# Patient Record
Sex: FEMALE | Race: WHITE | NOT HISPANIC OR LATINO | Employment: OTHER | URBAN - METROPOLITAN AREA
[De-identification: names, ages, dates, MRNs, and addresses within clinical notes are randomized per-mention and may not be internally consistent; named-entity substitution may affect disease eponyms.]

---

## 2016-12-30 RX ORDER — CYANOCOBALAMIN 1000 UG/ML
1000 INJECTION INTRAMUSCULAR; SUBCUTANEOUS ONCE
Status: COMPLETED | OUTPATIENT
Start: 2017-01-03 | End: 2017-01-03

## 2017-01-01 ENCOUNTER — GENERIC CONVERSION - ENCOUNTER (OUTPATIENT)
Dept: OBGYN CLINIC | Facility: CLINIC | Age: 82
End: 2017-01-01

## 2017-01-01 ENCOUNTER — APPOINTMENT (OUTPATIENT)
Dept: RADIOLOGY | Facility: CLINIC | Age: 82
End: 2017-01-01
Payer: COMMERCIAL

## 2017-01-01 ENCOUNTER — GENERIC CONVERSION - ENCOUNTER (OUTPATIENT)
Dept: OTHER | Facility: OTHER | Age: 82
End: 2017-01-01

## 2017-01-01 DIAGNOSIS — I12.9 HYPERTENSIVE CHRONIC KIDNEY DISEASE WITH STAGE 1 THROUGH STAGE 4 CHRONIC KIDNEY DISEASE, OR UNSPECIFIED CHRONIC KIDNEY DISEASE: ICD-10-CM

## 2017-01-01 DIAGNOSIS — S72.401D: ICD-10-CM

## 2017-01-01 DIAGNOSIS — Z91.81 HISTORY OF FALLING: ICD-10-CM

## 2017-01-01 PROCEDURE — 73552 X-RAY EXAM OF FEMUR 2/>: CPT

## 2017-01-03 ENCOUNTER — HOSPITAL ENCOUNTER (OUTPATIENT)
Dept: INFUSION CENTER | Facility: CLINIC | Age: 82
Discharge: HOME | End: 2017-01-03
Payer: MEDICARE

## 2017-01-03 PROCEDURE — 96372 THER/PROPH/DIAG INJ SC/IM: CPT

## 2017-01-03 RX ORDER — POTASSIUM CHLORIDE 20 MEQ/1
10 TABLET, EXTENDED RELEASE ORAL EVERY OTHER DAY
COMMUNITY
End: 2018-01-01 | Stop reason: HOSPADM

## 2017-01-03 RX ORDER — FUROSEMIDE 20 MG/1
40 TABLET ORAL EVERY OTHER DAY
Status: ON HOLD | COMMUNITY
End: 2018-01-01

## 2017-01-03 RX ORDER — WARFARIN SODIUM 1 MG/1
2.5 TABLET ORAL DAILY
COMMUNITY

## 2017-01-03 RX ADMIN — CYANOCOBALAMIN 1000 MCG: 1000 INJECTION, SOLUTION INTRAMUSCULAR at 11:57

## 2017-01-03 NOTE — PLAN OF CARE

## 2017-01-03 NOTE — PLAN OF CARE
Problem: PAIN - ADULT  Goal: Verbalizes/displays adequate comfort level or baseline comfort level  Interventions:  - Encourage patient to monitor pain and request assistance  - Assess pain using appropriate pain scale  - Administer analgesics based on type and severity of pain and evaluate response  - Implement non-pharmacological measures as appropriate and evaluate response  - Consider cultural and social influences on pain and pain management  - Notify physician/advanced practitioner if interventions unsuccessful or patient reports new pain  Outcome: Progressing

## 2017-01-03 NOTE — PROGRESS NOTES
Patient tolerated b12 injection to left deltoid as ordered and will RTO in 1 month for same  Daughter stated patient is on new BP medication but unsure of name  She will bring a list in at next appointment

## 2017-01-03 NOTE — PLAN OF CARE
Problem: Potential for Falls  Goal: Patient will remain free of falls  INTERVENTIONS:  - Assess patient frequently for physical needs  - Identify cognitive and physical deficits and behaviors that affect risk of falls    - [place] fall precautions as indicated by assessment   - Educate patient/family on patient safety including physical limitations  - Instruct patient to call for assistance with activity based on assessment  - Modify environment to reduce risk of injury  - Consider OT/PT consult to assist with strengthening/mobility   Outcome: Progressing

## 2017-01-12 ENCOUNTER — APPOINTMENT (OUTPATIENT)
Dept: LAB | Facility: CLINIC | Age: 82
End: 2017-01-12
Payer: MEDICARE

## 2017-01-12 ENCOUNTER — TRANSCRIBE ORDERS (OUTPATIENT)
Dept: LAB | Facility: CLINIC | Age: 82
End: 2017-01-12

## 2017-01-12 DIAGNOSIS — I48.91 ATRIAL FIBRILLATION, UNSPECIFIED TYPE (HCC): ICD-10-CM

## 2017-01-12 DIAGNOSIS — R06.02 SHORTNESS OF BREATH: ICD-10-CM

## 2017-01-12 DIAGNOSIS — R60.0 LOCALIZED EDEMA: ICD-10-CM

## 2017-01-12 LAB
INR PPP: 2.37 (ref 0.86–1.16)
PROTHROMBIN TIME: 25 SECONDS (ref 12–14.3)

## 2017-01-12 PROCEDURE — 85610 PROTHROMBIN TIME: CPT

## 2017-01-12 PROCEDURE — 36415 COLL VENOUS BLD VENIPUNCTURE: CPT

## 2017-01-25 ENCOUNTER — APPOINTMENT (OUTPATIENT)
Dept: LAB | Facility: CLINIC | Age: 82
End: 2017-01-25
Payer: MEDICARE

## 2017-01-25 DIAGNOSIS — I48.91 ATRIAL FIBRILLATION, UNSPECIFIED TYPE (HCC): ICD-10-CM

## 2017-01-25 DIAGNOSIS — R06.02 SHORTNESS OF BREATH: ICD-10-CM

## 2017-01-25 DIAGNOSIS — R60.0 LOCALIZED EDEMA: ICD-10-CM

## 2017-01-25 LAB
INR PPP: 1.94 (ref 0.86–1.16)
PROTHROMBIN TIME: 21.5 SECONDS (ref 12–14.3)

## 2017-01-25 PROCEDURE — 85610 PROTHROMBIN TIME: CPT

## 2017-01-25 PROCEDURE — 36415 COLL VENOUS BLD VENIPUNCTURE: CPT

## 2017-01-27 RX ORDER — CYANOCOBALAMIN 1000 UG/ML
1000 INJECTION INTRAMUSCULAR; SUBCUTANEOUS ONCE
Status: COMPLETED | OUTPATIENT
Start: 2017-01-30 | End: 2017-01-30

## 2017-01-30 ENCOUNTER — HOSPITAL ENCOUNTER (OUTPATIENT)
Dept: INFUSION CENTER | Facility: CLINIC | Age: 82
Discharge: HOME | End: 2017-01-30
Payer: MEDICARE

## 2017-01-30 PROCEDURE — 96372 THER/PROPH/DIAG INJ SC/IM: CPT

## 2017-01-30 RX ADMIN — CYANOCOBALAMIN 1000 MCG: 1000 INJECTION, SOLUTION INTRAMUSCULAR at 12:08

## 2017-01-30 NOTE — PROGRESS NOTES
Pt offers no complaints today  B12 given in right arm, band aid applied   Copy of schedule given, declines AVS

## 2017-02-08 ENCOUNTER — APPOINTMENT (OUTPATIENT)
Dept: LAB | Facility: CLINIC | Age: 82
End: 2017-02-08
Payer: MEDICARE

## 2017-02-08 DIAGNOSIS — R60.0 LOCALIZED EDEMA: ICD-10-CM

## 2017-02-08 DIAGNOSIS — R06.02 SHORTNESS OF BREATH: ICD-10-CM

## 2017-02-08 DIAGNOSIS — I48.91 ATRIAL FIBRILLATION, UNSPECIFIED TYPE (HCC): ICD-10-CM

## 2017-02-08 LAB
INR PPP: 3.08 (ref 0.86–1.16)
PROTHROMBIN TIME: 30.5 SECONDS (ref 12–14.3)

## 2017-02-08 PROCEDURE — 36415 COLL VENOUS BLD VENIPUNCTURE: CPT

## 2017-02-08 PROCEDURE — 85610 PROTHROMBIN TIME: CPT

## 2017-02-14 ENCOUNTER — GENERIC CONVERSION - ENCOUNTER (OUTPATIENT)
Dept: OTHER | Facility: OTHER | Age: 82
End: 2017-02-14

## 2017-02-23 ENCOUNTER — APPOINTMENT (OUTPATIENT)
Dept: LAB | Facility: CLINIC | Age: 82
End: 2017-02-23
Payer: MEDICARE

## 2017-02-23 ENCOUNTER — TRANSCRIBE ORDERS (OUTPATIENT)
Dept: LAB | Facility: CLINIC | Age: 82
End: 2017-02-23

## 2017-02-23 DIAGNOSIS — R06.02 SHORTNESS OF BREATH: ICD-10-CM

## 2017-02-23 DIAGNOSIS — R60.0 LOCALIZED EDEMA: ICD-10-CM

## 2017-02-23 DIAGNOSIS — I48.91 ATRIAL FIBRILLATION, UNSPECIFIED TYPE (HCC): ICD-10-CM

## 2017-02-23 LAB
INR PPP: 3 (ref 0.86–1.16)
PROTHROMBIN TIME: 29.9 SECONDS (ref 12–14.3)

## 2017-02-23 PROCEDURE — 36415 COLL VENOUS BLD VENIPUNCTURE: CPT

## 2017-02-23 PROCEDURE — 85610 PROTHROMBIN TIME: CPT

## 2017-02-24 RX ORDER — CYANOCOBALAMIN 1000 UG/ML
1000 INJECTION INTRAMUSCULAR; SUBCUTANEOUS ONCE
Status: COMPLETED | OUTPATIENT
Start: 2017-02-27 | End: 2017-02-27

## 2017-02-27 ENCOUNTER — HOSPITAL ENCOUNTER (OUTPATIENT)
Dept: INFUSION CENTER | Facility: CLINIC | Age: 82
Discharge: HOME | End: 2017-02-27
Payer: MEDICARE

## 2017-02-27 ENCOUNTER — APPOINTMENT (OUTPATIENT)
Dept: LAB | Facility: CLINIC | Age: 82
End: 2017-02-27
Payer: MEDICARE

## 2017-02-27 DIAGNOSIS — D50.9 IRON DEFICIENCY ANEMIA: ICD-10-CM

## 2017-02-27 LAB
ALBUMIN SERPL BCP-MCNC: 3.2 G/DL (ref 3.5–5)
ALP SERPL-CCNC: 137 U/L (ref 46–116)
ALT SERPL W P-5'-P-CCNC: 28 U/L (ref 12–78)
ANION GAP SERPL CALCULATED.3IONS-SCNC: 8 MMOL/L (ref 4–13)
AST SERPL W P-5'-P-CCNC: 34 U/L (ref 5–45)
BASOPHILS # BLD AUTO: 0.01 THOUSANDS/ΜL (ref 0–0.1)
BASOPHILS NFR BLD AUTO: 0 % (ref 0–1)
BILIRUB SERPL-MCNC: 0.9 MG/DL (ref 0.2–1)
BUN SERPL-MCNC: 35 MG/DL (ref 5–25)
CALCIUM SERPL-MCNC: 9 MG/DL (ref 8.3–10.1)
CHLORIDE SERPL-SCNC: 105 MMOL/L (ref 100–108)
CO2 SERPL-SCNC: 30 MMOL/L (ref 21–32)
CREAT SERPL-MCNC: 1.48 MG/DL (ref 0.6–1.3)
EOSINOPHIL # BLD AUTO: 0.01 THOUSAND/ΜL (ref 0–0.61)
EOSINOPHIL NFR BLD AUTO: 0 % (ref 0–6)
ERYTHROCYTE [DISTWIDTH] IN BLOOD BY AUTOMATED COUNT: 14.3 % (ref 11.6–15.1)
FERRITIN SERPL-MCNC: 16 NG/ML (ref 8–388)
GFR SERPL CREATININE-BSD FRML MDRD: 32.9 ML/MIN/1.73SQ M
GLUCOSE SERPL-MCNC: 79 MG/DL (ref 65–140)
HCT VFR BLD AUTO: 40.6 % (ref 34.8–46.1)
HGB BLD-MCNC: 12.7 G/DL (ref 11.5–15.4)
IRON SATN MFR SERPL: 15 %
IRON SERPL-MCNC: 62 UG/DL (ref 50–170)
LYMPHOCYTES # BLD AUTO: 1.25 THOUSANDS/ΜL (ref 0.6–4.47)
LYMPHOCYTES NFR BLD AUTO: 24 % (ref 14–44)
MCH RBC QN AUTO: 29.5 PG (ref 26.8–34.3)
MCHC RBC AUTO-ENTMCNC: 31.3 G/DL (ref 31.4–37.4)
MCV RBC AUTO: 94 FL (ref 82–98)
MONOCYTES # BLD AUTO: 0.32 THOUSAND/ΜL (ref 0.17–1.22)
MONOCYTES NFR BLD AUTO: 6 % (ref 4–12)
NEUTROPHILS # BLD AUTO: 3.57 THOUSANDS/ΜL (ref 1.85–7.62)
NEUTS SEG NFR BLD AUTO: 70 % (ref 43–75)
PLATELET # BLD AUTO: 179 THOUSANDS/UL (ref 149–390)
PMV BLD AUTO: 10.2 FL (ref 8.9–12.7)
POTASSIUM SERPL-SCNC: 3.5 MMOL/L (ref 3.5–5.3)
PROT SERPL-MCNC: 7.6 G/DL (ref 6.4–8.2)
RBC # BLD AUTO: 4.3 MILLION/UL (ref 3.81–5.12)
SODIUM SERPL-SCNC: 143 MMOL/L (ref 136–145)
TIBC SERPL-MCNC: 423 UG/DL (ref 250–450)
WBC # BLD AUTO: 5.16 THOUSAND/UL (ref 4.31–10.16)

## 2017-02-27 PROCEDURE — 83550 IRON BINDING TEST: CPT

## 2017-02-27 PROCEDURE — 96372 THER/PROPH/DIAG INJ SC/IM: CPT

## 2017-02-27 PROCEDURE — 85025 COMPLETE CBC W/AUTO DIFF WBC: CPT

## 2017-02-27 PROCEDURE — 80053 COMPREHEN METABOLIC PANEL: CPT

## 2017-02-27 PROCEDURE — 36415 COLL VENOUS BLD VENIPUNCTURE: CPT

## 2017-02-27 PROCEDURE — 82728 ASSAY OF FERRITIN: CPT

## 2017-02-27 PROCEDURE — 83540 ASSAY OF IRON: CPT

## 2017-02-27 RX ADMIN — CYANOCOBALAMIN 1000 MCG: 1000 INJECTION, SOLUTION INTRAMUSCULAR at 12:04

## 2017-03-09 ENCOUNTER — APPOINTMENT (OUTPATIENT)
Dept: LAB | Facility: CLINIC | Age: 82
End: 2017-03-09
Payer: MEDICARE

## 2017-03-09 DIAGNOSIS — R06.02 SHORTNESS OF BREATH: ICD-10-CM

## 2017-03-09 DIAGNOSIS — R60.0 LOCALIZED EDEMA: ICD-10-CM

## 2017-03-09 DIAGNOSIS — I48.91 ATRIAL FIBRILLATION, UNSPECIFIED TYPE (HCC): ICD-10-CM

## 2017-03-09 LAB
INR PPP: 2.84 (ref 0.86–1.16)
PROTHROMBIN TIME: 28.7 SECONDS (ref 12–14.3)

## 2017-03-09 PROCEDURE — 36415 COLL VENOUS BLD VENIPUNCTURE: CPT

## 2017-03-09 PROCEDURE — 85610 PROTHROMBIN TIME: CPT

## 2017-03-23 ENCOUNTER — GENERIC CONVERSION - ENCOUNTER (OUTPATIENT)
Dept: OTHER | Facility: OTHER | Age: 82
End: 2017-03-23

## 2017-03-24 RX ORDER — CYANOCOBALAMIN 1000 UG/ML
1000 INJECTION INTRAMUSCULAR; SUBCUTANEOUS ONCE
Status: COMPLETED | OUTPATIENT
Start: 2017-03-27 | End: 2017-03-27

## 2017-03-27 ENCOUNTER — HOSPITAL ENCOUNTER (OUTPATIENT)
Dept: INFUSION CENTER | Facility: CLINIC | Age: 82
Discharge: HOME | End: 2017-03-27
Payer: MEDICARE

## 2017-03-27 ENCOUNTER — ALLSCRIPTS OFFICE VISIT (OUTPATIENT)
Dept: OTHER | Facility: OTHER | Age: 82
End: 2017-03-27

## 2017-03-27 PROCEDURE — 96372 THER/PROPH/DIAG INJ SC/IM: CPT

## 2017-03-27 RX ADMIN — CYANOCOBALAMIN 1000 MCG: 1000 INJECTION, SOLUTION INTRAMUSCULAR at 11:53

## 2017-03-27 NOTE — PROGRESS NOTES
Pt resting with no complaints  Vitamin B12 given in VAN without issue  Declined AVS  Pt aware of next appt

## 2017-03-29 ENCOUNTER — APPOINTMENT (OUTPATIENT)
Dept: LAB | Facility: CLINIC | Age: 82
End: 2017-03-29
Payer: MEDICARE

## 2017-03-29 ENCOUNTER — TRANSCRIBE ORDERS (OUTPATIENT)
Dept: LAB | Facility: CLINIC | Age: 82
End: 2017-03-29

## 2017-03-29 DIAGNOSIS — R06.02 SHORTNESS OF BREATH: ICD-10-CM

## 2017-03-29 DIAGNOSIS — R60.0 LOCALIZED EDEMA: ICD-10-CM

## 2017-03-29 DIAGNOSIS — I48.91 ATRIAL FIBRILLATION, UNSPECIFIED TYPE (HCC): ICD-10-CM

## 2017-03-29 LAB
INR PPP: 2.47 (ref 0.86–1.16)
PROTHROMBIN TIME: 25.8 SECONDS (ref 12–14.3)

## 2017-03-29 PROCEDURE — 36415 COLL VENOUS BLD VENIPUNCTURE: CPT

## 2017-03-29 PROCEDURE — 85610 PROTHROMBIN TIME: CPT

## 2017-04-05 ENCOUNTER — ALLSCRIPTS OFFICE VISIT (OUTPATIENT)
Dept: OTHER | Facility: OTHER | Age: 82
End: 2017-04-05

## 2017-04-20 ENCOUNTER — APPOINTMENT (OUTPATIENT)
Dept: LAB | Facility: CLINIC | Age: 82
End: 2017-04-20
Payer: MEDICARE

## 2017-04-20 ENCOUNTER — GENERIC CONVERSION - ENCOUNTER (OUTPATIENT)
Dept: OTHER | Facility: OTHER | Age: 82
End: 2017-04-20

## 2017-04-20 DIAGNOSIS — R06.02 SHORTNESS OF BREATH: ICD-10-CM

## 2017-04-20 DIAGNOSIS — R60.0 LOCALIZED EDEMA: ICD-10-CM

## 2017-04-20 DIAGNOSIS — E03.9 HYPOTHYROIDISM: ICD-10-CM

## 2017-04-20 DIAGNOSIS — I48.91 ATRIAL FIBRILLATION, UNSPECIFIED TYPE (HCC): ICD-10-CM

## 2017-04-20 LAB
INR PPP: 1.96 (ref 0.86–1.16)
PROTHROMBIN TIME: 21.7 SECONDS (ref 12–14.3)
T4 FREE SERPL-MCNC: 1.57 NG/DL (ref 0.76–1.46)
TSH SERPL DL<=0.05 MIU/L-ACNC: 0.35 UIU/ML (ref 0.36–3.74)

## 2017-04-20 PROCEDURE — 36415 COLL VENOUS BLD VENIPUNCTURE: CPT

## 2017-04-20 PROCEDURE — 84439 ASSAY OF FREE THYROXINE: CPT

## 2017-04-20 PROCEDURE — 85610 PROTHROMBIN TIME: CPT

## 2017-04-20 PROCEDURE — 84443 ASSAY THYROID STIM HORMONE: CPT

## 2017-04-21 ENCOUNTER — GENERIC CONVERSION - ENCOUNTER (OUTPATIENT)
Dept: OTHER | Facility: OTHER | Age: 82
End: 2017-04-21

## 2017-04-28 RX ORDER — SODIUM CHLORIDE 9 MG/ML
20 INJECTION, SOLUTION INTRAVENOUS CONTINUOUS
Status: DISCONTINUED | OUTPATIENT
Start: 2017-05-01 | End: 2017-05-04 | Stop reason: HOSPADM

## 2017-04-28 RX ORDER — CYANOCOBALAMIN 1000 UG/ML
1000 INJECTION INTRAMUSCULAR; SUBCUTANEOUS ONCE
Status: COMPLETED | OUTPATIENT
Start: 2017-05-01 | End: 2017-05-01

## 2017-05-01 ENCOUNTER — HOSPITAL ENCOUNTER (OUTPATIENT)
Dept: INFUSION CENTER | Facility: CLINIC | Age: 82
Discharge: HOME | End: 2017-05-01
Payer: MEDICARE

## 2017-05-01 VITALS
HEART RATE: 65 BPM | RESPIRATION RATE: 20 BRPM | DIASTOLIC BLOOD PRESSURE: 62 MMHG | TEMPERATURE: 97.6 F | SYSTOLIC BLOOD PRESSURE: 133 MMHG

## 2017-05-01 PROCEDURE — 96372 THER/PROPH/DIAG INJ SC/IM: CPT

## 2017-05-01 PROCEDURE — 96365 THER/PROPH/DIAG IV INF INIT: CPT

## 2017-05-01 RX ADMIN — CYANOCOBALAMIN 1000 MCG: 1000 INJECTION, SOLUTION INTRAMUSCULAR at 15:12

## 2017-05-01 RX ADMIN — IRON SUCROSE 300 MG: 20 INJECTION, SOLUTION INTRAVENOUS at 14:08

## 2017-05-01 RX ADMIN — SODIUM CHLORIDE 20 ML/HR: 0.9 INJECTION, SOLUTION INTRAVENOUS at 13:59

## 2017-05-17 ENCOUNTER — TRANSCRIBE ORDERS (OUTPATIENT)
Dept: LAB | Facility: CLINIC | Age: 82
End: 2017-05-17

## 2017-05-17 ENCOUNTER — GENERIC CONVERSION - ENCOUNTER (OUTPATIENT)
Dept: OTHER | Facility: OTHER | Age: 82
End: 2017-05-17

## 2017-05-17 ENCOUNTER — APPOINTMENT (OUTPATIENT)
Dept: LAB | Facility: CLINIC | Age: 82
End: 2017-05-17
Payer: MEDICARE

## 2017-05-17 DIAGNOSIS — R06.02 SHORTNESS OF BREATH: ICD-10-CM

## 2017-05-17 DIAGNOSIS — E03.9 HYPOTHYROIDISM: ICD-10-CM

## 2017-05-17 DIAGNOSIS — I48.91 ATRIAL FIBRILLATION, UNSPECIFIED TYPE (HCC): ICD-10-CM

## 2017-05-17 DIAGNOSIS — R60.0 LOCALIZED EDEMA: ICD-10-CM

## 2017-05-17 LAB
INR PPP: 1.26 (ref 0.86–1.16)
PROTHROMBIN TIME: 16.2 SECONDS (ref 12.1–14.4)
TSH SERPL DL<=0.05 MIU/L-ACNC: 1.48 UIU/ML (ref 0.36–3.74)

## 2017-05-17 PROCEDURE — 85610 PROTHROMBIN TIME: CPT

## 2017-05-17 PROCEDURE — 36415 COLL VENOUS BLD VENIPUNCTURE: CPT

## 2017-05-17 PROCEDURE — 84443 ASSAY THYROID STIM HORMONE: CPT

## 2017-05-25 ENCOUNTER — APPOINTMENT (OUTPATIENT)
Dept: LAB | Facility: CLINIC | Age: 82
End: 2017-05-25
Payer: MEDICARE

## 2017-05-25 DIAGNOSIS — R60.0 LOCALIZED EDEMA: ICD-10-CM

## 2017-05-25 DIAGNOSIS — R06.02 SHORTNESS OF BREATH: ICD-10-CM

## 2017-05-25 DIAGNOSIS — I48.91 ATRIAL FIBRILLATION, UNSPECIFIED TYPE (HCC): ICD-10-CM

## 2017-05-25 LAB
INR PPP: 1.71 (ref 0.86–1.16)
PROTHROMBIN TIME: 20.7 SECONDS (ref 12.1–14.4)

## 2017-05-25 PROCEDURE — 36415 COLL VENOUS BLD VENIPUNCTURE: CPT

## 2017-05-25 PROCEDURE — 85610 PROTHROMBIN TIME: CPT

## 2017-05-26 RX ORDER — CYANOCOBALAMIN 1000 UG/ML
1000 INJECTION INTRAMUSCULAR; SUBCUTANEOUS ONCE
Status: COMPLETED | OUTPATIENT
Start: 2017-05-30 | End: 2017-05-30

## 2017-05-30 ENCOUNTER — HOSPITAL ENCOUNTER (OUTPATIENT)
Dept: INFUSION CENTER | Facility: CLINIC | Age: 82
Discharge: HOME | End: 2017-05-30
Payer: MEDICARE

## 2017-05-30 PROCEDURE — 96372 THER/PROPH/DIAG INJ SC/IM: CPT

## 2017-05-30 RX ADMIN — CYANOCOBALAMIN 1000 MCG: 1000 INJECTION, SOLUTION INTRAMUSCULAR at 14:36

## 2017-05-30 NOTE — PROGRESS NOTES
Pt  offers no complaints  B12 admin  IM in VAN without incident  AVS declined, next appt  confirmed

## 2017-05-30 NOTE — PLAN OF CARE
Problem: Potential for Falls  Goal: Patient will remain free of falls  INTERVENTIONS:  - Assess patient frequently for physical needs  - Identify cognitive and physical deficits and behaviors that affect risk of falls    - [place] fall precautions as indicated by assessment   - Educate patient/family on patient safety including physical limitations  - Instruct patient to call for assistance with activity based on assessment  - Modify environment to reduce risk of injury  - Consider OT/PT consult to assist with strengthening/mobility   Outcome: Progressing

## 2017-06-01 ENCOUNTER — APPOINTMENT (OUTPATIENT)
Dept: LAB | Facility: CLINIC | Age: 82
End: 2017-06-01
Payer: MEDICARE

## 2017-06-01 DIAGNOSIS — R06.02 SHORTNESS OF BREATH: ICD-10-CM

## 2017-06-01 DIAGNOSIS — R60.0 LOCALIZED EDEMA: ICD-10-CM

## 2017-06-01 DIAGNOSIS — I48.91 ATRIAL FIBRILLATION, UNSPECIFIED TYPE (HCC): ICD-10-CM

## 2017-06-01 LAB
INR PPP: 2.01 (ref 0.86–1.16)
PROTHROMBIN TIME: 23.5 SECONDS (ref 12.1–14.4)

## 2017-06-01 PROCEDURE — 36415 COLL VENOUS BLD VENIPUNCTURE: CPT

## 2017-06-01 PROCEDURE — 85610 PROTHROMBIN TIME: CPT

## 2017-06-05 DIAGNOSIS — E03.9 HYPOTHYROIDISM: ICD-10-CM

## 2017-06-15 ENCOUNTER — APPOINTMENT (OUTPATIENT)
Dept: LAB | Facility: CLINIC | Age: 82
End: 2017-06-15
Payer: MEDICARE

## 2017-06-15 DIAGNOSIS — I48.91 ATRIAL FIBRILLATION, UNSPECIFIED TYPE (HCC): ICD-10-CM

## 2017-06-15 DIAGNOSIS — R60.0 LOCALIZED EDEMA: ICD-10-CM

## 2017-06-15 DIAGNOSIS — R06.02 SHORTNESS OF BREATH: ICD-10-CM

## 2017-06-15 LAB
INR PPP: 2.26 (ref 0.86–1.16)
PROTHROMBIN TIME: 25.8 SECONDS (ref 12.1–14.4)

## 2017-06-15 PROCEDURE — 36415 COLL VENOUS BLD VENIPUNCTURE: CPT

## 2017-06-15 PROCEDURE — 85610 PROTHROMBIN TIME: CPT

## 2017-06-23 RX ORDER — CYANOCOBALAMIN 1000 UG/ML
1000 INJECTION INTRAMUSCULAR; SUBCUTANEOUS ONCE
Status: COMPLETED | OUTPATIENT
Start: 2017-06-26 | End: 2017-06-26

## 2017-06-26 ENCOUNTER — HOSPITAL ENCOUNTER (OUTPATIENT)
Dept: INFUSION CENTER | Facility: CLINIC | Age: 82
Discharge: HOME | End: 2017-06-26
Payer: MEDICARE

## 2017-06-26 PROCEDURE — 96372 THER/PROPH/DIAG INJ SC/IM: CPT

## 2017-06-26 RX ADMIN — CYANOCOBALAMIN 1000 MCG: 1000 INJECTION, SOLUTION INTRAMUSCULAR at 11:36

## 2017-06-26 NOTE — PLAN OF CARE
Problem: Potential for Falls  Goal: Patient will remain free of falls  INTERVENTIONS:  - Assess patient frequently for physical needs  -  Identify cognitive and physical deficits and behaviors that affect risk of falls    -  [place] fall precautions as indicated by assessment   - Educate patient/family on patient safety including physical limitations  - Instruct patient to call for assistance with activity based on assessment  - Modify environment to reduce risk of injury  - Consider OT/PT consult to assist with strengthening/mobility   Outcome: Progressing

## 2017-06-26 NOTE — PROGRESS NOTES
Pt to clinic for monthly B12 injection  She is feeling well and offers no c/o  She denies any changes in health history or medications since last visit  IM injection given as per orders without incident  Pt confirms next month's appt

## 2017-07-05 ENCOUNTER — APPOINTMENT (OUTPATIENT)
Dept: LAB | Facility: CLINIC | Age: 82
End: 2017-07-05
Payer: MEDICARE

## 2017-07-05 ENCOUNTER — TRANSCRIBE ORDERS (OUTPATIENT)
Dept: LAB | Facility: CLINIC | Age: 82
End: 2017-07-05

## 2017-07-05 ENCOUNTER — ALLSCRIPTS OFFICE VISIT (OUTPATIENT)
Dept: OTHER | Facility: OTHER | Age: 82
End: 2017-07-05

## 2017-07-05 DIAGNOSIS — I48.91 ATRIAL FIBRILLATION, UNSPECIFIED TYPE (HCC): ICD-10-CM

## 2017-07-05 DIAGNOSIS — R06.02 SHORTNESS OF BREATH: ICD-10-CM

## 2017-07-05 DIAGNOSIS — R60.0 LOCALIZED EDEMA: ICD-10-CM

## 2017-07-05 LAB
INR PPP: 2.62 (ref 0.86–1.16)
PROTHROMBIN TIME: 29 SECONDS (ref 12.1–14.4)

## 2017-07-05 PROCEDURE — 36415 COLL VENOUS BLD VENIPUNCTURE: CPT

## 2017-07-05 PROCEDURE — 85610 PROTHROMBIN TIME: CPT

## 2017-07-20 RX ORDER — CYANOCOBALAMIN 1000 UG/ML
1000 INJECTION INTRAMUSCULAR; SUBCUTANEOUS ONCE
Status: COMPLETED | OUTPATIENT
Start: 2017-07-24 | End: 2017-07-24

## 2017-07-24 ENCOUNTER — APPOINTMENT (OUTPATIENT)
Dept: LAB | Facility: CLINIC | Age: 82
End: 2017-07-24
Payer: MEDICARE

## 2017-07-24 ENCOUNTER — HOSPITAL ENCOUNTER (OUTPATIENT)
Dept: INFUSION CENTER | Facility: CLINIC | Age: 82
Discharge: HOME | End: 2017-07-24
Payer: MEDICARE

## 2017-07-24 DIAGNOSIS — R06.02 SHORTNESS OF BREATH: ICD-10-CM

## 2017-07-24 DIAGNOSIS — I48.91 ATRIAL FIBRILLATION, UNSPECIFIED TYPE (HCC): ICD-10-CM

## 2017-07-24 DIAGNOSIS — R60.0 LOCALIZED EDEMA: ICD-10-CM

## 2017-07-24 LAB
INR PPP: 2.45 (ref 0.86–1.16)
PROTHROMBIN TIME: 27.5 SECONDS (ref 12.1–14.4)

## 2017-07-24 PROCEDURE — 96372 THER/PROPH/DIAG INJ SC/IM: CPT

## 2017-07-24 PROCEDURE — 85610 PROTHROMBIN TIME: CPT

## 2017-07-24 PROCEDURE — 36415 COLL VENOUS BLD VENIPUNCTURE: CPT

## 2017-07-24 RX ADMIN — CYANOCOBALAMIN 1000 MCG: 1000 INJECTION, SOLUTION INTRAMUSCULAR at 13:38

## 2017-08-17 RX ORDER — CYANOCOBALAMIN 1000 UG/ML
1000 INJECTION INTRAMUSCULAR; SUBCUTANEOUS ONCE
Status: COMPLETED | OUTPATIENT
Start: 2017-08-21 | End: 2017-08-21

## 2017-08-21 ENCOUNTER — HOSPITAL ENCOUNTER (OUTPATIENT)
Dept: INFUSION CENTER | Facility: CLINIC | Age: 82
Discharge: HOME | End: 2017-08-21
Payer: MEDICARE

## 2017-08-21 ENCOUNTER — TRANSCRIBE ORDERS (OUTPATIENT)
Dept: LAB | Facility: CLINIC | Age: 82
End: 2017-08-21

## 2017-08-21 ENCOUNTER — APPOINTMENT (OUTPATIENT)
Dept: LAB | Facility: CLINIC | Age: 82
End: 2017-08-21
Payer: MEDICARE

## 2017-08-21 DIAGNOSIS — I48.91 ATRIAL FIBRILLATION, UNSPECIFIED TYPE (HCC): ICD-10-CM

## 2017-08-21 DIAGNOSIS — R06.02 SHORTNESS OF BREATH: ICD-10-CM

## 2017-08-21 DIAGNOSIS — R60.0 LOCALIZED EDEMA: ICD-10-CM

## 2017-08-21 LAB
INR PPP: 2.31 (ref 0.86–1.16)
PROTHROMBIN TIME: 26.2 SECONDS (ref 12.1–14.4)

## 2017-08-21 PROCEDURE — 85610 PROTHROMBIN TIME: CPT

## 2017-08-21 PROCEDURE — 96372 THER/PROPH/DIAG INJ SC/IM: CPT

## 2017-08-21 PROCEDURE — 36415 COLL VENOUS BLD VENIPUNCTURE: CPT

## 2017-08-21 RX ADMIN — CYANOCOBALAMIN 1000 MCG: 1000 INJECTION, SOLUTION INTRAMUSCULAR at 13:34

## 2017-09-15 RX ORDER — CYANOCOBALAMIN 1000 UG/ML
1000 INJECTION INTRAMUSCULAR; SUBCUTANEOUS ONCE
Status: DISCONTINUED | OUTPATIENT
Start: 2017-09-18 | End: 2017-09-21 | Stop reason: HOSPADM

## 2017-09-18 ENCOUNTER — HOSPITAL ENCOUNTER (OUTPATIENT)
Dept: INFUSION CENTER | Facility: CLINIC | Age: 82
Discharge: HOME | End: 2017-09-18
Payer: MEDICARE

## 2017-09-20 ENCOUNTER — APPOINTMENT (OUTPATIENT)
Dept: LAB | Facility: CLINIC | Age: 82
End: 2017-09-20
Payer: MEDICARE

## 2017-09-20 ENCOUNTER — HOSPITAL ENCOUNTER (OUTPATIENT)
Dept: INFUSION CENTER | Facility: CLINIC | Age: 82
Discharge: HOME | End: 2017-09-20
Payer: MEDICARE

## 2017-09-20 ENCOUNTER — TRANSCRIBE ORDERS (OUTPATIENT)
Dept: LAB | Facility: CLINIC | Age: 82
End: 2017-09-20

## 2017-09-20 DIAGNOSIS — R60.0 LOCALIZED EDEMA: ICD-10-CM

## 2017-09-20 DIAGNOSIS — I48.91 ATRIAL FIBRILLATION, UNSPECIFIED TYPE (HCC): ICD-10-CM

## 2017-09-20 DIAGNOSIS — R10.9 ABDOMINAL PAIN: ICD-10-CM

## 2017-09-20 DIAGNOSIS — N18.9 CHRONIC KIDNEY DISEASE: ICD-10-CM

## 2017-09-20 DIAGNOSIS — R06.02 SHORTNESS OF BREATH: ICD-10-CM

## 2017-09-20 LAB
BASOPHILS # BLD AUTO: 0.05 THOUSANDS/ΜL (ref 0–0.1)
BASOPHILS NFR BLD AUTO: 1 % (ref 0–1)
EOSINOPHIL # BLD AUTO: 0.09 THOUSAND/ΜL (ref 0–0.61)
EOSINOPHIL NFR BLD AUTO: 2 % (ref 0–6)
ERYTHROCYTE [DISTWIDTH] IN BLOOD BY AUTOMATED COUNT: 14.1 % (ref 11.6–15.1)
FERRITIN SERPL-MCNC: 20 NG/ML (ref 8–388)
HCT VFR BLD AUTO: 42 % (ref 34.8–46.1)
HGB BLD-MCNC: 13.2 G/DL (ref 11.5–15.4)
INR PPP: 3.07 (ref 0.86–1.16)
IRON SATN MFR SERPL: 15 %
IRON SERPL-MCNC: 71 UG/DL (ref 50–170)
LYMPHOCYTES # BLD AUTO: 1.1 THOUSANDS/ΜL (ref 0.6–4.47)
LYMPHOCYTES NFR BLD AUTO: 24 % (ref 14–44)
MCH RBC QN AUTO: 30.8 PG (ref 26.8–34.3)
MCHC RBC AUTO-ENTMCNC: 31.4 G/DL (ref 31.4–37.4)
MCV RBC AUTO: 98 FL (ref 82–98)
MONOCYTES # BLD AUTO: 0.3 THOUSAND/ΜL (ref 0.17–1.22)
MONOCYTES NFR BLD AUTO: 6 % (ref 4–12)
NEUTROPHILS # BLD AUTO: 3.13 THOUSANDS/ΜL (ref 1.85–7.62)
NEUTS SEG NFR BLD AUTO: 67 % (ref 43–75)
PLATELET # BLD AUTO: 199 THOUSANDS/UL (ref 149–390)
PMV BLD AUTO: 10.4 FL (ref 8.9–12.7)
PROTHROMBIN TIME: 32.9 SECONDS (ref 12.1–14.4)
RBC # BLD AUTO: 4.29 MILLION/UL (ref 3.81–5.12)
TIBC SERPL-MCNC: 480 UG/DL (ref 250–450)
WBC # BLD AUTO: 4.67 THOUSAND/UL (ref 4.31–10.16)

## 2017-09-20 PROCEDURE — 85025 COMPLETE CBC W/AUTO DIFF WBC: CPT

## 2017-09-20 PROCEDURE — 85610 PROTHROMBIN TIME: CPT

## 2017-09-20 PROCEDURE — 36415 COLL VENOUS BLD VENIPUNCTURE: CPT

## 2017-09-20 PROCEDURE — 96372 THER/PROPH/DIAG INJ SC/IM: CPT

## 2017-09-20 PROCEDURE — 82728 ASSAY OF FERRITIN: CPT

## 2017-09-20 PROCEDURE — 83540 ASSAY OF IRON: CPT

## 2017-09-20 PROCEDURE — 83550 IRON BINDING TEST: CPT

## 2017-09-20 RX ORDER — CYANOCOBALAMIN 1000 UG/ML
1000 INJECTION INTRAMUSCULAR; SUBCUTANEOUS ONCE
Status: COMPLETED | OUTPATIENT
Start: 2017-09-20 | End: 2017-09-20

## 2017-09-20 RX ADMIN — CYANOCOBALAMIN 1000 MCG: 1000 INJECTION, SOLUTION INTRAMUSCULAR at 11:47

## 2017-09-20 NOTE — PLAN OF CARE
Problem: Potential for Falls  Goal: Patient will remain free of falls  INTERVENTIONS:  - Assess patient frequently for physical needs  -  Identify cognitive and physical deficits and behaviors that affect risk of falls    -  [place] fall precautions as indicated by assessment   - Educate patient/family on patient safety including physical limitations  - Instruct patient to call for assistance with activity based on assessment  - Modify environment to reduce risk of injury  - Consider OT/PT consult to assist with strengthening/mobility   Outcome: Progressing

## 2017-09-21 ENCOUNTER — GENERIC CONVERSION - ENCOUNTER (OUTPATIENT)
Dept: OTHER | Facility: OTHER | Age: 82
End: 2017-09-21

## 2017-10-11 ENCOUNTER — ALLSCRIPTS OFFICE VISIT (OUTPATIENT)
Dept: OTHER | Facility: OTHER | Age: 82
End: 2017-10-11

## 2017-10-11 ENCOUNTER — TRANSCRIBE ORDERS (OUTPATIENT)
Dept: LAB | Facility: CLINIC | Age: 82
End: 2017-10-11

## 2017-10-11 ENCOUNTER — GENERIC CONVERSION - ENCOUNTER (OUTPATIENT)
Dept: OTHER | Facility: OTHER | Age: 82
End: 2017-10-11

## 2017-10-11 ENCOUNTER — APPOINTMENT (OUTPATIENT)
Dept: LAB | Facility: CLINIC | Age: 82
End: 2017-10-11
Payer: MEDICARE

## 2017-10-11 DIAGNOSIS — I48.91 ATRIAL FIBRILLATION, UNSPECIFIED TYPE (HCC): ICD-10-CM

## 2017-10-11 DIAGNOSIS — R60.0 LOCALIZED EDEMA: ICD-10-CM

## 2017-10-11 DIAGNOSIS — R06.02 SHORTNESS OF BREATH: Primary | ICD-10-CM

## 2017-10-11 DIAGNOSIS — E03.9 HYPOTHYROIDISM: ICD-10-CM

## 2017-10-11 LAB
ALBUMIN SERPL BCP-MCNC: 3.2 G/DL (ref 3.5–5)
ALP SERPL-CCNC: 147 U/L (ref 46–116)
ALT SERPL W P-5'-P-CCNC: 24 U/L (ref 12–78)
ANION GAP SERPL CALCULATED.3IONS-SCNC: 6 MMOL/L (ref 4–13)
AST SERPL W P-5'-P-CCNC: 34 U/L (ref 5–45)
BILIRUB SERPL-MCNC: 1.3 MG/DL (ref 0.2–1)
BUN SERPL-MCNC: 31 MG/DL (ref 5–25)
CALCIUM SERPL-MCNC: 9.4 MG/DL (ref 8.3–10.1)
CHLORIDE SERPL-SCNC: 104 MMOL/L (ref 100–108)
CHOLEST SERPL-MCNC: 125 MG/DL (ref 50–200)
CO2 SERPL-SCNC: 30 MMOL/L (ref 21–32)
CREAT SERPL-MCNC: 1.47 MG/DL (ref 0.6–1.3)
GFR SERPL CREATININE-BSD FRML MDRD: 30 ML/MIN/1.73SQ M
GLUCOSE P FAST SERPL-MCNC: 100 MG/DL (ref 65–99)
HDLC SERPL-MCNC: 46 MG/DL (ref 40–60)
INR PPP: 2.62 (ref 0.86–1.16)
LDLC SERPL CALC-MCNC: 57 MG/DL (ref 0–100)
POTASSIUM SERPL-SCNC: 4.1 MMOL/L (ref 3.5–5.3)
PROT SERPL-MCNC: 7.7 G/DL (ref 6.4–8.2)
PROTHROMBIN TIME: 29 SECONDS (ref 12.1–14.4)
SODIUM SERPL-SCNC: 140 MMOL/L (ref 136–145)
TRIGL SERPL-MCNC: 111 MG/DL
TSH SERPL DL<=0.05 MIU/L-ACNC: 1.02 UIU/ML (ref 0.36–3.74)

## 2017-10-11 PROCEDURE — 36415 COLL VENOUS BLD VENIPUNCTURE: CPT

## 2017-10-11 PROCEDURE — 80061 LIPID PANEL: CPT

## 2017-10-11 PROCEDURE — 84443 ASSAY THYROID STIM HORMONE: CPT

## 2017-10-11 PROCEDURE — 85610 PROTHROMBIN TIME: CPT

## 2017-10-11 PROCEDURE — 80053 COMPREHEN METABOLIC PANEL: CPT

## 2017-10-13 RX ORDER — CYANOCOBALAMIN 1000 UG/ML
1000 INJECTION INTRAMUSCULAR; SUBCUTANEOUS ONCE
Status: COMPLETED | OUTPATIENT
Start: 2017-10-16 | End: 2017-10-16

## 2017-10-13 NOTE — PROGRESS NOTES
Assessment  1  Anemia in CKD (chronic kidney disease) (285 21) (N18 9,D63 1)   2  Vitamin B12 deficiency (266 2) (E53 8)    Plan  Vitamin B12 deficiency    · Drink plenty of fluids ; Status:Complete;   Done: [id number]   Ordered; For:Vitamin B12 deficiency; Ordered By:Miley Hammonds;   · Follow-up visit in 6 months Evaluation and Treatment  Follow-up  Status: Hold For -  Scheduling  Requested for: [id number]   Ordered; For: Vitamin B12 deficiency; Ordered By: Rosa Becker Performed:  Due: 07TWS6583   · (1) CBC/PLT/DIFF; Status:Active; Requested for:04Apr2017;    Perform:formerly [facility]; QCD:[id number]; Ordered; For:Vitamin B12 deficiency; Ordered By:Miley Hammonds;   · (1) COMPREHENSIVE METABOLIC PANEL; Status:Active; Requested for:04Apr2017;    Perform:formerly [facility]; WMX:[id number]; Ordered; For:Vitamin B12 deficiency; Ordered By:Miley Hammonds;   · (1) FERRITIN; Status:Active; Requested for:04Apr2017;    Perform:formerly [facility]; ATY:[id number]; Ordered; For:Vitamin B12 deficiency; Ordered By:Felix Hammonds;   · (1) IRON SATURATION %, TIBC; Status:Active; Requested for:04Apr2017;    Perform:formerly [facility]; GWZ:[id number]; Ordered; For:Vitamin B12 deficiency; Ordered By:Miley Hammonds;    Discussion/Summary  Discussion Summary:   In summary, this is an 28-year-old female history of anemia as outlined  CBC shows normal hemoglobin  Iron saturation is 15%  Ferritin is normal iron infusion was April 2017  Observation is recommended at this time  she is feeling fairly well  She does have some dyspnea with exertion but is able to walk with her walker and go shopping without difficulty  she notes that she is quite sensitive to oral sodium intake and monitors this trying to keep it at a minimum  all is well see her back in 6 months for review with repeat lab work just prior to that visit     Counseling Documentation With Imm: The patient was counseled regarding diagnostic results,-instructions for management,-patient and family education,-impressions  total time of encounter was 15 minutes  Chief Complaint  Chief Complaint Free Text Note Form: CC follow-up regarding anemia  History of Present Illness  HPI: Patient believes she was diagnosed with pernicious anemia approximately 1990  She had been on chronic B12 injections  2012 decision was made to hold her B12 shots  After 4 months  She felt weak and tired  B12 shots were restarted  In February 2013  Her B12 level is less than 200  colonoscopy- incomplete  2015- EGD gastric polyp removed  +Hiatal hernia  2015- got Feraheme x 2 doses  Previous Therapy:   Feraheme Early July 2013 , weekly B12 through July into early aug 2013  Current Therapy: B12 1000 mcg q month  Interval History: Faye Bernard and fx left hip, December 2015  Review of Systems  Complete-Female:   Constitutional: recent 10 lbs past few months  lb weight loss, but-No fever, no chills, feels well, no tiredness, no recent weight gain or weight loss  Eyes: No complaints of eye pain, no red eyes, no eyesight problems, no discharge, no dry eyes, no itching of eyes  ENT: no complaints of earache, no loss of hearing, no nose bleeds, no nasal discharge, no sore throat, no hoarseness  Cardiovascular: No complaints of slow heart rate, no fast heart rate, no chest pain, no palpitations, no leg claudication, no lower extremity edema  Respiratory: shortness of breath during exertion  Gastrointestinal: No complaints of abdominal pain, no constipation, no nausea or vomiting, no diarrhea, no bloody stools  Genitourinary: No complaints of dysuria, no incontinence, no pelvic pain, no dysmenorrhea, no vaginal discharge or bleeding  Musculoskeletal: arthralgias-and-weakness in the knees  occasional, but better than it had been  Integumentary: No complaints of skin rash or lesions, no itching, no skin wounds, no breast pain or lump  Neurological: tingling-and-some BL toe tingling in bed  lasts 10 minutes or so  Resolves woithout intervention  Psychiatric: Not suicidal, no sleep disturbance, no anxiety or depression, no change in personality, no emotional problems  Endocrine: No complaints of proptosis, no hot flashes, no muscle weakness, no deepening of the voice, no feelings of weakness  Hematologic/Lymphatic: No complaints of swollen glands, no swollen glands in the neck, does not bleed easily, does not bruise easily  Active Problems  1  Abdominal discomfort (789 00) (R10 9)   2  Abdominal pain (789 00) (R10 9)   3  Abdominal swelling, mass, or lump (789 30) (R19 00)   4  Abnormal finding on imaging (793 99) (R93 8)   5  Aftercare following surgery of the musculoskeletal system (V58 78) (Z47 89)   6  Anemia in CKD (chronic kidney disease) (285 21) (N18 9,D63 1)   7  Atrial fibrillation (427 31) (I48 91)   8  Back pain (724 5) (M54 9)   9  Benign hypertension with chronic kidney disease, stage III (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,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) (I12 9,N18 3)   10  Benign paroxysmal vertigo, unspecified laterality (386 11) (H81 10)   11  Carotid artery stenosis (433 10) (I65 29)   12  Constipation (564 00) (K59 00)   13  Current use of long term anticoagulation (V58 61) (Z79 01)   14  Dark stools (792 1) (R19 5)   15  Difficulty breathing (786 09) (R06 89)   16  Encounter to discuss test results (V65 49) (Z71 89)   17  Gastritis (535 50) (K29 70)   18  Heme + stool (792 1) (R19 5)   19  Hiatal hernia (553 3) (K44 9)   20  History of fall (V15 88) (Z91 81)   21  History of hip fracture (V15 51) (Z87 81)   22  Hypercholesterolemia (272 0) (E78 00)   23  Hyperhomocysteinemia (270 4) (E72 11)   24  Hypothyroidism (244 9) (E03 9)   25  Iron deficiency anemia (280 9) (D50 9)   26  Left knee pain (719 46) (M25 562)   27  Medicare annual wellness visit, initial (V70 0) (Z00 00)   28  Need for prophylactic vaccination and inoculation against influenza (V04 81) (Z23)   29  Osteopenia (733 90) (M85 80)   30  Paroxysmal tachycardia (427 2) (I47 9)   31  Pedal edema (782 3) (R60 0)   32  Screening for genitourinary condition (V81 6) (Z13 89)   33  Screening for neurological condition (V80 09) (Z13 89)   34  Sick sinus syndrome (427 81) (I49 5)   35  Thyroid Nodule   36  Vitamin B12 deficiency (266 2) (E53 8)   37  Vitamin D deficiency (268 9) (E55 9)    Past Medical History  1  History of Angioedema, initial encounter (995 1) (T78 3XXA)   2  History of Breast cancer screening (V76 10) (Z12 39)   3  History of Colon cancer screening (V76 51) (Z12 11)   4  History of Difficulty chewing (783 3) (R63 3)   5  History of anemia (V12 3) (Z86 2)   6  History of iron deficiency anemia (V12 3) (Z86 2)   7  History of paroxysmal atrial tachycardia (V12 59) (Z86 79)   8  History of urinary tract infection (V13 02) (Z87 440)   9  History of Other screening mammogram (V76 12) (Z12 31)   10  History of Pain, dental (525 9) (K08 89)   11  History of Swelling of calf (729 81) (M79 89)   12  History of Urinary frequency (788 41) (R35 0)   13  History of Weight loss (783 21) (R63 4)    Surgical History  1  History of Anterior Colporrhaphy, Repair Of Cystocele   2  History of Appendectomy   3  History of Cataract Surgery   4  History of Hysterectomy   5  History of Knee Replacement   6  History of Oophorectomy   7  History of Open Treatment Intertrochanteric Fracture, Intramed Implant   8  History of Rectocele Repair    Family History  Mother    1  Family history of Kidney Cancer (V16 51)  Father    2  Family history of Alcohol Abuse   3  Family history of Cirrhosis   4  Family history of Drug Use  Brother    5  Family history of Alcohol Abuse   6  Family history of Diabetes Mellitus (V18 0)   7  Family history of Drug Use   8  Family history of Stroke Syndrome (V17 1)  Family History    9   Denied: Family history of Coronary Artery Disease    Social History   · Denied: History of Alcohol Use (History)   · Daily Coffee Consumption (2  Cups/Day)   · Daily Tea Consumption (1  Cups/Day) · Never A Smoker   · Work History    Current Meds   1  Acetaminophen 325 MG Oral Tablet Recorded   2  Dicyclomine HCl - 10 MG Oral Capsule; TAKE ONE TABLET 4 TIMES PER WEEK OR AS   DIRECTED; Therapy: 02Efu3126 to (Evaluate:13Oct2017)  Requested for: 30Sql8605; Last   Rx:[id number] Ordered   3  Docusate Sodium 100 MG Oral Tablet; TAKE 1 TABLET DAILY AS DIRECTED; Therapy: 51NSM0191 to (Evaluate:42Dcr9907); Last Rx:[id number] Ordered   4  Furosemide 40 MG Oral Tablet; TAKE 1 TABLET DAILY AS DIRECTED; Therapy: (Recorded:24Oct2016) to Recorded   5  Levothyroxine Sodium 88 MCG Oral Tablet; TAKE 1 TABLET DAILY; Therapy: 19HWZ5547 to (Evaluate:30Jun2018)  Requested for: 01DDZ3918; Last   Rx:[id number] Ordered   6  Meclizine HCl - 12 5 MG Oral Tablet; TAKE 1 TABLET 3 TIMES DAILY AS NEEDED; Therapy: 15XRF5754 to (Evaluate:69Kwf5218)  Requested for: 58Nsc0457; Last   Rx:[id number] Ordered   7  Metoprolol Tartrate 25 MG Oral Tablet; TAKE 1 TABLET TWICE DAILY; Therapy: (Recorded:24Oct2016) to Recorded   8  Omeprazole 40 MG Oral Capsule Delayed Release; 1 po daily; Therapy: (Recorded:79Zpb8268) to Recorded   9  Potassium Chloride ER 10 MEQ Oral Capsule Extended Release; Therapy: (Recorded:27Mar2017) to Recorded   10  Pravastatin Sodium 20 MG Oral Tablet; TAKE 1 TABLET DAILY AT BEDTIME  Requested    for: 82UER2010; Last Rx:44Jap5587; Status: ACTIVE - Renewal Voided Ordered   11  Pravastatin Sodium 20 MG Oral Tablet; take 1 tablet daily at bedtime; Therapy: 72AGF4686 to (FNJPEGNA:42TBK3026)  Requested for: 60MUY7511; Last    Rx:25Yvf8737 Ordered   12  Vitamin D3 2000 UNIT Oral Capsule; TAKE 1 CAPSULE ONCE DAILY; Therapy: 11LCI6708 to (Evaluate:18Oct2016)  Requested for: 21Apr2016; Last    Rx:21Apr2016 Ordered   13  Warfarin Sodium 2 5 MG Oral Tablet; TAKE 1 TABLET DAILY AS DIRECTED; Therapy: (Recorded:24Oct2016) to Recorded    Allergies  1  ACE Inhibitors   2  Angiotensin Receptor Blockers   3   Aspirin Buffered TABS   4  Codeine Sulfate TABS   5  Penicillins    Vitals  Vital Signs    Recorded: 22VOL8232 02:35PM   Temperature 98 2 F   Heart Rate 93   Respiration 17   Systolic 513   Diastolic 64   Height 4 ft 9 in   Weight 131 lb    BMI Calculated 28 35   BSA Calculated 1 5   O2 Saturation 96   Pain Scale 0     Physical Exam    Constitutional   General appearance: No acute distress, well appearing and well nourished  Eyes   Conjunctiva and lids: No swelling, erythema or discharge  Ears, Nose, Mouth, and Throat   External inspection of ears and nose: Normal     Oropharynx: Normal with no erythema, edema, exudate or lesions  Pulmonary   Auscultation of lungs: Clear to auscultation  Cardiovascular   Auscultation of heart: Normal rate and rhythm, normal S1 and S2, without murmurs  Examination of extremities for edema and/or varicosities: Abnormal   bilateral ankle --U+ pitting edema  Abdomen   Abdomen: Non-tender, no masses  Liver and spleen: No hepatomegaly or splenomegaly  Lymphatic   Palpation of lymph nodes in neck: No lymphadenopathy  Musculoskeletal   Gait and station: Normal     Skin   Skin and subcutaneous tissue: Normal without rashes or lesions  Neurologic   Cranial nerves: Cranial nerves 2-12 intact  Psychiatric   Orientation to person, place, and time: Normal          Future Appointments    Date/Time Provider Specialty Site   11/13/2017 02:00 PM Manju Hankins DO Internal Medicine [address]     Signatures   Electronically signed by : SMITHA Muir  Oct 11 2017  2:55PM EST                       (Author)

## 2017-10-16 ENCOUNTER — HOSPITAL ENCOUNTER (OUTPATIENT)
Dept: INFUSION CENTER | Facility: CLINIC | Age: 82
Discharge: HOME | End: 2017-10-16
Payer: MEDICARE

## 2017-10-16 PROCEDURE — 96372 THER/PROPH/DIAG INJ SC/IM: CPT

## 2017-10-16 RX ADMIN — CYANOCOBALAMIN 1000 MCG: 1000 INJECTION, SOLUTION INTRAMUSCULAR at 13:14

## 2017-11-01 DIAGNOSIS — E03.9 HYPOTHYROIDISM: ICD-10-CM

## 2017-11-09 RX ORDER — CYANOCOBALAMIN 1000 UG/ML
1000 INJECTION INTRAMUSCULAR; SUBCUTANEOUS ONCE
Status: COMPLETED | OUTPATIENT
Start: 2017-11-13 | End: 2017-11-13

## 2017-11-13 ENCOUNTER — APPOINTMENT (OUTPATIENT)
Dept: LAB | Facility: CLINIC | Age: 82
End: 2017-11-13
Payer: MEDICARE

## 2017-11-13 ENCOUNTER — HOSPITAL ENCOUNTER (OUTPATIENT)
Dept: INFUSION CENTER | Facility: CLINIC | Age: 82
Discharge: HOME | End: 2017-11-13
Payer: MEDICARE

## 2017-11-13 ENCOUNTER — TRANSCRIBE ORDERS (OUTPATIENT)
Dept: LAB | Facility: CLINIC | Age: 82
End: 2017-11-13

## 2017-11-13 ENCOUNTER — ALLSCRIPTS OFFICE VISIT (OUTPATIENT)
Dept: OTHER | Facility: OTHER | Age: 82
End: 2017-11-13

## 2017-11-13 DIAGNOSIS — Z79.01 LONG-TERM (CURRENT) USE OF ANTICOAGULANTS: ICD-10-CM

## 2017-11-13 DIAGNOSIS — I48.91 ATRIAL FIBRILLATION, UNSPECIFIED TYPE (HCC): Primary | ICD-10-CM

## 2017-11-13 DIAGNOSIS — I48.91 ATRIAL FIBRILLATION, UNSPECIFIED TYPE (HCC): ICD-10-CM

## 2017-11-13 LAB
INR PPP: 3.36 (ref 0.86–1.16)
PROTHROMBIN TIME: 35.3 SECONDS (ref 12.1–14.4)

## 2017-11-13 PROCEDURE — 96372 THER/PROPH/DIAG INJ SC/IM: CPT

## 2017-11-13 PROCEDURE — 36415 COLL VENOUS BLD VENIPUNCTURE: CPT

## 2017-11-13 PROCEDURE — 85610 PROTHROMBIN TIME: CPT

## 2017-11-13 RX ADMIN — CYANOCOBALAMIN 1000 MCG: 1000 INJECTION, SOLUTION INTRAMUSCULAR at 13:20

## 2017-11-13 NOTE — PROGRESS NOTES
Pt administered B12 as ordered  Pt tolerated treatment well  Pt declines AVS and aware of next appointment

## 2017-11-23 ENCOUNTER — APPOINTMENT (EMERGENCY)
Dept: RADIOLOGY | Facility: HOSPITAL | Age: 82
DRG: 534 | End: 2017-11-23
Payer: MEDICARE

## 2017-11-23 ENCOUNTER — HOSPITAL ENCOUNTER (INPATIENT)
Facility: HOSPITAL | Age: 82
LOS: 5 days | Discharge: NURSING, REHAB OR LONG-TERM CARE | DRG: 534 | End: 2017-11-28
Attending: EMERGENCY MEDICINE | Admitting: FAMILY MEDICINE
Payer: MEDICARE

## 2017-11-23 ENCOUNTER — APPOINTMENT (EMERGENCY)
Dept: CT IMAGING | Facility: HOSPITAL | Age: 82
DRG: 534 | End: 2017-11-23
Payer: MEDICARE

## 2017-11-23 DIAGNOSIS — M25.559 HIP PAIN: ICD-10-CM

## 2017-11-23 DIAGNOSIS — S72.344A: Primary | ICD-10-CM

## 2017-11-23 PROBLEM — N17.9 AKI (ACUTE KIDNEY INJURY) (HCC): Status: ACTIVE | Noted: 2017-11-23

## 2017-11-23 PROBLEM — N18.4 CKD (CHRONIC KIDNEY DISEASE) STAGE 4, GFR 15-29 ML/MIN (HCC): Status: ACTIVE | Noted: 2017-11-23

## 2017-11-23 PROBLEM — E03.9 HYPOTHYROIDISM: Status: ACTIVE | Noted: 2017-11-23

## 2017-11-23 PROBLEM — I10 ESSENTIAL HYPERTENSION: Status: ACTIVE | Noted: 2017-11-23

## 2017-11-23 LAB
ANION GAP SERPL CALCULATED.3IONS-SCNC: 7 MMOL/L (ref 4–13)
APTT PPP: 37 SECONDS (ref 23–35)
BASOPHILS # BLD AUTO: 0.02 THOUSANDS/ΜL (ref 0–0.1)
BASOPHILS NFR BLD AUTO: 1 % (ref 0–1)
BUN SERPL-MCNC: 32 MG/DL (ref 5–25)
CALCIUM SERPL-MCNC: 9.5 MG/DL (ref 8.3–10.1)
CHLORIDE SERPL-SCNC: 105 MMOL/L (ref 100–108)
CO2 SERPL-SCNC: 28 MMOL/L (ref 21–32)
CREAT SERPL-MCNC: 1.4 MG/DL (ref 0.6–1.3)
EOSINOPHIL # BLD AUTO: 0.08 THOUSAND/ΜL (ref 0–0.61)
EOSINOPHIL NFR BLD AUTO: 2 % (ref 0–6)
ERYTHROCYTE [DISTWIDTH] IN BLOOD BY AUTOMATED COUNT: 14.5 % (ref 11.6–15.1)
GFR SERPL CREATININE-BSD FRML MDRD: 32 ML/MIN/1.73SQ M
GLUCOSE SERPL-MCNC: 116 MG/DL (ref 65–140)
HCT VFR BLD AUTO: 39.1 % (ref 34.8–46.1)
HGB BLD-MCNC: 12.2 G/DL (ref 11.5–15.4)
INR PPP: 2.18 (ref 0.86–1.16)
LYMPHOCYTES # BLD AUTO: 0.7 THOUSANDS/ΜL (ref 0.6–4.47)
LYMPHOCYTES NFR BLD AUTO: 16 % (ref 14–44)
MCH RBC QN AUTO: 30.2 PG (ref 26.8–34.3)
MCHC RBC AUTO-ENTMCNC: 31.2 G/DL (ref 31.4–37.4)
MCV RBC AUTO: 97 FL (ref 82–98)
MONOCYTES # BLD AUTO: 0.3 THOUSAND/ΜL (ref 0.17–1.22)
MONOCYTES NFR BLD AUTO: 7 % (ref 4–12)
NEUTROPHILS # BLD AUTO: 3.16 THOUSANDS/ΜL (ref 1.85–7.62)
NEUTS SEG NFR BLD AUTO: 74 % (ref 43–75)
PLATELET # BLD AUTO: 186 THOUSANDS/UL (ref 149–390)
PMV BLD AUTO: 9.9 FL (ref 8.9–12.7)
POTASSIUM SERPL-SCNC: 4.4 MMOL/L (ref 3.5–5.3)
PROTHROMBIN TIME: 25.1 SECONDS (ref 12.1–14.4)
RBC # BLD AUTO: 4.04 MILLION/UL (ref 3.81–5.12)
SODIUM SERPL-SCNC: 140 MMOL/L (ref 136–145)
WBC # BLD AUTO: 4.26 THOUSAND/UL (ref 4.31–10.16)

## 2017-11-23 PROCEDURE — 80048 BASIC METABOLIC PNL TOTAL CA: CPT | Performed by: EMERGENCY MEDICINE

## 2017-11-23 PROCEDURE — 96374 THER/PROPH/DIAG INJ IV PUSH: CPT

## 2017-11-23 PROCEDURE — 70450 CT HEAD/BRAIN W/O DYE: CPT

## 2017-11-23 PROCEDURE — 73552 X-RAY EXAM OF FEMUR 2/>: CPT

## 2017-11-23 PROCEDURE — 36415 COLL VENOUS BLD VENIPUNCTURE: CPT | Performed by: EMERGENCY MEDICINE

## 2017-11-23 PROCEDURE — 85025 COMPLETE CBC W/AUTO DIFF WBC: CPT | Performed by: EMERGENCY MEDICINE

## 2017-11-23 PROCEDURE — 71010 HB CHEST X-RAY 1 VIEW FRONTAL (PORTABLE): CPT

## 2017-11-23 PROCEDURE — 73502 X-RAY EXAM HIP UNI 2-3 VIEWS: CPT

## 2017-11-23 PROCEDURE — 72125 CT NECK SPINE W/O DYE: CPT

## 2017-11-23 PROCEDURE — 85730 THROMBOPLASTIN TIME PARTIAL: CPT | Performed by: EMERGENCY MEDICINE

## 2017-11-23 PROCEDURE — 99285 EMERGENCY DEPT VISIT HI MDM: CPT

## 2017-11-23 PROCEDURE — 85610 PROTHROMBIN TIME: CPT | Performed by: EMERGENCY MEDICINE

## 2017-11-23 RX ORDER — LEVOTHYROXINE SODIUM 88 UG/1
88 TABLET ORAL
Status: DISCONTINUED | OUTPATIENT
Start: 2017-11-24 | End: 2017-11-28 | Stop reason: HOSPADM

## 2017-11-23 RX ORDER — ACETAMINOPHEN 325 MG/1
650 TABLET ORAL EVERY 6 HOURS PRN
Status: DISCONTINUED | OUTPATIENT
Start: 2017-11-23 | End: 2017-11-24

## 2017-11-23 RX ORDER — PRAVASTATIN SODIUM 20 MG
20 TABLET ORAL EVERY EVENING
Status: DISCONTINUED | OUTPATIENT
Start: 2017-11-23 | End: 2017-11-28 | Stop reason: HOSPADM

## 2017-11-23 RX ORDER — FUROSEMIDE 40 MG/1
40 TABLET ORAL EVERY OTHER DAY
Status: DISCONTINUED | OUTPATIENT
Start: 2017-11-24 | End: 2017-11-28 | Stop reason: HOSPADM

## 2017-11-23 RX ORDER — FENTANYL CITRATE 50 UG/ML
50 INJECTION, SOLUTION INTRAMUSCULAR; INTRAVENOUS ONCE
Status: COMPLETED | OUTPATIENT
Start: 2017-11-23 | End: 2017-11-23

## 2017-11-23 RX ORDER — MELATONIN
2000 DAILY
Status: DISCONTINUED | OUTPATIENT
Start: 2017-11-24 | End: 2017-11-28 | Stop reason: HOSPADM

## 2017-11-23 RX ORDER — WARFARIN SODIUM 1 MG/1
1 TABLET ORAL
Status: DISCONTINUED | OUTPATIENT
Start: 2017-11-23 | End: 2017-11-28 | Stop reason: HOSPADM

## 2017-11-23 RX ORDER — FUROSEMIDE 40 MG/1
40 TABLET ORAL EVERY OTHER DAY
Status: DISCONTINUED | OUTPATIENT
Start: 2017-11-23 | End: 2017-11-23

## 2017-11-23 RX ORDER — PANTOPRAZOLE SODIUM 20 MG/1
20 TABLET, DELAYED RELEASE ORAL
Status: DISCONTINUED | OUTPATIENT
Start: 2017-11-24 | End: 2017-11-28 | Stop reason: HOSPADM

## 2017-11-23 RX ORDER — OMEPRAZOLE 40 MG/1
40 CAPSULE, DELAYED RELEASE ORAL DAILY
COMMUNITY
End: 2018-01-01 | Stop reason: SDUPTHER

## 2017-11-23 RX ADMIN — METOPROLOL TARTRATE 25 MG: 25 TABLET ORAL at 21:37

## 2017-11-23 RX ADMIN — FENTANYL CITRATE 50 MCG: 50 INJECTION INTRAMUSCULAR; INTRAVENOUS at 10:33

## 2017-11-23 RX ADMIN — WARFARIN SODIUM 1 MG: 1 TABLET ORAL at 18:08

## 2017-11-23 RX ADMIN — ACETAMINOPHEN 650 MG: 325 TABLET ORAL at 16:41

## 2017-11-23 RX ADMIN — PRAVASTATIN SODIUM 20 MG: 20 TABLET ORAL at 18:08

## 2017-11-23 NOTE — H&P
History and Physical - [facility]    Patient Information: Dank Canchola 80 y o  female MRN: [id number]  Unit/Bed#: -01 Encounter: [id number]  Admitting Physician: Syed Kidd MD  PCP: Jose Guadalupe Brown MD  Date of Admission:  11/23/17    Assessment/Plan:    Hospital Problem List:     Principal Problem:    Hip pain  Active Problems:    Nondisplaced spiral fracture of shaft of right femur, initial encounter for closed fracture (HCC)    CKD (chronic kidney disease) stage 4, GFR 15-29 ml/min ([address] )    Hypothyroidism    Essential hypertension      Plan for the Primary Problem(s):  Hip Pain:  Most likely secondary to Right hip internal fixation hardware (intramedullary nixon and compression screw) demonstrates unremarkable configuration without evidence of hardware complication  on X ray  -Pain medication  -DVT Prophylaxis  -Orthopedic     CKDIV: Closely monitor    HTN: keep on home med  Hypothyroidism: cont home medication   Plan for Additional Problems:   ·     VTE Prophylaxis: Warfarin (Coumadin)  / sequential compression device   Code Status: full code  POLST: There is no POLST form on file for this patient (pre-hospital)    Anticipated Length of Stay:  Patient will be admitted on an Inpatient basis with an anticipated length of stay of  > 2 midnights  Justification for Hospital Stay:  HIP PAIN  Total Time for Visit, including Counseling / Coordination of Care: 45 minutes  Greater than 50% of this total time spent on direct patient counseling and coordination of care  Chief Complaint:  Hip pain    History of Present Illness:    Dank Canchola is a 80 y o  female with significant past medical history of Hypothyroidism, HTN,  Who came today after patient fall in her house, hit the floor  She now complained of right leg pain secondary to the fall which she previously had knee intervention  Pain is 10/10 of scale, constant,hip and right knee    Patient denied any chest pain, short of breath, palpitation, abdominal pain, urinary complaints, leg swelling,    Review of Systems:    Review of Systems   Constitutional: Negative for activity change, appetite change, chills, diaphoresis, fatigue, fever and unexpected weight change  HENT: Negative for congestion, dental problem and drooling  Eyes: Negative for pain, discharge, redness, itching and visual disturbance  Respiratory: Negative for apnea, cough, choking, chest tightness, shortness of breath, wheezing and stridor  Cardiovascular: Negative for chest pain, palpitations and leg swelling  Gastrointestinal: Negative for abdominal distention, abdominal pain, anal bleeding, blood in stool, constipation, diarrhea, rectal pain and vomiting  Endocrine: Negative for cold intolerance and heat intolerance  Genitourinary: Negative for difficulty urinating, dyspareunia, dysuria, enuresis, flank pain and frequency  Musculoskeletal: Positive for back pain and gait problem  Negative for joint swelling, myalgias, neck pain and neck stiffness  Skin: Negative for color change  Neurological: Negative for dizziness, facial asymmetry, light-headedness and headaches  Psychiatric/Behavioral: Negative for agitation  Past Medical and Surgical History:     Past Medical History:   Diagnosis Date    Anemia     pernicious    Arthritis     Cardiac disease     CHF [facility]estive heart failure) [facility] [address]     Hiatal hernia     HTN (hypertension)     Hypercholesteremia     Hypothyroidism        Past Surgical History:   Procedure Laterality Date    APPENDECTOMY      CATARACT EXTRACTION Bilateral     HIP FRACTURE SURGERY Right 12/2015    HYSTERECTOMY      OOPHORECTOMY      REPAIR RECTOCELE      REPLACEMENT TOTAL KNEE Right        Meds/Allergies:    Prior to Admission medications    Medication Sig Start Date End Date Taking? Authorizing Provider   Cholecalciferol (VITAMIN D) 2000 UNITS CAPS Take 2,000 Units by mouth daily     Yes Historical Provider, MD   furosemide (LASIX) 20 mg tablet Take 40 mg by mouth every other day     Yes Historical Provider, MD   levothyroxine 75 mcg tablet Take 88 mcg by mouth daily     Yes Historical Provider, MD   metoprolol tartrate (LOPRESSOR) 25 mg tablet Take 25 mg by mouth every 12 (twelve) hours   Yes Historical Provider, MD   omeprazole (PriLOSEC) 40 MG capsule Take 40 mg by mouth daily   Yes Historical Provider, MD   potassium chloride (K-DUR,KLOR-CON) 20 mEq tablet Take 10 mEq by mouth every other day     Yes Historical Provider, MD   pravastatin (PRAVACHOL) 20 mg tablet Take 20 mg by mouth daily  Yes Historical Provider, MD   warfarin (COUMADIN) 1 mg tablet Take by mouth daily Dosing is based on INR     Yes Historical Provider, MD   dicyclomine (BENTYL) 10 mg capsule Take 10 mg by mouth 3 (three) times a week    11/23/17  Historical Provider, MD   irbesartan (AVAPRO) 300 mg tablet Take 300 mg by mouth daily at bedtime  11/23/17  Historical Provider, MD   meclizine (ANTIVERT) 32 MG tablet Take 12 5 mg by mouth 3 (three) times a day as needed for dizziness Indications: Sensation of Spinning or Whirling  11/23/17  Historical Provider, MD   ondansetron (ZOFRAN) 4 mg tablet Take 4 mg by mouth every 8 (eight) hours as needed for nausea or vomiting  11/23/17  Historical Provider, MD     I have reviewed home medications with patient personally  Allergies: Allergies   Allergen Reactions    Ace Inhibitors      Other reaction(s): Angioedema  Pt and family state not an allergy    Erythromycin Ethylsuccinate GI Intolerance    Losartan      Other reaction(s): Angioedema  Pt and family state this is not an allergy      Aspirin Dermatitis    Codeine     Penicillins Hives       Social History:     Marital Status:     Occupation:   Patient Pre-hospital Living Situation:   Patient Pre-hospital Level of Mobility:   Patient Pre-hospital Diet Restrictions:   Substance Use History:   History   Alcohol Use No History   Smoking Status    Never Smoker   Smokeless Tobacco    Never Used     History   Drug Use No       Family History:    non-contributory    Physical Exam:     Vitals:   Blood Pressure: 140/65 (11/23/17 1402)  Pulse: 78 (11/23/17 1402)  Temperature: 97 6 °F (36 4 °C) (11/23/17 1402)  Temp Source: Oral (11/23/17 1402)  Respirations: 18 (11/23/17 1402)  Height: 5' (152 4 cm) (11/23/17 1402)  Weight - Scale: 61 7 kg (136 lb 0 4 oz) (11/23/17 1402)  SpO2: 96 % (11/23/17 1402)    Physical Exam   Constitutional: She is oriented to person, place, and time  No distress  Eyes: Left eye exhibits no discharge  No scleral icterus  Neck: Normal range of motion  Neck supple  No JVD present  No tracheal deviation present  No thyromegaly present  Cardiovascular: Normal rate, regular rhythm, normal heart sounds and intact distal pulses  Exam reveals no gallop and no friction rub  No murmur heard  Pulmonary/Chest: No respiratory distress  She has no wheezes  She has no rales  She exhibits no tenderness  Abdominal: Soft  Bowel sounds are normal  She exhibits no distension and no mass  There is no tenderness  There is no rebound and no guarding  Musculoskeletal: She exhibits tenderness and deformity  Decrease ROM due hip pain    Lymphadenopathy:     She has no cervical adenopathy  Neurological: She is alert and oriented to person, place, and time  Skin: Skin is warm  She is not diaphoretic  Psychiatric: She has a normal mood and affect  Additional Data:     Lab Results: I have personally reviewed pertinent reports          Results from last 7 days  Lab Units 11/23/17  1002   WBC Thousand/uL 4 26*   HEMOGLOBIN g/dL 12 2   HEMATOCRIT % 39 1   PLATELETS Thousands/uL 186   NEUTROS PCT % 74   LYMPHS PCT % 16   MONOS PCT % 7   EOS PCT % 2       Results from last 7 days  Lab Units 11/23/17  1002   SODIUM mmol/L 140   POTASSIUM mmol/L 4 4   CHLORIDE mmol/L 105   CO2 mmol/L 28   BUN mg/dL 32* CREATININE mg/dL 1 40*   CALCIUM mg/dL 9 5   GLUCOSE RANDOM mg/dL 116       Results from last 7 days  Lab Units 11/23/17  1002   INR  2 18*       Imaging: I have personally reviewed pertinent reports  Xr Chest Portable    Result Date: 11/23/2017  Narrative: CHEST  PORTABLE INDICATION: Trauma COMPARISON:  February 1, 2016 VIEWS:   AP semierect; IMAGES:  1 FINDINGS: Low lung volumes  The cardiomediastinal silhouette is unremarkable  The lungs are clear  No pleural effusion  Bony thorax unremarkable  Evidence of chronic rotator cuff tear and/or degeneration (narrowing of the right acromiohumeral joint space), right shoulder  Impression: No acute pulmonary disease    Workstation performed: [id number]     Xr Hip/pelv 2-3 Vws Right    Result Date: 11/23/2017  Narrative: RIGHT HIP INDICATION:  Trauma  Right thigh pain  COMPARISON: Right femur June 1, 2016 VIEWS: AP pelvis and 2 coned down views IMAGES:  4 FINDINGS: Right hip internal fixation hardware (intramedullary nixon and compression screw) demonstrates unremarkable configuration without evidence of hardware complication  The distal nixon is not included in this portion of the study though it is present in the right femur obtained same date  There is no acute fracture or dislocation  No lytic or blastic lesions are seen  Soft tissues are unremarkable  Impression: Advanced healing intertrochanteric fracture status post internal fixation  (Fracture distal femur noted on right femur radiographs, same date; please see report of right femur for further discussion of this finding)  Workstation performed: [id number]     Xr Femur 2 Views Right    Result Date: 11/23/2017  Narrative: RIGHT FEMUR INDICATION:  Right thigh pain  Trauma   COMPARISON: June 1, 2016 VIEWS:  AP and lateral; IMAGES:  4 FINDINGS: Spiral nondisplaced fracture of the distal femoral metadiaphysis appears new in the interval, traversed by a previously noted indwelling intramedullary nixon with proximal femoral neck compression screw  Old healed intertrochanteric fracture noted  Total right knee replacement prosthesis components present  No degenerative changes  No lytic or blastic lesions are seen  Soft tissues are unremarkable  Impression: Nondisplaced spiral fracture of the distal femoral metadiaphysis, new since June 1, 2016  Right hip internal fixation hardware includes a long intramedullary nixon which traverses the length of the spiral fracture  Right knee replacement intact  Workstation performed: [id number]     Ct Head Without Contrast    Result Date: 11/23/2017  Narrative: CT BRAIN - WITHOUT CONTRAST INDICATION:  Trauma  History taken directly from the electronic ordering system  COMPARISON:  12/4/2015 TECHNIQUE:  CT examination of the brain was performed  In addition to axial images, coronal reformatted images were created and submitted for interpretation  Radiation dose length product (DLP) for this visit:  0282 mGy-cm   This examination, like all CT scans performed in the [facility], was performed utilizing techniques to minimize radiation dose exposure, including the use of iterative reconstruction and automated exposure control  IMAGE QUALITY:  Diagnostic  FINDINGS:  PARENCHYMA:  Decreased attenuation is noted in the supratentorial white matter demonstrating an appearance most consistent with severe microangiopathic change  Stable right temporal encephalomalacia  No intracranial mass, mass effect or midline shift  No CT signs of acute infarction  There is no parenchymal hemorrhage  Vascular calcifications  VENTRICLES AND EXTRA-AXIAL SPACES:  Enlargement of ventricles and extra-axial CSF spaces consistent with cerebral and cerebellar atrophy  VISUALIZED ORBITS AND PARANASAL SINUSES:  Unremarkable  CALVARIUM AND EXTRACRANIAL SOFT TISSUES:   Normal      Impression: Stable exam  No acute intracranial abnormality   Workstation performed: [id number]     Ct Cervical Spine Without Contrast    Result Date: 11/23/2017  Narrative: CT CERVICAL SPINE - WITHOUT CONTRAST INDICATION: Trauma  History taken directly from the electronic ordering system  COMPARISON: 12/4/2015 TECHNIQUE:  CT examination of the cervical spine was performed without intravenous contrast   Contiguous axial images were obtained  Sagittal and coronal reconstructions were performed  Radiation dose length product (DLP) for this visit:  360 mGy-cm   This examination, like all CT scans performed in the [facility], was performed utilizing techniques to minimize radiation dose exposure, including the use of iterative reconstruction and automated exposure control  IMAGE QUALITY:  Diagnostic  FINDINGS: ALIGNMENT:  Normal alignment of the cervical spine  No subluxation  VERTEBRAL BODIES:  No fracture  DEGENERATIVE CHANGES:  Moderate multilevel cervical degenerative changes are noted  No critical central canal stenosis  PREVERTEBRAL AND PARASPINAL SOFT TISSUES: Normal         THORACIC INLET:  6 x 5 mm right upper medial subpleural nodule series 5 image 108  Impression: 1  No cervical spine fracture or traumatic malalignment  2   6 x 5 mm right upper medial subpleural lung nodule  Based on current Fleischner Society 2017 Guidelines on incidental pulmonary nodule, followup non-contrast CT is recommended, initially at 6-12 months from this examination and, if stable at  that time, an additional followup is recommended for 18-24 months from this exam   Workstation performed: [id number]       EKG, Pathology, and Other Studies Reviewed on Admission:   EKG:   Allscripts / Epic Records Reviewed: Yes     ** Please Note: This note has been constructed using a voice recognition system   **

## 2017-11-23 NOTE — ED PROVIDER NOTES
History  Chief Complaint   Patient presents with    Fall     Pt brought to ER via EMS from home with c/o right hip pain s/p loosing balance when ambulating from bed to closet  +right leg shortened  +blood thinners  Pt denies head injury, or c-spine tenderness with palp     This is a 70-year-old female presents emergency department after a fall today  Patient lost balance and fell landing on her right side  The patient has had prior medullary nixon placed for a hip fracture  Complaining of pain in the distal femur  Patient does not believe she struck her head  The patient is on Coumadin  Unable to stand after the fall  No back pain  No neck pain  No nausea vomiting  No abdominal pain  Symptoms moderate severity  Worse with movement  No aggravating or alleviating factors other than those mentioned  No radiation of symptoms  Sudden onset  Differential diagnosis includes hip fracture, femur fracture, head injury, intracranial hemorrhage  Prior to Admission Medications   Prescriptions Last Dose Informant Patient Reported? Taking? Cholecalciferol (VITAMIN D) 2000 UNITS CAPS 11/23/2017 at Unknown time Self Yes Yes   Sig: Take 2,000 Units by mouth daily  furosemide (LASIX) 20 mg tablet 11/22/2017 at Unknown time Self Yes Yes   Sig: Take 40 mg by mouth every other day     levothyroxine 75 mcg tablet 11/23/2017 at Unknown time Self Yes Yes   Sig: Take 88 mcg by mouth daily     metoprolol tartrate (LOPRESSOR) 25 mg tablet 11/23/2017 at Unknown time Self Yes Yes   Sig: Take 25 mg by mouth every 12 (twelve) hours   omeprazole (PriLOSEC) 40 MG capsule 11/23/2017 at Unknown time Self Yes Yes   Sig: Take 40 mg by mouth daily   potassium chloride (K-DUR,KLOR-CON) 20 mEq tablet 11/22/2017 at Unknown time Self Yes Yes   Sig: Take 10 mEq by mouth every other day     pravastatin (PRAVACHOL) 20 mg tablet 11/22/2017 at Unknown time Self Yes Yes   Sig: Take 20 mg by mouth daily     warfarin (COUMADIN) 1 mg tablet 11/22/2017 at Unknown time Self Yes Yes   Sig: Take by mouth daily Dosing is based on INR        Facility-Administered Medications: None       Past Medical History:   Diagnosis Date    Anemia     pernicious    Arthritis     Cardiac disease     CHF (congestive heart failure) (HCC)     Hiatal hernia     HTN (hypertension)     Hypercholesteremia     Hypothyroidism        Past Surgical History:   Procedure Laterality Date    APPENDECTOMY      CATARACT EXTRACTION Bilateral     HIP FRACTURE SURGERY Right 12/2015    HYSTERECTOMY      OOPHORECTOMY      REPAIR RECTOCELE      REPLACEMENT TOTAL KNEE Right        History reviewed  No pertinent family history  I have reviewed and agree with the history as documented  Social History   Substance Use Topics    Smoking status: Never Smoker    Smokeless tobacco: Never Used    Alcohol use No        Review of Systems   Constitutional: Negative for activity change, appetite change, chills and fever  HENT: Negative for congestion, ear pain, rhinorrhea and sore throat  Eyes: Negative for pain, discharge and redness  Respiratory: Negative for cough, shortness of breath and wheezing  Cardiovascular: Negative for chest pain and palpitations  Gastrointestinal: Negative for abdominal pain, diarrhea, nausea and vomiting  Endocrine: Negative for polyuria  Genitourinary: Negative for difficulty urinating, dysuria, frequency and urgency  Musculoskeletal: Positive for arthralgias  Negative for back pain, joint swelling, myalgias and neck pain  Skin: Negative for color change, pallor, rash and wound  Allergic/Immunologic: Negative for immunocompromised state  Neurological: Negative for dizziness, syncope, weakness, light-headedness and numbness  Hematological: Does not bruise/bleed easily  Psychiatric/Behavioral: Negative for confusion  All other systems reviewed and are negative        Physical Exam  ED Triage Vitals [11/23/17 4846] Temperature Pulse Respirations Blood Pressure SpO2   98 2 °F (36 8 °C) 94 18 139/81 94 %      Temp Source Heart Rate Source Patient Position - Orthostatic VS BP Location FiO2 (%)   Oral Monitor Lying Right arm --      Pain Score       Worst Possible Pain           Orthostatic Vital Signs  Vitals:    11/23/17 0930 11/23/17 1035 11/23/17 1341 11/23/17 1402   BP: 139/81 136/77 110/76 140/65   Pulse: 94 78 90 78   Patient Position - Orthostatic VS: Lying  Lying Lying       Physical Exam   Constitutional: She is oriented to person, place, and time  She appears well-developed  No distress  HENT:   Head: Normocephalic and atraumatic  Nose: Nose normal    Eyes: Conjunctivae are normal  No scleral icterus  Neck: Normal range of motion  Neck supple  Cardiovascular: Normal rate, regular rhythm, normal heart sounds and intact distal pulses  Pulmonary/Chest: Effort normal and breath sounds normal  No stridor  No respiratory distress  She has no wheezes  Abdominal: Soft  She exhibits no distension  There is no tenderness  There is no rebound and no guarding  Musculoskeletal: She exhibits tenderness (Tender over the distal femur  )  She exhibits no edema or deformity  No midline C-spine tenderness   Neurological: She is alert and oriented to person, place, and time  No cranial nerve deficit  She exhibits normal muscle tone  Coordination normal    Skin: Skin is warm and dry  No rash noted  Psychiatric: She has a normal mood and affect  Thought content normal    Nursing note and vitals reviewed        ED Medications  Medications   fentanyl citrate (PF) 100 MCG/2ML 50 mcg (50 mcg Intravenous Given 11/23/17 1033)       Diagnostic Studies  Results Reviewed     Procedure Component Value Units Date/Time    Protime-INR [[id number]]  (Abnormal) Collected:  11/23/17 1002    Lab Status:  Final result Specimen:  Blood from Arm, Right Updated:  11/23/17 1022     Protime 25 1 (H) seconds      INR 2 18 (H)    APTT [[id number]] (Abnormal) Collected:  11/23/17 1002    Lab Status:  Final result Specimen:  Blood from Arm, Right Updated:  11/23/17 1022     PTT 37 (H) seconds     Narrative: Therapeutic Heparin Range = 60-90 seconds    Basic metabolic panel [[id number]]  (Abnormal) Collected:  11/23/17 1002    Lab Status:  Final result Specimen:  Blood from Arm, Right Updated:  11/23/17 1018     Sodium 140 mmol/L      Potassium 4 4 mmol/L      Chloride 105 mmol/L      CO2 28 mmol/L      Anion Gap 7 mmol/L      BUN 32 (H) mg/dL      Creatinine 1 40 (H) mg/dL      Glucose 116 mg/dL      Calcium 9 5 mg/dL      eGFR 32 ml/min/1 73sq m     Narrative:         National Kidney Disease Education Program recommendations are as follows:  GFR calculation is accurate only with a steady state creatinine  Chronic Kidney disease less than 60 ml/min/1 73 sq  meters  Kidney failure less than 15 ml/min/1 73 sq  meters  CBC and differential [[id number]]  (Abnormal) Collected:  11/23/17 1002    Lab Status:  Final result Specimen:  Blood from Arm, Right Updated:  11/23/17 1010     WBC 4 26 (L) Thousand/uL      RBC 4 04 Million/uL      Hemoglobin 12 2 g/dL      Hematocrit 39 1 %      MCV 97 fL      MCH 30 2 pg      MCHC 31 2 (L) g/dL      RDW 14 5 %      MPV 9 9 fL      Platelets 363 Thousands/uL      Neutrophils Relative 74 %      Lymphocytes Relative 16 %      Monocytes Relative 7 %      Eosinophils Relative 2 %      Basophils Relative 1 %      Neutrophils Absolute 3 16 Thousands/µL      Lymphocytes Absolute 0 70 Thousands/µL      Monocytes Absolute 0 30 Thousand/µL      Eosinophils Absolute 0 08 Thousand/µL      Basophils Absolute 0 02 Thousands/µL                  CT head without contrast   Final Result by Freddy Tan MD (11/23 1052)   Stable exam    No acute intracranial abnormality  Workstation performed: [id number]         CT cervical spine without contrast   Final Result by Freddy Tan MD (11/23 1051)   1    No cervical spine fracture or traumatic malalignment  2   6 x 5 mm right upper medial subpleural lung nodule  Based on current Fleischner Society 2017 Guidelines on incidental pulmonary nodule, followup non-contrast CT is recommended, initially at 6-12 months from this examination and, if stable at    that time, an additional followup is recommended for 18-24 months from this exam                    Workstation performed: [id number]         XR hip/pelv 2-3 vws right   Final Result by TIM Agustin MD (11/23 1043)      Advanced healing intertrochanteric fracture status post internal fixation  (Fracture distal femur noted on right femur radiographs, same date; please see report of right femur for further discussion of this finding)  Workstation performed: [id number]         XR femur 2 views RIGHT   ED Interpretation by Alexsandra Means MD (11/23 1039)   + distal femur fracture      Final Result by TIM Agustin MD (11/23 1029)      Nondisplaced spiral fracture of the distal femoral metadiaphysis, new since June 1, 2016  Right hip internal fixation hardware includes a long intramedullary nixon which traverses the length of the spiral fracture  Right knee replacement intact  Workstation performed: [id number]         XR chest portable   Final Result by TIM Agustin MD (11/23 1027)      No acute pulmonary disease            Workstation performed: [id number]                    Procedures  Procedures       Phone Contacts  ED Phone Contact    ED Course  ED Course as of Nov 23 1559   Thu Nov 23, 2017   [id number] Ortho paged    [phone] Discussed with Dr Parveen Richardson (ortho)  Requests knee immobilizer  Non weight bearing  Will consult on patient          right leg knee immobilizer Splint was placed by ED tech  Examined by me post splint placement  Splint is in good position and neurovascular exam intact after splint placement                            MDM  Number of Diagnoses or Management Options  Nondisplaced spiral fracture of shaft of right femur, initial encounter for closed fracture [facility]):   Diagnosis management comments: Patient with distal femur fracture  Spiral fracture  Discussed with Orthopedics  The patient will need to be not ambulatory and will need to be evaluated by Ortho  Patient will require admission  Amount and/or Complexity of Data Reviewed  Clinical lab tests: ordered and reviewed  Tests in the radiology section of CPT®: ordered and reviewed  Discuss the patient with other providers: yes  Independent visualization of images, tracings, or specimens: yes      CritCare Time    Disposition  Final diagnoses:   Nondisplaced spiral fracture of shaft of right femur, initial encounter for closed fracture ([address] )     Time reflects when diagnosis was documented in both MDM as applicable and the Disposition within this note     Time User Action Codes Description Comment    11/23/2017 11:48 AM Henrietta Rain Add [O35 [id number]] Nondisplaced spiral fracture of shaft of right femur, initial encounter for closed fracture [facility])       ED Disposition     ED Disposition Condition Comment    Admit  Case was discussed with Dr Vangie Ugalde and the patient's admission status was agreed to be Admission Status: inpatient status to the service of Dr Vangie Ugalde   Follow-up Information    None       Current Discharge Medication List      CONTINUE these medications which have NOT CHANGED    Details   Cholecalciferol (VITAMIN D) 2000 UNITS CAPS Take 2,000 Units by mouth daily        furosemide (LASIX) 20 mg tablet Take 40 mg by mouth every other day        levothyroxine 75 mcg tablet Take 88 mcg by mouth daily        metoprolol tartrate (LOPRESSOR) 25 mg tablet Take 25 mg by mouth every 12 (twelve) hours      omeprazole (PriLOSEC) 40 MG capsule Take 40 mg by mouth daily      potassium chloride (K-DUR,KLOR-CON) 20 mEq tablet Take 10 mEq by mouth every other day        pravastatin (PRAVACHOL) 20 mg tablet Take 20 mg by mouth daily  warfarin (COUMADIN) 1 mg tablet Take by mouth daily Dosing is based on INR             No discharge procedures on file      ED Provider  Electronically Signed by           Codi Mejía MD  11/23/17 8953

## 2017-11-23 NOTE — PLAN OF CARE
PAIN - ADULT     Verbalizes/displays adequate comfort level or baseline comfort level Progressing        Potential for Falls     Patient will remain free of falls Progressing        Prexisting or High Potential for Compromised Skin Integrity     Skin integrity is maintained or improved Progressing        SAFETY ADULT     Maintain or return to baseline ADL function Progressing     Maintain or return mobility status to optimal level Progressing

## 2017-11-24 LAB
ANION GAP SERPL CALCULATED.3IONS-SCNC: 9 MMOL/L (ref 4–13)
BUN SERPL-MCNC: 28 MG/DL (ref 5–25)
CALCIUM SERPL-MCNC: 8.8 MG/DL (ref 8.3–10.1)
CHLORIDE SERPL-SCNC: 108 MMOL/L (ref 100–108)
CO2 SERPL-SCNC: 27 MMOL/L (ref 21–32)
CREAT SERPL-MCNC: 1.36 MG/DL (ref 0.6–1.3)
ERYTHROCYTE [DISTWIDTH] IN BLOOD BY AUTOMATED COUNT: 14.6 % (ref 11.6–15.1)
GFR SERPL CREATININE-BSD FRML MDRD: 33 ML/MIN/1.73SQ M
GLUCOSE SERPL-MCNC: 106 MG/DL (ref 65–140)
HCT VFR BLD AUTO: 35.2 % (ref 34.8–46.1)
HGB BLD-MCNC: 10.8 G/DL (ref 11.5–15.4)
MCH RBC QN AUTO: 29.6 PG (ref 26.8–34.3)
MCHC RBC AUTO-ENTMCNC: 30.7 G/DL (ref 31.4–37.4)
MCV RBC AUTO: 96 FL (ref 82–98)
PLATELET # BLD AUTO: 168 THOUSANDS/UL (ref 149–390)
PMV BLD AUTO: 10.2 FL (ref 8.9–12.7)
POTASSIUM SERPL-SCNC: 3.7 MMOL/L (ref 3.5–5.3)
RBC # BLD AUTO: 3.65 MILLION/UL (ref 3.81–5.12)
SODIUM SERPL-SCNC: 144 MMOL/L (ref 136–145)
WBC # BLD AUTO: 4.45 THOUSAND/UL (ref 4.31–10.16)

## 2017-11-24 PROCEDURE — G8978 MOBILITY CURRENT STATUS: HCPCS

## 2017-11-24 PROCEDURE — G8987 SELF CARE CURRENT STATUS: HCPCS

## 2017-11-24 PROCEDURE — G8979 MOBILITY GOAL STATUS: HCPCS

## 2017-11-24 PROCEDURE — 97163 PT EVAL HIGH COMPLEX 45 MIN: CPT

## 2017-11-24 PROCEDURE — 80048 BASIC METABOLIC PNL TOTAL CA: CPT | Performed by: FAMILY MEDICINE

## 2017-11-24 PROCEDURE — 97167 OT EVAL HIGH COMPLEX 60 MIN: CPT

## 2017-11-24 PROCEDURE — 97110 THERAPEUTIC EXERCISES: CPT

## 2017-11-24 PROCEDURE — G8988 SELF CARE GOAL STATUS: HCPCS

## 2017-11-24 PROCEDURE — 85027 COMPLETE CBC AUTOMATED: CPT | Performed by: FAMILY MEDICINE

## 2017-11-24 RX ORDER — OXYCODONE HYDROCHLORIDE 5 MG/1
2.5 TABLET ORAL EVERY 12 HOURS SCHEDULED
Status: DISCONTINUED | OUTPATIENT
Start: 2017-11-24 | End: 2017-11-28 | Stop reason: HOSPADM

## 2017-11-24 RX ORDER — ACETAMINOPHEN 325 MG/1
975 TABLET ORAL EVERY 8 HOURS SCHEDULED
Status: DISCONTINUED | OUTPATIENT
Start: 2017-11-24 | End: 2017-11-28 | Stop reason: HOSPADM

## 2017-11-24 RX ORDER — OXYCODONE HYDROCHLORIDE 5 MG/1
5 TABLET ORAL EVERY 6 HOURS PRN
Status: DISCONTINUED | OUTPATIENT
Start: 2017-11-24 | End: 2017-11-28 | Stop reason: HOSPADM

## 2017-11-24 RX ADMIN — OXYCODONE HYDROCHLORIDE 2.5 MG: 5 TABLET ORAL at 15:32

## 2017-11-24 RX ADMIN — FUROSEMIDE 40 MG: 40 TABLET ORAL at 10:35

## 2017-11-24 RX ADMIN — PANTOPRAZOLE SODIUM 20 MG: 20 TABLET, DELAYED RELEASE ORAL at 05:39

## 2017-11-24 RX ADMIN — METOPROLOL TARTRATE 25 MG: 25 TABLET ORAL at 20:45

## 2017-11-24 RX ADMIN — CHOLECALCIFEROL TAB 25 MCG (1000 UNIT) 2000 UNITS: 25 TAB at 10:32

## 2017-11-24 RX ADMIN — PRAVASTATIN SODIUM 20 MG: 20 TABLET ORAL at 18:34

## 2017-11-24 RX ADMIN — LEVOTHYROXINE SODIUM 88 MCG: 88 TABLET ORAL at 05:39

## 2017-11-24 RX ADMIN — WARFARIN SODIUM 1 MG: 1 TABLET ORAL at 18:34

## 2017-11-24 RX ADMIN — ACETAMINOPHEN 650 MG: 325 TABLET ORAL at 12:40

## 2017-11-24 RX ADMIN — METOPROLOL TARTRATE 25 MG: 25 TABLET ORAL at 10:32

## 2017-11-24 RX ADMIN — ACETAMINOPHEN 650 MG: 325 TABLET ORAL at 03:03

## 2017-11-24 RX ADMIN — ACETAMINOPHEN 975 MG: 325 TABLET ORAL at 21:52

## 2017-11-24 NOTE — SOCIAL WORK
LOS- 1 DAY  PATIENT IS NOT A BUNDLE  PATIENT IS NOT A READMIT  CM intern RB met with Pt at bedside  Pt lives in an apartment with her daughter located in [place]  Pt reports living on the first floor and has 3 steps to get into the apartment  Pt uses a walker to get around, and is capable of completing all her ADLS  Pt's daughter states that Pt is on a no salt diet  Pt reports having [employer] 2 years ago to help her after having surgery on the her hip  Pt reports she was at [facility] for 3 weeks after her surgery  Pt uses the [address] [employer] and [employer] located in [place] and has prescription coverage  Pt has her daughter listed as her emergency contact and consents that we can call her whenever needed  Pt does not have a POA and does not want information  Pt is retired and receives [employer]  Pt depends on her daughter to drive her around  CM dept will continue to follow Pt until d/c

## 2017-11-24 NOTE — PLAN OF CARE
Problem: DISCHARGE PLANNING - CARE MANAGEMENT  Goal: Discharge to post-acute care or home with appropriate resources  INTERVENTIONS:  - Conduct assessment to determine patient/family and health care team treatment goals, and need for post-acute services based on payer coverage, community resources, and patient preferences, and barriers to discharge  - Address psychosocial, clinical, and financial barriers to discharge as identified in assessment in conjunction with the patient/family and health care team  - Arrange appropriate level of post-acute services according to patients   needs and preference and payer coverage in collaboration with the physician and health care team  - Communicate with and update the patient/family, physician, and health care team regarding progress on the discharge plan  - Arrange appropriate transportation to post-acute venues  Outcome: Progressing  LOS- 1 [address]  PATIENT IS NOT A BUNDLE  PATIENT IS NOT A READMIT  CM intern RB met with Pt at bedside  Pt lives in an apartment with her daughter located in [place]  Pt reports living on the first floor and has 3 steps to get into the apartment  Pt uses a walker to get around, and is capable of completing all her ADLS  Pt's daughter states that Pt is on a no salt diet  Pt reports having [employer] 2 years ago to help her after having surgery on the her hip  Pt reports she was at [place] [address] for 3 weeks after her surgery  Pt uses the [address] and [employer] located in [place] and has prescription coverage  Pt has her daughter listed as her emergency contact and consents that we can call her whenever needed  Pt does not have a POA and does not want information  Pt is retired and receives [employer]  Pt depends on her daughter to drive her around   dept will continue to follow Pt until d/c

## 2017-11-24 NOTE — PROGRESS NOTES
[facility] Internal Medicine Progress Note  Patient: Sourav Bird 80 y o  female   MRN: [id number]  PCP: Radha Swann MD  Unit/Bed#: -71 Encounter: [id number]  Date Of Visit: 17    Assessment:    Principal Problem:    Hip pain  Active Problems:    Nondisplaced spiral fracture of shaft of right femur, initial encounter for closed fracture (HCC)    CKD (chronic kidney disease) stage 4, GFR 15-29 ml/min ([facility])    Hypothyroidism    Essential hypertension      Plan:    · Right hip pain with history of right distal femur fracture continue analgesics, orthopedic input noted, weight-bearing as tolerated physical therapy  · Atrial fibrillation on Coumadin monitor INR  · Chronic kidney disease stage 4 monitor  · Out of bed as able ambulation as able      VTE Pharmacologic Prophylaxis:   Pharmacologic: Warfarin (Coumadin)  Mechanical VTE Prophylaxis in Place: Yes    Patient Centered Rounds: I have performed bedside rounds with nursing staff today  Discussions with Specialists or Other Care Team Provider:     Education and Discussions with Family / Patient:  Discussed with the patient and her children at bedside in detail, questions answered    Time Spent for Care: 30 minutes  More than 50% of total time spent on counseling and coordination of care as described above      Current Length of Stay: 1 day(s)    Current Patient Status: Inpatient   Certification Statement: The patient will continue to require additional inpatient hospital stay due to As mentioned    Discharge Plan / Estimated Discharge Date:  Pending placement discussed with case management    Code Status: Level 1 - Full Code      Subjective:     Complains of right lower extremity pain  Chart history Labs medications reviewed    Objective:     Vitals:   Temp (24hrs), Av 2 °F (36 8 °C), Min:97 6 °F (36 4 °C), Max:98 7 °F (37 1 °C)    HR:  [78-86] 86  Resp:  [18] 18  BP: (119-140)/(63-78) 126/78  SpO2:  [96 %] 96 %  Body mass index is 26 57 kg/m²  Input and Output Summary (last 24 hours): Intake/Output Summary (Last 24 hours) at 11/24/17 1359  Last data filed at 11/24/17 1302   Gross per 24 hour   Intake              720 ml   Output              600 ml   Net              120 ml       Physical Exam:     Physical Exam     Comfortably sitting up in chair  Neck supple  Lungs clear to auscultation  Heart sounds no murmurs appreciable  Abdomen soft nontender  Pulses present  No pedal edema  Awake alert nonfocal neuro exam  No rash      Additional Data:     Labs:      Results from last 7 days  Lab Units 11/24/17  0449 11/23/17  1002   WBC Thousand/uL 4 45 4 26*   HEMOGLOBIN g/dL 10 8* 12 2   HEMATOCRIT % 35 2 39 1   PLATELETS Thousands/uL 168 186   NEUTROS PCT %  --  74   LYMPHS PCT %  --  16   MONOS PCT %  --  7   EOS PCT %  --  2       Results from last 7 days  Lab Units 11/24/17  0449   SODIUM mmol/L 144   POTASSIUM mmol/L 3 7   CHLORIDE mmol/L 108   CO2 mmol/L 27   BUN mg/dL 28*   CREATININE mg/dL 1 36*   CALCIUM mg/dL 8 8   GLUCOSE RANDOM mg/dL 106       Results from last 7 days  Lab Units 11/23/17  1002   INR  2 18*       * I Have Reviewed All Lab Data Listed Above  * Additional Pertinent Lab Tests Reviewed: [facility] Admission Reviewed    Imaging:    Imaging Reports Reviewed Today Include:   Imaging Personally Reviewed by Myself Includes:     Recent Cultures (last 7 days):           Last 24 Hours Medication List:     acetaminophen 975 mg Oral Q8H [facility]   cholecalciferol 2,000 Units Oral Daily   furosemide 40 mg Oral Every Other Day   levothyroxine 88 mcg Oral Early Morning   metoprolol tartrate 25 mg Oral Q12H [facility]   oxyCODONE 2 5 mg Oral Q12H KENISHA   pantoprazole 20 mg Oral Early Morning   pravastatin 20 mg Oral QPM   warfarin 1 mg Oral Daily (warfarin)        Today, Patient Was Seen By: Mike Blue MD    ** Please Note: This note has been constructed using a voice recognition system   **

## 2017-11-24 NOTE — PLAN OF CARE
Problem: PHYSICAL THERAPY ADULT  Goal: Performs mobility at highest level of function for planned discharge setting  See evaluation for individualized goals  Treatment/Interventions: Functional transfer training, LE strengthening/ROM, Elevations, Therapeutic exercise, Endurance training, Bed mobility, Equipment eval/education, Patient/family training, Cognitive reorientation, Gait training, Spoke to nursing (WC mobility)  Equipment Recommended: Maryuri Hernandez, Wheelchair       See flowsheet documentation for full assessment, interventions and recommendations  Prognosis: Good  Problem List: Decreased strength, Decreased range of motion, Decreased endurance, Impaired balance, Decreased mobility, Decreased safety awareness, Impaired hearing, Orthopedic restrictions, Pain     Barriers to Discharge: Inaccessible home environment ([facility] apt)     Recommendation: Post acute IP rehab, OT consult          See flowsheet documentation for full assessment

## 2017-11-24 NOTE — PHYSICAL THERAPY NOTE
PHYSICAL THERAPY EVALUATION  NAME:  Meghan Mar  DATE: [birth year]    AGE:   80 y o  Mrn:   [id number]  ADMIT DX:  Hip pain [M25 559]  Nondisplaced spiral fracture of shaft of right femur, initial encounter for closed fracture ([facility] [address] ) [place]    Past Medical History:   Diagnosis Date    Anemia     pernicious    Arthritis     Cardiac disease     [facility] )     Hiatal hernia     HTN (hypertension)     Hypercholesteremia     Hypothyroidism      Length Of Stay: 1  PHYSICAL THERAPY EVALUATION :    11/24/17 4878   Note Type   Note type Eval/Treat   Pain Assessment   Pain Assessment FLACC   Pain Score 6   Pain Type Acute pain   Pain Location Leg   Pain Orientation Right   Effect of Pain on Daily Activities limits speed and indep of mobility   Patient's Stated Pain Goal No pain   Hospital Pain Intervention(s) Repositioned;Medication (See MAR); Elevated; Emotional support  (premedicated prior to session)   Pain Rating: FLACC (Rest) - Face 0   Pain Rating: FLACC (Rest) - Legs 0   Pain Rating: FLACC (Rest) - Activity 0   Pain Rating: FLACC (Rest) - Cry 0   Pain Rating: FLACC (Rest) - Consolability 0   Score: FLACC (Rest) 0   Pain Rating: FLACC (Activity) - Face 2   Pain Rating: FLACC (Activity) - Legs 1   Pain Rating: FLACC (Activity) - Activity 1   Pain Rating: FLACC (Activity) - Cry 1   Pain Rating: FLACC (Activity) - Consolability 1   Score: FLACC (Activity) 6   Home Living   Type of Home Apartment   Home Layout One level  (4 TEMI (down) with one railing)   Home Equipment Walker   Prior Function   Level of [place] Independent with ADLs and functional mobility   Lives With Daughter   Receives Help From Family   ADL Assistance Independent   IADLs Needs assistance  (pt reports daughter shops and cleans  )   Falls in the last 6 months 1 to 4   Vocational Retired   Restrictions/Precautions   [place] Bearing Precautions Per Order Yes   RLE Weight Bearing Per Order NWB   Braces or Orthoses LE Immobilizer  (R LE knee immobilizer)   General   Additional Pertinent History Ortho Assessment:89 y  o female with Right Distal Femur Fracture around Long TFN after mechanical fall  Plan: Recommend non-surgical management  ,NWB RLE in knee immobilizer, Pain Control, PT/OT   Family/Caregiver Present No   Cognition   Overall Cognitive Status WFL   Attention Attends with cues to redirect  (hard of hearing)   Orientation Level Oriented to person;Oriented to place;Oriented to situation   Memory Decreased recall of precautions   Following Commands Follows one step commands with increased time or repetition  (hard of hearing)   RUE Assessment   RUE Assessment X  (limited AROM shoulder past 90*)   RUE Strength   RUE Overall Strength Deficits   LUE Assessment   LUE Assessment X  (limited AROM shoulder end range)   LUE Strength   LUE Overall Strength Deficits   RLE Overall PROM   R Ankle Dorsiflexion limited to neutral    R Ankle Plantar Flexion WFL   R Knee Flexion NT   R Knee Extension WFL   R Hip ABduction limited   Strength RLE   R Hip Flexion 2-/5   R Hip ABduction 2-/5   R Hip ADduction 2-/5   R Ankle Dorsiflexion 3/5   Strength LLE   L Hip Flexion 4+/5   L Knee Extension 4+/5   L Ankle Dorsiflexion 4+/5   Coordination   Movements are Fluid and Coordinated 0   Sensation X  ([place])   Light Touch   RLE Light Touch Grossly intact   LLE Light Touch Grossly intact   Bed Mobility   Supine to Sit 2  Maximal assistance   Additional items Assist x 1; Increased time required;Verbal cues; Bedrails;HOB elevated   Transfers   Sit to Stand 2  Maximal assistance   Additional items Assist x 2; Increased time required;Verbal cues   Stand to Sit 3  Moderate assistance   Additional items Assist x 2; Increased time required;Verbal cues   Stand pivot 2  Maximal assistance   Additional items Assist x 2; Increased time required;Verbal cues  (cues for R LE NWB, hand placement, and tech w/ RW) Ambulation/Elevation   Gait pattern Not appropriate  (Pt performed pivot transfer, but not ambulation )   Balance   Static Sitting Fair +   Static Standing Fair -  (standing tolerance 90 seconds)   Activity Tolerance   Activity Tolerance Patient limited by pain   Medical Staff Made Aware spoke to [place] OT   Nurse Made Aware spoke to RN and PCA   Assessment   Prognosis Good   Problem List Decreased strength;Decreased range of motion;Decreased endurance; Impaired balance;Decreased mobility; Decreased safety awareness; Impaired hearing;Orthopedic restrictions;Pain   Barriers to Discharge Inaccessible home environment  (TEMI apt)   Goals   Patient Goals to go home   STG Expiration Date 12/04/17   Short Term Goal #1 Pt will perform bed mobility S, transfers sit<>stand and stand pivot S; amb w/ short walker for >20' S w/maintenance of NWB on RLE , perform steps as appropriate, propel WC for > 50' S w/ S and S for brakes/legrests  Treatment Day 0   Plan   Treatment/Interventions Functional transfer training;LE strengthening/ROM; Elevations; Therapeutic exercise; Endurance training;Bed mobility; Equipment eval/education;Patient/family training;Cognitive reorientation;Gait training;Spoke to nursing  (WC mobility)   PT Frequency 5x/wk; Weekend  (1x weekend)   Recommendation   Recommendation Post acute IP rehab;OT consult   Equipment Recommended Walker; Wheelchair   Barthel Index   Feeding 10   Bathing 0   Grooming Score 5   Dressing Score 5   Bladder Score 10   Bowels Score 10   Toilet Use Score 5   Transfers (Bed/Chair) Score 5   Mobility (Level Surface) Score 0   Stairs Score 0   Barthel Index Score 50   (Please find full objective findings from PT assessment regarding body systems outlined above)  Assessment: Pt is a 80 y o  female seen for PT evaluation s/p admit to [facility] on 11/23/2017 w/ Hip pain  Ortho Plan: Recommend non-surgical management  ,NWB RLE in knee immobilizer  Order placed for PT    Prior to admission, pt was independent w/ all functional mobility w/ rolling walker, lives in one floor environment, has 4 TEMI and lives with daughter  Upon evaluation: Pt requires A of 1 for bed mobility, A of 2 for sit<>stand and stand pivot transfers using walker, but otherwise non ambulatory  Impairments and limitations also listed above, especially due to  weakness, decreased ROM, impaired balance, decreased endurance, gait deviations, pain, fall risk and orthopedic restrictions  The following objective measures performed on IE also reveal limitations: Barthel Index 50/100  Pt's clinical presentation is currently unstable/unpredictable seen in pt's presentation of limited mobility compared to her baseline, fall risk due to hx of fall and new WB limitations, plus abnormal labs   Pt to benefit from continued skilled PT tx while in hospital and upon DC to address deficits as defined above and maximize level of functional mobility  From PT/mobility standpoint, recommendation at time of d/c would be IP rehab and OT consult pending progress in order to maximize pt's functional independence and consistency w/ mobility in order to facilitate return to PLOF  Recommend trial with walker next session, trial with WC next session and ther ex BLes  Comorbidities affecting pt's physical performance at time of assessment include: HTN, limited hearing, previous surgery, CHF and TKR, hip fracture with fixation (both surgeries on RLE)  Personal factors affecting pt at time of IE include: steps to enter environment, environment, advanced age, past experience and recent fall(s)  Brock Butler PT, DPT    Skilled PT recommended while in hospital and upon DC to progress pt toward treatment goals  Pt requires PT /OT co-eval due to signficant assistance with mobility and cognitive-behavioral impairments      Brock Butler PT

## 2017-11-24 NOTE — PLAN OF CARE
Problem: OCCUPATIONAL THERAPY ADULT  Goal: Performs self-care activities at highest level of function for planned discharge setting  See evaluation for individualized goals  Treatment Interventions: ADL retraining, Functional transfer training, UE strengthening/ROM, Equipment evaluation/education, Patient/family training, Compensatory technique education, Activityengagement  Equipment Recommended: Bedside commode       See flowsheet documentation for full assessment, interventions and recommendations  Limitation: Decreased ADL status, Decreased UE strength, Decreased endurance, Decreased self-care trans, Decreased high-level ADLs     Assessment: Pt is a 79 yo female [facility]  Pt presents w/ hip pain after a fall at home, and significant PMH impacting her occupational performance including HTN, CHF, hx R TKA, hx R hips fracture surgery, cardiac disease  Per ortho consult NWB R LE in knee immobilizer for nondisplaced spiral fracture shaft of right femur  Pt completed bed mobility w/ max A supine to sit at EOB w/ HOB elevated  Pt demonstrated limited ROM B shoulders AG R>L, and reports right hand dominance  Pt completed UBD w/ min A and requires max A to complete LBD  Pt completed functional transfers bed to chair to pt's left side using RW w/ max A x2  Pt benefits from verbal cues and physical demonstration to improve carryover of R LE NWB  Pt alert and cooperative, and benefits from good eye contact when engaged in conversation due to hard of hearing  Pt able to provide social history and demonstrated appropriate memory of recent events  Pt presents w/ decreased standing tolerance, decreasd endurance, decreased UE ROM and strength, decreased R LE ROM strength, decreased balance impacting her I w/ dressing, bathing, grooming, clothing mgmt, food prep / clean up, functional mobility, and community mobility   Pt would benefit from OT while in acute care to address deficits, and post acute rehab when medically stable for discharge from acute care to return to baseline level of I  Will continue to follow pt while in acute care        OT Discharge Recommendation: Other (Comment) (post acute rehab)  OT - OK to Discharge: No

## 2017-11-24 NOTE — PHYSICAL THERAPY NOTE
Physical Therapy Cancellation Note    Chart reviewed  Ortho consult pending  Will follow up and treat as appropriate       Riccardo PORTILLO

## 2017-11-24 NOTE — PLAN OF CARE
Problem: PHYSICAL THERAPY ADULT  Goal: Performs mobility at highest level of function for planned discharge setting  See evaluation for individualized goals  Treatment/Interventions: Functional transfer training, LE strengthening/ROM, Elevations, Therapeutic exercise, Endurance training, Bed mobility, Equipment eval/education, Patient/family training, Cognitive reorientation, Gait training, Spoke to nursing (WC mobility)  Equipment Recommended: Caitlin Spanner, Wheelchair       See flowsheet documentation for full assessment, interventions and recommendations  Outcome: Progressing  Prognosis: Good  Problem List: Decreased strength, Decreased range of motion, Decreased endurance, Impaired balance, Decreased mobility, Decreased safety awareness, Impaired hearing, Orthopedic restrictions, Pain  Assessment: Pt was able to perform >50% participation with seated ther ex, but with slow performance and longer therapeutic rests than normal  Recommend continued ther ex for RLE, and Walker vs WC for mobility  Barriers to Discharge: [address] home environment (TEMI apt)     Recommendation: Post acute IP rehab, OT consult          See flowsheet documentation for full assessment

## 2017-11-24 NOTE — SOCIAL WORK
CM met with Pt at bedside  CM discussed STR recommendation  Pt reports that she was previously at [facility] and that it where she would like a referral made to, CM explained difference between acute rehab and short term rehab  Pt reports she has only been to acute and would like to go back and for CM to follow up with daughter if [facility] cannot accept  CM placed referral to [facility] CM dept will follow Pt until discharge

## 2017-11-24 NOTE — CASE MANAGEMENT
Initial Clinical Review    Admission: Date/Time/Statement: 11/23/17 @ 1147     Orders Placed This Encounter   Procedures    Inpatient Admission (expected length of stay for this patient is greater than two midnights)     Standing Status:   Standing     Number of Occurrences:   1     Order Specific Question:   Admitting Physician     Answer:   Toby Whitney [[id number]]     Order Specific Question:   Level of Care     Answer:   Med Surg [16]     Order Specific Question:   Estimated length of stay     Answer:   More than 2 Midnights     Order Specific Question:   Certification     Answer:   I certify that inpatient services are medically necessary for this patient for a duration of greater than two midnights  See H&P and MD Progress Notes for additional information about the patient's course of treatment  ED: Date/Time/Mode of Arrival:   ED Arrival Information     Expected Arrival Acuity Means of Arrival Escorted By Service Admission Type    - 11/23/2017 09:28 Urgent Ambulance [facility] General Medicine Urgent    Arrival Complaint    Fall          Chief Complaint:   Chief Complaint   Patient presents with    Fall     Pt brought to ER via EMS from home with c/o right hip pain s/p loosing balance when ambulating from bed to closet  +right leg shortened  +blood thinners  Pt denies head injury, or c-spine tenderness with palp       History of Illness:  80 y o  female with significant past medical history of Hypothyroidism, HTN,  Who came today after patient fall in her house, hit the floor  She now complained of right leg pain secondary to the fall which she previously had knee intervention  Pain is 10/10 of scale, constant,hip and right knee    Patient     ED Vital Signs:   ED Triage Vitals [11/23/17 0930]   Temperature Pulse Respirations Blood Pressure SpO2   98 2 °F (36 8 °C) 94 18 139/81 94 %      Temp Source Heart Rate Source Patient Position - Orthostatic VS BP Location FiO2 (%)   Oral Monitor Lying Right arm --      Pain Score       Worst Possible Pain        Wt Readings from Last 1 Encounters:   11/23/17 61 7 kg (136 lb 0 4 oz)       Vital Signs (abnormal): none    Abnormal Labs/Diagnostic Test Results: INR 2 18  Bun 32  Creatinine 1 40  Wbc 4 26    Ct cervical spine -  No cervical spine fracture or traumatic malalignment  2   6 x 5 mm right upper medial subpleural lung nodule  Xray right femur - Nondisplaced spiral fracture of the distal femoral metadiaphysis, new since June 1, 2016  Right hip internal fixation hardware includes a long intramedullary nixon which traverses the length of the spiral fracture  Right knee replacement intact  Xray right hip - Advanced healing intertrochanteric fracture status post internal fixation  Labs 11/24 am - bun 28  Creatinine 1 36    hgb 10 8, hct 35 2    ED Treatment:   Medication Administration from 11/23/2017 0928 to 11/23/2017 1357       Date/Time Order Dose Route Action Action by Comments     11/23/2017 1033 fentanyl citrate (PF) 100 MCG/2ML 50 mcg 50 mcg Intravenous Given Theodore Urban RN           Past Medical/Surgical History:    Active Ambulatory Problems     Diagnosis Date Noted    No Active Ambulatory Problems     Resolved Ambulatory Problems     Diagnosis Date Noted    No Resolved Ambulatory Problems     Past Medical History:   Diagnosis Date    Anemia     Arthritis     Cardiac disease     CHF [facility]congestive [facility] failure) ([facility] [address]     Hiatal hernia     HTN (hypertension)     Hypercholesteremia     Hypothyroidism        Admitting Diagnosis: Hip pain [M25 559]  Nondisplaced spiral fracture of shaft of right femur, initial encounter for closed fracture ([facility] [address] ) [place]    Age/Sex: 80 y o  female    Assessment/Plan: Hip Pain:  Most likely secondary to Right hip internal fixation hardware (intramedullary nixon and compression screw) demonstrates unremarkable configuration without evidence of hardware complication   on X ray  -Pain medication  -DVT Prophylaxis  -Orthopedic      CKDIV: Closely monitor     HTN: keep on home med  Hypothyroidism: cont home medication     Admission Orders:  11/23/2017  1147 INPATIENT   Scheduled Meds:   cholecalciferol 2,000 Units Oral Daily   furosemide 40 mg Oral Every Other Day   levothyroxine 88 mcg Oral Early Morning   metoprolol tartrate 25 mg Oral Q12H KENISHA   pantoprazole 20 mg Oral Early Morning   pravastatin 20 mg Oral QPM   warfarin 1 mg Oral Daily (warfarin)     Continuous Infusions:    PRN Meds:   acetaminophen- used x 2    OTHER ORDERS: scds  Knee immobilizer  Consult orthopedics  PT/OT    Per orthopedics:  80 y  o female with Right Distal Femur Fracture around Long TFN after mechanical fall      Plan:   · Recommend non-surgical management    · NWB RLE in knee immobilizer  · Pain Control  · PT/OT  · DVT proph, continue coumadin  · Discussed treatment plan with patients daughter Agapito Phelps on the phone  F/u as outpt in 2 weeks

## 2017-11-24 NOTE — SOCIAL WORK
CM spoke to Radha Garcia from [place] [address] who asked about Pt's home environment and if it is w/c accessible  Per Radha Garcia they would assist with w/c level for a short time  CM spoke to Pt's daughter via phone to discuss with her accessibility of Pt's home  Per Pt's daughter, the home is not w/c accessible  CM explained [facility] [place] and providing w/c level training  CM discussed STR with Pt's daughter and Pt's daughter will look over the list and look into choices  CM left list of SNF's at Pt's bedside  Radha Garcia from [address] [place] [address] also called daughter and daughter is in agreement with pt going to short term rehab rather than acute  CM dept will follow Pt until discharge

## 2017-11-24 NOTE — PHYSICAL THERAPY NOTE
PHYSICAL THERAPY NOTE    Patient Name: Osiris Abdullahi  PUTCD'Q Date: 11/24/2017 11/24/17 1007   Pain Assessment   Pain Assessment FLACC   Pain Score 6   Pain Type Acute pain   Pain Location Leg   Pain Orientation Right   Effect of Pain on Daily Activities limits speed and indep of mobility   Patient's Stated Pain Goal No pain   Hospital Pain Intervention(s) Repositioned;Medication (See MAR); Elevated; Emotional support   Pain Rating: FLACC (Rest) - Face 0   Pain Rating: FLACC (Rest) - Legs 0   Pain Rating: FLACC (Rest) - Activity 0   Pain Rating: FLACC (Rest) - Cry 0   Pain Rating: FLACC (Rest) - Consolability 0   Score: FLACC (Rest) 0   Pain Rating: FLACC (Activity) - Face 2   Pain Rating: FLACC (Activity) - Legs 1   Pain Rating: FLACC (Activity) - Activity 1   Pain Rating: FLACC (Activity) - Cry 1   Pain Rating: FLACC (Activity) - Consolability 1   Score: FLACC (Activity) 6   Restrictions/Precautions   Weight Bearing Precautions Per Order Yes   RLE Weight Bearing Per Order NWB   Braces or Orthoses LE Immobilizer  (R LE knee immobilizer)   General   Chart Reviewed Yes   Response to Previous Treatment Patient reporting fatigue but able to participate  Family/Caregiver Present No   Cognition   Overall Cognitive Status WFL   Attention Attends with cues to redirect  (hard of hearing)   Orientation Level Oriented to person;Oriented to place;Oriented to situation   Memory Decreased recall of precautions   Following Commands Follows one step commands with increased time or repetition  (hard of hearing)   Subjective   Subjective Pt agreeable to participate with ther ex once positioned into recliner   Activity Tolerance   Activity Tolerance Patient limited by pain   Medical Staff Made Aware spoke to Giuliano Handley 61 spoke to RN and PCA   Exercises   Quad Sets Sitting;10 reps;AROM; Right  (inside knee immobilizer)   Hip Abduction Sitting;10 reps;AAROM; Right   Hip Adduction Sitting;10 reps;AAROM; Right  (to neutral )   Ankle Pumps Sitting;10 reps;AAROM;AROM; Right  (slow)   Assessment   Prognosis Good   Problem List Decreased strength;Decreased range of motion;Decreased endurance; Impaired balance;Decreased mobility; Decreased safety awareness; Impaired hearing;Orthopedic restrictions;Pain   Assessment Pt was able to perform >50% participation with seated ther ex, but with slow performance and longer therapeutic rests than normal  Recommend continued ther ex for RLE, and Walker vs WC for mobility   Barriers to Discharge Inaccessible home environment  (TEMI apt)   Goals   Patient Goals to go home   STG Expiration Date 12/04/17   Treatment Day 1   Plan   Treatment/Interventions Functional transfer training;LE strengthening/ROM; Elevations; Therapeutic exercise; Endurance training;Bed mobility; Equipment eval/education;Patient/family training;Cognitive reorientation;Gait training;Spoke to nursing  (WC mobility)   Progress Slow progress, decreased activity tolerance   PT Frequency 5x/wk; Weekend  (1x weekend)   Recommendation   Recommendation Post acute IP rehab;OT consult   Equipment Recommended Walker; Wheelchair   Skilled PT recommended while in hospital and upon DC to progress pt toward treatment goals     Arben Trujillo, PT

## 2017-11-24 NOTE — OCCUPATIONAL THERAPY NOTE
[address]  Evaluation     Patient Name: Angelo CARPENTER Date: 11/24/2017  Problem List  Patient Active Problem List   Diagnosis    Hip pain    Nondisplaced spiral fracture of shaft of right femur, initial encounter for closed fracture ([facility] )    CKD (chronic kidney disease) stage 4, GFR 15-29 ml/min ([facility])    Hypothyroidism    Essential hypertension     Past Medical History  Past Medical History:   Diagnosis Date    Anemia     pernicious    Arthritis     Cardiac disease     CHF (congestive heart failure) ([facility] )     Hiatal hernia     HTN (hypertension)     Hypercholesteremia     Hypothyroidism      Past Surgical History  Past Surgical History:   Procedure Laterality Date    APPENDECTOMY      CATARACT EXTRACTION Bilateral     HIP FRACTURE SURGERY Right 12/2015    HYSTERECTOMY      OOPHORECTOMY      REPAIR RECTOCELE      REPLACEMENT TOTAL KNEE Right          11/24/17 0955   Note Type   Note type Eval/Treat   Restrictions/Precautions   Weight Bearing Precautions Per Order Yes   RLE Weight Bearing Per Order NWB   Braces or Orthoses LE Immobilizer  (R LE knee immobilizer)   Pain Assessment   Pain Assessment FLACC  (pt reports no pain upon arrival resting in bed)   Pain Score 6   Pain Type Acute pain   Pain Location Leg   Pain Orientation Right   Effect of Pain on Daily Activities limits mobility and ADL performance   Patient's Stated Pain Goal No pain   Hospital Pain Intervention(s) Repositioned; Ambulation/increased activity; Emotional support   Response to Interventions tolerated   Pain Rating: FLACC (Rest) - Face 0   Pain Rating: FLACC (Rest) - Legs 0   Pain Rating: FLACC (Rest) - Activity 0   Pain Rating: FLACC (Rest) - Cry 0   Pain Rating: FLACC (Rest) - Consolability 0   Score: FLACC (Rest) 0   Pain Rating: FLACC (Activity) - Face 2   Pain Rating: FLACC (Activity) - Legs 1   Pain Rating: FLACC (Activity) - Activity 1   Pain Rating: FLACC (Activity) - Cry 1   Pain Rating: FLACC (Activity) - Consolability 1   Score: FLACC (Activity) 6   Home Living   Type of Home Apartment   Home Layout One level; Other (Comment); Performs ADLs on one level  (4 TEMI)   Bathroom Shower/Tub Tub/shower unit   Bathroom Toilet Standard   Bathroom Equipment Other (Comment)  (no DME)   [address]   Additional Comments Pt reports living w/ daughter in apt w/ 4 TEMI w/ railing   Prior Function   Level of [place] Independent with ADLs and functional mobility   Lives With Daughter   Receives Help From Family   ADL Assistance Independent   IADLs Needs assistance  (pt reports daughter shops and cleans  )   Falls in the last 6 months 1 to 4   Vocational Retired   Comments Pt reports I w/ ADL and functional mobility using RW  Pt reports that she drives, and folds the laundry, but her daugher washes laundry, cleans, and grocery shops  Pt reports that her daughter works part time at [id number] Pt reports I w/ ADL PTA  Pt reports some assistance w/ IADL   Reciprocal Relationships Pt reports living w/ daughter in one level apt w/ 4 TEMI  Pt reports supportive and involved family  Pt added that her daughter works part time   Service to [employer] 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[employer], and worked in packaging   Intrinsic Gratification Pt reports enjoying spending time w/ family and doing word puzzles  ADL   Eating Assistance 6  Modified independent   Eating Deficit Setup   Grooming Assistance 6  Modified Independent  (seated in chair w/ tray table)   Grooming Deficit Setup   UB Bathing Assistance 4  Minimal Assistance   UB Dressing Assistance 5  Supervision/Setup   UB Dressing Deficit Setup; Increased time to complete   LB Dressing Assistance 2  Maximal Assistance   LB Dressing Deficit Setup; Increased time to complete; Don/doff R sock; Thread RLE into pants; Thread RLE into underwear;Pull up over hips   Bed Mobility   Supine to Sit 2  Maximal assistance Additional items HOB elevated; Increased time required;Verbal cues;LE management;Assist x 1   Sit to Supine Unable to assess   Additional Comments Pt seated OOB in chair post eval w/ call bell in reach w/ PT, Romel   Transfers   Sit to Stand 2  Maximal assistance   Additional items Assist x 2; Increased time required;Verbal cues   Stand to Sit 3  Moderate assistance   Additional items Assist x 2;Armrests; Increased time required;Verbal cues   Stand pivot 2  Maximal assistance   Additional items Assist x 2; Increased time required;Verbal cues  (cues for R LE NWB, hand placement, and tech  w/ RW)   Balance   Static Sitting Fair   Static Standing Fair -   Activity Tolerance   Activity Tolerance Patient limited by pain   Medical Staff Made Aware spoke to PT, Cr 39 per RN, Moraima Maciel ok to see   RUE Assessment   RUE Assessment X  (limited AROM shoulder past 90*)   RUE Strength   RUE Overall Strength Deficits; Other (Comment)  (shoulder 2+/5, elbow 3+/5, grasp 3+/5)   LUE Assessment   LUE Assessment X  (limited AROM shoulder end range)   LUE Strength   LUE Overall Strength Deficits  (shoulder 3/5, elbow 3/5, grasp 3+/5)   Hand Function   Fine Motor Coordination Functional  (right hand dominance, arthritic changes)   Sensation   Light Touch No apparent deficits   Vision-Basic Assessment   Current Vision Wears glasses all the time   Cognition   Overall Cognitive Status [facility]   Arousal/Participation Alert; Cooperative   Attention Attends with cues to redirect  (hard of hearing)   Orientation Level Oriented to person;Oriented to place;Oriented to situation   Memory Decreased recall of precautions   Following Commands Follows one step commands with increased time or repetition  (hard of hearing)   Comments Pt agreed to participate in OT eval  Pt able to provide social history, and follow one step directions w/ cues / increased time  Pt presetsn hard of hearing, and benefits from good eye contact   Pt benefits from verbal cues and physical demonstration for improved carryover of functional transfers tech using RW  Pt alert and oriented to person, place, situation  Pt oriented to time (Thanksgiving yesterday)   Assessment   Limitation Decreased ADL status; Decreased UE strength;Decreased endurance;Decreased self-care trans;Decreased high-level ADLs   Assessment Pt is a 81 yo female admitted [facility] on 11/23/2017  Pt presents w/ hip pain after a fall at home, and significant PMH impacting her occupational performance including HTN, CHF, hx R TKA, hx R hips fracture surgery, cardiac disease  Per ortho consult NWB R LE in knee immobilizer for nondisplaced spiral fracture shaft of right femur  Pt completed bed mobility w/ max A supine to sit at EOB w/ HOB elevated  Pt demonstrated limited ROM B shoulders AG R>L, and reports right hand dominance  Pt completed UBD w/ min A and requires max A to complete LBD  Pt completed functional transfers bed to chair to pt's left side using RW w/ max A x2  Pt benefits from verbal cues and physical demonstration to improve carryover of R LE NWB  Pt alert and cooperative, and benefits from good eye contact when engaged in conversation due to hard of hearing  Pt able to provide social history and demonstrated appropriate memory of recent events  Pt presents w/ decreased standing tolerance, decreasd endurance, decreased UE ROM and strength, decreased R LE ROM strength, decreased balance impacting her I w/ dressing, bathing, grooming, clothing mgmt, food prep / clean up, functional mobility, and community mobility  Pt would benefit from OT while in acute care to address deficits, and post acute rehab when medically stable for discharge from acute care to return to baseline level of I  Will continue to follow pt while in acute care  Goals   Patient Goals to go home   Plan   Treatment Interventions ADL retraining;Functional transfer training;UE strengthening/ROM; Equipment evaluation/education;Patient/family training; Compensatory technique education; Activityengagement   Goal Expiration Date 12/01/17   OT Frequency 3-5x/wk   Recommendation   OT Discharge Recommendation Other (Comment)  (post acute rehab)   Equipment Recommended Bedside commode   OT - OK to Discharge No   Barthel Index   Feeding 10   Bathing 0   Grooming Score 5   Dressing Score 5   Bladder Score 10   Bowels Score 10   Toilet Use Score 5   Transfers (Bed/Chair) Score 5   Mobility (Level Surface) Score 0   Stairs Score 0   Barthel Index Score 50   Pt goals to be met in 7 days:  1  Pt will complete bed mobility supine <> sit w/ min A x1  2  Pt will complete functional transfers to bed, chair, and commode w/ min A x1 using AD  3  Pt will complete UBD w/ mod I after set- up  4  Pt will complete LBD w/ min A x1 after set- up while seated at EOB w/ fair + balance  5  Pt will demonstrate increased activity tolerance while seated at EOB to complete grooming w/ S after set- up, and will tolerate sitting for at least 10 minutes  6  Pt will demonstrate good attention and verbalize understanding of safety precautions and R LE NWB for ADL performance  7  Pt will consistently demonstrate understanding of safety precautions during ADL performance  8   Pt will consistently demonstrate understanding of R LE NWB during LB ADL performance and functional transfers  Dana Elizondo, OT

## 2017-11-25 RX ADMIN — ACETAMINOPHEN 975 MG: 325 TABLET ORAL at 22:17

## 2017-11-25 RX ADMIN — METOPROLOL TARTRATE 25 MG: 25 TABLET ORAL at 22:17

## 2017-11-25 RX ADMIN — METOPROLOL TARTRATE 25 MG: 25 TABLET ORAL at 09:40

## 2017-11-25 RX ADMIN — ACETAMINOPHEN 975 MG: 325 TABLET ORAL at 05:37

## 2017-11-25 RX ADMIN — LEVOTHYROXINE SODIUM 88 MCG: 88 TABLET ORAL at 05:36

## 2017-11-25 RX ADMIN — ACETAMINOPHEN 975 MG: 325 TABLET ORAL at 12:42

## 2017-11-25 RX ADMIN — OXYCODONE HYDROCHLORIDE 2.5 MG: 5 TABLET ORAL at 14:48

## 2017-11-25 RX ADMIN — OXYCODONE HYDROCHLORIDE 2.5 MG: 5 TABLET ORAL at 02:40

## 2017-11-25 RX ADMIN — PRAVASTATIN SODIUM 20 MG: 20 TABLET ORAL at 17:03

## 2017-11-25 RX ADMIN — WARFARIN SODIUM 1 MG: 1 TABLET ORAL at 17:03

## 2017-11-25 RX ADMIN — PANTOPRAZOLE SODIUM 20 MG: 20 TABLET, DELAYED RELEASE ORAL at 05:37

## 2017-11-25 RX ADMIN — CHOLECALCIFEROL TAB 25 MCG (1000 UNIT) 2000 UNITS: 25 TAB at 09:39

## 2017-11-25 NOTE — PROGRESS NOTES
[facility] Internal Medicine Progress Note  Patient: Anthony Foley 80 y o  female   MRN: [id number]  PCP: Vaughn Alejo MD  Unit/Bed#: -51 Encounter: [id number]  Date Of Visit: 17    Assessment:    Principal Problem:    Hip pain  Active Problems:    Nondisplaced spiral fracture of shaft of right femur, initial encounter for closed fracture (HCC)    CKD (chronic kidney disease) stage 4, GFR 15-29 ml/min (HCC)    Hypothyroidism    Essential hypertension      Plan:    · Right hip pain he slowly improving, continue analgesics weight-bearing as tolerated physical therapy  · Atrial fibrillation continue Coumadin monitor INR  · Chronic kidney stage 4  · Out of bed as able ambulation as able physical therapy      VTE Pharmacologic Prophylaxis:   Pharmacologic: Warfarin (Coumadin)  Mechanical VTE Prophylaxis in Place: Yes    Patient Centered Rounds: I have performed bedside rounds with nursing staff today  Discussions with Specialists or Other Care Team Provider:     Education and Discussions with Family / Patient: pt    Time Spent for Care: 30 minutes  More than 50% of total time spent on counseling and coordination of care as described above  Current Length of Stay: 2 day(s)    Current Patient Status: Inpatient   Certification Statement: The patient will continue to require additional inpatient hospital stay due to As mentioned    Discharge Plan / Estimated Discharge Date:  Pending placement discussed with case management    Code Status: Level 1 - Full Code      Subjective:     Comfortably lying in bed  Reports improving right hip pain    Objective:     Vitals:   Temp (24hrs), Av 7 °F (36 5 °C), Min:97 5 °F (36 4 °C), Max:97 8 °F (36 6 °C)    HR:  [72-87] 73  Resp:  [16-18] 18  BP: (131-154)/(64-76) 131/64  SpO2:  [93 %-96 %] 93 %  Body mass index is 26 57 kg/m²  Input and Output Summary (last 24 hours):        Intake/Output Summary (Last 24 hours) at 17 2661  Last data filed at 11/25/17 1300   Gross per 24 hour   Intake              900 ml   Output             1125 ml   Net             -225 ml       Physical Exam:     Physical Exam     Comfortably lying in bed  Neck supple  Lungs clear to auscultation  Heart sounds no murmurs appreciable  Abdomen soft  Pulses present  No pedal edema  Awake alert obeys simple commands  No rash      Additional Data:     Labs:      Results from last 7 days  Lab Units 11/24/17  0449 11/23/17  1002   WBC Thousand/uL 4 45 4 26*   HEMOGLOBIN g/dL 10 8* 12 2   HEMATOCRIT % 35 2 39 1   PLATELETS Thousands/uL 168 186   NEUTROS PCT %  --  74   LYMPHS PCT %  --  16   MONOS PCT %  --  7   EOS PCT %  --  2       Results from last 7 days  Lab Units 11/24/17  0449   SODIUM mmol/L 144   POTASSIUM mmol/L 3 7   CHLORIDE mmol/L 108   CO2 mmol/L 27   BUN mg/dL 28*   CREATININE mg/dL 1 36*   CALCIUM mg/dL 8 8   GLUCOSE RANDOM mg/dL 106       Results from last 7 days  Lab Units 11/23/17  1002   INR  2 18*       * I Have Reviewed All Lab Data Listed Above  * Additional Pertinent Lab Tests Reviewed: All Labs Within Last 24 Hours Reviewed    Imaging:    Imaging Reports Reviewed Today Include:   Imaging Personally Reviewed by Myself Includes:     Recent Cultures (last 7 days):           Last 24 Hours Medication List:     acetaminophen 975 mg Oral Q8H [address]   cholecalciferol 2,000 Units Oral Daily   furosemide 40 mg Oral Every Other Day   levothyroxine 88 mcg Oral Early Morning   metoprolol tartrate 25 mg Oral Q12H [address]   oxyCODONE 2 5 mg Oral Q12H KENISHA   pantoprazole 20 mg Oral Early Morning   pravastatin 20 mg Oral QPM   warfarin 1 mg Oral Daily (warfarin)        Today, Patient Was Seen By: Sourav Weaver MD    ** Please Note: This note has been constructed using a voice recognition system   **

## 2017-11-25 NOTE — PLAN OF CARE
DISCHARGE PLANNING - CARE MANAGEMENT     Discharge to post-acute care or home with appropriate resources Progressing        PAIN - ADULT     Verbalizes/displays adequate comfort level or baseline comfort level Progressing        Potential for Falls     Patient will remain free of falls Progressing        Prexisting or High Potential for Compromised Skin Integrity     Skin integrity is maintained or improved Progressing        SAFETY ADULT     Maintain or return to baseline ADL function Progressing     Maintain or return mobility status to optimal level Progressing

## 2017-11-25 NOTE — PHYSICIAN ADVISOR
Current patient class: Inpatient  The patient is currently on Hospital Day: 2      The patient was admitted to the hospital at [phone] on 11/23/17 for the following diagnosis:  Hip pain [M25 [id number]]  Nondisplaced spiral fracture of shaft of right femur, initial encounter for closed fracture ([facility] ) [S72 805A]       There is documentation in the medical record of an expected length of stay of at least 2 midnights  The patient is therefore expected to satisfy the 2 midnight benchmark and given the 2 midnight presumption is appropriate for INPATIENT ADMISSION  Given this expectation of a satisfying stay, CMS instructs us that the patient is most often appropriate for inpatient admission under part A provided medical necessity is documented in the chart  After review of the relevant documentation, labs, vital signs and test results, the patient is appropriate for INPATIENT ADMISSION  Admission to the hospital as an inpatient is a complex decision making process which requires the practitioner to consider the patients presenting complaint, history and physical examination and all relevant testing  With this in mind, in this case, the patient was deemed appropriate for INPATIENT ADMISSION  After review of the documentation and testing available at the time of the admission I concur with this clinical determination of medical necessity  Rationale is as follows: The patient is a 80 yrs old Female who presented to the ED at 11/23/2017  9:31 AM with a chief complaint of Fall (Pt brought to ER via EMS from home with c/o right hip pain s/p loosing balance when ambulating from bed to closet  +right leg shortened  +blood thinners  Pt denies head injury, or c-spine tenderness with palp)     Patient was found to have a hip fracture, and is currently admitted to the hospital within expectation at least 2 midnight length of stay    At the present time the patient has been hospitalized for 1 midnight, and will cross a 2nd midnight tonight  The patient is appropriate for inpatient admission given the need for at least a 2 midnight length of stay, the presence of acute hip fracture, and need for further management      The patients vitals on arrival were ED Triage Vitals [11/23/17 0930]   Temperature Pulse Respirations Blood Pressure SpO2   98 2 °F (36 8 °C) 94 18 139/81 94 %      Temp Source Heart Rate Source Patient Position - Orthostatic VS BP Location FiO2 (%)   Oral Monitor Lying Right arm --      Pain Score       Worst Possible Pain           Past Medical History:   Diagnosis Date    Anemia     pernicious    Arthritis     Cardiac disease     CHF (congestive heart failure) (HCC)     Hiatal hernia     HTN (hypertension)     Hypercholesteremia     Hypothyroidism      Past Surgical History:   Procedure Laterality Date    APPENDECTOMY      CATARACT EXTRACTION Bilateral     HIP FRACTURE SURGERY Right 12/2015    HYSTERECTOMY      OOPHORECTOMY      REPAIR RECTOCELE      REPLACEMENT TOTAL KNEE Right            Consults have been placed to:   IP CONSULT TO ORTHOPEDIC SURGERY    Vitals:    11/24/17 1032 11/24/17 1525 11/24/17 2045 11/24/17 2213   BP: 126/78 143/67 143/65 154/76   Pulse: 86 79 87 72   Resp:  16  16   Temp:  97 8 °F (36 6 °C)  97 8 °F (36 6 °C)   TempSrc:  Oral  Oral   SpO2:  96%  94%   Weight:       Height:           Most recent labs:    Recent Labs      11/23/17   1002  11/24/17   0449   WBC  4 26*  4 45   HGB  12 2  10 8*   HCT  39 1  35 2   PLT  186  168   K  4 4  3 7   NA  140  144   CALCIUM  9 5  8 8   BUN  32*  28*   CREATININE  1 40*  1 36*   INR  2 18*   --        Scheduled Meds:  acetaminophen 975 mg Oral Q8H KENISHA   cholecalciferol 2,000 Units Oral Daily   furosemide 40 mg Oral Every Other Day   levothyroxine 88 mcg Oral Early Morning   metoprolol tartrate 25 mg Oral Q12H KENISHA   oxyCODONE 2 5 mg Oral Q12H KENISHA   pantoprazole 20 mg Oral Early Morning   pravastatin 20 mg Oral QPM   warfarin 1 mg Oral Daily (warfarin)     Continuous Infusions:   PRN Meds: oxyCODONE    Surgical procedures (if appropriate):

## 2017-11-26 PROBLEM — N18.30 CKD (CHRONIC KIDNEY DISEASE), STAGE III (HCC): Status: ACTIVE | Noted: 2017-11-23

## 2017-11-26 PROBLEM — I48.91 ATRIAL FIBRILLATION (HCC): Status: ACTIVE | Noted: 2017-11-26

## 2017-11-26 LAB
ANION GAP SERPL CALCULATED.3IONS-SCNC: 7 MMOL/L (ref 4–13)
BUN SERPL-MCNC: 26 MG/DL (ref 5–25)
CALCIUM SERPL-MCNC: 8.8 MG/DL (ref 8.3–10.1)
CHLORIDE SERPL-SCNC: 101 MMOL/L (ref 100–108)
CO2 SERPL-SCNC: 28 MMOL/L (ref 21–32)
CREAT SERPL-MCNC: 1.48 MG/DL (ref 0.6–1.3)
ERYTHROCYTE [DISTWIDTH] IN BLOOD BY AUTOMATED COUNT: 14.2 % (ref 11.6–15.1)
GFR SERPL CREATININE-BSD FRML MDRD: 30 ML/MIN/1.73SQ M
GLUCOSE SERPL-MCNC: 132 MG/DL (ref 65–140)
HCT VFR BLD AUTO: 36.6 % (ref 34.8–46.1)
HGB BLD-MCNC: 11.2 G/DL (ref 11.5–15.4)
INR PPP: 2.63 (ref 0.86–1.16)
MCH RBC QN AUTO: 29.2 PG (ref 26.8–34.3)
MCHC RBC AUTO-ENTMCNC: 30.6 G/DL (ref 31.4–37.4)
MCV RBC AUTO: 96 FL (ref 82–98)
PLATELET # BLD AUTO: 206 THOUSANDS/UL (ref 149–390)
PMV BLD AUTO: 9.9 FL (ref 8.9–12.7)
POTASSIUM SERPL-SCNC: 4.1 MMOL/L (ref 3.5–5.3)
PROTHROMBIN TIME: 29.1 SECONDS (ref 12.1–14.4)
RBC # BLD AUTO: 3.83 MILLION/UL (ref 3.81–5.12)
SODIUM SERPL-SCNC: 136 MMOL/L (ref 136–145)
WBC # BLD AUTO: 4.49 THOUSAND/UL (ref 4.31–10.16)

## 2017-11-26 PROCEDURE — 85610 PROTHROMBIN TIME: CPT | Performed by: PHYSICIAN ASSISTANT

## 2017-11-26 PROCEDURE — 85027 COMPLETE CBC AUTOMATED: CPT | Performed by: PHYSICIAN ASSISTANT

## 2017-11-26 PROCEDURE — 80048 BASIC METABOLIC PNL TOTAL CA: CPT | Performed by: PHYSICIAN ASSISTANT

## 2017-11-26 RX ORDER — ONDANSETRON 2 MG/ML
4 INJECTION INTRAMUSCULAR; INTRAVENOUS EVERY 4 HOURS PRN
Status: DISCONTINUED | OUTPATIENT
Start: 2017-11-26 | End: 2017-11-28 | Stop reason: HOSPADM

## 2017-11-26 RX ADMIN — FUROSEMIDE 40 MG: 40 TABLET ORAL at 08:29

## 2017-11-26 RX ADMIN — METOPROLOL TARTRATE 25 MG: 25 TABLET ORAL at 21:24

## 2017-11-26 RX ADMIN — PRAVASTATIN SODIUM 20 MG: 20 TABLET ORAL at 17:33

## 2017-11-26 RX ADMIN — ACETAMINOPHEN 975 MG: 325 TABLET ORAL at 06:10

## 2017-11-26 RX ADMIN — ACETAMINOPHEN 975 MG: 325 TABLET ORAL at 21:24

## 2017-11-26 RX ADMIN — ACETAMINOPHEN 975 MG: 325 TABLET ORAL at 13:21

## 2017-11-26 RX ADMIN — CHOLECALCIFEROL TAB 25 MCG (1000 UNIT) 2000 UNITS: 25 TAB at 08:29

## 2017-11-26 RX ADMIN — METOPROLOL TARTRATE 25 MG: 25 TABLET ORAL at 08:29

## 2017-11-26 RX ADMIN — OXYCODONE HYDROCHLORIDE 2.5 MG: 5 TABLET ORAL at 04:17

## 2017-11-26 RX ADMIN — LEVOTHYROXINE SODIUM 88 MCG: 88 TABLET ORAL at 06:11

## 2017-11-26 RX ADMIN — PANTOPRAZOLE SODIUM 20 MG: 20 TABLET, DELAYED RELEASE ORAL at 06:11

## 2017-11-26 RX ADMIN — WARFARIN SODIUM 1 MG: 1 TABLET ORAL at 17:33

## 2017-11-26 RX ADMIN — OXYCODONE HYDROCHLORIDE 2.5 MG: 5 TABLET ORAL at 14:56

## 2017-11-26 NOTE — SOCIAL WORK
CM spoke to Pt's sister Alvarez Gomez about STR  Per Alvarez Gomez they have questions for the orthopedic doctor they would like answered prior to agreeing that Pt is ready for d/c  Alvarez Gomez states she is agreeable to a referral being made to [facility] but is not fully committed to Pt going there as they would like to tour it first  CM submitted referral  Cm dept will follow Pt until discharge

## 2017-11-26 NOTE — PLAN OF CARE
Problem: DISCHARGE PLANNING - CARE MANAGEMENT  Goal: Discharge to post-acute care or home with appropriate resources  INTERVENTIONS:  - Conduct assessment to determine patient/family and health care team treatment goals, and need for post-acute services based on payer coverage, community resources, and patient preferences, and barriers to discharge  - Address psychosocial, clinical, and financial barriers to discharge as identified in assessment in conjunction with the patient/family and health care team  - Arrange appropriate level of post-acute services according to patient's   needs and preference and payer coverage in collaboration with the physician and health care team  - Communicate with and update the patient/family, physician, and health care team regarding progress on the discharge plan  - Arrange appropriate transportation to post-acute venues   Outcome: Progressing  CM spoke to Pt's sister Jaylyn Evans about STR  Per Jaylyn Evans they have questions for the orthopedic doctor they would like answered prior to agreeing that Pt is ready for d/c  Jaylyn Evans states she is agreeable to a referral being made to [facility] but is not fully committed to Pt going there as they would like to tour it first  CM submitted referral  Cm dept will follow Pt until discharge

## 2017-11-26 NOTE — SOCIAL WORK
TRINA met with Pt, Pt's son in law, and Pt's son in [address] [place] brother at bedside  Pt stated she wanted CM to contact her daughter for STR choices  CM left a message for Pt's daughter Kelli Amor and a message for Pt's daughter Tasha Harry, awaiting call back

## 2017-11-26 NOTE — PLAN OF CARE
Problem: DISCHARGE PLANNING - CARE MANAGEMENT  Goal: Discharge to post-acute care or home with appropriate resources  INTERVENTIONS:  - Conduct assessment to determine patient/family and health care team treatment goals, and need for post-acute services based on payer coverage, community resources, and patient preferences, and barriers to discharge  - Address psychosocial, clinical, and financial barriers to discharge as identified in assessment in conjunction with the patient/family and health care team  - Arrange appropriate level of post-acute services according to patient's   needs and preference and payer coverage in collaboration with the physician and health care team  - Communicate with and update the patient/family, physician, and health care team regarding progress on the discharge plan  - Arrange appropriate transportation to post-acute venues   Outcome: Progressing  CM met with Pt, Pt's son in law, and Pt's son in [address] [facility] brother at bedside  Pt stated she wanted CM to contact her daughter for STR choices  CM left a message for Pt's daughter Ryanne Marking and a message for Pt's daughter Debo Clas, awaiting call back

## 2017-11-26 NOTE — PROGRESS NOTES
[address] Internal Medicine Progress Note  Patient: Sharifa Nolan 80 y o  female   MRN: [id number]  PCP: Paula Garcia MD  Unit/Bed#: -06 Encounter: [id number]  Date Of Visit: 11/26/17    Assessment:    Principal Problem:    Nondisplaced spiral fracture of shaft of right femur, initial encounter for closed fracture [facility])  Active Problems:    Hip pain    CKD (chronic kidney disease), stage III    Hypothyroidism    Essential hypertension    Atrial fibrillation ([address] )      Plan:    · Right distal femur spiral fracture around long TFN:  Was seen in consultation by Orthopedics, nonweightbearing for 6 weeks, not operative intervention  Knee immobilizer  Pain control  Awaiting rehab choices  · Ambulatory dysfunction secondary to distal femur fracture: Nonweightbearing right lower extremity  Physical therapy occupational therapy following  Await rehab choices  · Atrial fibrillation:  Maintained on chronic Coumadin  Repeat INR  Last INR 11/23/2017 was 2 18  Continue Coumadin at 1 mg, await INR and dose adjust based on this  Continue metoprolol  · Chronic kidney disease stage III:  Creatinine currently 1 36 which appears to be baseline  Continue to monitor  · Hypothyroidism:  Continue Synthroid  · Hyperlipidemia:  Continue statin  · Anemia secondary to chronic kidney disease and B12 deficiency:  Hemoglobin on admission was noted to be 12, decreased to 10 8  Baseline appears to be 11-12  Repeat labs  VTE Pharmacologic Prophylaxis:   Pharmacologic: Warfarin (Coumadin)  Mechanical VTE Prophylaxis in Place: Yes    Patient Centered Rounds: I have performed bedside rounds with nursing staff today  Winter Perez    Discussions with Specialists or Other Care Team Provider: case management    Education and Discussions with Family / Patient:     Time Spent for Care: 30 minutes  More than 50% of total time spent on counseling and coordination of care as described above      Current Length of Stay: 3 day(s)    Current Patient Status: Inpatient   Certification Statement: The patient will continue to require additional inpatient hospital stay due to await rehab placement    Discharge Plan / Estimated Discharge Date: await rehab placement    Code Status: Level 1 - Full Code      Subjective:   Ms Primo Ferreira is a 80year old female who is feeling well since oxycodone was started  No nausea, vomiting, diarrhea or abdominal pain  Urinates a lot when she takes lasix (this is the same dose as at home)  Objective:     Vitals:   Temp (24hrs), Av 2 °F (36 8 °C), Min:97 8 °F (36 6 °C), Max:98 5 °F (36 9 °C)    HR:  [70-81] 81  Resp:  [18] 18  BP: (117-135)/(61-74) 135/61  SpO2:  [95 %-96 %] 96 %  Body mass index is 26 57 kg/m²  Input and Output Summary (last 24 hours): Intake/Output Summary (Last 24 hours) at 17 1212  Last data filed at 17 1210   Gross per 24 hour   Intake              800 ml   Output             1600 ml   Net             -800 ml       Physical Exam:     Physical Exam   Constitutional: No distress  HENT:   Head: Normocephalic and atraumatic  Eyes: Conjunctivae are normal    Neck: No JVD present  Cardiovascular: Normal rate, normal heart sounds and intact distal pulses  An irregularly irregular rhythm present  No murmur heard  Pulmonary/Chest: Effort normal and breath sounds normal  No respiratory distress  She has no wheezes  She has no rales  Abdominal: Soft  Bowel sounds are normal  She exhibits no distension  There is no tenderness  There is no rebound and no guarding  Musculoskeletal: She exhibits no edema  Neurological: She is alert  Skin: Skin is warm and dry  No rash noted  She is not diaphoretic  No erythema  Nursing note and vitals reviewed        Additional Data:     Labs:      Results from last 7 days  Lab Units 17  0449 17  1002   WBC Thousand/uL 4 45 4 26*   HEMOGLOBIN g/dL 10 8* 12 2   HEMATOCRIT % 35 2 39 1   PLATELETS Thousands/uL 168 186   NEUTROS PCT %  --  74   LYMPHS PCT %  --  16   MONOS PCT %  --  7   EOS PCT %  --  2       Results from last 7 days  Lab Units 11/24/17  0449   SODIUM mmol/L 144   POTASSIUM mmol/L 3 7   CHLORIDE mmol/L 108   CO2 mmol/L 27   BUN mg/dL 28*   CREATININE mg/dL 1 36*   CALCIUM mg/dL 8 8   GLUCOSE RANDOM mg/dL 106       Results from last 7 days  Lab Units 11/23/17  1002   INR  2 18*       * I Have Reviewed All Lab Data Listed Above  * Additional Pertinent Lab Tests Reviewed: [address] Admission Reviewed    Imaging:    Imaging Reports Reviewed Today Include: Ct cervical spine, ct head  Imaging Personally Reviewed by Myself Includes:  none    Recent Cultures (last 7 days):           Last 24 Hours Medication List:     acetaminophen 975 mg Oral Q8H [address]   cholecalciferol 2,000 Units Oral Daily   furosemide 40 mg Oral Every Other Day   levothyroxine 88 mcg Oral Early Morning   metoprolol tartrate 25 mg Oral Q12H KENISHA   oxyCODONE 2 5 mg Oral Q12H KENISHA   pantoprazole 20 mg Oral Early Morning   pravastatin 20 mg Oral QPM   warfarin 1 mg Oral Daily (warfarin)        Today, Patient Was Seen By: Evita Sanchez PA-C    ** Please Note: This note has been constructed using a voice recognition system   **

## 2017-11-27 PROBLEM — D63.8 ANEMIA OF CHRONIC DISEASE: Chronic | Status: ACTIVE | Noted: 2017-11-27

## 2017-11-27 RX ADMIN — PRAVASTATIN SODIUM 20 MG: 20 TABLET ORAL at 17:56

## 2017-11-27 RX ADMIN — OXYCODONE HYDROCHLORIDE 2.5 MG: 5 TABLET ORAL at 14:58

## 2017-11-27 RX ADMIN — WARFARIN SODIUM 1 MG: 1 TABLET ORAL at 17:56

## 2017-11-27 RX ADMIN — OXYCODONE HYDROCHLORIDE 5 MG: 5 TABLET ORAL at 19:05

## 2017-11-27 RX ADMIN — ACETAMINOPHEN 975 MG: 325 TABLET ORAL at 14:58

## 2017-11-27 RX ADMIN — METOPROLOL TARTRATE 25 MG: 25 TABLET ORAL at 08:45

## 2017-11-27 RX ADMIN — OXYCODONE HYDROCHLORIDE 2.5 MG: 5 TABLET ORAL at 03:55

## 2017-11-27 RX ADMIN — ACETAMINOPHEN 975 MG: 325 TABLET ORAL at 21:52

## 2017-11-27 RX ADMIN — PANTOPRAZOLE SODIUM 20 MG: 20 TABLET, DELAYED RELEASE ORAL at 06:27

## 2017-11-27 RX ADMIN — ACETAMINOPHEN 975 MG: 325 TABLET ORAL at 06:27

## 2017-11-27 RX ADMIN — METOPROLOL TARTRATE 25 MG: 25 TABLET ORAL at 21:52

## 2017-11-27 RX ADMIN — LEVOTHYROXINE SODIUM 88 MCG: 88 TABLET ORAL at 06:27

## 2017-11-27 RX ADMIN — CHOLECALCIFEROL TAB 25 MCG (1000 UNIT) 2000 UNITS: 25 TAB at 08:45

## 2017-11-27 NOTE — PLAN OF CARE
Problem: DISCHARGE PLANNING - CARE MANAGEMENT  Goal: Discharge to post-acute care or home with appropriate resources  INTERVENTIONS:  - Conduct assessment to determine patient/family and health care team treatment goals, and need for post-acute services based on payer coverage, community resources, and patient preferences, and barriers to discharge  - Address psychosocial, clinical, and financial barriers to discharge as identified in assessment in conjunction with the patient/family and health care team  - Arrange appropriate level of post-acute services according to patient's   needs and preference and payer coverage in collaboration with the physician and health care team  - Communicate with and update the patient/family, physician, and health care team regarding progress on the discharge plan  - Arrange appropriate transportation to post-acute venues   Outcome: Progressing  Pt's family requested referral to [facility]  S/w pt's daughter Radha Soto at pt's bedside to make her aware that [facility] does not have a bed available this week  [place] understands and requests referral to [facility]  I offered to call pt's daughter Ata Gandara but Radha Soto states she will call to update her  Referral placed, CM dept will follow

## 2017-11-27 NOTE — ASSESSMENT & PLAN NOTE
· Non-surgical treatment per orthopedics  · Non-weight bearing in knee immobilizer  · Adjust pain Rx  No benefit in utilizing short-acting Oxycodone every 12 hours  Would discontinue OxyIR 2 5 mg every 12 hours  · Continue scheduled Tylenol  · Add scheduled Tramadol 50 mg every 8 hours (between scheduled Tylenol)  · Continue PRN oxycodone 5 mg every 6 hours PRN

## 2017-11-27 NOTE — PROGRESS NOTES
Progress Note - Neymar Schaefer 80 y o  female MRN: [id number]  Unit/Bed#: -01 Encounter: [id number]    * Nondisplaced spiral fracture of shaft of right femur, initial encounter for closed fracture [facility])   Assessment & Plan    · Non-surgical treatment per orthopedics  · Non-weight bearing in knee immobilizer  · Adjust pain Rx  No benefit in utilizing short-acting Oxycodone every 12 hours  Would discontinue OxyIR 2 5 mg every 12 hours  · Continue scheduled Tylenol  · Add scheduled Tramadol 50 mg every 8 hours (between scheduled Tylenol)  · Continue PRN oxycodone 5 mg every 6 hours PRN  Anemia of chronic disease   Assessment & Plan    · Hemoglobin stable and at baseline  Atrial fibrillation ([address] )   Assessment & Plan    · Rate controlled on metoprolol  · Therapeutic on Coumadin (INR today: 2 63)        Essential hypertension   Assessment & Plan    · Stable on Lasix, metoprolol        Hypothyroidism   Assessment & Plan    · Continue Synthroid  CKD (chronic kidney disease), stage III   Assessment & Plan    · Baseline creatinine: 1 4          VTE Pharmacologic Prophylaxis:   Pharmacologic: Warfarin (Coumadin)  Mechanical: Mechanical VTE prophylaxis in place  Patient Centered Rounds: I have performed bedside rounds with nursing staff today  Discussions with Specialists or Other Care Team Provider: None  Education and Discussions with Family / Patient: All patient questions answered to the best of my ability  Time Spent for Care: 20 minutes  More than 50% of total time spent on counseling and coordination of care as described above  Current Length of Stay: 4 day(s)  Current Patient Status: Inpatient   Certification Statement: The patient will continue to require additional inpatient hospital stay due to awaiting rehab placement  Discharge Plan: Patient is medically stable for discharge once rehab placement is obtained    Code Status: Level 1 - Full Code    Subjective:   Patient is doing well overall; however, she has a lot of pain in the leg with only a little movement  Objective:   Vitals:   Temp (24hrs), Av 7 °F (36 5 °C), Min:97 5 °F (36 4 °C), Max:98 °F (36 7 °C)    HR:  [67-81] 67  Resp:  [18] 18  BP: (136-156)/(63-91) 136/66  SpO2:  [94 %-97 %] 97 %  Body mass index is 26 57 kg/m²  Input and Output Summary (last 24 hours): Intake/Output Summary (Last 24 hours) at 17 0908  Last data filed at 17 0405   Gross per 24 hour   Intake              420 ml   Output             1650 ml   Net            -1230 ml       Physical Exam:     Physical Exam   HENT:   Head: Normocephalic and atraumatic  Mouth/Throat: Oropharynx is clear and moist and mucous membranes are normal    Eyes: No scleral icterus  Cardiovascular: Normal rate  An irregular rhythm present  No murmur heard  Pulmonary/Chest: Breath sounds normal  She has no wheezes  She has no rales  She exhibits no tenderness  Abdominal: Soft  Bowel sounds are normal  She exhibits no distension  There is no tenderness  Musculoskeletal: Normal range of motion  She exhibits no edema  Right knee immobilizer  Skin: Skin is warm and dry  No rash noted  Psychiatric: She has a normal mood and affect  Vitals reviewed  Additional Data:   Labs:    Results from last 7 days  Lab Units 17  1237  17  1002   WBC Thousand/uL 4 49  < > 4 26*   HEMOGLOBIN g/dL 11 2*  < > 12 2   HEMATOCRIT % 36 6  < > 39 1   PLATELETS Thousands/uL 206  < > 186   NEUTROS PCT %  --   --  74   LYMPHS PCT %  --   --  16   MONOS PCT %  --   --  7   EOS PCT %  --   --  2   < > = values in this interval not displayed      Results from last 7 days  Lab Units 17  1237   SODIUM mmol/L 136   POTASSIUM mmol/L 4 1   CHLORIDE mmol/L 101   CO2 mmol/L 28   BUN mg/dL 26*   CREATININE mg/dL 1 48*   CALCIUM mg/dL 8 8   GLUCOSE RANDOM mg/dL 132       Results from last 7 days  Lab Units 17  1237   INR  2 63*       * I Have Reviewed All Lab Data Listed Above  * Additional Pertinent Lab Tests Reviewed: All Labs Within Last 24 Hours Reviewed    Imaging:    Imaging Reports Reviewed Today Include: None new    Cultures:   Blood Culture: No results found for: BLOODCX  Urine Culture: No results found for: URINECX  Sputum Culture: No components found for: SPUTUMCX  Wound Culture: No results found for: WOUNDCULT    Last 24 Hours Medication List:     acetaminophen 975 mg Oral Q8H [address]   cholecalciferol 2,000 Units Oral Daily   furosemide 40 mg Oral Every Other Day   levothyroxine 88 mcg Oral Early Morning   metoprolol tartrate 25 mg Oral Q12H [address]   oxyCODONE 2 5 mg Oral Q12H [address]   pantoprazole 20 mg Oral Early Morning   pravastatin 20 mg Oral QPM   warfarin 1 mg Oral Daily (warfarin)        Today, Patient Was Seen By: Kait Hadley PA-C    ** Please Note: Dragon 360 Dictation voice to text software may have been used in the creation of this document   **

## 2017-11-27 NOTE — SOCIAL WORK
Pt's family requested referral to [facility]  S/w pt's daughter Kelly Barragan at pt's bedside to make her aware that [facility] does not have a bed available this week  [place] understands and requests referral to [facility]  I offered to call pt's daughter Mike Geller but Kelly Barragan states she will call to update her  Referral placed, CM dept will follow

## 2017-11-27 NOTE — CASE MANAGEMENT
Continued Stay Review    Date: 11/27/17    Vital Signs: /66   Pulse 67   Temp 98 °F (36 7 °C) (Oral)   Resp 18   Ht 5' (1 524 m)   Wt 61 7 kg (136 lb 0 4 oz)   LMP  (LMP Unknown)   SpO2 97%   BMI 26 57 kg/m²     Medications:   Scheduled Meds:   acetaminophen 975 mg Oral Q8H [address]   cholecalciferol 2,000 Units Oral Daily   furosemide 40 mg Oral Every Other Day   levothyroxine 88 mcg Oral Early Morning   metoprolol tartrate 25 mg Oral Q12H KENISHA   oxyCODONE 2 5 mg Oral Q12H KENISHA   pantoprazole 20 mg Oral Early Morning   pravastatin 20 mg Oral QPM   warfarin 1 mg Oral Daily (warfarin)     Continuous Infusions:    PRN Meds: ondansetron    oxyCODONE    Abnormal Labs/Diagnostic Results:   Age/Sex: 80 y o  female     Assessment/Plan:   * Nondisplaced spiral fracture of shaft of right femur, initial encounter for closed fracture (HCC)   Assessment & Plan     · Non-surgical treatment per orthopedics  · Non-weight bearing in knee immobilizer  · Adjust pain Rx  No benefit in utilizing short-acting Oxycodone every 12 hours  Would discontinue OxyIR 2 5 mg every 12 hours  · Continue scheduled Tylenol  · Add scheduled Tramadol 50 mg every 8 hours (between scheduled Tylenol)  · Continue PRN oxycodone 5 mg every 6 hours PRN        Anemia of chronic disease   Assessment & Plan     · Hemoglobin stable and at baseline        Atrial fibrillation (HCC)   Assessment & Plan     · Rate controlled on metoprolol    · Therapeutic on Coumadin (INR today: 2 63)       Essential hypertension   Assessment & Plan     · Stable on Lasix, metoprolol       Hypothyroidism   Assessment & Plan     · Continue Synthroid        CKD (chronic kidney disease), stage III   Assessment & Plan     · Baseline creatinine: 1 4         Discharge Plan: TBD

## 2017-11-28 VITALS
WEIGHT: 136.02 LBS | RESPIRATION RATE: 22 BRPM | BODY MASS INDEX: 26.71 KG/M2 | HEIGHT: 60 IN | DIASTOLIC BLOOD PRESSURE: 83 MMHG | OXYGEN SATURATION: 95 % | HEART RATE: 70 BPM | SYSTOLIC BLOOD PRESSURE: 137 MMHG | TEMPERATURE: 97.4 F

## 2017-11-28 RX ORDER — ACETAMINOPHEN 325 MG/1
975 TABLET ORAL EVERY 8 HOURS
Qty: 30 TABLET | Refills: 0
Start: 2017-11-28

## 2017-11-28 RX ORDER — OXYCODONE HYDROCHLORIDE 5 MG/1
5 TABLET ORAL EVERY 4 HOURS PRN
Qty: 42 TABLET | Refills: 0 | Status: SHIPPED | OUTPATIENT
Start: 2017-11-28 | End: 2017-01-01

## 2017-11-28 RX ORDER — OXYCODONE HYDROCHLORIDE 5 MG/1
2.5 TABLET ORAL EVERY 4 HOURS PRN
Qty: 42 TABLET | Refills: 0 | Status: SHIPPED | OUTPATIENT
Start: 2017-11-28 | End: 2017-01-01

## 2017-11-28 RX ADMIN — METOPROLOL TARTRATE 25 MG: 25 TABLET ORAL at 09:03

## 2017-11-28 RX ADMIN — ACETAMINOPHEN 975 MG: 325 TABLET ORAL at 05:25

## 2017-11-28 RX ADMIN — PANTOPRAZOLE SODIUM 20 MG: 20 TABLET, DELAYED RELEASE ORAL at 05:25

## 2017-11-28 RX ADMIN — OXYCODONE HYDROCHLORIDE 2.5 MG: 5 TABLET ORAL at 02:19

## 2017-11-28 RX ADMIN — CHOLECALCIFEROL TAB 25 MCG (1000 UNIT) 2000 UNITS: 25 TAB at 09:01

## 2017-11-28 RX ADMIN — FUROSEMIDE 40 MG: 40 TABLET ORAL at 09:02

## 2017-11-28 RX ADMIN — LEVOTHYROXINE SODIUM 88 MCG: 88 TABLET ORAL at 05:25

## 2017-11-28 NOTE — DISCHARGE INSTRUCTIONS
Orthopedics:     Right distal femur fracture around long TFN  Nonweightbearing right lower extremity in knee immobilizer at all times x 6 weeks  Follow-up in two weeks as outpatient      Lung nodule found on CT:  followup non-contrast CT is recommended, initially at 6-12 months from this examination and, if stable at   that time, an additional followup is recommended for 18-24 months from this exam

## 2017-11-28 NOTE — SOCIAL WORK
S/w pt's daughter Veola Fraction this am to discuss dcp to [facility] today  Veola Fraction would like for pt to be transferred at 130pm to [facility]  Pt was set up for a 130pm BLS transport via [facility] to [facility] today  Nurse Tammi Gee, Dr Nasreen Luciano and Bonnie at [facility] aware  CM dept will follow

## 2017-11-28 NOTE — PLAN OF CARE
Problem: DISCHARGE PLANNING - CARE MANAGEMENT  Goal: Discharge to post-acute care or home with appropriate resources  INTERVENTIONS:  - Conduct assessment to determine patient/family and health care team treatment goals, and need for post-acute services based on payer coverage, community resources, and patient preferences, and barriers to discharge  - Address psychosocial, clinical, and financial barriers to discharge as identified in assessment in conjunction with the patient/family and health care team  - Arrange appropriate level of post-acute services according to patient's   needs and preference and payer coverage in collaboration with the physician and health care team  - Communicate with and update the patient/family, physician, and health care team regarding progress on the discharge plan  - Arrange appropriate transportation to post-acute venues   Outcome: Completed Date Met: 11/28/17  S/w pt's daughter Juana Guevara this am to discuss dcp to [facility] today  Juana Guevara would like for pt to be transferred at 130pm to [facility]  Pt was set up for a 130pm BLS transport via [facility] to [facility] today  Nurse Winter Perez, Dr Aravind Zabala and Bonnie at [facility] aware  CM dept will follow

## 2017-11-28 NOTE — PROGRESS NOTES
Pt resting in bed with knee immobilzer on  Pt able to move toes and positive pedal pulses  No complaints of pain at this time  Pt going to order breakfast with her student nurse  Will continue to monitor

## 2017-11-28 NOTE — DISCHARGE SUMMARY
Discharge Summary - [address] Internal Medicine    Patient Information: Yvan Bettencourt 80 y o  female MRN: [id number]  Unit/Bed#: -01 Encounter: [id number]    Discharging Physician / Practitioner: Criss Lima PA-C  PCP: Bry Qiu MD  Admission Date: 11/23/2017  Discharge Date: 11/28/17    Reason for Admission: Nondisplaced spiral fracture of the shaft of the right femur    Discharge Diagnoses:     Principal Problem:    Nondisplaced spiral fracture of shaft of right femur, initial encounter for closed fracture ([address] )  Active Problems:    CKD (chronic kidney disease), stage III    Hypothyroidism    Essential hypertension    Atrial fibrillation ([facility] )    Anemia of chronic disease  Resolved Problems:    * No resolved hospital problems  *      Consultations During Hospital Stay:  · Orthopedics     Procedures Performed:     · None     Significant Findings / Test Results:     · XR femur 2 view right-  Nondisplaced spiral fracture of the distal femoral metadiaphysis, new since June 1, 2016  Right hip internal fixation hardware includes a long intramedullary nixon which traverses the length of the spiral fracture  Right knee replacement intact      · XR hip/pelv- same as above  · XR chest portable- no acute pulmonary disease  · CT head wo contrast- no acute intracranial abnormality   · CT cervical spine-No cervical spine fracture or traumatic malalignment    Incidental Findings:   6 x 5 mm right upper medial subpleural lung nodule            Based on current Fleischner Society 2017 Guidelines on incidental pulmonary nodule, followup non-contrast CT is recommended, initially at 6-12 months from this examination and, if stable at   that time, an additional followup is recommended for 18-24 months from this exam   Test Results Pending at Discharge (will require follow up):   · none     Outpatient Tests Requested:  · none    Complications:  none    Hospital Course:     Yvan Bettenocurt is a 80 y o  female patient who originally presented to the hospital on 11/23/2017 due to hip pain  Patient was admitted after x-ray of her femur showed a spiral nondisplaced fracture of the distal femoral metadiaphysis at which point the orthopedics was consulted  Patient also received CT head without contrast to rule out any intracranial processes as well as a CT cervical spine and a chest x-ray all of which were normal   Ortho opted for nonsurgical treatment with nonweightbearing and a knee immobilizer  Patient placed on DVT prophylaxis and INR kept within the therapeutic range for AFib on Coumadin simultaneously  Pain controlled on scheduled Tylenol and scheduled tramadol with p r n  Oxycodone  Patient is ready for placement at [address] [place] [address] on 11/28  Condition at Discharge: stable     Discharge Day Visit / Exam:     Subjective:  Patient states that she still has some pain in her hip but that is well controlled on her current pain regimen she does admit to requesting both p r n  oxycodone yesterday  Vitals: Blood Pressure: 137/83 (11/28/17 0900)  Pulse: 70 (11/28/17 0900)  Temperature: (!) 97 4 °F (36 3 °C) (11/28/17 0900)  Temp Source: Oral (11/28/17 0900)  Respirations: 22 (11/28/17 0900)  Height: 5' (152 4 cm) (11/23/17 1402)  Weight - Scale: 61 7 kg (136 lb 0 4 oz) (11/23/17 1402)  SpO2: 95 % (11/28/17 0900)  Exam:   Physical Exam   Constitutional: She is oriented to person, place, and time  She appears well-developed and well-nourished  No distress  HENT:   Head: Normocephalic and atraumatic  Cardiovascular: Normal rate, normal heart sounds and intact distal pulses  Irregular rhythm    Pulmonary/Chest: Breath sounds normal  No respiratory distress  She has no wheezes  She has no rales  She exhibits no tenderness  Musculoskeletal: She exhibits no edema  Neurological: She is alert and oriented to person, place, and time  Skin: Skin is warm and dry  Psychiatric: She has a normal mood and affect     Vitals reviewed  Discussion with Family: discussed plan of care with patient's daughter    Discharge instructions/Information to patient and family:   See after visit summary for information provided to patient and family  Provisions for Follow-Up Care:  See after visit summary for information related to follow-up care and any pertinent home health orders  Disposition:     Cornel Crandall at [facility] to [facility]:   · Not applicable    Planned Readmission: no     Discharge Statement:  I spent 45 minutes discharging the patient  This time was spent on the day of discharge  I had direct contact with the patient on the day of discharge  Greater than 50% of the total time was spent examining patient, answering all patient questions, arranging and discussing plan of care with patient as well as directly providing post-discharge instructions  Additional time then spent on discharge activities  Discharge Medications:  See after visit summary for reconciled discharge medications provided to patient and family        ** Please Note: This note has been constructed using a voice recognition system **

## 2017-11-30 ENCOUNTER — GENERIC CONVERSION - ENCOUNTER (OUTPATIENT)
Dept: OTHER | Facility: OTHER | Age: 82
End: 2017-11-30

## 2018-01-01 ENCOUNTER — OFFICE VISIT (OUTPATIENT)
Dept: PHYSICAL THERAPY | Facility: CLINIC | Age: 83
End: 2018-01-01
Payer: MEDICARE

## 2018-01-01 ENCOUNTER — TRANSCRIBE ORDERS (OUTPATIENT)
Dept: LAB | Facility: CLINIC | Age: 83
End: 2018-01-01

## 2018-01-01 ENCOUNTER — GENERIC CONVERSION - ENCOUNTER (OUTPATIENT)
Dept: OTHER | Facility: OTHER | Age: 83
End: 2018-01-01

## 2018-01-01 ENCOUNTER — OFFICE VISIT (OUTPATIENT)
Dept: OBGYN CLINIC | Facility: CLINIC | Age: 83
End: 2018-01-01
Payer: MEDICARE

## 2018-01-01 ENCOUNTER — APPOINTMENT (OUTPATIENT)
Dept: LAB | Facility: CLINIC | Age: 83
End: 2018-01-01
Payer: MEDICARE

## 2018-01-01 ENCOUNTER — LAB REQUISITION (OUTPATIENT)
Dept: LAB | Facility: HOSPITAL | Age: 83
End: 2018-01-01
Payer: MEDICARE

## 2018-01-01 ENCOUNTER — HOSPITAL ENCOUNTER (OUTPATIENT)
Dept: ULTRASOUND IMAGING | Facility: HOSPITAL | Age: 83
Discharge: HOME | End: 2018-10-10
Payer: MEDICARE

## 2018-01-01 ENCOUNTER — OFFICE VISIT (OUTPATIENT)
Dept: INTERNAL MEDICINE CLINIC | Facility: CLINIC | Age: 83
End: 2018-01-01
Payer: MEDICARE

## 2018-01-01 ENCOUNTER — OFFICE VISIT (OUTPATIENT)
Dept: OBGYN CLINIC | Facility: CLINIC | Age: 83
End: 2018-01-01

## 2018-01-01 ENCOUNTER — APPOINTMENT (EMERGENCY)
Dept: RADIOLOGY | Facility: HOSPITAL | Age: 83
End: 2018-01-01
Payer: MEDICARE

## 2018-01-01 ENCOUNTER — TELEPHONE (OUTPATIENT)
Dept: INTERNAL MEDICINE CLINIC | Facility: CLINIC | Age: 83
End: 2018-01-01

## 2018-01-01 ENCOUNTER — APPOINTMENT (EMERGENCY)
Dept: CT IMAGING | Facility: HOSPITAL | Age: 83
DRG: 392 | End: 2018-01-01
Payer: MEDICARE

## 2018-01-01 ENCOUNTER — EVALUATION (OUTPATIENT)
Dept: PHYSICAL THERAPY | Facility: CLINIC | Age: 83
End: 2018-01-01
Payer: MEDICARE

## 2018-01-01 ENCOUNTER — APPOINTMENT (OUTPATIENT)
Dept: PHYSICAL THERAPY | Facility: CLINIC | Age: 83
End: 2018-01-01
Payer: MEDICARE

## 2018-01-01 ENCOUNTER — TELEPHONE (OUTPATIENT)
Dept: HEMATOLOGY ONCOLOGY | Facility: CLINIC | Age: 83
End: 2018-01-01

## 2018-01-01 ENCOUNTER — APPOINTMENT (INPATIENT)
Dept: RADIOLOGY | Facility: HOSPITAL | Age: 83
DRG: 392 | End: 2018-01-01
Payer: MEDICARE

## 2018-01-01 ENCOUNTER — APPOINTMENT (EMERGENCY)
Dept: RADIOLOGY | Facility: HOSPITAL | Age: 83
DRG: 640 | End: 2018-01-01
Payer: MEDICARE

## 2018-01-01 ENCOUNTER — APPOINTMENT (INPATIENT)
Dept: NUCLEAR MEDICINE | Facility: HOSPITAL | Age: 83
DRG: 392 | End: 2018-01-01
Payer: MEDICARE

## 2018-01-01 ENCOUNTER — TRANSITIONAL CARE MANAGEMENT (OUTPATIENT)
Dept: INTERNAL MEDICINE CLINIC | Facility: CLINIC | Age: 83
End: 2018-01-01

## 2018-01-01 ENCOUNTER — HOSPITAL ENCOUNTER (OUTPATIENT)
Dept: INFUSION CENTER | Facility: CLINIC | Age: 83
Discharge: HOME | End: 2018-09-21
Payer: MEDICARE

## 2018-01-01 ENCOUNTER — HOSPITAL ENCOUNTER (OUTPATIENT)
Dept: INFUSION CENTER | Facility: CLINIC | Age: 83
End: 2018-01-01

## 2018-01-01 ENCOUNTER — HOSPITAL ENCOUNTER (OUTPATIENT)
Dept: INFUSION CENTER | Facility: CLINIC | Age: 83
Discharge: HOME | End: 2018-11-20

## 2018-01-01 ENCOUNTER — APPOINTMENT (OUTPATIENT)
Dept: RADIOLOGY | Facility: CLINIC | Age: 83
End: 2018-01-01
Payer: MEDICARE

## 2018-01-01 ENCOUNTER — HOSPITAL ENCOUNTER (OUTPATIENT)
Dept: INFUSION CENTER | Facility: CLINIC | Age: 83
Discharge: HOME | End: 2018-07-27
Payer: MEDICARE

## 2018-01-01 ENCOUNTER — HOSPITAL ENCOUNTER (EMERGENCY)
Facility: HOSPITAL | Age: 83
Discharge: HOME | End: 2018-10-05
Attending: EMERGENCY MEDICINE | Admitting: EMERGENCY MEDICINE
Payer: MEDICARE

## 2018-01-01 ENCOUNTER — APPOINTMENT (EMERGENCY)
Dept: CT IMAGING | Facility: HOSPITAL | Age: 83
End: 2018-01-01
Payer: MEDICARE

## 2018-01-01 ENCOUNTER — OFFICE VISIT (OUTPATIENT)
Dept: HEMATOLOGY ONCOLOGY | Facility: CLINIC | Age: 83
End: 2018-01-01
Payer: MEDICARE

## 2018-01-01 ENCOUNTER — HOSPITAL ENCOUNTER (EMERGENCY)
Facility: HOSPITAL | Age: 83
Discharge: HOME | End: 2018-09-08
Attending: EMERGENCY MEDICINE | Admitting: EMERGENCY MEDICINE
Payer: MEDICARE

## 2018-01-01 ENCOUNTER — APPOINTMENT (INPATIENT)
Dept: RADIOLOGY | Facility: HOSPITAL | Age: 83
DRG: 640 | End: 2018-01-01
Payer: MEDICARE

## 2018-01-01 ENCOUNTER — APPOINTMENT (INPATIENT)
Dept: ULTRASOUND IMAGING | Facility: HOSPITAL | Age: 83
DRG: 392 | End: 2018-01-01
Payer: MEDICARE

## 2018-01-01 ENCOUNTER — HOSPITAL ENCOUNTER (OUTPATIENT)
Dept: INFUSION CENTER | Facility: CLINIC | Age: 83
Discharge: HOME | End: 2018-08-24
Payer: MEDICARE

## 2018-01-01 ENCOUNTER — HOSPITAL ENCOUNTER (INPATIENT)
Facility: HOSPITAL | Age: 83
LOS: 11 days | Discharge: HOME HEALTH | DRG: 392 | End: 2018-11-01
Attending: EMERGENCY MEDICINE | Admitting: INTERNAL MEDICINE
Payer: MEDICARE

## 2018-01-01 ENCOUNTER — HOSPITAL ENCOUNTER (OUTPATIENT)
Dept: INFUSION CENTER | Facility: CLINIC | Age: 83
Discharge: HOME | End: 2018-06-29
Payer: MEDICARE

## 2018-01-01 ENCOUNTER — HOSPITAL ENCOUNTER (OUTPATIENT)
Dept: INFUSION CENTER | Facility: CLINIC | Age: 83
Discharge: HOME | End: 2018-01-10
Payer: MEDICARE

## 2018-01-01 ENCOUNTER — HOSPITAL ENCOUNTER (OUTPATIENT)
Dept: INFUSION CENTER | Facility: CLINIC | Age: 83
Discharge: HOME | End: 2018-06-01
Payer: MEDICARE

## 2018-01-01 ENCOUNTER — HOSPITAL ENCOUNTER (OUTPATIENT)
Dept: INFUSION CENTER | Facility: CLINIC | Age: 83
Discharge: HOME | End: 2018-02-05
Payer: MEDICARE

## 2018-01-01 ENCOUNTER — APPOINTMENT (OUTPATIENT)
Dept: RADIOLOGY | Facility: CLINIC | Age: 83
End: 2018-01-01
Payer: COMMERCIAL

## 2018-01-01 ENCOUNTER — HOSPITAL ENCOUNTER (OUTPATIENT)
Dept: INFUSION CENTER | Facility: CLINIC | Age: 83
Discharge: HOME | End: 2018-05-01
Payer: MEDICARE

## 2018-01-01 ENCOUNTER — TELEPHONE (OUTPATIENT)
Dept: OBGYN CLINIC | Facility: CLINIC | Age: 83
End: 2018-01-01

## 2018-01-01 ENCOUNTER — HOSPITAL ENCOUNTER (OUTPATIENT)
Dept: INFUSION CENTER | Facility: CLINIC | Age: 83
Discharge: HOME | End: 2018-04-24
Payer: MEDICARE

## 2018-01-01 ENCOUNTER — HOSPITAL ENCOUNTER (INPATIENT)
Facility: HOSPITAL | Age: 83
LOS: 8 days | DRG: 640 | End: 2018-12-05
Attending: EMERGENCY MEDICINE | Admitting: INTERNAL MEDICINE
Payer: MEDICARE

## 2018-01-01 VITALS
HEIGHT: 60 IN | DIASTOLIC BLOOD PRESSURE: 80 MMHG | HEART RATE: 94 BPM | RESPIRATION RATE: 18 BRPM | BODY MASS INDEX: 25.64 KG/M2 | SYSTOLIC BLOOD PRESSURE: 116 MMHG | OXYGEN SATURATION: 98 % | TEMPERATURE: 98 F | WEIGHT: 130.6 LBS

## 2018-01-01 VITALS
RESPIRATION RATE: 18 BRPM | OXYGEN SATURATION: 97 % | SYSTOLIC BLOOD PRESSURE: 130 MMHG | TEMPERATURE: 96.8 F | DIASTOLIC BLOOD PRESSURE: 80 MMHG | HEART RATE: 102 BPM

## 2018-01-01 VITALS
HEART RATE: 96 BPM | HEIGHT: 60 IN | WEIGHT: 133.2 LBS | OXYGEN SATURATION: 98 % | RESPIRATION RATE: 16 BRPM | SYSTOLIC BLOOD PRESSURE: 138 MMHG | DIASTOLIC BLOOD PRESSURE: 82 MMHG | BODY MASS INDEX: 26.15 KG/M2

## 2018-01-01 VITALS
RESPIRATION RATE: 18 BRPM | SYSTOLIC BLOOD PRESSURE: 120 MMHG | OXYGEN SATURATION: 95 % | DIASTOLIC BLOOD PRESSURE: 80 MMHG | TEMPERATURE: 97.6 F | HEART RATE: 66 BPM

## 2018-01-01 VITALS
SYSTOLIC BLOOD PRESSURE: 118 MMHG | TEMPERATURE: 97.7 F | OXYGEN SATURATION: 92 % | HEART RATE: 52 BPM | DIASTOLIC BLOOD PRESSURE: 84 MMHG | RESPIRATION RATE: 18 BRPM

## 2018-01-01 VITALS
OXYGEN SATURATION: 95 % | SYSTOLIC BLOOD PRESSURE: 120 MMHG | BODY MASS INDEX: 28.1 KG/M2 | TEMPERATURE: 97.6 F | WEIGHT: 130.25 LBS | HEIGHT: 57 IN | DIASTOLIC BLOOD PRESSURE: 80 MMHG | RESPIRATION RATE: 16 BRPM | HEART RATE: 86 BPM

## 2018-01-01 VITALS
OXYGEN SATURATION: 96 % | BODY MASS INDEX: 28.26 KG/M2 | DIASTOLIC BLOOD PRESSURE: 64 MMHG | RESPIRATION RATE: 17 BRPM | TEMPERATURE: 98.2 F | HEART RATE: 93 BPM | HEIGHT: 57 IN | SYSTOLIC BLOOD PRESSURE: 132 MMHG | WEIGHT: 131 LBS

## 2018-01-01 VITALS
OXYGEN SATURATION: 95 % | HEIGHT: 57 IN | BODY MASS INDEX: 27.61 KG/M2 | TEMPERATURE: 99 F | WEIGHT: 128 LBS | SYSTOLIC BLOOD PRESSURE: 132 MMHG | RESPIRATION RATE: 18 BRPM | DIASTOLIC BLOOD PRESSURE: 68 MMHG | HEART RATE: 69 BPM

## 2018-01-01 VITALS — HEART RATE: 92 BPM | DIASTOLIC BLOOD PRESSURE: 81 MMHG | SYSTOLIC BLOOD PRESSURE: 131 MMHG

## 2018-01-01 VITALS
WEIGHT: 117.06 LBS | RESPIRATION RATE: 18 BRPM | TEMPERATURE: 97.3 F | BODY MASS INDEX: 22.98 KG/M2 | HEART RATE: 61 BPM | OXYGEN SATURATION: 99 % | SYSTOLIC BLOOD PRESSURE: 148 MMHG | DIASTOLIC BLOOD PRESSURE: 65 MMHG | HEIGHT: 60 IN

## 2018-01-01 VITALS
TEMPERATURE: 97.9 F | DIASTOLIC BLOOD PRESSURE: 69 MMHG | BODY MASS INDEX: 23.42 KG/M2 | SYSTOLIC BLOOD PRESSURE: 148 MMHG | WEIGHT: 119.93 LBS | HEART RATE: 60 BPM | OXYGEN SATURATION: 94 % | RESPIRATION RATE: 16 BRPM

## 2018-01-01 VITALS
HEART RATE: 74 BPM | DIASTOLIC BLOOD PRESSURE: 75 MMHG | WEIGHT: 125 LBS | TEMPERATURE: 97.6 F | SYSTOLIC BLOOD PRESSURE: 135 MMHG | OXYGEN SATURATION: 93 % | BODY MASS INDEX: 24.41 KG/M2 | RESPIRATION RATE: 18 BRPM

## 2018-01-01 VITALS
DIASTOLIC BLOOD PRESSURE: 84 MMHG | BODY MASS INDEX: 28.1 KG/M2 | HEIGHT: 57 IN | HEART RATE: 94 BPM | WEIGHT: 130.25 LBS | SYSTOLIC BLOOD PRESSURE: 135 MMHG

## 2018-01-01 VITALS — DIASTOLIC BLOOD PRESSURE: 81 MMHG | SYSTOLIC BLOOD PRESSURE: 126 MMHG | HEART RATE: 105 BPM | HEIGHT: 60 IN

## 2018-01-01 VITALS
RESPIRATION RATE: 18 BRPM | TEMPERATURE: 97.8 F | HEART RATE: 74 BPM | HEIGHT: 57 IN | BODY MASS INDEX: 28.34 KG/M2 | OXYGEN SATURATION: 96 % | DIASTOLIC BLOOD PRESSURE: 74 MMHG | SYSTOLIC BLOOD PRESSURE: 130 MMHG | WEIGHT: 131.38 LBS

## 2018-01-01 VITALS
RESPIRATION RATE: 16 BRPM | SYSTOLIC BLOOD PRESSURE: 128 MMHG | DIASTOLIC BLOOD PRESSURE: 72 MMHG | BODY MASS INDEX: 27.62 KG/M2 | HEART RATE: 80 BPM | HEIGHT: 59 IN | WEIGHT: 137 LBS | TEMPERATURE: 98.2 F

## 2018-01-01 VITALS
DIASTOLIC BLOOD PRESSURE: 75 MMHG | TEMPERATURE: 98 F | SYSTOLIC BLOOD PRESSURE: 119 MMHG | RESPIRATION RATE: 20 BRPM | HEART RATE: 66 BPM | OXYGEN SATURATION: 97 %

## 2018-01-01 VITALS — DIASTOLIC BLOOD PRESSURE: 65 MMHG | SYSTOLIC BLOOD PRESSURE: 137 MMHG

## 2018-01-01 VITALS
WEIGHT: 127.25 LBS | DIASTOLIC BLOOD PRESSURE: 72 MMHG | RESPIRATION RATE: 18 BRPM | OXYGEN SATURATION: 95 % | SYSTOLIC BLOOD PRESSURE: 130 MMHG | HEIGHT: 58 IN | TEMPERATURE: 98 F | BODY MASS INDEX: 26.71 KG/M2 | HEART RATE: 98 BPM

## 2018-01-01 VITALS — SYSTOLIC BLOOD PRESSURE: 138 MMHG | DIASTOLIC BLOOD PRESSURE: 83 MMHG | HEART RATE: 96 BPM

## 2018-01-01 DIAGNOSIS — D51.9 ANEMIA DUE TO VITAMIN B12 DEFICIENCY, UNSPECIFIED B12 DEFICIENCY TYPE: ICD-10-CM

## 2018-01-01 DIAGNOSIS — I50.32 CHRONIC DIASTOLIC HEART FAILURE (HCC): ICD-10-CM

## 2018-01-01 DIAGNOSIS — S72.344D CLOSED NONDISPLACED SPIRAL FRACTURE OF SHAFT OF RIGHT FEMUR WITH ROUTINE HEALING, SUBSEQUENT ENCOUNTER: Primary | ICD-10-CM

## 2018-01-01 DIAGNOSIS — E78.2 MIXED HYPERLIPIDEMIA: ICD-10-CM

## 2018-01-01 DIAGNOSIS — R06.02 SHORTNESS OF BREATH: Primary | ICD-10-CM

## 2018-01-01 DIAGNOSIS — I48.91 ATRIAL FIBRILLATION, UNSPECIFIED TYPE (HCC): ICD-10-CM

## 2018-01-01 DIAGNOSIS — Z79.01 LONG-TERM (CURRENT) USE OF ANTICOAGULANTS: ICD-10-CM

## 2018-01-01 DIAGNOSIS — E03.9 HYPOTHYROIDISM, UNSPECIFIED TYPE: Primary | ICD-10-CM

## 2018-01-01 DIAGNOSIS — D51.9 VITAMIN B12 DEFICIENCY ANEMIA: ICD-10-CM
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
## 2018-01-01 DIAGNOSIS — Z79.01 LONG TERM (CURRENT) USE OF ANTICOAGULANTS: ICD-10-CM

## 2018-01-01 DIAGNOSIS — I48.20 CHRONIC ATRIAL FIBRILLATION (HCC): Primary | ICD-10-CM

## 2018-01-01 DIAGNOSIS — M79.604 RIGHT LEG PAIN: ICD-10-CM

## 2018-01-01 DIAGNOSIS — N17.9 AKI (ACUTE KIDNEY INJURY) (HCC): ICD-10-CM

## 2018-01-01 DIAGNOSIS — R77.8 ELEVATED TROPONIN: ICD-10-CM

## 2018-01-01 DIAGNOSIS — S22.31XA CLOSED FRACTURE OF ONE RIB OF RIGHT SIDE, INITIAL ENCOUNTER: ICD-10-CM

## 2018-01-01 DIAGNOSIS — R74.8 ELEVATED ALKALINE PHOSPHATASE LEVEL: ICD-10-CM

## 2018-01-01 DIAGNOSIS — Z79.01 LONG TERM CURRENT USE OF ANTICOAGULANT THERAPY: ICD-10-CM

## 2018-01-01 DIAGNOSIS — E44.0 MODERATE PROTEIN-CALORIE MALNUTRITION (HCC): ICD-10-CM

## 2018-01-01 DIAGNOSIS — S22.070A CLOSED WEDGE COMPRESSION FRACTURE OF NINTH THORACIC VERTEBRA, INITIAL ENCOUNTER: ICD-10-CM

## 2018-01-01 DIAGNOSIS — K44.9 HIATAL HERNIA: ICD-10-CM

## 2018-01-01 DIAGNOSIS — I50.32 CHRONIC DIASTOLIC CHF (CONGESTIVE HEART FAILURE) (HCC): ICD-10-CM

## 2018-01-01 DIAGNOSIS — R10.30 LOWER ABDOMINAL PAIN: Primary | ICD-10-CM

## 2018-01-01 DIAGNOSIS — R62.7 ADULT FAILURE TO THRIVE: ICD-10-CM

## 2018-01-01 DIAGNOSIS — R10.84 GENERALIZED ABDOMINAL PAIN: Primary | ICD-10-CM

## 2018-01-01 DIAGNOSIS — R17 TOTAL BILIRUBIN, ELEVATED: ICD-10-CM

## 2018-01-01 DIAGNOSIS — R10.9 RIGHT SIDED ABDOMINAL PAIN: ICD-10-CM

## 2018-01-01 DIAGNOSIS — R17 ELEVATED BILIRUBIN: ICD-10-CM

## 2018-01-01 DIAGNOSIS — N30.00 ACUTE CYSTITIS WITHOUT HEMATURIA: ICD-10-CM

## 2018-01-01 DIAGNOSIS — K29.70 GASTRITIS, PRESENCE OF BLEEDING UNSPECIFIED, UNSPECIFIED CHRONICITY, UNSPECIFIED GASTRITIS TYPE: Primary | ICD-10-CM

## 2018-01-01 DIAGNOSIS — I50.32 CHRONIC DIASTOLIC HEART FAILURE (HCC): Primary | ICD-10-CM

## 2018-01-01 DIAGNOSIS — E03.9 HYPOTHYROIDISM: ICD-10-CM

## 2018-01-01 DIAGNOSIS — K29.70 GASTRITIS, PRESENCE OF BLEEDING UNSPECIFIED, UNSPECIFIED CHRONICITY, UNSPECIFIED GASTRITIS TYPE: ICD-10-CM

## 2018-01-01 DIAGNOSIS — R79.1 ELEVATED INR: ICD-10-CM

## 2018-01-01 DIAGNOSIS — S72.401D: ICD-10-CM

## 2018-01-01 DIAGNOSIS — R10.9 ABDOMINAL CRAMPING: ICD-10-CM

## 2018-01-01 DIAGNOSIS — I48.20 CHRONIC ATRIAL FIBRILLATION (HCC): ICD-10-CM

## 2018-01-01 DIAGNOSIS — E53.8 DEFICIENCY OF OTHER SPECIFIED B GROUP VITAMINS (CODE): ICD-10-CM

## 2018-01-01 DIAGNOSIS — D51.9 ANEMIA DUE TO VITAMIN B12 DEFICIENCY, UNSPECIFIED B12 DEFICIENCY TYPE: Primary | ICD-10-CM

## 2018-01-01 DIAGNOSIS — I10 ESSENTIAL HYPERTENSION, MALIGNANT: ICD-10-CM

## 2018-01-01 DIAGNOSIS — B37.0 ORAL THRUSH: Primary | ICD-10-CM

## 2018-01-01 DIAGNOSIS — E03.9 HYPOTHYROIDISM, UNSPECIFIED TYPE: ICD-10-CM

## 2018-01-01 DIAGNOSIS — D63.8 ANEMIA OF CHRONIC DISEASE: Primary | Chronic | ICD-10-CM

## 2018-01-01 DIAGNOSIS — S72.344A: Primary | ICD-10-CM

## 2018-01-01 DIAGNOSIS — R04.0 EPISTAXIS: Primary | ICD-10-CM

## 2018-01-01 DIAGNOSIS — R10.10 PAIN OF UPPER ABDOMEN: ICD-10-CM

## 2018-01-01 DIAGNOSIS — Z23 FLU VACCINE NEED: ICD-10-CM

## 2018-01-01 DIAGNOSIS — E87.2 LACTIC ACIDOSIS: ICD-10-CM

## 2018-01-01 DIAGNOSIS — E78.00 HYPERCHOLESTEREMIA: ICD-10-CM

## 2018-01-01 DIAGNOSIS — S09.90XA INJURY OF HEAD, INITIAL ENCOUNTER: ICD-10-CM

## 2018-01-01 DIAGNOSIS — Z79.01 CHRONIC ANTICOAGULATION: ICD-10-CM

## 2018-01-01 DIAGNOSIS — L60.2 LONG TOENAIL: ICD-10-CM

## 2018-01-01 DIAGNOSIS — R10.9 RIGHT SIDED ABDOMINAL PAIN: Primary | ICD-10-CM

## 2018-01-01 DIAGNOSIS — I48.91 ATRIAL FIBRILLATION (HCC): ICD-10-CM

## 2018-01-01 DIAGNOSIS — R10.9 ABDOMINAL PAIN: ICD-10-CM

## 2018-01-01 DIAGNOSIS — R07.89 CHEST WALL PAIN: Primary | ICD-10-CM

## 2018-01-01 DIAGNOSIS — S22.31XA CLOSED FRACTURE OF ONE RIB OF RIGHT SIDE, INITIAL ENCOUNTER: Primary | ICD-10-CM

## 2018-01-01 DIAGNOSIS — S22.070S CLOSED WEDGE COMPRESSION FRACTURE OF NINTH THORACIC VERTEBRA, SEQUELA: ICD-10-CM

## 2018-01-01 LAB
ALBUMIN SERPL BCP-MCNC: 2.2 G/DL (ref 3.5–5)
ALBUMIN SERPL BCP-MCNC: 2.3 G/DL (ref 3.5–5)
ALBUMIN SERPL BCP-MCNC: 2.3 G/DL (ref 3.5–5)
ALBUMIN SERPL BCP-MCNC: 2.4 G/DL (ref 3.5–5)
ALBUMIN SERPL BCP-MCNC: 2.5 G/DL (ref 3.5–5)
ALBUMIN SERPL BCP-MCNC: 2.7 G/DL (ref 3.5–5)
ALBUMIN SERPL BCP-MCNC: 2.9 G/DL (ref 3.5–5)
ALBUMIN SERPL BCP-MCNC: 2.9 G/DL (ref 3.5–5)
ALBUMIN SERPL BCP-MCNC: 3 G/DL (ref 3.5–5)
ALBUMIN SERPL BCP-MCNC: 3 G/DL (ref 3.5–5)
ALBUMIN SERPL BCP-MCNC: 3.1 G/DL (ref 3.5–5)
ALBUMIN SERPL BCP-MCNC: 3.2 G/DL (ref 3.5–5)
ALBUMIN SERPL BCP-MCNC: 3.3 G/DL (ref 3.5–5)
ALBUMIN SERPL BCP-MCNC: 3.5 G/DL (ref 3.5–5)
ALBUMIN SERPL BCP-MCNC: 3.7 G/DL (ref 3.5–5)
ALP BONE CFR SERPL: 38 % (ref 14–68)
ALP INTEST CFR SERPL: 0 % (ref 0–18)
ALP LIVER CFR SERPL: 62 % (ref 18–85)
ALP SERPL-CCNC: 156 U/L (ref 46–116)
ALP SERPL-CCNC: 158 U/L (ref 46–116)
ALP SERPL-CCNC: 172 U/L (ref 46–116)
ALP SERPL-CCNC: 178 U/L (ref 46–116)
ALP SERPL-CCNC: 182 U/L (ref 46–116)
ALP SERPL-CCNC: 188 U/L (ref 46–116)
ALP SERPL-CCNC: 197 U/L (ref 46–116)
ALP SERPL-CCNC: 200 IU/L (ref 39–117)
ALP SERPL-CCNC: 200 U/L (ref 46–116)
ALP SERPL-CCNC: 205 U/L (ref 46–116)
ALP SERPL-CCNC: 215 U/L (ref 46–116)
ALP SERPL-CCNC: 223 U/L (ref 46–116)
ALP SERPL-CCNC: 227 U/L (ref 46–116)
ALP SERPL-CCNC: 242 U/L (ref 46–116)
ALP SERPL-CCNC: 249 U/L (ref 46–116)
ALP SERPL-CCNC: 249 U/L (ref 46–116)
ALP SERPL-CCNC: 251 U/L (ref 46–116)
ALP SERPL-CCNC: 272 U/L (ref 46–116)
ALP SERPL-CCNC: 274 U/L (ref 46–116)
ALT SERPL W P-5'-P-CCNC: 15 U/L (ref 12–78)
ALT SERPL W P-5'-P-CCNC: 20 U/L (ref 12–78)
ALT SERPL W P-5'-P-CCNC: 20 U/L (ref 12–78)
ALT SERPL W P-5'-P-CCNC: 21 U/L (ref 12–78)
ALT SERPL W P-5'-P-CCNC: 21 U/L (ref 12–78)
ALT SERPL W P-5'-P-CCNC: 22 U/L (ref 12–78)
ALT SERPL W P-5'-P-CCNC: 23 U/L (ref 12–78)
ALT SERPL W P-5'-P-CCNC: 23 U/L (ref 12–78)
ALT SERPL W P-5'-P-CCNC: 29 U/L (ref 12–78)
ALT SERPL W P-5'-P-CCNC: 31 U/L (ref 12–78)
ALT SERPL W P-5'-P-CCNC: 41 U/L (ref 12–78)
ALT SERPL W P-5'-P-CCNC: 72 U/L (ref 12–78)
ALT SERPL W P-5'-P-CCNC: 73 U/L (ref 12–78)
ANION GAP SERPL CALCULATED.3IONS-SCNC: 10 MMOL/L (ref 4–13)
ANION GAP SERPL CALCULATED.3IONS-SCNC: 11 MMOL/L (ref 4–13)
ANION GAP SERPL CALCULATED.3IONS-SCNC: 12 MMOL/L (ref 4–13)
ANION GAP SERPL CALCULATED.3IONS-SCNC: 13 MMOL/L (ref 4–13)
ANION GAP SERPL CALCULATED.3IONS-SCNC: 15 MMOL/L (ref 4–13)
ANION GAP SERPL CALCULATED.3IONS-SCNC: 15 MMOL/L (ref 4–13)
ANION GAP SERPL CALCULATED.3IONS-SCNC: 6 MMOL/L (ref 4–13)
ANION GAP SERPL CALCULATED.3IONS-SCNC: 7 MMOL/L (ref 4–13)
ANION GAP SERPL CALCULATED.3IONS-SCNC: 8 MMOL/L (ref 4–13)
ANION GAP SERPL CALCULATED.3IONS-SCNC: 8 MMOL/L (ref 4–13)
ANION GAP SERPL CALCULATED.3IONS-SCNC: 9 MMOL/L (ref 4–13)
ANISOCYTOSIS BLD QL SMEAR: PRESENT
APTT PPP: 45 SECONDS (ref 24–36)
AST SERPL W P-5'-P-CCNC: 103 U/L (ref 5–45)
AST SERPL W P-5'-P-CCNC: 20 U/L (ref 5–45)
AST SERPL W P-5'-P-CCNC: 22 U/L (ref 5–45)
AST SERPL W P-5'-P-CCNC: 25 U/L (ref 5–45)
AST SERPL W P-5'-P-CCNC: 27 U/L (ref 5–45)
AST SERPL W P-5'-P-CCNC: 29 U/L (ref 5–45)
AST SERPL W P-5'-P-CCNC: 32 U/L (ref 5–45)
AST SERPL W P-5'-P-CCNC: 34 U/L (ref 5–45)
AST SERPL W P-5'-P-CCNC: 37 U/L (ref 5–45)
AST SERPL W P-5'-P-CCNC: 40 U/L (ref 5–45)
AST SERPL W P-5'-P-CCNC: 40 U/L (ref 5–45)
AST SERPL W P-5'-P-CCNC: 41 U/L (ref 5–45)
AST SERPL W P-5'-P-CCNC: 42 U/L (ref 5–45)
AST SERPL W P-5'-P-CCNC: 46 U/L (ref 5–45)
AST SERPL W P-5'-P-CCNC: 48 U/L (ref 5–45)
AST SERPL W P-5'-P-CCNC: 51 U/L (ref 5–45)
AST SERPL W P-5'-P-CCNC: 59 U/L (ref 5–45)
AST SERPL W P-5'-P-CCNC: 66 U/L (ref 5–45)
ATRIAL RATE: 241 BPM
ATRIAL RATE: 340 BPM
BACTERIA BLD CULT: NORMAL
BACTERIA BLD CULT: NORMAL
BACTERIA UR CULT: ABNORMAL
BACTERIA UR QL AUTO: ABNORMAL /HPF
BACTERIA UR QL AUTO: ABNORMAL /HPF
BASOPHILS # BLD AUTO: 0.01 THOUSANDS/ΜL (ref 0–0.1)
BASOPHILS # BLD AUTO: 0.01 THOUSANDS/ΜL (ref 0–0.1)
BASOPHILS # BLD AUTO: 0.02 THOUSANDS/ΜL (ref 0–0.1)
BASOPHILS # BLD AUTO: 0.02 THOUSANDS/ΜL (ref 0–0.1)
BASOPHILS # BLD AUTO: 0.03 THOUSANDS/ΜL (ref 0–0.1)
BASOPHILS # BLD AUTO: 0.04 THOUSANDS/ΜL (ref 0–0.1)
BASOPHILS # BLD MANUAL: 0 THOUSAND/UL (ref 0–0.1)
BASOPHILS NFR BLD AUTO: 0 % (ref 0–1)
BASOPHILS NFR BLD AUTO: 1 % (ref 0–1)
BASOPHILS NFR MAR MANUAL: 0 % (ref 0–1)
BILIRUB DIRECT SERPL-MCNC: 1.08 MG/DL (ref 0–0.2)
BILIRUB DIRECT SERPL-MCNC: 1.28 MG/DL (ref 0–0.2)
BILIRUB DIRECT SERPL-MCNC: 1.31 MG/DL (ref 0–0.2)
BILIRUB SERPL-MCNC: 0.8 MG/DL (ref 0.2–1)
BILIRUB SERPL-MCNC: 1.1 MG/DL (ref 0.2–1)
BILIRUB SERPL-MCNC: 1.2 MG/DL (ref 0.2–1)
BILIRUB SERPL-MCNC: 1.3 MG/DL (ref 0.2–1)
BILIRUB SERPL-MCNC: 1.3 MG/DL (ref 0.2–1)
BILIRUB SERPL-MCNC: 1.36 MG/DL (ref 0.2–1)
BILIRUB SERPL-MCNC: 1.38 MG/DL (ref 0.2–1)
BILIRUB SERPL-MCNC: 1.5 MG/DL (ref 0.2–1)
BILIRUB SERPL-MCNC: 1.5 MG/DL (ref 0.2–1)
BILIRUB SERPL-MCNC: 1.6 MG/DL (ref 0.2–1)
BILIRUB SERPL-MCNC: 1.7 MG/DL (ref 0.2–1)
BILIRUB SERPL-MCNC: 1.9 MG/DL (ref 0.2–1)
BILIRUB SERPL-MCNC: 2 MG/DL (ref 0.2–1)
BILIRUB SERPL-MCNC: 2 MG/DL (ref 0.2–1)
BILIRUB SERPL-MCNC: 2.1 MG/DL (ref 0.2–1)
BILIRUB SERPL-MCNC: 2.21 MG/DL (ref 0.2–1)
BILIRUB SERPL-MCNC: 2.4 MG/DL (ref 0.2–1)
BILIRUB SERPL-MCNC: 2.72 MG/DL (ref 0.2–1)
BILIRUB UR QL STRIP: ABNORMAL
BILIRUB UR QL STRIP: NEGATIVE
BUN SERPL-MCNC: 20 MG/DL (ref 5–25)
BUN SERPL-MCNC: 24 MG/DL (ref 5–25)
BUN SERPL-MCNC: 25 MG/DL (ref 5–25)
BUN SERPL-MCNC: 25 MG/DL (ref 5–25)
BUN SERPL-MCNC: 27 MG/DL (ref 5–25)
BUN SERPL-MCNC: 27 MG/DL (ref 5–25)
BUN SERPL-MCNC: 28 MG/DL (ref 5–25)
BUN SERPL-MCNC: 28 MG/DL (ref 5–25)
BUN SERPL-MCNC: 30 MG/DL (ref 5–25)
BUN SERPL-MCNC: 32 MG/DL (ref 5–25)
BUN SERPL-MCNC: 32 MG/DL (ref 5–25)
BUN SERPL-MCNC: 33 MG/DL (ref 5–25)
BUN SERPL-MCNC: 34 MG/DL (ref 5–25)
BUN SERPL-MCNC: 35 MG/DL (ref 5–25)
BUN SERPL-MCNC: 37 MG/DL (ref 5–25)
BUN SERPL-MCNC: 40 MG/DL (ref 5–25)
BUN SERPL-MCNC: 42 MG/DL (ref 5–25)
BUN SERPL-MCNC: 44 MG/DL (ref 5–25)
BUN SERPL-MCNC: 46 MG/DL (ref 5–25)
BUN SERPL-MCNC: 46 MG/DL (ref 5–25)
BURR CELLS BLD QL SMEAR: PRESENT
CALCIUM SERPL-MCNC: 8 MG/DL (ref 8.3–10.1)
CALCIUM SERPL-MCNC: 8.7 MG/DL (ref 8.3–10.1)
CALCIUM SERPL-MCNC: 8.7 MG/DL (ref 8.3–10.1)
CALCIUM SERPL-MCNC: 8.8 MG/DL (ref 8.3–10.1)
CALCIUM SERPL-MCNC: 8.9 MG/DL (ref 8.3–10.1)
CALCIUM SERPL-MCNC: 9 MG/DL (ref 8.3–10.1)
CALCIUM SERPL-MCNC: 9 MG/DL (ref 8.3–10.1)
CALCIUM SERPL-MCNC: 9.1 MG/DL (ref 8.3–10.1)
CALCIUM SERPL-MCNC: 9.1 MG/DL (ref 8.3–10.1)
CALCIUM SERPL-MCNC: 9.3 MG/DL (ref 8.3–10.1)
CALCIUM SERPL-MCNC: 9.5 MG/DL (ref 8.3–10.1)
CALCIUM SERPL-MCNC: 9.5 MG/DL (ref 8.3–10.1)
CALCIUM SERPL-MCNC: 9.6 MG/DL (ref 8.3–10.1)
CALCIUM SERPL-MCNC: 9.6 MG/DL (ref 8.3–10.1)
CALCIUM SERPL-MCNC: 9.7 MG/DL (ref 8.3–10.1)
CALCIUM SERPL-MCNC: 9.7 MG/DL (ref 8.3–10.1)
CALCIUM SERPL-MCNC: 9.8 MG/DL (ref 8.3–10.1)
CHLORIDE SERPL-SCNC: 100 MMOL/L (ref 100–108)
CHLORIDE SERPL-SCNC: 101 MMOL/L (ref 100–108)
CHLORIDE SERPL-SCNC: 102 MMOL/L (ref 100–108)
CHLORIDE SERPL-SCNC: 103 MMOL/L (ref 100–108)
CHLORIDE SERPL-SCNC: 104 MMOL/L (ref 100–108)
CHLORIDE SERPL-SCNC: 104 MMOL/L (ref 100–108)
CHLORIDE SERPL-SCNC: 105 MMOL/L (ref 100–108)
CHLORIDE SERPL-SCNC: 106 MMOL/L (ref 100–108)
CHLORIDE SERPL-SCNC: 108 MMOL/L (ref 100–108)
CHLORIDE SERPL-SCNC: 111 MMOL/L (ref 100–108)
CHLORIDE SERPL-SCNC: 98 MMOL/L (ref 100–108)
CHLORIDE SERPL-SCNC: 99 MMOL/L (ref 100–108)
CHOLEST SERPL-MCNC: 138 MG/DL (ref 50–200)
CLARITY UR: ABNORMAL
CLARITY UR: CLEAR
CO2 SERPL-SCNC: 20 MMOL/L (ref 21–32)
CO2 SERPL-SCNC: 21 MMOL/L (ref 21–32)
CO2 SERPL-SCNC: 21 MMOL/L (ref 21–32)
CO2 SERPL-SCNC: 23 MMOL/L (ref 21–32)
CO2 SERPL-SCNC: 24 MMOL/L (ref 21–32)
CO2 SERPL-SCNC: 25 MMOL/L (ref 21–32)
CO2 SERPL-SCNC: 26 MMOL/L (ref 21–32)
CO2 SERPL-SCNC: 27 MMOL/L (ref 21–32)
CO2 SERPL-SCNC: 28 MMOL/L (ref 21–32)
CO2 SERPL-SCNC: 34 MMOL/L (ref 21–32)
COLOR UR: YELLOW
COLOR UR: YELLOW
COLOR, POC: NORMAL
CREAT SERPL-MCNC: 1.15 MG/DL (ref 0.6–1.3)
CREAT SERPL-MCNC: 1.21 MG/DL (ref 0.6–1.3)
CREAT SERPL-MCNC: 1.22 MG/DL (ref 0.6–1.3)
CREAT SERPL-MCNC: 1.25 MG/DL (ref 0.6–1.3)
CREAT SERPL-MCNC: 1.28 MG/DL (ref 0.6–1.3)
CREAT SERPL-MCNC: 1.29 MG/DL (ref 0.6–1.3)
CREAT SERPL-MCNC: 1.29 MG/DL (ref 0.6–1.3)
CREAT SERPL-MCNC: 1.3 MG/DL (ref 0.6–1.3)
CREAT SERPL-MCNC: 1.32 MG/DL (ref 0.6–1.3)
CREAT SERPL-MCNC: 1.38 MG/DL (ref 0.6–1.3)
CREAT SERPL-MCNC: 1.38 MG/DL (ref 0.6–1.3)
CREAT SERPL-MCNC: 1.44 MG/DL (ref 0.6–1.3)
CREAT SERPL-MCNC: 1.44 MG/DL (ref 0.6–1.3)
CREAT SERPL-MCNC: 1.46 MG/DL (ref 0.6–1.3)
CREAT SERPL-MCNC: 1.55 MG/DL (ref 0.6–1.3)
CREAT SERPL-MCNC: 1.55 MG/DL (ref 0.6–1.3)
CREAT SERPL-MCNC: 1.56 MG/DL (ref 0.6–1.3)
CREAT SERPL-MCNC: 1.63 MG/DL (ref 0.6–1.3)
CREAT SERPL-MCNC: 1.68 MG/DL (ref 0.6–1.3)
CREAT SERPL-MCNC: 1.75 MG/DL (ref 0.6–1.3)
CREAT SERPL-MCNC: 2.09 MG/DL (ref 0.6–1.3)
CREAT SERPL-MCNC: 2.37 MG/DL (ref 0.6–1.3)
EOSINOPHIL # BLD AUTO: 0 THOUSAND/ΜL (ref 0–0.61)
EOSINOPHIL # BLD AUTO: 0 THOUSAND/ΜL (ref 0–0.61)
EOSINOPHIL # BLD AUTO: 0.02 THOUSAND/ΜL (ref 0–0.61)
EOSINOPHIL # BLD AUTO: 0.03 THOUSAND/ΜL (ref 0–0.61)
EOSINOPHIL # BLD AUTO: 0.05 THOUSAND/ΜL (ref 0–0.61)
EOSINOPHIL # BLD AUTO: 0.06 THOUSAND/ΜL (ref 0–0.61)
EOSINOPHIL # BLD AUTO: 0.09 THOUSAND/ΜL (ref 0–0.61)
EOSINOPHIL # BLD AUTO: 0.17 THOUSAND/ΜL (ref 0–0.61)
EOSINOPHIL # BLD AUTO: 0.19 THOUSAND/ΜL (ref 0–0.61)
EOSINOPHIL # BLD MANUAL: 0 THOUSAND/UL (ref 0–0.4)
EOSINOPHIL NFR BLD AUTO: 0 % (ref 0–6)
EOSINOPHIL NFR BLD AUTO: 1 % (ref 0–6)
EOSINOPHIL NFR BLD AUTO: 4 % (ref 0–6)
EOSINOPHIL NFR BLD AUTO: 4 % (ref 0–6)
EOSINOPHIL NFR BLD MANUAL: 0 % (ref 0–6)
ERYTHROCYTE [DISTWIDTH] IN BLOOD BY AUTOMATED COUNT: 13.5 % (ref 11.6–15.1)
ERYTHROCYTE [DISTWIDTH] IN BLOOD BY AUTOMATED COUNT: 13.6 % (ref 11.6–15.1)
ERYTHROCYTE [DISTWIDTH] IN BLOOD BY AUTOMATED COUNT: 14.4 % (ref 11.6–15.1)
ERYTHROCYTE [DISTWIDTH] IN BLOOD BY AUTOMATED COUNT: 14.6 % (ref 11.6–15.1)
ERYTHROCYTE [DISTWIDTH] IN BLOOD BY AUTOMATED COUNT: 14.6 % (ref 11.6–15.1)
ERYTHROCYTE [DISTWIDTH] IN BLOOD BY AUTOMATED COUNT: 14.8 % (ref 11.6–15.1)
ERYTHROCYTE [DISTWIDTH] IN BLOOD BY AUTOMATED COUNT: 15 % (ref 11.6–15.1)
ERYTHROCYTE [DISTWIDTH] IN BLOOD BY AUTOMATED COUNT: 15.2 % (ref 11.6–15.1)
ERYTHROCYTE [DISTWIDTH] IN BLOOD BY AUTOMATED COUNT: 15.4 % (ref 11.6–15.1)
ERYTHROCYTE [DISTWIDTH] IN BLOOD BY AUTOMATED COUNT: 15.5 % (ref 11.6–15.1)
ERYTHROCYTE [DISTWIDTH] IN BLOOD BY AUTOMATED COUNT: 16.4 % (ref 11.6–15.1)
ERYTHROCYTE [DISTWIDTH] IN BLOOD BY AUTOMATED COUNT: 18.3 % (ref 11.6–15.1)
ERYTHROCYTE [DISTWIDTH] IN BLOOD BY AUTOMATED COUNT: 18.3 % (ref 11.6–15.1)
ERYTHROCYTE [DISTWIDTH] IN BLOOD BY AUTOMATED COUNT: 18.8 % (ref 11.6–15.1)
ERYTHROCYTE [DISTWIDTH] IN BLOOD BY AUTOMATED COUNT: 19.2 % (ref 11.6–15.1)
ERYTHROCYTE [DISTWIDTH] IN BLOOD BY AUTOMATED COUNT: 19.2 % (ref 11.6–15.1)
ERYTHROCYTE [DISTWIDTH] IN BLOOD BY AUTOMATED COUNT: 19.6 % (ref 11.6–15.1)
FERRITIN SERPL-MCNC: 26 NG/ML (ref 8–388)
FERRITIN SERPL-MCNC: 520 NG/ML (ref 8–388)
GFR SERPL CREATININE-BSD FRML MDRD: 17 ML/MIN/1.73SQ M
GFR SERPL CREATININE-BSD FRML MDRD: 20 ML/MIN/1.73SQ M
GFR SERPL CREATININE-BSD FRML MDRD: 24 ML/MIN/1.73SQ M
GFR SERPL CREATININE-BSD FRML MDRD: 26 ML/MIN/1.73SQ M
GFR SERPL CREATININE-BSD FRML MDRD: 27 ML/MIN/1.73SQ M
GFR SERPL CREATININE-BSD FRML MDRD: 28 ML/MIN/1.73SQ M
GFR SERPL CREATININE-BSD FRML MDRD: 30 ML/MIN/1.73SQ M
GFR SERPL CREATININE-BSD FRML MDRD: 31 ML/MIN/1.73SQ M
GFR SERPL CREATININE-BSD FRML MDRD: 31 ML/MIN/1.73SQ M
GFR SERPL CREATININE-BSD FRML MDRD: 33 ML/MIN/1.73SQ M
GFR SERPL CREATININE-BSD FRML MDRD: 33 ML/MIN/1.73SQ M
GFR SERPL CREATININE-BSD FRML MDRD: 34 ML/MIN/1.73SQ M
GFR SERPL CREATININE-BSD FRML MDRD: 35 ML/MIN/1.73SQ M
GFR SERPL CREATININE-BSD FRML MDRD: 35 ML/MIN/1.73SQ M
GFR SERPL CREATININE-BSD FRML MDRD: 36 ML/MIN/1.73SQ M
GFR SERPL CREATININE-BSD FRML MDRD: 36 ML/MIN/1.73SQ M
GFR SERPL CREATININE-BSD FRML MDRD: 37 ML/MIN/1.73SQ M
GFR SERPL CREATININE-BSD FRML MDRD: 38 ML/MIN/1.73SQ M
GFR SERPL CREATININE-BSD FRML MDRD: 38 ML/MIN/1.73SQ M
GFR SERPL CREATININE-BSD FRML MDRD: 41 ML/MIN/1.73SQ M
GGT SERPL-CCNC: 232 U/L (ref 5–85)
GLUCOSE P FAST SERPL-MCNC: 104 MG/DL (ref 65–99)
GLUCOSE SERPL-MCNC: 100 MG/DL (ref 65–140)
GLUCOSE SERPL-MCNC: 102 MG/DL (ref 65–140)
GLUCOSE SERPL-MCNC: 103 MG/DL (ref 65–140)
GLUCOSE SERPL-MCNC: 105 MG/DL (ref 65–140)
GLUCOSE SERPL-MCNC: 106 MG/DL (ref 65–140)
GLUCOSE SERPL-MCNC: 113 MG/DL (ref 65–140)
GLUCOSE SERPL-MCNC: 116 MG/DL (ref 65–140)
GLUCOSE SERPL-MCNC: 120 MG/DL (ref 65–140)
GLUCOSE SERPL-MCNC: 134 MG/DL (ref 65–140)
GLUCOSE SERPL-MCNC: 138 MG/DL (ref 65–140)
GLUCOSE SERPL-MCNC: 140 MG/DL (ref 65–140)
GLUCOSE SERPL-MCNC: 171 MG/DL (ref 65–140)
GLUCOSE SERPL-MCNC: 67 MG/DL (ref 65–140)
GLUCOSE SERPL-MCNC: 73 MG/DL (ref 65–140)
GLUCOSE SERPL-MCNC: 75 MG/DL (ref 65–140)
GLUCOSE SERPL-MCNC: 79 MG/DL (ref 65–140)
GLUCOSE SERPL-MCNC: 80 MG/DL (ref 65–140)
GLUCOSE SERPL-MCNC: 82 MG/DL (ref 65–140)
GLUCOSE SERPL-MCNC: 83 MG/DL (ref 65–140)
GLUCOSE SERPL-MCNC: 91 MG/DL (ref 65–140)
GLUCOSE SERPL-MCNC: 98 MG/DL (ref 65–140)
GLUCOSE UR STRIP-MCNC: NEGATIVE MG/DL
GLUCOSE UR STRIP-MCNC: NEGATIVE MG/DL
HCT VFR BLD AUTO: 38.7 % (ref 34.8–46.1)
HCT VFR BLD AUTO: 39.3 % (ref 34.8–46.1)
HCT VFR BLD AUTO: 42.3 % (ref 34.8–46.1)
HCT VFR BLD AUTO: 42.6 % (ref 34.8–46.1)
HCT VFR BLD AUTO: 44.3 % (ref 34.8–46.1)
HCT VFR BLD AUTO: 45 % (ref 34.8–46.1)
HCT VFR BLD AUTO: 46.9 % (ref 34.8–46.1)
HCT VFR BLD AUTO: 47.1 % (ref 34.8–46.1)
HCT VFR BLD AUTO: 47.8 % (ref 34.8–46.1)
HCT VFR BLD AUTO: 47.8 % (ref 34.8–46.1)
HCT VFR BLD AUTO: 47.9 % (ref 34.8–46.1)
HCT VFR BLD AUTO: 48.5 % (ref 34.8–46.1)
HCT VFR BLD AUTO: 48.5 % (ref 34.8–46.1)
HCT VFR BLD AUTO: 48.8 % (ref 34.8–46.1)
HCT VFR BLD AUTO: 48.9 % (ref 34.8–46.1)
HCT VFR BLD AUTO: 50.9 % (ref 34.8–46.1)
HCT VFR BLD AUTO: 51.1 % (ref 34.8–46.1)
HDLC SERPL-MCNC: 50 MG/DL (ref 40–60)
HGB BLD-MCNC: 11.8 G/DL (ref 11.5–15.4)
HGB BLD-MCNC: 12.3 G/DL (ref 11.5–15.4)
HGB BLD-MCNC: 13.8 G/DL (ref 11.5–15.4)
HGB BLD-MCNC: 13.9 G/DL (ref 11.5–15.4)
HGB BLD-MCNC: 14.6 G/DL (ref 11.5–15.4)
HGB BLD-MCNC: 14.9 G/DL (ref 11.5–15.4)
HGB BLD-MCNC: 15.1 G/DL (ref 11.5–15.4)
HGB BLD-MCNC: 15.5 G/DL (ref 11.5–15.4)
HGB BLD-MCNC: 15.6 G/DL (ref 11.5–15.4)
HGB BLD-MCNC: 15.6 G/DL (ref 11.5–15.4)
HGB BLD-MCNC: 15.7 G/DL (ref 11.5–15.4)
HGB BLD-MCNC: 15.7 G/DL (ref 11.5–15.4)
HGB BLD-MCNC: 16 G/DL (ref 11.5–15.4)
HGB BLD-MCNC: 16.1 G/DL (ref 11.5–15.4)
HGB BLD-MCNC: 16.1 G/DL (ref 11.5–15.4)
HGB BLD-MCNC: 16.4 G/DL (ref 11.5–15.4)
HGB BLD-MCNC: 16.7 G/DL (ref 11.5–15.4)
HGB UR QL STRIP.AUTO: ABNORMAL
HGB UR QL STRIP.AUTO: ABNORMAL
HYALINE CASTS #/AREA URNS LPF: ABNORMAL /LPF
IMM GRANULOCYTES # BLD AUTO: 0.01 THOUSAND/UL (ref 0–0.2)
IMM GRANULOCYTES # BLD AUTO: 0.01 THOUSAND/UL (ref 0–0.2)
IMM GRANULOCYTES # BLD AUTO: 0.02 THOUSAND/UL (ref 0–0.2)
IMM GRANULOCYTES # BLD AUTO: 0.02 THOUSAND/UL (ref 0–0.2)
IMM GRANULOCYTES # BLD AUTO: 0.04 THOUSAND/UL (ref 0–0.2)
IMM GRANULOCYTES # BLD AUTO: 0.06 THOUSAND/UL (ref 0–0.2)
IMM GRANULOCYTES # BLD AUTO: 0.08 THOUSAND/UL (ref 0–0.2)
IMM GRANULOCYTES # BLD AUTO: 0.08 THOUSAND/UL (ref 0–0.2)
IMM GRANULOCYTES NFR BLD AUTO: 0 % (ref 0–2)
IMM GRANULOCYTES NFR BLD AUTO: 1 % (ref 0–2)
INR PPP: 1.64 (ref 0.86–1.16)
INR PPP: 1.8 (ref 0.86–1.17)
INR PPP: 1.9 (ref 0.86–1.17)
INR PPP: 1.99 (ref 0.86–1.17)
INR PPP: 2.06 (ref 0.86–1.16)
INR PPP: 2.22 (ref 0.86–1.16)
INR PPP: 2.3 (ref 0.86–1.17)
INR PPP: 2.31 (ref 0.86–1.17)
INR PPP: 2.54 (ref 0.86–1.17)
INR PPP: 2.56 (ref 0.86–1.17)
INR PPP: 2.61 (ref 0.86–1.17)
INR PPP: 2.68 (ref 0.86–1.17)
INR PPP: 2.79 (ref 0.86–1.17)
INR PPP: 2.86 (ref 0.86–1.16)
INR PPP: 3.29 (ref 0.86–1.17)
INR PPP: 3.3 (ref 0.86–1.17)
INR PPP: 3.53 (ref 0.86–1.17)
INR PPP: 3.87 (ref 0.86–1.17)
INR PPP: 4.08 (ref 0.86–1.17)
INR PPP: 4.23 (ref 0.86–1.17)
INR PPP: 5.73 (ref 0.86–1.17)
INR PPP: 7.07 (ref 0.86–1.17)
INR PPP: 8.24 (ref 0.86–1.17)
INR PPP: 9.81 (ref 0.86–1.16)
IRON SATN MFR SERPL: 25 %
IRON SATN MFR SERPL: 9 %
IRON SERPL-MCNC: 39 UG/DL (ref 50–170)
IRON SERPL-MCNC: 52 UG/DL (ref 50–170)
KETONES UR STRIP-MCNC: NEGATIVE MG/DL
KETONES UR STRIP-MCNC: NEGATIVE MG/DL
LACTATE SERPL-SCNC: 2.4 MMOL/L (ref 0.5–2)
LACTATE SERPL-SCNC: 2.5 MMOL/L (ref 0.5–2)
LACTATE SERPL-SCNC: 3.1 MMOL/L (ref 0.5–2)
LDLC SERPL CALC-MCNC: 61 MG/DL (ref 0–100)
LEUKOCYTE ESTERASE UR QL STRIP: ABNORMAL
LEUKOCYTE ESTERASE UR QL STRIP: NEGATIVE
LG PLATELETS BLD QL SMEAR: PRESENT
LIPASE SERPL-CCNC: 293 U/L (ref 73–393)
LIPASE SERPL-CCNC: 434 U/L (ref 73–393)
LYMPHOCYTES # BLD AUTO: 0.18 THOUSAND/UL (ref 0.6–4.47)
LYMPHOCYTES # BLD AUTO: 0.42 THOUSANDS/ΜL (ref 0.6–4.47)
LYMPHOCYTES # BLD AUTO: 0.49 THOUSANDS/ΜL (ref 0.6–4.47)
LYMPHOCYTES # BLD AUTO: 0.5 THOUSANDS/ΜL (ref 0.6–4.47)
LYMPHOCYTES # BLD AUTO: 0.57 THOUSANDS/ΜL (ref 0.6–4.47)
LYMPHOCYTES # BLD AUTO: 0.58 THOUSANDS/ΜL (ref 0.6–4.47)
LYMPHOCYTES # BLD AUTO: 0.61 THOUSANDS/ΜL (ref 0.6–4.47)
LYMPHOCYTES # BLD AUTO: 0.66 THOUSANDS/ΜL (ref 0.6–4.47)
LYMPHOCYTES # BLD AUTO: 0.71 THOUSANDS/ΜL (ref 0.6–4.47)
LYMPHOCYTES # BLD AUTO: 0.74 THOUSANDS/ΜL (ref 0.6–4.47)
LYMPHOCYTES # BLD AUTO: 0.96 THOUSANDS/ΜL (ref 0.6–4.47)
LYMPHOCYTES # BLD AUTO: 1.01 THOUSANDS/ΜL (ref 0.6–4.47)
LYMPHOCYTES # BLD AUTO: 4 % (ref 14–44)
LYMPHOCYTES NFR BLD AUTO: 10 % (ref 14–44)
LYMPHOCYTES NFR BLD AUTO: 10 % (ref 14–44)
LYMPHOCYTES NFR BLD AUTO: 11 % (ref 14–44)
LYMPHOCYTES NFR BLD AUTO: 11 % (ref 14–44)
LYMPHOCYTES NFR BLD AUTO: 13 % (ref 14–44)
LYMPHOCYTES NFR BLD AUTO: 13 % (ref 14–44)
LYMPHOCYTES NFR BLD AUTO: 14 % (ref 14–44)
LYMPHOCYTES NFR BLD AUTO: 16 % (ref 14–44)
LYMPHOCYTES NFR BLD AUTO: 22 % (ref 14–44)
LYMPHOCYTES NFR BLD AUTO: 7 % (ref 14–44)
LYMPHOCYTES NFR BLD AUTO: 9 % (ref 14–44)
MACROCYTES BLD QL AUTO: PRESENT
MAGNESIUM SERPL-MCNC: 1.4 MG/DL (ref 1.6–2.6)
MAGNESIUM SERPL-MCNC: 1.5 MG/DL (ref 1.6–2.6)
MCH RBC QN AUTO: 31.1 PG (ref 26.8–34.3)
MCH RBC QN AUTO: 32.5 PG (ref 26.8–34.3)
MCH RBC QN AUTO: 32.8 PG (ref 26.8–34.3)
MCH RBC QN AUTO: 33 PG (ref 26.8–34.3)
MCH RBC QN AUTO: 33.2 PG (ref 26.8–34.3)
MCH RBC QN AUTO: 33.3 PG (ref 26.8–34.3)
MCH RBC QN AUTO: 33.5 PG (ref 26.8–34.3)
MCH RBC QN AUTO: 33.6 PG (ref 26.8–34.3)
MCH RBC QN AUTO: 33.6 PG (ref 26.8–34.3)
MCH RBC QN AUTO: 33.7 PG (ref 26.8–34.3)
MCH RBC QN AUTO: 33.8 PG (ref 26.8–34.3)
MCH RBC QN AUTO: 33.8 PG (ref 26.8–34.3)
MCH RBC QN AUTO: 33.9 PG (ref 26.8–34.3)
MCHC RBC AUTO-ENTMCNC: 30.5 G/DL (ref 31.4–37.4)
MCHC RBC AUTO-ENTMCNC: 31.3 G/DL (ref 31.4–37.4)
MCHC RBC AUTO-ENTMCNC: 31.8 G/DL (ref 31.4–37.4)
MCHC RBC AUTO-ENTMCNC: 32.2 G/DL (ref 31.4–37.4)
MCHC RBC AUTO-ENTMCNC: 32.2 G/DL (ref 31.4–37.4)
MCHC RBC AUTO-ENTMCNC: 32.4 G/DL (ref 31.4–37.4)
MCHC RBC AUTO-ENTMCNC: 32.4 G/DL (ref 31.4–37.4)
MCHC RBC AUTO-ENTMCNC: 32.6 G/DL (ref 31.4–37.4)
MCHC RBC AUTO-ENTMCNC: 32.6 G/DL (ref 31.4–37.4)
MCHC RBC AUTO-ENTMCNC: 32.7 G/DL (ref 31.4–37.4)
MCHC RBC AUTO-ENTMCNC: 32.8 G/DL (ref 31.4–37.4)
MCHC RBC AUTO-ENTMCNC: 32.8 G/DL (ref 31.4–37.4)
MCHC RBC AUTO-ENTMCNC: 32.9 G/DL (ref 31.4–37.4)
MCHC RBC AUTO-ENTMCNC: 33 G/DL (ref 31.4–37.4)
MCHC RBC AUTO-ENTMCNC: 33.1 G/DL (ref 31.4–37.4)
MCHC RBC AUTO-ENTMCNC: 33.6 G/DL (ref 31.4–37.4)
MCHC RBC AUTO-ENTMCNC: 33.7 G/DL (ref 31.4–37.4)
MCV RBC AUTO: 100 FL (ref 82–98)
MCV RBC AUTO: 101 FL (ref 82–98)
MCV RBC AUTO: 102 FL (ref 82–98)
MCV RBC AUTO: 103 FL (ref 82–98)
MCV RBC AUTO: 104 FL (ref 82–98)
MCV RBC AUTO: 105 FL (ref 82–98)
MCV RBC AUTO: 106 FL (ref 82–98)
MCV RBC AUTO: 108 FL (ref 82–98)
MCV RBC AUTO: 99 FL (ref 82–98)
MONOCYTES # BLD AUTO: 0.09 THOUSAND/UL (ref 0–1.22)
MONOCYTES # BLD AUTO: 0.2 THOUSAND/ΜL (ref 0.17–1.22)
MONOCYTES # BLD AUTO: 0.24 THOUSAND/ΜL (ref 0.17–1.22)
MONOCYTES # BLD AUTO: 0.26 THOUSAND/ΜL (ref 0.17–1.22)
MONOCYTES # BLD AUTO: 0.27 THOUSAND/ΜL (ref 0.17–1.22)
MONOCYTES # BLD AUTO: 0.27 THOUSAND/ΜL (ref 0.17–1.22)
MONOCYTES # BLD AUTO: 0.28 THOUSAND/ΜL (ref 0.17–1.22)
MONOCYTES # BLD AUTO: 0.28 THOUSAND/ΜL (ref 0.17–1.22)
MONOCYTES # BLD AUTO: 0.31 THOUSAND/ΜL (ref 0.17–1.22)
MONOCYTES # BLD AUTO: 0.36 THOUSAND/ΜL (ref 0.17–1.22)
MONOCYTES # BLD AUTO: 0.37 THOUSAND/ΜL (ref 0.17–1.22)
MONOCYTES # BLD AUTO: 0.5 THOUSAND/ΜL (ref 0.17–1.22)
MONOCYTES NFR BLD AUTO: 4 % (ref 4–12)
MONOCYTES NFR BLD AUTO: 5 % (ref 4–12)
MONOCYTES NFR BLD AUTO: 6 % (ref 4–12)
MONOCYTES NFR BLD AUTO: 7 % (ref 4–12)
MONOCYTES NFR BLD: 2 % (ref 4–12)
NEUTROPHILS # BLD AUTO: 2.91 THOUSANDS/ΜL (ref 1.85–7.62)
NEUTROPHILS # BLD AUTO: 3.14 THOUSANDS/ΜL (ref 1.85–7.62)
NEUTROPHILS # BLD AUTO: 3.87 THOUSANDS/ΜL (ref 1.85–7.62)
NEUTROPHILS # BLD AUTO: 3.88 THOUSANDS/ΜL (ref 1.85–7.62)
NEUTROPHILS # BLD AUTO: 4.27 THOUSANDS/ΜL (ref 1.85–7.62)
NEUTROPHILS # BLD AUTO: 4.45 THOUSANDS/ΜL (ref 1.85–7.62)
NEUTROPHILS # BLD AUTO: 4.59 THOUSANDS/ΜL (ref 1.85–7.62)
NEUTROPHILS # BLD AUTO: 4.66 THOUSANDS/ΜL (ref 1.85–7.62)
NEUTROPHILS # BLD AUTO: 4.71 THOUSANDS/ΜL (ref 1.85–7.62)
NEUTROPHILS # BLD AUTO: 5.42 THOUSANDS/ΜL (ref 1.85–7.62)
NEUTROPHILS # BLD AUTO: 6.33 THOUSANDS/ΜL (ref 1.85–7.62)
NEUTROPHILS # BLD MANUAL: 4.28 THOUSAND/UL (ref 1.85–7.62)
NEUTS SEG NFR BLD AUTO: 68 % (ref 43–75)
NEUTS SEG NFR BLD AUTO: 74 % (ref 43–75)
NEUTS SEG NFR BLD AUTO: 78 % (ref 43–75)
NEUTS SEG NFR BLD AUTO: 79 % (ref 43–75)
NEUTS SEG NFR BLD AUTO: 79 % (ref 43–75)
NEUTS SEG NFR BLD AUTO: 80 % (ref 43–75)
NEUTS SEG NFR BLD AUTO: 80 % (ref 43–75)
NEUTS SEG NFR BLD AUTO: 82 % (ref 43–75)
NEUTS SEG NFR BLD AUTO: 82 % (ref 43–75)
NEUTS SEG NFR BLD AUTO: 85 % (ref 43–75)
NEUTS SEG NFR BLD AUTO: 88 % (ref 43–75)
NEUTS SEG NFR BLD AUTO: 94 % (ref 43–75)
NITRITE UR QL STRIP: NEGATIVE
NITRITE UR QL STRIP: POSITIVE
NON-SQ EPI CELLS URNS QL MICRO: ABNORMAL /HPF
NON-SQ EPI CELLS URNS QL MICRO: ABNORMAL /HPF
NRBC BLD AUTO-RTO: 0 /100 WBCS
NRBC BLD AUTO-RTO: 1 /100 WBCS
NRBC BLD AUTO-RTO: 2 /100 WBCS
NRBC BLD AUTO-RTO: 3 /100 WBC (ref 0–2)
NT-PROBNP SERPL-MCNC: ABNORMAL PG/ML
OTHER STN SPEC: ABNORMAL
PH UR STRIP.AUTO: 5.5 [PH] (ref 4.5–8)
PH UR STRIP.AUTO: 5.5 [PH] (ref 4.5–8)
PLATELET # BLD AUTO: 112 THOUSANDS/UL (ref 149–390)
PLATELET # BLD AUTO: 124 THOUSANDS/UL (ref 149–390)
PLATELET # BLD AUTO: 124 THOUSANDS/UL (ref 149–390)
PLATELET # BLD AUTO: 128 THOUSANDS/UL (ref 149–390)
PLATELET # BLD AUTO: 130 THOUSANDS/UL (ref 149–390)
PLATELET # BLD AUTO: 138 THOUSANDS/UL (ref 149–390)
PLATELET # BLD AUTO: 147 THOUSANDS/UL (ref 149–390)
PLATELET # BLD AUTO: 163 THOUSANDS/UL (ref 149–390)
PLATELET # BLD AUTO: 167 THOUSANDS/UL (ref 149–390)
PLATELET # BLD AUTO: 174 THOUSANDS/UL (ref 149–390)
PLATELET # BLD AUTO: 175 THOUSANDS/UL (ref 149–390)
PLATELET # BLD AUTO: 176 THOUSANDS/UL (ref 149–390)
PLATELET # BLD AUTO: 187 THOUSANDS/UL (ref 149–390)
PLATELET # BLD AUTO: 197 THOUSANDS/UL (ref 149–390)
PLATELET # BLD AUTO: 212 THOUSANDS/UL (ref 149–390)
PLATELET # BLD AUTO: 232 THOUSANDS/UL (ref 149–390)
PLATELET # BLD AUTO: 235 THOUSANDS/UL (ref 149–390)
PLATELET BLD QL SMEAR: ABNORMAL
PMV BLD AUTO: 10 FL (ref 8.9–12.7)
PMV BLD AUTO: 10.1 FL (ref 8.9–12.7)
PMV BLD AUTO: 10.2 FL (ref 8.9–12.7)
PMV BLD AUTO: 10.3 FL (ref 8.9–12.7)
PMV BLD AUTO: 10.4 FL (ref 8.9–12.7)
PMV BLD AUTO: 10.4 FL (ref 8.9–12.7)
PMV BLD AUTO: 10.5 FL (ref 8.9–12.7)
PMV BLD AUTO: 10.6 FL (ref 8.9–12.7)
PMV BLD AUTO: 10.7 FL (ref 8.9–12.7)
PMV BLD AUTO: 10.8 FL (ref 8.9–12.7)
PMV BLD AUTO: 10.9 FL (ref 8.9–12.7)
PMV BLD AUTO: 10.9 FL (ref 8.9–12.7)
PMV BLD AUTO: 11 FL (ref 8.9–12.7)
PMV BLD AUTO: 11.1 FL (ref 8.9–12.7)
PMV BLD AUTO: 11.2 FL (ref 8.9–12.7)
POIKILOCYTOSIS BLD QL SMEAR: PRESENT
POTASSIUM SERPL-SCNC: 3.3 MMOL/L (ref 3.5–5.3)
POTASSIUM SERPL-SCNC: 3.5 MMOL/L (ref 3.5–5.3)
POTASSIUM SERPL-SCNC: 3.7 MMOL/L (ref 3.5–5.3)
POTASSIUM SERPL-SCNC: 4 MMOL/L (ref 3.5–5.3)
POTASSIUM SERPL-SCNC: 4 MMOL/L (ref 3.5–5.3)
POTASSIUM SERPL-SCNC: 4.1 MMOL/L (ref 3.5–5.3)
POTASSIUM SERPL-SCNC: 4.2 MMOL/L (ref 3.5–5.3)
POTASSIUM SERPL-SCNC: 4.2 MMOL/L (ref 3.5–5.3)
POTASSIUM SERPL-SCNC: 4.3 MMOL/L (ref 3.5–5.3)
POTASSIUM SERPL-SCNC: 4.4 MMOL/L (ref 3.5–5.3)
POTASSIUM SERPL-SCNC: 4.4 MMOL/L (ref 3.5–5.3)
POTASSIUM SERPL-SCNC: 4.5 MMOL/L (ref 3.5–5.3)
POTASSIUM SERPL-SCNC: 4.8 MMOL/L (ref 3.5–5.3)
POTASSIUM SERPL-SCNC: 5 MMOL/L (ref 3.5–5.3)
POTASSIUM SERPL-SCNC: 5 MMOL/L (ref 3.5–5.3)
POTASSIUM SERPL-SCNC: 5.3 MMOL/L (ref 3.5–5.3)
PROCALCITONIN SERPL-MCNC: 0.09 NG/ML
PROT SERPL-MCNC: 5.1 G/DL (ref 6.4–8.2)
PROT SERPL-MCNC: 5.4 G/DL (ref 6.4–8.2)
PROT SERPL-MCNC: 5.7 G/DL (ref 6.4–8.2)
PROT SERPL-MCNC: 5.7 G/DL (ref 6.4–8.2)
PROT SERPL-MCNC: 6 G/DL (ref 6.4–8.2)
PROT SERPL-MCNC: 6.1 G/DL (ref 6.4–8.2)
PROT SERPL-MCNC: 6.3 G/DL (ref 6.4–8.2)
PROT SERPL-MCNC: 6.7 G/DL (ref 6.4–8.2)
PROT SERPL-MCNC: 6.8 G/DL (ref 6.4–8.2)
PROT SERPL-MCNC: 6.9 G/DL (ref 6.4–8.2)
PROT SERPL-MCNC: 6.9 G/DL (ref 6.4–8.2)
PROT SERPL-MCNC: 7 G/DL (ref 6.4–8.2)
PROT SERPL-MCNC: 7 G/DL (ref 6.4–8.2)
PROT SERPL-MCNC: 7.1 G/DL (ref 6.4–8.2)
PROT SERPL-MCNC: 7.2 G/DL (ref 6.4–8.2)
PROT SERPL-MCNC: 7.6 G/DL (ref 6.4–8.2)
PROT SERPL-MCNC: 7.7 G/DL (ref 6.4–8.2)
PROT SERPL-MCNC: 8.5 G/DL (ref 6.4–8.2)
PROT UR STRIP-MCNC: NEGATIVE MG/DL
PROT UR STRIP-MCNC: NEGATIVE MG/DL
PROTHROMBIN TIME: 105.4 SECONDS (ref 9.4–11.7)
PROTHROMBIN TIME: 20 SECONDS (ref 12.1–14.4)
PROTHROMBIN TIME: 21 SECONDS (ref 11.8–14.2)
PROTHROMBIN TIME: 21.2 SECONDS (ref 11.8–14.2)
PROTHROMBIN TIME: 22 SECONDS (ref 11.8–14.2)
PROTHROMBIN TIME: 24 SECONDS (ref 12.1–14.4)
PROTHROMBIN TIME: 24.6 SECONDS (ref 11.8–14.2)
PROTHROMBIN TIME: 24.7 SECONDS (ref 11.8–14.2)
PROTHROMBIN TIME: 25.4 SECONDS (ref 12.1–14.4)
PROTHROMBIN TIME: 26.6 SECONDS (ref 11.8–14.2)
PROTHROMBIN TIME: 26.7 SECONDS (ref 11.8–14.2)
PROTHROMBIN TIME: 27.1 SECONDS (ref 11.8–14.2)
PROTHROMBIN TIME: 27.7 SECONDS (ref 11.8–14.2)
PROTHROMBIN TIME: 28.6 SECONDS (ref 11.8–14.2)
PROTHROMBIN TIME: 31.1 SECONDS (ref 12.1–14.4)
PROTHROMBIN TIME: 32.5 SECONDS (ref 11.8–14.2)
PROTHROMBIN TIME: 32.6 SECONDS (ref 11.8–14.2)
PROTHROMBIN TIME: 34.3 SECONDS (ref 11.8–14.2)
PROTHROMBIN TIME: 38 SECONDS (ref 11.8–14.2)
PROTHROMBIN TIME: 38.4 SECONDS (ref 11.8–14.2)
PROTHROMBIN TIME: 40.7 SECONDS (ref 11.8–14.2)
PROTHROMBIN TIME: 50 SECONDS (ref 11.8–14.2)
PROTHROMBIN TIME: 60.6 SECONDS (ref 11.8–14.2)
PROTHROMBIN TIME: 68.2 SECONDS (ref 11.8–14.2)
QRS AXIS: -28 DEGREES
QRS AXIS: -32 DEGREES
QRSD INTERVAL: 76 MS
QRSD INTERVAL: 84 MS
QT INTERVAL: 330 MS
QT INTERVAL: 376 MS
QTC INTERVAL: 348 MS
QTC INTERVAL: 397 MS
RBC # BLD AUTO: 3.63 MILLION/UL (ref 3.81–5.12)
RBC # BLD AUTO: 3.8 MILLION/UL (ref 3.81–5.12)
RBC # BLD AUTO: 4.14 MILLION/UL (ref 3.81–5.12)
RBC # BLD AUTO: 4.21 MILLION/UL (ref 3.81–5.12)
RBC # BLD AUTO: 4.4 MILLION/UL (ref 3.81–5.12)
RBC # BLD AUTO: 4.44 MILLION/UL (ref 3.81–5.12)
RBC # BLD AUTO: 4.48 MILLION/UL (ref 3.81–5.12)
RBC # BLD AUTO: 4.59 MILLION/UL (ref 3.81–5.12)
RBC # BLD AUTO: 4.62 MILLION/UL (ref 3.81–5.12)
RBC # BLD AUTO: 4.7 MILLION/UL (ref 3.81–5.12)
RBC # BLD AUTO: 4.76 MILLION/UL (ref 3.81–5.12)
RBC # BLD AUTO: 4.76 MILLION/UL (ref 3.81–5.12)
RBC # BLD AUTO: 4.77 MILLION/UL (ref 3.81–5.12)
RBC # BLD AUTO: 4.84 MILLION/UL (ref 3.81–5.12)
RBC # BLD AUTO: 4.85 MILLION/UL (ref 3.81–5.12)
RBC # BLD AUTO: 5.05 MILLION/UL (ref 3.81–5.12)
RBC # BLD AUTO: 5.06 MILLION/UL (ref 3.81–5.12)
RBC #/AREA URNS AUTO: ABNORMAL /HPF
RBC #/AREA URNS AUTO: ABNORMAL /HPF
RBC MORPH BLD: PRESENT
SODIUM SERPL-SCNC: 133 MMOL/L (ref 136–145)
SODIUM SERPL-SCNC: 133 MMOL/L (ref 136–145)
SODIUM SERPL-SCNC: 135 MMOL/L (ref 136–145)
SODIUM SERPL-SCNC: 136 MMOL/L (ref 136–145)
SODIUM SERPL-SCNC: 137 MMOL/L (ref 136–145)
SODIUM SERPL-SCNC: 137 MMOL/L (ref 136–145)
SODIUM SERPL-SCNC: 138 MMOL/L (ref 136–145)
SODIUM SERPL-SCNC: 138 MMOL/L (ref 136–145)
SODIUM SERPL-SCNC: 139 MMOL/L (ref 136–145)
SODIUM SERPL-SCNC: 140 MMOL/L (ref 136–145)
SODIUM SERPL-SCNC: 142 MMOL/L (ref 136–145)
SODIUM SERPL-SCNC: 142 MMOL/L (ref 136–145)
SP GR UR STRIP.AUTO: 1.01 (ref 1–1.03)
SP GR UR STRIP.AUTO: 1.02 (ref 1–1.03)
T WAVE AXIS: 201 DEGREES
T WAVE AXIS: 263 DEGREES
TIBC SERPL-MCNC: 209 UG/DL (ref 250–450)
TIBC SERPL-MCNC: 416 UG/DL (ref 250–450)
TRIGL SERPL-MCNC: 133 MG/DL
TROPONIN I SERPL-MCNC: 0.04 NG/ML
TROPONIN I SERPL-MCNC: 0.05 NG/ML
TROPONIN I SERPL-MCNC: 0.09 NG/ML
TROPONIN I SERPL-MCNC: 0.09 NG/ML
TSH SERPL DL<=0.05 MIU/L-ACNC: 2.56 UIU/ML (ref 0.36–3.74)
UROBILINOGEN UR QL STRIP.AUTO: 0.2 E.U./DL
UROBILINOGEN UR QL STRIP.AUTO: 1 E.U./DL
VENTRICULAR RATE: 67 BPM
VENTRICULAR RATE: 67 BPM
WBC # BLD AUTO: 4 THOUSAND/UL (ref 4.31–10.16)
WBC # BLD AUTO: 4.13 THOUSAND/UL (ref 4.31–10.16)
WBC # BLD AUTO: 4.22 THOUSAND/UL (ref 4.31–10.16)
WBC # BLD AUTO: 4.3 THOUSAND/UL (ref 4.31–10.16)
WBC # BLD AUTO: 4.55 THOUSAND/UL (ref 4.31–10.16)
WBC # BLD AUTO: 4.56 THOUSAND/UL (ref 4.31–10.16)
WBC # BLD AUTO: 4.71 THOUSAND/UL (ref 4.31–10.16)
WBC # BLD AUTO: 4.82 THOUSAND/UL (ref 4.31–10.16)
WBC # BLD AUTO: 4.84 THOUSAND/UL (ref 4.31–10.16)
WBC # BLD AUTO: 5.35 THOUSAND/UL (ref 4.31–10.16)
WBC # BLD AUTO: 5.47 THOUSAND/UL (ref 4.31–10.16)
WBC # BLD AUTO: 5.59 THOUSAND/UL (ref 4.31–10.16)
WBC # BLD AUTO: 5.7 THOUSAND/UL (ref 4.31–10.16)
WBC # BLD AUTO: 5.86 THOUSAND/UL (ref 4.31–10.16)
WBC # BLD AUTO: 6.46 THOUSAND/UL (ref 4.31–10.16)
WBC # BLD AUTO: 7.05 THOUSAND/UL (ref 4.31–10.16)
WBC # BLD AUTO: 7.14 THOUSAND/UL (ref 4.31–10.16)
WBC #/AREA URNS AUTO: ABNORMAL /HPF
WBC #/AREA URNS AUTO: ABNORMAL /HPF

## 2018-01-01 PROCEDURE — 97530 THERAPEUTIC ACTIVITIES: CPT

## 2018-01-01 PROCEDURE — 97110 THERAPEUTIC EXERCISES: CPT

## 2018-01-01 PROCEDURE — 97112 NEUROMUSCULAR REEDUCATION: CPT | Performed by: PHYSICAL THERAPIST

## 2018-01-01 PROCEDURE — 85027 COMPLETE CBC AUTOMATED: CPT | Performed by: NURSE PRACTITIONER

## 2018-01-01 PROCEDURE — 97535 SELF CARE MNGMENT TRAINING: CPT | Performed by: PHYSICAL THERAPIST

## 2018-01-01 PROCEDURE — 36415 COLL VENOUS BLD VENIPUNCTURE: CPT

## 2018-01-01 PROCEDURE — 85610 PROTHROMBIN TIME: CPT | Performed by: EMERGENCY MEDICINE

## 2018-01-01 PROCEDURE — 85730 THROMBOPLASTIN TIME PARTIAL: CPT | Performed by: EMERGENCY MEDICINE

## 2018-01-01 PROCEDURE — 85007 BL SMEAR W/DIFF WBC COUNT: CPT | Performed by: INTERNAL MEDICINE

## 2018-01-01 PROCEDURE — 99232 SBSQ HOSP IP/OBS MODERATE 35: CPT | Performed by: INTERNAL MEDICINE

## 2018-01-01 PROCEDURE — 81001 URINALYSIS AUTO W/SCOPE: CPT | Performed by: EMERGENCY MEDICINE

## 2018-01-01 PROCEDURE — 85610 PROTHROMBIN TIME: CPT

## 2018-01-01 PROCEDURE — 84080 ASSAY ALKALINE PHOSPHATASES: CPT | Performed by: PHYSICIAN ASSISTANT

## 2018-01-01 PROCEDURE — 36415 COLL VENOUS BLD VENIPUNCTURE: CPT | Performed by: EMERGENCY MEDICINE

## 2018-01-01 PROCEDURE — 85610 PROTHROMBIN TIME: CPT | Performed by: INTERNAL MEDICINE

## 2018-01-01 PROCEDURE — 2W35X3Z IMMOBILIZATION OF BACK USING BRACE: ICD-10-PCS | Performed by: ORTHOPAEDIC SURGERY

## 2018-01-01 PROCEDURE — 93975 VASCULAR STUDY: CPT

## 2018-01-01 PROCEDURE — 97110 THERAPEUTIC EXERCISES: CPT | Performed by: PHYSICAL THERAPIST

## 2018-01-01 PROCEDURE — G8978 MOBILITY CURRENT STATUS: HCPCS | Performed by: PHYSICAL THERAPIST

## 2018-01-01 PROCEDURE — 80053 COMPREHEN METABOLIC PANEL: CPT | Performed by: PHYSICIAN ASSISTANT

## 2018-01-01 PROCEDURE — 85025 COMPLETE CBC W/AUTO DIFF WBC: CPT | Performed by: EMERGENCY MEDICINE

## 2018-01-01 PROCEDURE — 80053 COMPREHEN METABOLIC PANEL: CPT | Performed by: EMERGENCY MEDICINE

## 2018-01-01 PROCEDURE — 96372 THER/PROPH/DIAG INJ SC/IM: CPT

## 2018-01-01 PROCEDURE — 99233 SBSQ HOSP IP/OBS HIGH 50: CPT | Performed by: FAMILY MEDICINE

## 2018-01-01 PROCEDURE — 85027 COMPLETE CBC AUTOMATED: CPT | Performed by: INTERNAL MEDICINE

## 2018-01-01 PROCEDURE — 80076 HEPATIC FUNCTION PANEL: CPT | Performed by: PHYSICIAN ASSISTANT

## 2018-01-01 PROCEDURE — 99232 SBSQ HOSP IP/OBS MODERATE 35: CPT | Performed by: FAMILY MEDICINE

## 2018-01-01 PROCEDURE — 85025 COMPLETE CBC W/AUTO DIFF WBC: CPT | Performed by: INTERNAL MEDICINE

## 2018-01-01 PROCEDURE — 97140 MANUAL THERAPY 1/> REGIONS: CPT | Performed by: PHYSICAL THERAPIST

## 2018-01-01 PROCEDURE — 97760 ORTHOTIC MGMT&TRAING 1ST ENC: CPT

## 2018-01-01 PROCEDURE — 99024 POSTOP FOLLOW-UP VISIT: CPT | Performed by: ORTHOPAEDIC SURGERY

## 2018-01-01 PROCEDURE — 99232 SBSQ HOSP IP/OBS MODERATE 35: CPT | Performed by: PHYSICIAN ASSISTANT

## 2018-01-01 PROCEDURE — 99239 HOSP IP/OBS DSCHRG MGMT >30: CPT | Performed by: INTERNAL MEDICINE

## 2018-01-01 PROCEDURE — 99213 OFFICE O/P EST LOW 20 MIN: CPT | Performed by: ORTHOPAEDIC SURGERY

## 2018-01-01 PROCEDURE — 84484 ASSAY OF TROPONIN QUANT: CPT | Performed by: EMERGENCY MEDICINE

## 2018-01-01 PROCEDURE — 78226 HEPATOBILIARY SYSTEM IMAGING: CPT

## 2018-01-01 PROCEDURE — 80048 BASIC METABOLIC PNL TOTAL CA: CPT | Performed by: PHYSICIAN ASSISTANT

## 2018-01-01 PROCEDURE — 97162 PT EVAL MOD COMPLEX 30 MIN: CPT | Performed by: PHYSICAL THERAPIST

## 2018-01-01 PROCEDURE — 85025 COMPLETE CBC W/AUTO DIFF WBC: CPT | Performed by: PHYSICIAN ASSISTANT

## 2018-01-01 PROCEDURE — G8979 MOBILITY GOAL STATUS: HCPCS | Performed by: PHYSICAL THERAPIST

## 2018-01-01 PROCEDURE — 99222 1ST HOSP IP/OBS MODERATE 55: CPT | Performed by: FAMILY MEDICINE

## 2018-01-01 PROCEDURE — 99232 SBSQ HOSP IP/OBS MODERATE 35: CPT | Performed by: NURSE PRACTITIONER

## 2018-01-01 PROCEDURE — 99214 OFFICE O/P EST MOD 30 MIN: CPT | Performed by: INTERNAL MEDICINE

## 2018-01-01 PROCEDURE — 81001 URINALYSIS AUTO W/SCOPE: CPT

## 2018-01-01 PROCEDURE — 97163 PT EVAL HIGH COMPLEX 45 MIN: CPT | Performed by: PHYSICAL THERAPIST

## 2018-01-01 PROCEDURE — 97140 MANUAL THERAPY 1/> REGIONS: CPT

## 2018-01-01 PROCEDURE — 97116 GAIT TRAINING THERAPY: CPT

## 2018-01-01 PROCEDURE — 80053 COMPREHEN METABOLIC PANEL: CPT | Performed by: NURSE PRACTITIONER

## 2018-01-01 PROCEDURE — 85610 PROTHROMBIN TIME: CPT | Performed by: PHYSICIAN ASSISTANT

## 2018-01-01 PROCEDURE — 83880 ASSAY OF NATRIURETIC PEPTIDE: CPT | Performed by: EMERGENCY MEDICINE

## 2018-01-01 PROCEDURE — 70450 CT HEAD/BRAIN W/O DYE: CPT

## 2018-01-01 PROCEDURE — 97167 OT EVAL HIGH COMPLEX 60 MIN: CPT

## 2018-01-01 PROCEDURE — 85027 COMPLETE CBC AUTOMATED: CPT | Performed by: PHYSICIAN ASSISTANT

## 2018-01-01 PROCEDURE — 85027 COMPLETE CBC AUTOMATED: CPT

## 2018-01-01 PROCEDURE — 83605 ASSAY OF LACTIC ACID: CPT | Performed by: EMERGENCY MEDICINE

## 2018-01-01 PROCEDURE — 80048 BASIC METABOLIC PNL TOTAL CA: CPT | Performed by: INTERNAL MEDICINE

## 2018-01-01 PROCEDURE — 73502 X-RAY EXAM HIP UNI 2-3 VIEWS: CPT

## 2018-01-01 PROCEDURE — 97112 NEUROMUSCULAR REEDUCATION: CPT

## 2018-01-01 PROCEDURE — 80061 LIPID PANEL: CPT

## 2018-01-01 PROCEDURE — 85610 PROTHROMBIN TIME: CPT | Performed by: NURSE PRACTITIONER

## 2018-01-01 PROCEDURE — G8988 SELF CARE GOAL STATUS: HCPCS

## 2018-01-01 PROCEDURE — 80053 COMPREHEN METABOLIC PANEL: CPT | Performed by: INTERNAL MEDICINE

## 2018-01-01 PROCEDURE — 83735 ASSAY OF MAGNESIUM: CPT | Performed by: INTERNAL MEDICINE

## 2018-01-01 PROCEDURE — 99223 1ST HOSP IP/OBS HIGH 75: CPT | Performed by: INTERNAL MEDICINE

## 2018-01-01 PROCEDURE — 96365 THER/PROPH/DIAG IV INF INIT: CPT

## 2018-01-01 PROCEDURE — 82248 BILIRUBIN DIRECT: CPT | Performed by: PHYSICIAN ASSISTANT

## 2018-01-01 PROCEDURE — 82728 ASSAY OF FERRITIN: CPT

## 2018-01-01 PROCEDURE — 97530 THERAPEUTIC ACTIVITIES: CPT | Performed by: PHYSICAL THERAPIST

## 2018-01-01 PROCEDURE — 99284 EMERGENCY DEPT VISIT MOD MDM: CPT

## 2018-01-01 PROCEDURE — 74176 CT ABD & PELVIS W/O CONTRAST: CPT

## 2018-01-01 PROCEDURE — G8978 MOBILITY CURRENT STATUS: HCPCS

## 2018-01-01 PROCEDURE — G8987 SELF CARE CURRENT STATUS: HCPCS

## 2018-01-01 PROCEDURE — 80053 COMPREHEN METABOLIC PANEL: CPT

## 2018-01-01 PROCEDURE — 82977 ASSAY OF GGT: CPT | Performed by: PHYSICIAN ASSISTANT

## 2018-01-01 PROCEDURE — 99222 1ST HOSP IP/OBS MODERATE 55: CPT | Performed by: INTERNAL MEDICINE

## 2018-01-01 PROCEDURE — 73552 X-RAY EXAM OF FEMUR 2/>: CPT

## 2018-01-01 PROCEDURE — 83550 IRON BINDING TEST: CPT | Performed by: INTERNAL MEDICINE

## 2018-01-01 PROCEDURE — 99213 OFFICE O/P EST LOW 20 MIN: CPT | Performed by: INTERNAL MEDICINE

## 2018-01-01 PROCEDURE — G8979 MOBILITY GOAL STATUS: HCPCS

## 2018-01-01 PROCEDURE — 99233 SBSQ HOSP IP/OBS HIGH 50: CPT | Performed by: INTERNAL MEDICINE

## 2018-01-01 PROCEDURE — 84443 ASSAY THYROID STIM HORMONE: CPT

## 2018-01-01 PROCEDURE — 99285 EMERGENCY DEPT VISIT HI MDM: CPT

## 2018-01-01 PROCEDURE — 99356 PR PROLONGED SVC I/P OR OBS SETTING 1ST HOUR: CPT | Performed by: FAMILY MEDICINE

## 2018-01-01 PROCEDURE — 87086 URINE CULTURE/COLONY COUNT: CPT | Performed by: EMERGENCY MEDICINE

## 2018-01-01 PROCEDURE — 85025 COMPLETE CBC W/AUTO DIFF WBC: CPT

## 2018-01-01 PROCEDURE — 71101 X-RAY EXAM UNILAT RIBS/CHEST: CPT

## 2018-01-01 PROCEDURE — 93975 VASCULAR STUDY: CPT | Performed by: SURGERY

## 2018-01-01 PROCEDURE — 97163 PT EVAL HIGH COMPLEX 45 MIN: CPT

## 2018-01-01 PROCEDURE — 83735 ASSAY OF MAGNESIUM: CPT | Performed by: NURSE PRACTITIONER

## 2018-01-01 PROCEDURE — 83550 IRON BINDING TEST: CPT

## 2018-01-01 PROCEDURE — 97535 SELF CARE MNGMENT TRAINING: CPT

## 2018-01-01 PROCEDURE — 99233 SBSQ HOSP IP/OBS HIGH 50: CPT | Performed by: PHYSICIAN ASSISTANT

## 2018-01-01 PROCEDURE — 97110 THERAPEUTIC EXERCISES: CPT | Performed by: PHYSICAL MEDICINE & REHABILITATION

## 2018-01-01 PROCEDURE — 84484 ASSAY OF TROPONIN QUANT: CPT | Performed by: PHYSICIAN ASSISTANT

## 2018-01-01 PROCEDURE — A9537 TC99M MEBROFENIN: HCPCS

## 2018-01-01 PROCEDURE — 94760 N-INVAS EAR/PLS OXIMETRY 1: CPT

## 2018-01-01 PROCEDURE — G0008 ADMIN INFLUENZA VIRUS VAC: HCPCS | Performed by: INTERNAL MEDICINE

## 2018-01-01 PROCEDURE — 83540 ASSAY OF IRON: CPT

## 2018-01-01 PROCEDURE — 99221 1ST HOSP IP/OBS SF/LOW 40: CPT | Performed by: INTERNAL MEDICINE

## 2018-01-01 PROCEDURE — 80076 HEPATIC FUNCTION PANEL: CPT | Performed by: NURSE PRACTITIONER

## 2018-01-01 PROCEDURE — 71045 X-RAY EXAM CHEST 1 VIEW: CPT

## 2018-01-01 PROCEDURE — 96360 HYDRATION IV INFUSION INIT: CPT

## 2018-01-01 PROCEDURE — 87186 SC STD MICRODIL/AGAR DIL: CPT | Performed by: EMERGENCY MEDICINE

## 2018-01-01 PROCEDURE — 36415 COLL VENOUS BLD VENIPUNCTURE: CPT | Performed by: INTERNAL MEDICINE

## 2018-01-01 PROCEDURE — 93005 ELECTROCARDIOGRAM TRACING: CPT

## 2018-01-01 PROCEDURE — 83540 ASSAY OF IRON: CPT | Performed by: INTERNAL MEDICINE

## 2018-01-01 PROCEDURE — G8980 MOBILITY D/C STATUS: HCPCS | Performed by: PHYSICAL THERAPIST

## 2018-01-01 PROCEDURE — 84145 PROCALCITONIN (PCT): CPT | Performed by: EMERGENCY MEDICINE

## 2018-01-01 PROCEDURE — 90662 IIV NO PRSV INCREASED AG IM: CPT | Performed by: INTERNAL MEDICINE

## 2018-01-01 PROCEDURE — 96374 THER/PROPH/DIAG INJ IV PUSH: CPT

## 2018-01-01 PROCEDURE — 82728 ASSAY OF FERRITIN: CPT | Performed by: INTERNAL MEDICINE

## 2018-01-01 PROCEDURE — 83690 ASSAY OF LIPASE: CPT | Performed by: EMERGENCY MEDICINE

## 2018-01-01 PROCEDURE — 71046 X-RAY EXAM CHEST 2 VIEWS: CPT

## 2018-01-01 PROCEDURE — 87040 BLOOD CULTURE FOR BACTERIA: CPT | Performed by: EMERGENCY MEDICINE

## 2018-01-01 PROCEDURE — 84075 ASSAY ALKALINE PHOSPHATASE: CPT | Performed by: PHYSICIAN ASSISTANT

## 2018-01-01 PROCEDURE — 87077 CULTURE AEROBIC IDENTIFY: CPT | Performed by: EMERGENCY MEDICINE

## 2018-01-01 PROCEDURE — 99222 1ST HOSP IP/OBS MODERATE 55: CPT | Performed by: ORTHOPAEDIC SURGERY

## 2018-01-01 PROCEDURE — 76705 ECHO EXAM OF ABDOMEN: CPT

## 2018-01-01 PROCEDURE — 93010 ELECTROCARDIOGRAM REPORT: CPT | Performed by: INTERNAL MEDICINE

## 2018-01-01 RX ORDER — CYANOCOBALAMIN 1000 UG/ML
1000 INJECTION INTRAMUSCULAR; SUBCUTANEOUS ONCE
Status: COMPLETED | OUTPATIENT
Start: 2018-01-01 | End: 2018-01-01

## 2018-01-01 RX ORDER — SUCRALFATE ORAL 1 G/10ML
1000 SUSPENSION ORAL EVERY 6 HOURS SCHEDULED
Status: DISCONTINUED | OUTPATIENT
Start: 2018-01-01 | End: 2018-01-01 | Stop reason: HOSPADM

## 2018-01-01 RX ORDER — POLYETHYLENE GLYCOL 3350 17 G/17G
17 POWDER, FOR SOLUTION ORAL DAILY PRN
Status: DISCONTINUED | OUTPATIENT
Start: 2018-01-01 | End: 2018-01-01 | Stop reason: HOSPADM

## 2018-01-01 RX ORDER — LEVOTHYROXINE SODIUM 88 UG/1
88 TABLET ORAL DAILY
Qty: 90 TABLET | Refills: 1 | Status: SHIPPED | OUTPATIENT
Start: 2018-01-01

## 2018-01-01 RX ORDER — SODIUM CHLORIDE 9 MG/ML
100 INJECTION, SOLUTION INTRAVENOUS CONTINUOUS
Status: DISCONTINUED | OUTPATIENT
Start: 2018-01-01 | End: 2018-01-01

## 2018-01-01 RX ORDER — ACETAMINOPHEN 325 MG/1
650 TABLET ORAL EVERY 6 HOURS PRN
Status: DISCONTINUED | OUTPATIENT
Start: 2018-01-01 | End: 2018-01-01 | Stop reason: HOSPADM

## 2018-01-01 RX ORDER — ALBUTEROL SULFATE 2.5 MG/3ML
2.5 SOLUTION RESPIRATORY (INHALATION) EVERY 6 HOURS PRN
Status: DISCONTINUED | OUTPATIENT
Start: 2018-01-01 | End: 2018-01-01

## 2018-01-01 RX ORDER — OMEPRAZOLE 40 MG/1
40 CAPSULE, DELAYED RELEASE ORAL 2 TIMES DAILY
Qty: 90 CAPSULE | Refills: 0 | Status: SHIPPED | OUTPATIENT
Start: 2018-01-01

## 2018-01-01 RX ORDER — OXYCODONE HYDROCHLORIDE 5 MG/1
2.5 TABLET ORAL ONCE
Status: COMPLETED | OUTPATIENT
Start: 2018-01-01 | End: 2018-01-01

## 2018-01-01 RX ORDER — DRONABINOL 5 MG/1
5 CAPSULE ORAL
Qty: 20 CAPSULE | Refills: 0 | Status: SHIPPED | OUTPATIENT
Start: 2018-01-01 | End: 2018-01-01

## 2018-01-01 RX ORDER — POTASSIUM CHLORIDE 750 MG/1
10 TABLET, FILM COATED, EXTENDED RELEASE ORAL DAILY
Qty: 30 TABLET | Refills: 0 | Status: SHIPPED | OUTPATIENT
Start: 2018-01-01 | End: 2019-11-01

## 2018-01-01 RX ORDER — HYDROMORPHONE HCL/PF 1 MG/ML
0.2 SYRINGE (ML) INJECTION
Status: DISCONTINUED | OUTPATIENT
Start: 2018-01-01 | End: 2018-01-01 | Stop reason: HOSPADM

## 2018-01-01 RX ORDER — POLYETHYLENE GLYCOL 3350 17 G/17G
17 POWDER, FOR SOLUTION ORAL DAILY PRN
Qty: 14 EACH | Refills: 0 | Status: SHIPPED | OUTPATIENT
Start: 2018-01-01

## 2018-01-01 RX ORDER — FLUTICASONE PROPIONATE 50 MCG
1 SPRAY, SUSPENSION (ML) NASAL DAILY
Status: DISCONTINUED | OUTPATIENT
Start: 2018-01-01 | End: 2018-01-01 | Stop reason: HOSPADM

## 2018-01-01 RX ORDER — OXYCODONE HYDROCHLORIDE AND ACETAMINOPHEN 5; 325 MG/1; MG/1
1 TABLET ORAL ONCE
Status: COMPLETED | OUTPATIENT
Start: 2018-01-01 | End: 2018-01-01

## 2018-01-01 RX ORDER — LIDOCAINE 50 MG/G
1 PATCH TOPICAL ONCE
Status: DISCONTINUED | OUTPATIENT
Start: 2018-01-01 | End: 2018-01-01 | Stop reason: HOSPADM

## 2018-01-01 RX ORDER — ATORVASTATIN CALCIUM 20 MG/1
20 TABLET, FILM COATED ORAL DAILY
Qty: 90 TABLET | Refills: 1 | Status: SHIPPED | OUTPATIENT
Start: 2018-01-01

## 2018-01-01 RX ORDER — OXYCODONE HYDROCHLORIDE 5 MG/1
2.5 TABLET ORAL EVERY 4 HOURS PRN
Status: DISCONTINUED | OUTPATIENT
Start: 2018-01-01 | End: 2018-01-01

## 2018-01-01 RX ORDER — SODIUM CHLORIDE 9 MG/ML
60 INJECTION, SOLUTION INTRAVENOUS CONTINUOUS
Status: DISCONTINUED | OUTPATIENT
Start: 2018-01-01 | End: 2018-01-01

## 2018-01-01 RX ORDER — POTASSIUM CHLORIDE 750 MG/1
10 TABLET, EXTENDED RELEASE ORAL EVERY OTHER DAY
Status: DISCONTINUED | OUTPATIENT
Start: 2018-01-01 | End: 2018-01-01

## 2018-01-01 RX ORDER — CYANOCOBALAMIN 1000 UG/ML
INJECTION INTRAMUSCULAR; SUBCUTANEOUS
Refills: 0 | COMMUNITY
Start: 2018-01-01 | End: 2018-01-01 | Stop reason: SDUPTHER

## 2018-01-01 RX ORDER — CEFUROXIME AXETIL 250 MG/1
250 TABLET ORAL EVERY 24 HOURS
Status: DISCONTINUED | OUTPATIENT
Start: 2018-01-01 | End: 2018-01-01

## 2018-01-01 RX ORDER — POLYETHYLENE GLYCOL 3350 17 G/17G
17 POWDER, FOR SOLUTION ORAL DAILY
Status: DISCONTINUED | OUTPATIENT
Start: 2018-01-01 | End: 2018-01-01

## 2018-01-01 RX ORDER — ALBUTEROL SULFATE 2.5 MG/3ML
2.5 SOLUTION RESPIRATORY (INHALATION) EVERY 4 HOURS PRN
Status: DISCONTINUED | OUTPATIENT
Start: 2018-01-01 | End: 2018-01-01

## 2018-01-01 RX ORDER — DEXAMETHASONE 2 MG/1
2 TABLET ORAL DAILY
Status: COMPLETED | OUTPATIENT
Start: 2018-01-01 | End: 2018-01-01

## 2018-01-01 RX ORDER — CIPROFLOXACIN 2 MG/ML
400 INJECTION, SOLUTION INTRAVENOUS EVERY 24 HOURS
Status: DISCONTINUED | OUTPATIENT
Start: 2018-01-01 | End: 2018-01-01

## 2018-01-01 RX ORDER — BISACODYL 10 MG
10 SUPPOSITORY, RECTAL RECTAL DAILY PRN
Status: DISCONTINUED | OUTPATIENT
Start: 2018-01-01 | End: 2018-01-01 | Stop reason: HOSPADM

## 2018-01-01 RX ORDER — DICYCLOMINE HYDROCHLORIDE 10 MG/1
10 CAPSULE ORAL DAILY PRN
Qty: 60 CAPSULE | Refills: 1 | Status: SHIPPED | OUTPATIENT
Start: 2018-01-01 | End: 2018-01-01 | Stop reason: HOSPADM

## 2018-01-01 RX ORDER — WARFARIN SODIUM 3 MG/1
3 TABLET ORAL
Status: DISCONTINUED | OUTPATIENT
Start: 2018-01-01 | End: 2018-01-01 | Stop reason: HOSPADM

## 2018-01-01 RX ORDER — PANTOPRAZOLE SODIUM 40 MG/1
40 TABLET, DELAYED RELEASE ORAL
Status: DISCONTINUED | OUTPATIENT
Start: 2018-01-01 | End: 2018-01-01 | Stop reason: HOSPADM

## 2018-01-01 RX ORDER — SODIUM CHLORIDE 9 MG/ML
75 INJECTION, SOLUTION INTRAVENOUS CONTINUOUS
Status: DISCONTINUED | OUTPATIENT
Start: 2018-01-01 | End: 2018-01-01

## 2018-01-01 RX ORDER — ACETAMINOPHEN 325 MG/1
975 TABLET ORAL EVERY 8 HOURS
Status: DISCONTINUED | OUTPATIENT
Start: 2018-01-01 | End: 2018-01-01 | Stop reason: HOSPADM

## 2018-01-01 RX ORDER — ONDANSETRON 4 MG/1
4 TABLET, ORALLY DISINTEGRATING ORAL EVERY 4 HOURS PRN
Status: DISCONTINUED | OUTPATIENT
Start: 2018-01-01 | End: 2018-01-01 | Stop reason: HOSPADM

## 2018-01-01 RX ORDER — POTASSIUM CHLORIDE 20 MEQ/1
20 TABLET, EXTENDED RELEASE ORAL DAILY
Status: DISCONTINUED | OUTPATIENT
Start: 2018-01-01 | End: 2018-01-01

## 2018-01-01 RX ORDER — FLUTICASONE PROPIONATE 50 MCG
1 SPRAY, SUSPENSION (ML) NASAL 2 TIMES DAILY
Status: DISCONTINUED | OUTPATIENT
Start: 2018-01-01 | End: 2018-01-01

## 2018-01-01 RX ORDER — SODIUM PHOSPHATE, DIBASIC AND SODIUM PHOSPHATE, MONOBASIC 7; 19 G/133ML; G/133ML
ENEMA RECTAL
COMMUNITY
End: 2018-01-01

## 2018-01-01 RX ORDER — FLUTICASONE PROPIONATE 50 MCG
SPRAY, SUSPENSION (ML) NASAL
COMMUNITY

## 2018-01-01 RX ORDER — ACETAMINOPHEN 325 MG/1
650 TABLET ORAL ONCE
Status: COMPLETED | OUTPATIENT
Start: 2018-01-01 | End: 2018-01-01

## 2018-01-01 RX ORDER — SUCRALFATE 1 G/1
1 TABLET ORAL 4 TIMES DAILY
Status: DISCONTINUED | OUTPATIENT
Start: 2018-01-01 | End: 2018-01-01

## 2018-01-01 RX ORDER — BISACODYL 10 MG
10 SUPPOSITORY, RECTAL RECTAL DAILY
Status: DISCONTINUED | OUTPATIENT
Start: 2018-01-01 | End: 2018-01-01

## 2018-01-01 RX ORDER — PANTOPRAZOLE SODIUM 20 MG/1
20 TABLET, DELAYED RELEASE ORAL
Status: DISCONTINUED | OUTPATIENT
Start: 2018-01-01 | End: 2018-01-01

## 2018-01-01 RX ORDER — MAGNESIUM SULFATE HEPTAHYDRATE 40 MG/ML
2 INJECTION, SOLUTION INTRAVENOUS ONCE
Status: COMPLETED | OUTPATIENT
Start: 2018-01-01 | End: 2018-01-01

## 2018-01-01 RX ORDER — WARFARIN SODIUM 2.5 MG/1
1.25 TABLET ORAL
Status: DISCONTINUED | OUTPATIENT
Start: 2018-01-01 | End: 2018-01-01

## 2018-01-01 RX ORDER — CIPROFLOXACIN 2 MG/ML
400 INJECTION, SOLUTION INTRAVENOUS ONCE
Status: COMPLETED | OUTPATIENT
Start: 2018-01-01 | End: 2018-01-01

## 2018-01-01 RX ORDER — LIDOCAINE 50 MG/G
1 PATCH TOPICAL DAILY
Qty: 30 PATCH | Refills: 0 | Status: SHIPPED | OUTPATIENT
Start: 2018-01-01

## 2018-01-01 RX ORDER — DRONABINOL 2.5 MG/1
2.5 CAPSULE ORAL
Status: DISCONTINUED | OUTPATIENT
Start: 2018-01-01 | End: 2018-01-01

## 2018-01-01 RX ORDER — POLYETHYLENE GLYCOL 3350 17 G/17G
POWDER, FOR SOLUTION ORAL
COMMUNITY
End: 2018-01-01

## 2018-01-01 RX ORDER — ATORVASTATIN CALCIUM 20 MG/1
20 TABLET, FILM COATED ORAL DAILY
Status: DISCONTINUED | OUTPATIENT
Start: 2018-01-01 | End: 2018-01-01

## 2018-01-01 RX ORDER — SUCRALFATE ORAL 1 G/10ML
1000 SUSPENSION ORAL EVERY 6 HOURS SCHEDULED
Status: DISCONTINUED | OUTPATIENT
Start: 2018-01-01 | End: 2018-01-01

## 2018-01-01 RX ORDER — GUAIFENESIN 600 MG
TABLET, EXTENDED RELEASE 12 HR ORAL
COMMUNITY
End: 2018-01-01

## 2018-01-01 RX ORDER — SODIUM CHLORIDE 9 MG/ML
60 INJECTION, SOLUTION INTRAVENOUS ONCE
Status: COMPLETED | OUTPATIENT
Start: 2018-01-01 | End: 2018-01-01

## 2018-01-01 RX ORDER — ONDANSETRON 2 MG/ML
4 INJECTION INTRAMUSCULAR; INTRAVENOUS EVERY 6 HOURS PRN
Status: DISCONTINUED | OUTPATIENT
Start: 2018-01-01 | End: 2018-01-01

## 2018-01-01 RX ORDER — SUCRALFATE ORAL 1 G/10ML
1000 SUSPENSION ORAL EVERY 6 HOURS SCHEDULED
Qty: 420 ML | Refills: 0 | Status: SHIPPED | OUTPATIENT
Start: 2018-01-01 | End: 2018-01-01 | Stop reason: HOSPADM

## 2018-01-01 RX ORDER — FUROSEMIDE 20 MG/1
20 TABLET ORAL EVERY OTHER DAY
Qty: 30 TABLET | Refills: 0 | Status: SHIPPED | OUTPATIENT
Start: 2018-01-01

## 2018-01-01 RX ORDER — OXYCODONE HYDROCHLORIDE 5 MG/1
2.5 TABLET ORAL EVERY 4 HOURS PRN
Status: DISCONTINUED | OUTPATIENT
Start: 2018-01-01 | End: 2018-01-01 | Stop reason: HOSPADM

## 2018-01-01 RX ORDER — CLOTRIMAZOLE 10 MG/1
10 LOZENGE ORAL; TOPICAL
Qty: 70 TABLET | Refills: 0 | Status: SHIPPED | OUTPATIENT
Start: 2018-01-01 | End: 2018-01-01

## 2018-01-01 RX ORDER — CYANOCOBALAMIN 1000 UG/ML
1000 INJECTION INTRAMUSCULAR; SUBCUTANEOUS
Qty: 1 ML | Refills: 0 | Status: SHIPPED | OUTPATIENT
Start: 2018-01-01 | End: 2018-01-01 | Stop reason: SDUPTHER

## 2018-01-01 RX ORDER — ASPIRIN 81 MG
1 TABLET, DELAYED RELEASE (ENTERIC COATED) ORAL DAILY
COMMUNITY
Start: 2015-07-21

## 2018-01-01 RX ORDER — OLANZAPINE 2.5 MG/1
2.5 TABLET ORAL
Status: DISCONTINUED | OUTPATIENT
Start: 2018-01-01 | End: 2018-01-01

## 2018-01-01 RX ORDER — LIDOCAINE 50 MG/G
1 PATCH TOPICAL DAILY
Status: DISCONTINUED | OUTPATIENT
Start: 2018-01-01 | End: 2018-01-01 | Stop reason: HOSPADM

## 2018-01-01 RX ORDER — OXYCODONE HYDROCHLORIDE 5 MG/1
5 TABLET ORAL EVERY 4 HOURS PRN
Status: DISCONTINUED | OUTPATIENT
Start: 2018-01-01 | End: 2018-01-01

## 2018-01-01 RX ORDER — SUCRALFATE 1 G/1
1 TABLET ORAL 4 TIMES DAILY
Qty: 90 TABLET | Refills: 0 | Status: SHIPPED | OUTPATIENT
Start: 2018-01-01

## 2018-01-01 RX ORDER — CYANOCOBALAMIN 1000 UG/ML
1000 INJECTION INTRAMUSCULAR; SUBCUTANEOUS ONCE
Status: DISCONTINUED | OUTPATIENT
Start: 2018-01-01 | End: 2018-01-01 | Stop reason: HOSPADM

## 2018-01-01 RX ORDER — ALBUMIN (HUMAN) 12.5 G/50ML
12.5 SOLUTION INTRAVENOUS ONCE
Status: COMPLETED | OUTPATIENT
Start: 2018-01-01 | End: 2018-01-01

## 2018-01-01 RX ORDER — DICYCLOMINE HYDROCHLORIDE 10 MG/1
CAPSULE ORAL
COMMUNITY
Start: 2017-09-13 | End: 2018-01-01 | Stop reason: SDUPTHER

## 2018-01-01 RX ORDER — OXYCODONE HYDROCHLORIDE 5 MG/1
TABLET ORAL
COMMUNITY
End: 2018-01-01

## 2018-01-01 RX ORDER — ACETAMINOPHEN 325 MG/1
650 TABLET ORAL ONCE
Status: DISCONTINUED | OUTPATIENT
Start: 2018-01-01 | End: 2018-01-01 | Stop reason: HOSPADM

## 2018-01-01 RX ORDER — SODIUM CHLORIDE 9 MG/ML
20 INJECTION, SOLUTION INTRAVENOUS CONTINUOUS
Status: DISCONTINUED | OUTPATIENT
Start: 2018-01-01 | End: 2018-01-01 | Stop reason: HOSPADM

## 2018-01-01 RX ORDER — ACETAMINOPHEN 325 MG/1
975 TABLET ORAL EVERY 8 HOURS
Status: DISCONTINUED | OUTPATIENT
Start: 2018-01-01 | End: 2018-01-01

## 2018-01-01 RX ORDER — SENNOSIDES 8.6 MG
1 TABLET ORAL
Status: DISCONTINUED | OUTPATIENT
Start: 2018-01-01 | End: 2018-01-01 | Stop reason: HOSPADM

## 2018-01-01 RX ORDER — SODIUM CHLORIDE 9 MG/ML
40 INJECTION, SOLUTION INTRAVENOUS ONCE
Status: COMPLETED | OUTPATIENT
Start: 2018-01-01 | End: 2018-01-01

## 2018-01-01 RX ORDER — ATORVASTATIN CALCIUM 20 MG/1
20 TABLET, FILM COATED ORAL
Status: DISCONTINUED | OUTPATIENT
Start: 2018-01-01 | End: 2018-01-01 | Stop reason: HOSPADM

## 2018-01-01 RX ORDER — PHYTONADIONE 5 MG/1
5 TABLET ORAL ONCE
Status: COMPLETED | OUTPATIENT
Start: 2018-01-01 | End: 2018-01-01

## 2018-01-01 RX ORDER — POTASSIUM CHLORIDE 750 MG/1
10 TABLET, FILM COATED, EXTENDED RELEASE ORAL
Status: ON HOLD | COMMUNITY
Start: 2018-01-01 | End: 2018-01-01

## 2018-01-01 RX ORDER — OMEPRAZOLE 40 MG/1
40 CAPSULE, DELAYED RELEASE ORAL DAILY
Qty: 90 CAPSULE | Refills: 1 | Status: SHIPPED | OUTPATIENT
Start: 2018-01-01 | End: 2018-01-01 | Stop reason: SDUPTHER

## 2018-01-01 RX ORDER — FUROSEMIDE 10 MG/ML
20 INJECTION INTRAMUSCULAR; INTRAVENOUS ONCE
Status: COMPLETED | OUTPATIENT
Start: 2018-01-01 | End: 2018-01-01

## 2018-01-01 RX ORDER — OXYCODONE HYDROCHLORIDE 5 MG/1
5 TABLET ORAL EVERY 12 HOURS PRN
Qty: 20 TABLET | Refills: 0 | Status: SHIPPED | OUTPATIENT
Start: 2018-01-01 | End: 2018-01-01 | Stop reason: SDUPTHER

## 2018-01-01 RX ORDER — PANTOPRAZOLE SODIUM 40 MG/1
40 TABLET, DELAYED RELEASE ORAL
Status: DISCONTINUED | OUTPATIENT
Start: 2018-01-01 | End: 2018-01-01

## 2018-01-01 RX ORDER — POLYETHYLENE GLYCOL 3350 17 G/17G
17 POWDER, FOR SOLUTION ORAL 2 TIMES DAILY
Status: DISCONTINUED | OUTPATIENT
Start: 2018-01-01 | End: 2018-01-01 | Stop reason: HOSPADM

## 2018-01-01 RX ORDER — BISACODYL 10 MG
SUPPOSITORY, RECTAL RECTAL
COMMUNITY

## 2018-01-01 RX ORDER — DRONABINOL 2.5 MG/1
5 CAPSULE ORAL
Status: DISCONTINUED | OUTPATIENT
Start: 2018-01-01 | End: 2018-01-01 | Stop reason: HOSPADM

## 2018-01-01 RX ORDER — ONDANSETRON 2 MG/ML
2 INJECTION INTRAMUSCULAR; INTRAVENOUS ONCE
Status: COMPLETED | OUTPATIENT
Start: 2018-01-01 | End: 2018-01-01

## 2018-01-01 RX ORDER — OXYCODONE HYDROCHLORIDE 5 MG/1
2.5 TABLET ORAL EVERY 4 HOURS PRN
Qty: 20 TABLET | Refills: 0 | Status: SHIPPED | OUTPATIENT
Start: 2018-01-01 | End: 2018-01-01

## 2018-01-01 RX ORDER — ALBUTEROL SULFATE 2.5 MG/3ML
SOLUTION RESPIRATORY (INHALATION)
Refills: 0 | COMMUNITY
Start: 2018-01-01

## 2018-01-01 RX ORDER — LEVOTHYROXINE SODIUM 88 UG/1
88 TABLET ORAL
Status: DISCONTINUED | OUTPATIENT
Start: 2018-01-01 | End: 2018-01-01 | Stop reason: HOSPADM

## 2018-01-01 RX ORDER — DICYCLOMINE HYDROCHLORIDE 10 MG/1
10 CAPSULE ORAL DAILY PRN
Status: DISCONTINUED | OUTPATIENT
Start: 2018-01-01 | End: 2018-01-01

## 2018-01-01 RX ORDER — CYANOCOBALAMIN 1000 UG/ML
1000 INJECTION INTRAMUSCULAR; SUBCUTANEOUS
Qty: 1 ML | Refills: 0 | Status: SHIPPED | OUTPATIENT
Start: 2018-01-01

## 2018-01-01 RX ORDER — MELATONIN
2000 DAILY
Status: DISCONTINUED | OUTPATIENT
Start: 2018-01-01 | End: 2018-01-01

## 2018-01-01 RX ORDER — LEVOTHYROXINE SODIUM 88 UG/1
88 TABLET ORAL
Status: DISCONTINUED | OUTPATIENT
Start: 2018-01-01 | End: 2018-01-01

## 2018-01-01 RX ORDER — ACETAMINOPHEN 325 MG/1
975 TABLET ORAL EVERY 8 HOURS SCHEDULED
Status: DISCONTINUED | OUTPATIENT
Start: 2018-01-01 | End: 2018-01-01 | Stop reason: SDUPTHER

## 2018-01-01 RX ORDER — SACCHAROMYCES BOULARDII 250 MG
250 CAPSULE ORAL 2 TIMES DAILY
Status: DISCONTINUED | OUTPATIENT
Start: 2018-01-01 | End: 2018-01-01 | Stop reason: HOSPADM

## 2018-01-01 RX ORDER — OXYCODONE HYDROCHLORIDE 5 MG/1
5 TABLET ORAL EVERY 12 HOURS PRN
Qty: 20 TABLET | Refills: 0 | Status: ON HOLD | OUTPATIENT
Start: 2018-01-01 | End: 2018-01-01

## 2018-01-01 RX ORDER — MECLIZINE HCL 12.5 MG/1
1 TABLET ORAL 3 TIMES DAILY PRN
COMMUNITY
Start: 2014-01-30 | End: 2018-01-01

## 2018-01-01 RX ORDER — OMEPRAZOLE 40 MG/1
40 CAPSULE, DELAYED RELEASE ORAL DAILY
Qty: 90 CAPSULE | Refills: 1 | Status: ON HOLD | OUTPATIENT
Start: 2018-01-01 | End: 2018-01-01

## 2018-01-01 RX ORDER — POTASSIUM CHLORIDE 750 MG/1
10 TABLET, EXTENDED RELEASE ORAL DAILY
Status: DISCONTINUED | OUTPATIENT
Start: 2018-01-01 | End: 2018-01-01

## 2018-01-01 RX ORDER — CALCIUM CARBONATE 500(1250)
1 TABLET ORAL
Status: DISCONTINUED | OUTPATIENT
Start: 2018-01-01 | End: 2018-01-01

## 2018-01-01 RX ORDER — LANOLIN ALCOHOL/MO/W.PET/CERES
3 CREAM (GRAM) TOPICAL
Status: DISCONTINUED | OUTPATIENT
Start: 2018-01-01 | End: 2018-01-01 | Stop reason: HOSPADM

## 2018-01-01 RX ORDER — ALUMINA, MAGNESIA, AND SIMETHICONE 2400; 2400; 240 MG/30ML; MG/30ML; MG/30ML
SUSPENSION ORAL
COMMUNITY
End: 2018-01-01

## 2018-01-01 RX ORDER — ONDANSETRON 2 MG/ML
4 INJECTION INTRAMUSCULAR; INTRAVENOUS EVERY 6 HOURS PRN
Status: DISCONTINUED | OUTPATIENT
Start: 2018-01-01 | End: 2018-01-01 | Stop reason: HOSPADM

## 2018-01-01 RX ORDER — OXYCODONE HCL 5 MG/5 ML
2.5 SOLUTION, ORAL ORAL EVERY 4 HOURS PRN
Status: DISCONTINUED | OUTPATIENT
Start: 2018-01-01 | End: 2018-01-01 | Stop reason: HOSPADM

## 2018-01-01 RX ORDER — WARFARIN SODIUM 2.5 MG/1
2.5 TABLET ORAL
Status: DISCONTINUED | OUTPATIENT
Start: 2018-01-01 | End: 2018-01-01

## 2018-01-01 RX ORDER — SENNOSIDES 8.6 MG
1 TABLET ORAL
Status: DISCONTINUED | OUTPATIENT
Start: 2018-01-01 | End: 2018-01-01

## 2018-01-01 RX ORDER — PHYTONADIONE 5 MG/1
2.5 TABLET ORAL DAILY
Status: DISCONTINUED | OUTPATIENT
Start: 2018-01-01 | End: 2018-01-01

## 2018-01-01 RX ORDER — ONDANSETRON 4 MG/1
4 TABLET, ORALLY DISINTEGRATING ORAL EVERY 8 HOURS SCHEDULED
Status: DISCONTINUED | OUTPATIENT
Start: 2018-01-01 | End: 2018-01-01

## 2018-01-01 RX ORDER — FUROSEMIDE 20 MG/1
20 TABLET ORAL EVERY OTHER DAY
Status: DISCONTINUED | OUTPATIENT
Start: 2018-01-01 | End: 2018-01-01

## 2018-01-01 RX ORDER — DOCUSATE SODIUM 100 MG/1
100 CAPSULE, LIQUID FILLED ORAL DAILY
Status: DISCONTINUED | OUTPATIENT
Start: 2018-01-01 | End: 2018-01-01

## 2018-01-01 RX ORDER — DRONABINOL 2.5 MG/1
2.5 CAPSULE ORAL
Qty: 20 CAPSULE | Refills: 0 | Status: SHIPPED | OUTPATIENT
Start: 2018-01-01 | End: 2018-01-01

## 2018-01-01 RX ORDER — POTASSIUM CHLORIDE AND SODIUM CHLORIDE 900; 300 MG/100ML; MG/100ML
100 INJECTION, SOLUTION INTRAVENOUS CONTINUOUS
Status: DISCONTINUED | OUTPATIENT
Start: 2018-01-01 | End: 2018-01-01

## 2018-01-01 RX ADMIN — DEXAMETHASONE 2 MG: 2 TABLET ORAL at 08:53

## 2018-01-01 RX ADMIN — VITAMIN D, TAB 1000IU (100/BT) 2000 UNITS: 25 TAB at 08:13

## 2018-01-01 RX ADMIN — FLUTICASONE PROPIONATE 1 SPRAY: 50 SPRAY, METERED NASAL at 10:04

## 2018-01-01 RX ADMIN — ACETAMINOPHEN 975 MG: 325 TABLET, FILM COATED ORAL at 17:48

## 2018-01-01 RX ADMIN — PANTOPRAZOLE SODIUM 40 MG: 40 TABLET, DELAYED RELEASE ORAL at 06:41

## 2018-01-01 RX ADMIN — PANTOPRAZOLE SODIUM 40 MG: 40 TABLET, DELAYED RELEASE ORAL at 18:09

## 2018-01-01 RX ADMIN — METOPROLOL TARTRATE 25 MG: 25 TABLET, FILM COATED ORAL at 21:04

## 2018-01-01 RX ADMIN — METOPROLOL TARTRATE 25 MG: 25 TABLET, FILM COATED ORAL at 22:19

## 2018-01-01 RX ADMIN — OLANZAPINE 2.5 MG: 2.5 TABLET, FILM COATED ORAL at 21:04

## 2018-01-01 RX ADMIN — LEVOTHYROXINE SODIUM 88 MCG: 88 TABLET ORAL at 06:17

## 2018-01-01 RX ADMIN — ACETAMINOPHEN 975 MG: 325 TABLET ORAL at 08:09

## 2018-01-01 RX ADMIN — DICLOFENAC 2 G: 10 GEL TOPICAL at 12:24

## 2018-01-01 RX ADMIN — OXYCODONE HYDROCHLORIDE 2.5 MG: 5 TABLET ORAL at 05:07

## 2018-01-01 RX ADMIN — METOPROLOL TARTRATE 25 MG: 25 TABLET, FILM COATED ORAL at 08:40

## 2018-01-01 RX ADMIN — MAGNESIUM SULFATE IN WATER 2 G: 40 INJECTION, SOLUTION INTRAVENOUS at 11:42

## 2018-01-01 RX ADMIN — FLUTICASONE PROPIONATE 1 SPRAY: 50 SPRAY, METERED NASAL at 08:08

## 2018-01-01 RX ADMIN — ONDANSETRON 4 MG: 2 INJECTION INTRAMUSCULAR; INTRAVENOUS at 16:57

## 2018-01-01 RX ADMIN — DICLOFENAC 2 G: 10 GEL TOPICAL at 22:19

## 2018-01-01 RX ADMIN — SUCRALFATE 1000 MG: 1 SUSPENSION ORAL at 17:48

## 2018-01-01 RX ADMIN — ONDANSETRON 4 MG: 2 INJECTION INTRAMUSCULAR; INTRAVENOUS at 08:36

## 2018-01-01 RX ADMIN — DOCUSATE SODIUM 100 MG: 100 CAPSULE, LIQUID FILLED ORAL at 08:13

## 2018-01-01 RX ADMIN — METOPROLOL TARTRATE 25 MG: 25 TABLET, FILM COATED ORAL at 20:56

## 2018-01-01 RX ADMIN — POLYETHYLENE GLYCOL 3350 17 G: 17 POWDER, FOR SOLUTION ORAL at 12:09

## 2018-01-01 RX ADMIN — ACETAMINOPHEN 975 MG: 325 TABLET, FILM COATED ORAL at 16:11

## 2018-01-01 RX ADMIN — PANTOPRAZOLE SODIUM 40 MG: 40 TABLET, DELAYED RELEASE ORAL at 17:42

## 2018-01-01 RX ADMIN — POLYETHYLENE GLYCOL 3350 17 G: 17 POWDER, FOR SOLUTION ORAL at 08:06

## 2018-01-01 RX ADMIN — FLUTICASONE PROPIONATE 1 SPRAY: 50 SPRAY, METERED NASAL at 09:39

## 2018-01-01 RX ADMIN — SODIUM CHLORIDE 75 ML/HR: 0.9 INJECTION, SOLUTION INTRAVENOUS at 05:42

## 2018-01-01 RX ADMIN — SUCRALFATE 1 G: 1 TABLET ORAL at 08:39

## 2018-01-01 RX ADMIN — FLUTICASONE PROPIONATE 1 SPRAY: 50 SPRAY, METERED NASAL at 08:44

## 2018-01-01 RX ADMIN — METOPROLOL TARTRATE 25 MG: 25 TABLET, FILM COATED ORAL at 21:00

## 2018-01-01 RX ADMIN — ONDANSETRON 4 MG: 4 TABLET, ORALLY DISINTEGRATING ORAL at 13:46

## 2018-01-01 RX ADMIN — DRONABINOL 2.5 MG: 2.5 CAPSULE ORAL at 17:42

## 2018-01-01 RX ADMIN — METOPROLOL TARTRATE 25 MG: 25 TABLET, FILM COATED ORAL at 10:07

## 2018-01-01 RX ADMIN — POTASSIUM CHLORIDE AND SODIUM CHLORIDE 100 ML/HR: 900; 300 INJECTION, SOLUTION INTRAVENOUS at 10:45

## 2018-01-01 RX ADMIN — DEXAMETHASONE 2 MG: 2 TABLET ORAL at 10:26

## 2018-01-01 RX ADMIN — ACETAMINOPHEN 975 MG: 325 TABLET, FILM COATED ORAL at 15:12

## 2018-01-01 RX ADMIN — DICLOFENAC 2 G: 10 GEL TOPICAL at 08:37

## 2018-01-01 RX ADMIN — ONDANSETRON 4 MG: 4 TABLET, ORALLY DISINTEGRATING ORAL at 12:24

## 2018-01-01 RX ADMIN — POLYETHYLENE GLYCOL 3350 17 G: 17 POWDER, FOR SOLUTION ORAL at 09:52

## 2018-01-01 RX ADMIN — SUCRALFATE 1000 MG: 1 SUSPENSION ORAL at 06:11

## 2018-01-01 RX ADMIN — Medication 250 MG: at 09:51

## 2018-01-01 RX ADMIN — WARFARIN SODIUM 1.25 MG: 2.5 TABLET ORAL at 17:14

## 2018-01-01 RX ADMIN — ACETAMINOPHEN 975 MG: 325 TABLET, FILM COATED ORAL at 21:04

## 2018-01-01 RX ADMIN — DICLOFENAC 2 G: 10 GEL TOPICAL at 10:27

## 2018-01-01 RX ADMIN — ATORVASTATIN CALCIUM 20 MG: 20 TABLET, FILM COATED ORAL at 18:57

## 2018-01-01 RX ADMIN — CYANOCOBALAMIN 1000 MCG: 1000 INJECTION, SOLUTION INTRAMUSCULAR at 13:08

## 2018-01-01 RX ADMIN — LIDOCAINE 1 PATCH: 50 PATCH CUTANEOUS at 08:14

## 2018-01-01 RX ADMIN — ACETAMINOPHEN 975 MG: 325 TABLET ORAL at 17:02

## 2018-01-01 RX ADMIN — SUCRALFATE 1000 MG: 1 SUSPENSION ORAL at 18:09

## 2018-01-01 RX ADMIN — METOPROLOL TARTRATE 25 MG: 25 TABLET, FILM COATED ORAL at 09:57

## 2018-01-01 RX ADMIN — WARFARIN SODIUM 3 MG: 3 TABLET ORAL at 17:22

## 2018-01-01 RX ADMIN — DICLOFENAC 2 G: 10 GEL TOPICAL at 22:45

## 2018-01-01 RX ADMIN — HYDROMORPHONE HYDROCHLORIDE 0.2 MG: 1 INJECTION, SOLUTION INTRAMUSCULAR; INTRAVENOUS; SUBCUTANEOUS at 09:54

## 2018-01-01 RX ADMIN — PANTOPRAZOLE SODIUM 20 MG: 20 TABLET, DELAYED RELEASE ORAL at 05:21

## 2018-01-01 RX ADMIN — PHYTONADIONE 2.5 MG: 5 TABLET ORAL at 11:45

## 2018-01-01 RX ADMIN — Medication 250 MG: at 08:04

## 2018-01-01 RX ADMIN — LIDOCAINE 1 PATCH: 50 PATCH CUTANEOUS at 09:56

## 2018-01-01 RX ADMIN — HYDROMORPHONE HYDROCHLORIDE 0.2 MG: 1 INJECTION, SOLUTION INTRAMUSCULAR; INTRAVENOUS; SUBCUTANEOUS at 11:12

## 2018-01-01 RX ADMIN — POTASSIUM CHLORIDE 10 MEQ: 750 TABLET, EXTENDED RELEASE ORAL at 08:47

## 2018-01-01 RX ADMIN — OLANZAPINE 2.5 MG: 2.5 TABLET, FILM COATED ORAL at 22:20

## 2018-01-01 RX ADMIN — OXYCODONE HYDROCHLORIDE 2.5 MG: 5 TABLET ORAL at 08:56

## 2018-01-01 RX ADMIN — SUCRALFATE 1 G: 1 TABLET ORAL at 12:10

## 2018-01-01 RX ADMIN — LEVOTHYROXINE SODIUM 88 MCG: 88 TABLET ORAL at 06:14

## 2018-01-01 RX ADMIN — LIDOCAINE 1 PATCH: 50 PATCH CUTANEOUS at 10:26

## 2018-01-01 RX ADMIN — FLUTICASONE PROPIONATE 1 SPRAY: 50 SPRAY, METERED NASAL at 08:52

## 2018-01-01 RX ADMIN — ACETAMINOPHEN 975 MG: 325 TABLET, FILM COATED ORAL at 21:43

## 2018-01-01 RX ADMIN — LEVOTHYROXINE SODIUM 88 MCG: 88 TABLET ORAL at 08:21

## 2018-01-01 RX ADMIN — ACETAMINOPHEN 975 MG: 325 TABLET, FILM COATED ORAL at 13:46

## 2018-01-01 RX ADMIN — ACETAMINOPHEN 975 MG: 325 TABLET ORAL at 17:56

## 2018-01-01 RX ADMIN — SUCRALFATE 1 G: 1 TABLET ORAL at 21:39

## 2018-01-01 RX ADMIN — OXYCODONE HYDROCHLORIDE 2.5 MG: 5 SOLUTION ORAL at 21:27

## 2018-01-01 RX ADMIN — ONDANSETRON 2 MG: 2 INJECTION INTRAMUSCULAR; INTRAVENOUS at 18:58

## 2018-01-01 RX ADMIN — FLUTICASONE PROPIONATE 1 SPRAY: 50 SPRAY, METERED NASAL at 08:15

## 2018-01-01 RX ADMIN — LIDOCAINE 1 PATCH: 50 PATCH CUTANEOUS at 08:52

## 2018-01-01 RX ADMIN — PANTOPRAZOLE SODIUM 40 MG: 40 TABLET, DELAYED RELEASE ORAL at 06:01

## 2018-01-01 RX ADMIN — FUROSEMIDE 20 MG: 20 TABLET ORAL at 10:26

## 2018-01-01 RX ADMIN — POTASSIUM CHLORIDE 20 MEQ: 1500 TABLET, EXTENDED RELEASE ORAL at 08:14

## 2018-01-01 RX ADMIN — ACETAMINOPHEN 325 MG: 325 TABLET, FILM COATED ORAL at 12:10

## 2018-01-01 RX ADMIN — SODIUM CHLORIDE 60 ML/HR: 0.9 INJECTION, SOLUTION INTRAVENOUS at 03:23

## 2018-01-01 RX ADMIN — ACETAMINOPHEN 975 MG: 325 TABLET, FILM COATED ORAL at 21:03

## 2018-01-01 RX ADMIN — ACETAMINOPHEN 975 MG: 325 TABLET, FILM COATED ORAL at 14:35

## 2018-01-01 RX ADMIN — ACETAMINOPHEN 975 MG: 325 TABLET, FILM COATED ORAL at 21:11

## 2018-01-01 RX ADMIN — ACETAMINOPHEN 975 MG: 325 TABLET, FILM COATED ORAL at 06:32

## 2018-01-01 RX ADMIN — ATORVASTATIN CALCIUM 20 MG: 20 TABLET, FILM COATED ORAL at 17:22

## 2018-01-01 RX ADMIN — SUCRALFATE 1000 MG: 1 SUSPENSION ORAL at 05:41

## 2018-01-01 RX ADMIN — FLUTICASONE PROPIONATE 1 SPRAY: 50 SPRAY, METERED NASAL at 08:25

## 2018-01-01 RX ADMIN — OXYCODONE HYDROCHLORIDE AND ACETAMINOPHEN 1 TABLET: 5; 325 TABLET ORAL at 19:20

## 2018-01-01 RX ADMIN — DICLOFENAC 2 G: 10 GEL TOPICAL at 21:07

## 2018-01-01 RX ADMIN — SUCRALFATE 1000 MG: 1 SUSPENSION ORAL at 01:44

## 2018-01-01 RX ADMIN — POTASSIUM CHLORIDE 20 MEQ: 1500 TABLET, EXTENDED RELEASE ORAL at 08:02

## 2018-01-01 RX ADMIN — WARFARIN SODIUM 1.25 MG: 2.5 TABLET ORAL at 17:13

## 2018-01-01 RX ADMIN — FUROSEMIDE 20 MG: 10 INJECTION, SOLUTION INTRAMUSCULAR; INTRAVENOUS at 09:54

## 2018-01-01 RX ADMIN — ATORVASTATIN CALCIUM 20 MG: 20 TABLET, FILM COATED ORAL at 15:48

## 2018-01-01 RX ADMIN — PANTOPRAZOLE SODIUM 40 MG: 40 TABLET, DELAYED RELEASE ORAL at 05:25

## 2018-01-01 RX ADMIN — LEVOTHYROXINE SODIUM 88 MCG: 88 TABLET ORAL at 06:13

## 2018-01-01 RX ADMIN — METOPROLOL TARTRATE 25 MG: 25 TABLET, FILM COATED ORAL at 08:53

## 2018-01-01 RX ADMIN — METOPROLOL TARTRATE 25 MG: 25 TABLET, FILM COATED ORAL at 00:24

## 2018-01-01 RX ADMIN — ACETAMINOPHEN 975 MG: 325 TABLET, FILM COATED ORAL at 05:21

## 2018-01-01 RX ADMIN — METOPROLOL TARTRATE 25 MG: 25 TABLET, FILM COATED ORAL at 21:31

## 2018-01-01 RX ADMIN — FUROSEMIDE 20 MG: 20 TABLET ORAL at 09:37

## 2018-01-01 RX ADMIN — ONDANSETRON 4 MG: 4 TABLET, ORALLY DISINTEGRATING ORAL at 17:22

## 2018-01-01 RX ADMIN — POLYETHYLENE GLYCOL 3350 17 G: 17 POWDER, FOR SOLUTION ORAL at 08:02

## 2018-01-01 RX ADMIN — WARFARIN SODIUM 3 MG: 3 TABLET ORAL at 17:59

## 2018-01-01 RX ADMIN — METOPROLOL TARTRATE 25 MG: 25 TABLET, FILM COATED ORAL at 21:40

## 2018-01-01 RX ADMIN — PANTOPRAZOLE SODIUM 40 MG: 40 TABLET, DELAYED RELEASE ORAL at 17:45

## 2018-01-01 RX ADMIN — SODIUM CHLORIDE 20 ML/HR: 0.9 INJECTION, SOLUTION INTRAVENOUS at 14:05

## 2018-01-01 RX ADMIN — SODIUM CHLORIDE 40 ML/HR: 0.9 INJECTION, SOLUTION INTRAVENOUS at 14:50

## 2018-01-01 RX ADMIN — SODIUM CHLORIDE 75 ML/HR: 0.9 INJECTION, SOLUTION INTRAVENOUS at 18:35

## 2018-01-01 RX ADMIN — OXYCODONE HYDROCHLORIDE 5 MG: 5 TABLET ORAL at 09:16

## 2018-01-01 RX ADMIN — LIDOCAINE 1 PATCH: 50 PATCH CUTANEOUS at 09:13

## 2018-01-01 RX ADMIN — ONDANSETRON 4 MG: 2 INJECTION INTRAMUSCULAR; INTRAVENOUS at 18:27

## 2018-01-01 RX ADMIN — DRONABINOL 2.5 MG: 2.5 CAPSULE ORAL at 08:39

## 2018-01-01 RX ADMIN — LEVOTHYROXINE SODIUM 88 MCG: 88 TABLET ORAL at 06:12

## 2018-01-01 RX ADMIN — SUCRALFATE 1 G: 1 TABLET ORAL at 17:42

## 2018-01-01 RX ADMIN — LEVOTHYROXINE SODIUM 88 MCG: 88 TABLET ORAL at 06:01

## 2018-01-01 RX ADMIN — CYANOCOBALAMIN 1000 MCG: 1000 INJECTION, SOLUTION INTRAMUSCULAR at 14:20

## 2018-01-01 RX ADMIN — METOPROLOL TARTRATE 25 MG: 25 TABLET, FILM COATED ORAL at 09:37

## 2018-01-01 RX ADMIN — ATORVASTATIN CALCIUM 20 MG: 20 TABLET, FILM COATED ORAL at 17:42

## 2018-01-01 RX ADMIN — MELATONIN TAB 3 MG 3 MG: 3 TAB at 22:23

## 2018-01-01 RX ADMIN — DRONABINOL 5 MG: 2.5 CAPSULE ORAL at 16:44

## 2018-01-01 RX ADMIN — SODIUM CHLORIDE 100 ML/HR: 0.9 INJECTION, SOLUTION INTRAVENOUS at 16:33

## 2018-01-01 RX ADMIN — POTASSIUM CHLORIDE AND SODIUM CHLORIDE 100 ML/HR: 900; 300 INJECTION, SOLUTION INTRAVENOUS at 09:01

## 2018-01-01 RX ADMIN — FLUTICASONE PROPIONATE 1 SPRAY: 50 SPRAY, METERED NASAL at 08:37

## 2018-01-01 RX ADMIN — DICLOFENAC 2 G: 10 GEL TOPICAL at 11:28

## 2018-01-01 RX ADMIN — DEXAMETHASONE 2 MG: 2 TABLET ORAL at 08:48

## 2018-01-01 RX ADMIN — ALBUMIN HUMAN 12.5 G: 0.25 SOLUTION INTRAVENOUS at 22:28

## 2018-01-01 RX ADMIN — FLUTICASONE PROPIONATE 1 SPRAY: 50 SPRAY, METERED NASAL at 09:56

## 2018-01-01 RX ADMIN — ACETAMINOPHEN 975 MG: 325 TABLET, FILM COATED ORAL at 22:19

## 2018-01-01 RX ADMIN — METOPROLOL TARTRATE 25 MG: 25 TABLET, FILM COATED ORAL at 08:04

## 2018-01-01 RX ADMIN — DRONABINOL 2.5 MG: 2.5 CAPSULE ORAL at 06:13

## 2018-01-01 RX ADMIN — LIDOCAINE 1 PATCH: 50 PATCH CUTANEOUS at 09:38

## 2018-01-01 RX ADMIN — POTASSIUM CHLORIDE 20 MEQ: 1500 TABLET, EXTENDED RELEASE ORAL at 10:26

## 2018-01-01 RX ADMIN — ATORVASTATIN CALCIUM 20 MG: 20 TABLET, FILM COATED ORAL at 18:09

## 2018-01-01 RX ADMIN — OXYCODONE HYDROCHLORIDE 5 MG: 5 TABLET ORAL at 09:00

## 2018-01-01 RX ADMIN — DICLOFENAC 2 G: 10 GEL TOPICAL at 19:15

## 2018-01-01 RX ADMIN — DRONABINOL 2.5 MG: 2.5 CAPSULE ORAL at 06:18

## 2018-01-01 RX ADMIN — SUCRALFATE 1000 MG: 1 SUSPENSION ORAL at 09:04

## 2018-01-01 RX ADMIN — Medication 250 MG: at 18:09

## 2018-01-01 RX ADMIN — DOCUSATE SODIUM 100 MG: 100 CAPSULE, LIQUID FILLED ORAL at 08:47

## 2018-01-01 RX ADMIN — SUCRALFATE 1 G: 1 TABLET ORAL at 17:57

## 2018-01-01 RX ADMIN — PANTOPRAZOLE SODIUM 40 MG: 40 TABLET, DELAYED RELEASE ORAL at 06:14

## 2018-01-01 RX ADMIN — OXYCODONE HYDROCHLORIDE 5 MG: 5 TABLET ORAL at 18:37

## 2018-01-01 RX ADMIN — LEVOTHYROXINE SODIUM 88 MCG: 88 TABLET ORAL at 05:41

## 2018-01-01 RX ADMIN — ACETAMINOPHEN 975 MG: 325 TABLET, FILM COATED ORAL at 21:00

## 2018-01-01 RX ADMIN — POLYETHYLENE GLYCOL 3350 17 G: 17 POWDER, FOR SOLUTION ORAL at 18:00

## 2018-01-01 RX ADMIN — CYANOCOBALAMIN 1000 MCG: 1000 INJECTION, SOLUTION INTRAMUSCULAR at 13:02

## 2018-01-01 RX ADMIN — LEVOTHYROXINE SODIUM 88 MCG: 88 TABLET ORAL at 05:08

## 2018-01-01 RX ADMIN — SUCRALFATE 1000 MG: 1 SUSPENSION ORAL at 18:57

## 2018-01-01 RX ADMIN — METOPROLOL TARTRATE 25 MG: 25 TABLET, FILM COATED ORAL at 09:15

## 2018-01-01 RX ADMIN — ACETAMINOPHEN 975 MG: 325 TABLET, FILM COATED ORAL at 21:40

## 2018-01-01 RX ADMIN — ATORVASTATIN CALCIUM 20 MG: 20 TABLET, FILM COATED ORAL at 16:16

## 2018-01-01 RX ADMIN — LIDOCAINE 1 PATCH: 50 PATCH CUTANEOUS at 10:06

## 2018-01-01 RX ADMIN — PANTOPRAZOLE SODIUM 20 MG: 20 TABLET, DELAYED RELEASE ORAL at 06:33

## 2018-01-01 RX ADMIN — ONDANSETRON 4 MG: 4 TABLET, ORALLY DISINTEGRATING ORAL at 12:42

## 2018-01-01 RX ADMIN — SODIUM CHLORIDE 1000 ML: 0.9 INJECTION, SOLUTION INTRAVENOUS at 15:48

## 2018-01-01 RX ADMIN — OLANZAPINE 2.5 MG: 2.5 TABLET, FILM COATED ORAL at 22:07

## 2018-01-01 RX ADMIN — METOPROLOL TARTRATE 25 MG: 25 TABLET, FILM COATED ORAL at 21:43

## 2018-01-01 RX ADMIN — METOPROLOL TARTRATE 25 MG: 25 TABLET, FILM COATED ORAL at 08:16

## 2018-01-01 RX ADMIN — LIDOCAINE 1 PATCH: 50 PATCH CUTANEOUS at 08:07

## 2018-01-01 RX ADMIN — ACETAMINOPHEN 975 MG: 325 TABLET ORAL at 08:46

## 2018-01-01 RX ADMIN — CEFAZOLIN SODIUM 1000 MG: 1 SOLUTION INTRAVENOUS at 12:58

## 2018-01-01 RX ADMIN — ATORVASTATIN CALCIUM 20 MG: 20 TABLET, FILM COATED ORAL at 17:03

## 2018-01-01 RX ADMIN — DICLOFENAC 2 G: 10 GEL TOPICAL at 22:20

## 2018-01-01 RX ADMIN — OXYCODONE HYDROCHLORIDE 2.5 MG: 5 TABLET ORAL at 08:50

## 2018-01-01 RX ADMIN — METOPROLOL TARTRATE 25 MG: 25 TABLET, FILM COATED ORAL at 08:47

## 2018-01-01 RX ADMIN — DICLOFENAC 2 G: 10 GEL TOPICAL at 08:24

## 2018-01-01 RX ADMIN — FERUMOXYTOL 510 MG: 510 INJECTION INTRAVENOUS at 14:40

## 2018-01-01 RX ADMIN — POLYETHYLENE GLYCOL 3350 17 G: 17 POWDER, FOR SOLUTION ORAL at 08:54

## 2018-01-01 RX ADMIN — OXYCODONE HYDROCHLORIDE 2.5 MG: 5 TABLET ORAL at 18:10

## 2018-01-01 RX ADMIN — LEVOTHYROXINE SODIUM 88 MCG: 88 TABLET ORAL at 06:33

## 2018-01-01 RX ADMIN — ACETAMINOPHEN 975 MG: 325 TABLET, FILM COATED ORAL at 08:20

## 2018-01-01 RX ADMIN — ONDANSETRON 4 MG: 4 TABLET, ORALLY DISINTEGRATING ORAL at 21:00

## 2018-01-01 RX ADMIN — SODIUM CHLORIDE 60 ML/HR: 0.9 INJECTION, SOLUTION INTRAVENOUS at 15:20

## 2018-01-01 RX ADMIN — SODIUM CHLORIDE 100 ML/HR: 0.9 INJECTION, SOLUTION INTRAVENOUS at 06:30

## 2018-01-01 RX ADMIN — DICLOFENAC 2 G: 10 GEL TOPICAL at 21:00

## 2018-01-01 RX ADMIN — LEVOTHYROXINE SODIUM 88 MCG: 88 TABLET ORAL at 05:38

## 2018-01-01 RX ADMIN — DICLOFENAC 2 G: 10 GEL TOPICAL at 21:14

## 2018-01-01 RX ADMIN — DRONABINOL 2.5 MG: 2.5 CAPSULE ORAL at 17:57

## 2018-01-01 RX ADMIN — SUCRALFATE 1000 MG: 1 SUSPENSION ORAL at 12:54

## 2018-01-01 RX ADMIN — WARFARIN SODIUM 2.5 MG: 2.5 TABLET ORAL at 18:57

## 2018-01-01 RX ADMIN — SUCRALFATE 1000 MG: 1 SUSPENSION ORAL at 17:13

## 2018-01-01 RX ADMIN — LIDOCAINE 1 PATCH: 50 PATCH CUTANEOUS at 08:56

## 2018-01-01 RX ADMIN — ONDANSETRON 4 MG: 4 TABLET, ORALLY DISINTEGRATING ORAL at 13:20

## 2018-01-01 RX ADMIN — DOCUSATE SODIUM 100 MG: 100 CAPSULE, LIQUID FILLED ORAL at 08:44

## 2018-01-01 RX ADMIN — SUCRALFATE 1000 MG: 1 SUSPENSION ORAL at 17:12

## 2018-01-01 RX ADMIN — OXYCODONE HYDROCHLORIDE 5 MG: 5 TABLET ORAL at 00:24

## 2018-01-01 RX ADMIN — ACETAMINOPHEN 975 MG: 325 TABLET, FILM COATED ORAL at 05:44

## 2018-01-01 RX ADMIN — LEVOTHYROXINE SODIUM 88 MCG: 88 TABLET ORAL at 05:21

## 2018-01-01 RX ADMIN — SODIUM CHLORIDE 60 ML/HR: 0.9 INJECTION, SOLUTION INTRAVENOUS at 22:44

## 2018-01-01 RX ADMIN — ACETAMINOPHEN 975 MG: 325 TABLET ORAL at 08:39

## 2018-01-01 RX ADMIN — CEFUROXIME AXETIL 250 MG: 250 TABLET ORAL at 08:37

## 2018-01-01 RX ADMIN — DICLOFENAC 2 G: 10 GEL TOPICAL at 17:10

## 2018-01-01 RX ADMIN — METOPROLOL TARTRATE 25 MG: 25 TABLET, FILM COATED ORAL at 08:48

## 2018-01-01 RX ADMIN — ACETAMINOPHEN 975 MG: 325 TABLET ORAL at 23:03

## 2018-01-01 RX ADMIN — OXYCODONE HYDROCHLORIDE 2.5 MG: 5 TABLET ORAL at 11:11

## 2018-01-01 RX ADMIN — SODIUM CHLORIDE 75 ML/HR: 0.9 INJECTION, SOLUTION INTRAVENOUS at 13:47

## 2018-01-01 RX ADMIN — OXYCODONE HYDROCHLORIDE 2.5 MG: 5 TABLET ORAL at 15:38

## 2018-01-01 RX ADMIN — DRONABINOL 2.5 MG: 2.5 CAPSULE ORAL at 21:07

## 2018-01-01 RX ADMIN — ONDANSETRON 4 MG: 4 TABLET, ORALLY DISINTEGRATING ORAL at 17:11

## 2018-01-01 RX ADMIN — SUCRALFATE 1 G: 1 TABLET ORAL at 08:13

## 2018-01-01 RX ADMIN — ACETAMINOPHEN 650 MG: 325 TABLET, FILM COATED ORAL at 18:46

## 2018-01-01 RX ADMIN — SUCRALFATE 1000 MG: 1 SUSPENSION ORAL at 12:21

## 2018-01-01 RX ADMIN — SENNOSIDES 8.6 MG: 8.6 TABLET, FILM COATED ORAL at 21:04

## 2018-01-01 RX ADMIN — SUCRALFATE 1 G: 1 TABLET ORAL at 21:56

## 2018-01-01 RX ADMIN — ACETAMINOPHEN 975 MG: 325 TABLET, FILM COATED ORAL at 06:14

## 2018-01-01 RX ADMIN — DEXAMETHASONE 2 MG: 2 TABLET ORAL at 09:51

## 2018-01-01 RX ADMIN — POTASSIUM CHLORIDE 10 MEQ: 750 TABLET, EXTENDED RELEASE ORAL at 09:56

## 2018-01-01 RX ADMIN — PANTOPRAZOLE SODIUM 40 MG: 40 TABLET, DELAYED RELEASE ORAL at 05:38

## 2018-01-01 RX ADMIN — ONDANSETRON 4 MG: 2 INJECTION INTRAMUSCULAR; INTRAVENOUS at 08:07

## 2018-01-01 RX ADMIN — FLUTICASONE PROPIONATE 1 SPRAY: 50 SPRAY, METERED NASAL at 10:27

## 2018-01-01 RX ADMIN — OXYCODONE HYDROCHLORIDE 2.5 MG: 5 TABLET ORAL at 14:20

## 2018-01-01 RX ADMIN — DICLOFENAC 2 G: 10 GEL TOPICAL at 13:16

## 2018-01-01 RX ADMIN — ATORVASTATIN CALCIUM 20 MG: 20 TABLET, FILM COATED ORAL at 19:09

## 2018-01-01 RX ADMIN — ACETAMINOPHEN 975 MG: 325 TABLET, FILM COATED ORAL at 13:39

## 2018-01-01 RX ADMIN — LEVOTHYROXINE SODIUM 88 MCG: 88 TABLET ORAL at 06:24

## 2018-01-01 RX ADMIN — POLYETHYLENE GLYCOL 3350 17 G: 17 POWDER, FOR SOLUTION ORAL at 18:09

## 2018-01-01 RX ADMIN — ACETAMINOPHEN 975 MG: 325 TABLET ORAL at 01:17

## 2018-01-01 RX ADMIN — DICLOFENAC 2 G: 10 GEL TOPICAL at 13:43

## 2018-01-01 RX ADMIN — VITAMIN D, TAB 1000IU (100/BT) 2000 UNITS: 25 TAB at 08:39

## 2018-01-01 RX ADMIN — POTASSIUM CHLORIDE 20 MEQ: 1500 TABLET, EXTENDED RELEASE ORAL at 09:37

## 2018-01-01 RX ADMIN — LIDOCAINE 1 PATCH: 50 PATCH CUTANEOUS at 09:52

## 2018-01-01 RX ADMIN — SODIUM CHLORIDE 60 ML/HR: 0.9 INJECTION, SOLUTION INTRAVENOUS at 08:06

## 2018-01-01 RX ADMIN — SODIUM CHLORIDE 60 ML/HR: 0.9 INJECTION, SOLUTION INTRAVENOUS at 17:03

## 2018-01-01 RX ADMIN — SUCRALFATE 1000 MG: 1 SUSPENSION ORAL at 23:55

## 2018-01-01 RX ADMIN — Medication 250 MG: at 17:59

## 2018-01-01 RX ADMIN — CIPROFLOXACIN 400 MG: 2 INJECTION, SOLUTION INTRAVENOUS at 17:13

## 2018-01-01 RX ADMIN — POTASSIUM CHLORIDE 10 MEQ: 750 TABLET, EXTENDED RELEASE ORAL at 09:15

## 2018-01-01 RX ADMIN — SUCRALFATE 1000 MG: 1 SUSPENSION ORAL at 13:16

## 2018-01-01 RX ADMIN — ACETAMINOPHEN 975 MG: 325 TABLET, FILM COATED ORAL at 22:44

## 2018-01-01 RX ADMIN — METOPROLOL TARTRATE 25 MG: 25 TABLET, FILM COATED ORAL at 21:11

## 2018-01-01 RX ADMIN — METOPROLOL TARTRATE 25 MG: 25 TABLET, FILM COATED ORAL at 08:36

## 2018-01-01 RX ADMIN — METOPROLOL TARTRATE 25 MG: 25 TABLET, FILM COATED ORAL at 08:14

## 2018-01-01 RX ADMIN — ATORVASTATIN CALCIUM 20 MG: 20 TABLET, FILM COATED ORAL at 16:48

## 2018-01-01 RX ADMIN — ACETAMINOPHEN 975 MG: 325 TABLET ORAL at 09:16

## 2018-01-01 RX ADMIN — FLUTICASONE PROPIONATE 1 SPRAY: 50 SPRAY, METERED NASAL at 17:04

## 2018-01-01 RX ADMIN — DICLOFENAC 2 G: 10 GEL TOPICAL at 17:17

## 2018-01-01 RX ADMIN — OXYCODONE HYDROCHLORIDE 2.5 MG: 5 TABLET ORAL at 17:08

## 2018-01-01 RX ADMIN — DRONABINOL 2.5 MG: 2.5 CAPSULE ORAL at 06:03

## 2018-01-01 RX ADMIN — SUCRALFATE 1 G: 1 TABLET ORAL at 11:44

## 2018-01-01 RX ADMIN — SUCRALFATE 1000 MG: 1 SUSPENSION ORAL at 17:59

## 2018-01-01 RX ADMIN — FLUTICASONE PROPIONATE 1 SPRAY: 50 SPRAY, METERED NASAL at 08:59

## 2018-01-01 RX ADMIN — ATORVASTATIN CALCIUM 20 MG: 20 TABLET, FILM COATED ORAL at 17:12

## 2018-01-01 RX ADMIN — ATORVASTATIN CALCIUM 20 MG: 20 TABLET, FILM COATED ORAL at 17:57

## 2018-01-01 RX ADMIN — ACETAMINOPHEN 975 MG: 325 TABLET ORAL at 23:28

## 2018-01-01 RX ADMIN — DOCUSATE SODIUM 100 MG: 100 CAPSULE, LIQUID FILLED ORAL at 09:56

## 2018-01-01 RX ADMIN — SUCRALFATE 1 G: 1 TABLET ORAL at 21:31

## 2018-01-01 RX ADMIN — ONDANSETRON 4 MG: 2 INJECTION INTRAMUSCULAR; INTRAVENOUS at 17:41

## 2018-01-01 RX ADMIN — ACETAMINOPHEN 975 MG: 325 TABLET ORAL at 23:02

## 2018-01-01 RX ADMIN — METOPROLOL TARTRATE 25 MG: 25 TABLET, FILM COATED ORAL at 22:44

## 2018-01-01 RX ADMIN — LIDOCAINE 1 PATCH: 50 PATCH CUTANEOUS at 08:48

## 2018-01-01 RX ADMIN — OXYCODONE HYDROCHLORIDE 2.5 MG: 5 TABLET ORAL at 20:22

## 2018-01-01 RX ADMIN — ACETAMINOPHEN 975 MG: 325 TABLET, FILM COATED ORAL at 05:41

## 2018-01-01 RX ADMIN — METOPROLOL TARTRATE 25 MG: 25 TABLET, FILM COATED ORAL at 22:07

## 2018-01-01 RX ADMIN — SUCRALFATE 1 G: 1 TABLET ORAL at 17:04

## 2018-01-01 RX ADMIN — PANTOPRAZOLE SODIUM 40 MG: 40 TABLET, DELAYED RELEASE ORAL at 15:48

## 2018-01-01 RX ADMIN — PANTOPRAZOLE SODIUM 40 MG: 40 TABLET, DELAYED RELEASE ORAL at 06:18

## 2018-01-01 RX ADMIN — ONDANSETRON 4 MG: 2 INJECTION INTRAMUSCULAR; INTRAVENOUS at 14:43

## 2018-01-01 RX ADMIN — PANTOPRAZOLE SODIUM 40 MG: 40 TABLET, DELAYED RELEASE ORAL at 16:48

## 2018-01-01 RX ADMIN — LIDOCAINE 1 PATCH: 50 PATCH CUTANEOUS at 08:43

## 2018-01-01 RX ADMIN — DEXAMETHASONE 2 MG: 2 TABLET ORAL at 09:37

## 2018-01-01 RX ADMIN — ACETAMINOPHEN 975 MG: 325 TABLET, FILM COATED ORAL at 22:07

## 2018-01-01 RX ADMIN — DICYCLOMINE HYDROCHLORIDE 10 MG: 10 CAPSULE ORAL at 08:07

## 2018-01-01 RX ADMIN — FUROSEMIDE 20 MG: 20 TABLET ORAL at 08:14

## 2018-01-01 RX ADMIN — CYANOCOBALAMIN 1000 MCG: 1000 INJECTION, SOLUTION INTRAMUSCULAR at 11:27

## 2018-01-01 RX ADMIN — DRONABINOL 2.5 MG: 2.5 CAPSULE ORAL at 06:24

## 2018-01-01 RX ADMIN — ONDANSETRON 4 MG: 4 TABLET, ORALLY DISINTEGRATING ORAL at 22:07

## 2018-01-01 RX ADMIN — PANTOPRAZOLE SODIUM 40 MG: 40 TABLET, DELAYED RELEASE ORAL at 08:26

## 2018-01-01 RX ADMIN — OLANZAPINE 2.5 MG: 2.5 TABLET, FILM COATED ORAL at 21:11

## 2018-01-01 RX ADMIN — ACETAMINOPHEN 975 MG: 325 TABLET, FILM COATED ORAL at 06:17

## 2018-01-01 RX ADMIN — PANTOPRAZOLE SODIUM 40 MG: 40 TABLET, DELAYED RELEASE ORAL at 06:12

## 2018-01-01 RX ADMIN — FLUTICASONE PROPIONATE 1 SPRAY: 50 SPRAY, METERED NASAL at 17:53

## 2018-01-01 RX ADMIN — SUCRALFATE 1000 MG: 1 SUSPENSION ORAL at 00:45

## 2018-01-01 RX ADMIN — POTASSIUM CHLORIDE 20 MEQ: 1500 TABLET, EXTENDED RELEASE ORAL at 08:48

## 2018-01-01 RX ADMIN — FERUMOXYTOL 510 MG: 510 INJECTION INTRAVENOUS at 14:10

## 2018-01-01 RX ADMIN — POTASSIUM CHLORIDE 20 MEQ: 1500 TABLET, EXTENDED RELEASE ORAL at 08:36

## 2018-01-01 RX ADMIN — METOPROLOL TARTRATE 25 MG: 25 TABLET, FILM COATED ORAL at 08:09

## 2018-01-01 RX ADMIN — LEVOTHYROXINE SODIUM 88 MCG: 88 TABLET ORAL at 06:43

## 2018-01-01 RX ADMIN — SENNOSIDES 8.6 MG: 8.6 TABLET, FILM COATED ORAL at 21:43

## 2018-01-01 RX ADMIN — VITAMIN D, TAB 1000IU (100/BT) 2000 UNITS: 25 TAB at 09:14

## 2018-01-01 RX ADMIN — PANTOPRAZOLE SODIUM 40 MG: 40 TABLET, DELAYED RELEASE ORAL at 08:02

## 2018-01-01 RX ADMIN — PHYTONADIONE 2.5 MG: 5 TABLET ORAL at 08:48

## 2018-01-01 RX ADMIN — LIDOCAINE 1 PATCH: 50 PATCH CUTANEOUS at 08:11

## 2018-01-01 RX ADMIN — DOCUSATE SODIUM 100 MG: 100 CAPSULE, LIQUID FILLED ORAL at 09:16

## 2018-01-01 RX ADMIN — CYANOCOBALAMIN 1000 MCG: 1000 INJECTION, SOLUTION INTRAMUSCULAR at 12:57

## 2018-01-01 RX ADMIN — SUCRALFATE 1000 MG: 1 SUSPENSION ORAL at 00:30

## 2018-01-01 RX ADMIN — DICLOFENAC 2 G: 10 GEL TOPICAL at 12:00

## 2018-01-01 RX ADMIN — DICLOFENAC 2 G: 10 GEL TOPICAL at 17:12

## 2018-01-01 RX ADMIN — DICLOFENAC 2 G: 10 GEL TOPICAL at 08:43

## 2018-01-01 RX ADMIN — FLUTICASONE PROPIONATE 1 SPRAY: 50 SPRAY, METERED NASAL at 18:28

## 2018-01-01 RX ADMIN — FLUTICASONE PROPIONATE 1 SPRAY: 50 SPRAY, METERED NASAL at 08:48

## 2018-01-01 RX ADMIN — DRONABINOL 2.5 MG: 2.5 CAPSULE ORAL at 09:57

## 2018-01-01 RX ADMIN — METOPROLOL TARTRATE 25 MG: 25 TABLET, FILM COATED ORAL at 21:03

## 2018-01-01 RX ADMIN — PANTOPRAZOLE SODIUM 40 MG: 40 TABLET, DELAYED RELEASE ORAL at 17:13

## 2018-01-01 RX ADMIN — PANTOPRAZOLE SODIUM 40 MG: 40 TABLET, DELAYED RELEASE ORAL at 06:27

## 2018-01-01 RX ADMIN — LIDOCAINE 1 PATCH: 50 PATCH TOPICAL at 12:26

## 2018-01-01 RX ADMIN — ACETAMINOPHEN 975 MG: 325 TABLET, FILM COATED ORAL at 14:16

## 2018-01-01 RX ADMIN — SUCRALFATE 1 G: 1 TABLET ORAL at 12:51

## 2018-01-01 RX ADMIN — SUCRALFATE 1000 MG: 1 SUSPENSION ORAL at 23:32

## 2018-01-01 RX ADMIN — ACETAMINOPHEN 975 MG: 325 TABLET ORAL at 09:56

## 2018-01-01 RX ADMIN — SUCRALFATE 1000 MG: 1 SUSPENSION ORAL at 13:43

## 2018-01-01 RX ADMIN — SODIUM CHLORIDE 100 ML/HR: 0.9 INJECTION, SOLUTION INTRAVENOUS at 01:25

## 2018-01-01 RX ADMIN — ONDANSETRON 4 MG: 4 TABLET, ORALLY DISINTEGRATING ORAL at 06:11

## 2018-01-01 RX ADMIN — OXYCODONE HYDROCHLORIDE 2.5 MG: 5 TABLET ORAL at 23:43

## 2018-01-01 RX ADMIN — PANTOPRAZOLE SODIUM 40 MG: 40 TABLET, DELAYED RELEASE ORAL at 17:03

## 2018-01-01 RX ADMIN — FLUTICASONE PROPIONATE 1 SPRAY: 50 SPRAY, METERED NASAL at 09:13

## 2018-01-01 RX ADMIN — DICLOFENAC 2 G: 10 GEL TOPICAL at 17:22

## 2018-01-01 RX ADMIN — CIPROFLOXACIN 400 MG: 2 INJECTION, SOLUTION INTRAVENOUS at 17:10

## 2018-01-01 RX ADMIN — SUCRALFATE 1 G: 1 TABLET ORAL at 08:47

## 2018-01-01 RX ADMIN — WARFARIN SODIUM 3 MG: 3 TABLET ORAL at 17:48

## 2018-01-01 RX ADMIN — LIDOCAINE 1 PATCH: 50 PATCH CUTANEOUS at 09:54

## 2018-01-01 RX ADMIN — PANTOPRAZOLE SODIUM 40 MG: 40 TABLET, DELAYED RELEASE ORAL at 06:10

## 2018-01-01 RX ADMIN — SUCRALFATE 1000 MG: 1 SUSPENSION ORAL at 05:48

## 2018-01-01 RX ADMIN — FLUTICASONE PROPIONATE 1 SPRAY: 50 SPRAY, METERED NASAL at 08:30

## 2018-01-01 RX ADMIN — SUCRALFATE 1 G: 1 TABLET ORAL at 21:07

## 2018-01-01 RX ADMIN — SUCRALFATE 1000 MG: 1 SUSPENSION ORAL at 12:09

## 2018-01-01 RX ADMIN — SUCRALFATE 1 G: 1 TABLET ORAL at 09:15

## 2018-01-01 RX ADMIN — PANTOPRAZOLE SODIUM 40 MG: 40 TABLET, DELAYED RELEASE ORAL at 06:24

## 2018-01-01 RX ADMIN — METOPROLOL TARTRATE 25 MG: 25 TABLET, FILM COATED ORAL at 10:26

## 2018-01-01 RX ADMIN — PANTOPRAZOLE SODIUM 40 MG: 40 TABLET, DELAYED RELEASE ORAL at 17:48

## 2018-01-01 RX ADMIN — DEXAMETHASONE 2 MG: 2 TABLET ORAL at 08:04

## 2018-01-01 RX ADMIN — METOPROLOL TARTRATE 25 MG: 25 TABLET, FILM COATED ORAL at 21:57

## 2018-01-01 RX ADMIN — WARFARIN SODIUM 3 MG: 3 TABLET ORAL at 18:09

## 2018-01-01 RX ADMIN — DICLOFENAC 2 G: 10 GEL TOPICAL at 11:47

## 2018-01-01 RX ADMIN — BISACODYL 10 MG: 10 SUPPOSITORY RECTAL at 09:17

## 2018-01-01 RX ADMIN — DICLOFENAC 2 G: 10 GEL TOPICAL at 21:06

## 2018-01-01 RX ADMIN — OXYCODONE HYDROCHLORIDE 2.5 MG: 5 SOLUTION ORAL at 06:20

## 2018-01-01 RX ADMIN — OXYCODONE HYDROCHLORIDE 2.5 MG: 5 TABLET ORAL at 14:40

## 2018-01-01 RX ADMIN — ATORVASTATIN CALCIUM 20 MG: 20 TABLET, FILM COATED ORAL at 16:11

## 2018-01-01 RX ADMIN — DICLOFENAC 2 G: 10 GEL TOPICAL at 17:23

## 2018-01-01 RX ADMIN — PANTOPRAZOLE SODIUM 40 MG: 40 TABLET, DELAYED RELEASE ORAL at 06:17

## 2018-01-01 RX ADMIN — OLANZAPINE 2.5 MG: 2.5 TABLET, FILM COATED ORAL at 22:23

## 2018-01-01 RX ADMIN — METOPROLOL TARTRATE 25 MG: 25 TABLET, FILM COATED ORAL at 09:51

## 2018-01-01 RX ADMIN — CEFAZOLIN SODIUM 1000 MG: 1 SOLUTION INTRAVENOUS at 23:32

## 2018-01-01 RX ADMIN — LEVOTHYROXINE SODIUM 88 MCG: 88 TABLET ORAL at 05:25

## 2018-01-01 RX ADMIN — FLUTICASONE PROPIONATE 1 SPRAY: 50 SPRAY, METERED NASAL at 08:03

## 2018-01-01 RX ADMIN — SUCRALFATE 1000 MG: 1 SUSPENSION ORAL at 08:26

## 2018-01-01 RX ADMIN — CYANOCOBALAMIN 1000 MCG: 1000 INJECTION, SOLUTION INTRAMUSCULAR at 14:26

## 2018-01-01 RX ADMIN — CYANOCOBALAMIN 1000 MCG: 1000 INJECTION, SOLUTION INTRAMUSCULAR at 12:50

## 2018-01-01 RX ADMIN — SUCRALFATE 1000 MG: 1 SUSPENSION ORAL at 06:15

## 2018-01-01 RX ADMIN — DICLOFENAC 2 G: 10 GEL TOPICAL at 08:03

## 2018-01-01 RX ADMIN — VITAMIN D, TAB 1000IU (100/BT) 2000 UNITS: 25 TAB at 09:56

## 2018-01-01 RX ADMIN — LEVOTHYROXINE SODIUM 88 MCG: 88 TABLET ORAL at 05:44

## 2018-01-01 RX ADMIN — ACETAMINOPHEN 975 MG: 325 TABLET, FILM COATED ORAL at 06:44

## 2018-01-01 RX ADMIN — PANTOPRAZOLE SODIUM 40 MG: 40 TABLET, DELAYED RELEASE ORAL at 17:12

## 2018-01-01 RX ADMIN — WARFARIN SODIUM 1.25 MG: 2.5 TABLET ORAL at 17:12

## 2018-01-01 RX ADMIN — ACETAMINOPHEN 975 MG: 325 TABLET ORAL at 17:42

## 2018-01-01 RX ADMIN — DICLOFENAC 2 G: 10 GEL TOPICAL at 09:39

## 2018-01-01 RX ADMIN — DEXAMETHASONE 2 MG: 2 TABLET ORAL at 10:07

## 2018-01-01 RX ADMIN — MELATONIN TAB 3 MG 3 MG: 3 TAB at 22:07

## 2018-01-01 RX ADMIN — ONDANSETRON 4 MG: 4 TABLET, ORALLY DISINTEGRATING ORAL at 19:39

## 2018-01-01 RX ADMIN — DRONABINOL 2.5 MG: 2.5 CAPSULE ORAL at 17:45

## 2018-01-01 RX ADMIN — VITAMIN D, TAB 1000IU (100/BT) 2000 UNITS: 25 TAB at 12:07

## 2018-01-01 RX ADMIN — OXYCODONE HYDROCHLORIDE 2.5 MG: 5 TABLET ORAL at 19:30

## 2018-01-01 RX ADMIN — MELATONIN TAB 3 MG 3 MG: 3 TAB at 21:12

## 2018-01-01 RX ADMIN — SUCRALFATE 1000 MG: 1 SUSPENSION ORAL at 11:27

## 2018-01-01 RX ADMIN — PANTOPRAZOLE SODIUM 40 MG: 40 TABLET, DELAYED RELEASE ORAL at 17:57

## 2018-01-01 RX ADMIN — PHYTONADIONE 5 MG: 5 TABLET ORAL at 10:53

## 2018-01-01 RX ADMIN — SUCRALFATE 1 G: 1 TABLET ORAL at 11:45

## 2018-01-01 RX ADMIN — PANTOPRAZOLE SODIUM 40 MG: 40 TABLET, DELAYED RELEASE ORAL at 06:43

## 2018-01-01 RX ADMIN — ACETAMINOPHEN 650 MG: 325 TABLET, FILM COATED ORAL at 08:52

## 2018-01-01 RX ADMIN — METOPROLOL TARTRATE 25 MG: 25 TABLET, FILM COATED ORAL at 21:39

## 2018-01-01 RX ADMIN — POLYETHYLENE GLYCOL 3350 17 G: 17 POWDER, FOR SOLUTION ORAL at 08:14

## 2018-01-01 RX ADMIN — POTASSIUM CHLORIDE 10 MEQ: 750 TABLET, EXTENDED RELEASE ORAL at 08:13

## 2018-01-01 RX ADMIN — ACETAMINOPHEN 975 MG: 325 TABLET, FILM COATED ORAL at 13:16

## 2018-01-01 RX ADMIN — LEVOTHYROXINE SODIUM 88 MCG: 88 TABLET ORAL at 06:18

## 2018-01-01 RX ADMIN — ONDANSETRON 4 MG: 2 INJECTION INTRAMUSCULAR; INTRAVENOUS at 15:15

## 2018-01-01 RX ADMIN — LIDOCAINE 1 PATCH: 50 PATCH CUTANEOUS at 08:44

## 2018-01-01 RX ADMIN — LIDOCAINE 1 PATCH: 50 PATCH CUTANEOUS at 08:36

## 2018-01-01 RX ADMIN — SUCRALFATE 1000 MG: 1 SUSPENSION ORAL at 06:17

## 2018-01-01 RX ADMIN — ACETAMINOPHEN 975 MG: 325 TABLET, FILM COATED ORAL at 06:11

## 2018-01-01 RX ADMIN — DICLOFENAC 2 G: 10 GEL TOPICAL at 10:04

## 2018-01-01 RX ADMIN — MELATONIN TAB 3 MG 3 MG: 3 TAB at 21:40

## 2018-01-01 RX ADMIN — METOPROLOL TARTRATE 25 MG: 25 TABLET, FILM COATED ORAL at 08:07

## 2018-01-09 NOTE — MISCELLANEOUS
Message   Recorded as Task   Date: 04/20/2017 05:12 PM, Created By: Mike Otero   Task Name: Call Patient with results   Assigned To: Neno Awad   Regarding Patient: Nathaniel Edwards, Status: In Progress   Comment:    Neno Awad - 20 Apr 2017 5:12 PM     Patient Phone: [phone]      thyroid blood test results are back and show thyroid medication dose may be bit too high  Is Carolyn taking 151RFK per day on empty stomach with plain water? if yes, any new medications? if no, we may have to lower dose of thyroid medication a bit  let me know, thx   Bety Healy - 21 Apr 2017 8:08 AM     TASK REASSIGNED: Previously Assigned To [place] - 21 Apr 2017 12:25 PM     TASK IN PROGRESS   Ariadne Rosado - 21 Apr 2017 12:28 PM     TASK REPLIED TO: Previously Assigned To [address] [place] [address] with patients daughter Cheryle Dennis who stated patient takes her medication every morning with just water as directed & no new medications started  Neno Awad - 21 Apr 2017 2:51 PM     TASK REPLIED TO: Previously Assigned To Neno Awad  recommend to lower dose to 88mcg once a day & re-check thyroid BW in 6-8 weeks    if pt's daughter(POA) agrees, let me knwo and will order rx to Carolyn's pharmacy    thanks   Carolina Tinoco - 21 Apr 2017 5:03 PM     TASK REPLIED TO: Previously Assigned To [address] [place] [address] with daughter who stated she has aout 3 weeks worth of the 88mcg from before  She said you can order the prescription to [facility] 30days until its determined if that will be the dose she stays on   Neno Awad - 21 Apr 2017 5:05 PM     TASK REPLIED TO: Previously Assigned To Neno Awad  okay, done    thx        Plan  Hypothyroidism    · From  Levothyroxine Sodium 100 MCG Oral Tablet TAKE 1 TABLET DAILY To  Levothyroxine Sodium 88 MCG Oral Tablet TAKE 1 TABLET DAILY   · (1) TSH WITH FT4 REFLEX; Status:Active; Requested [facility]:[id number];     Signatures   Electronically signed by : Caitlyn Reina DO;  Apr 21 2017  5:06PM EST                       (Author)

## 2018-01-09 NOTE — RESULT NOTES
Verified Results  (1) TSH WITH FT4 REFLEX [id number] 12:21PM Bernard Fossa Order Number: [id number]     Test Name Result Flag Reference   TSH 1 481 uIU/mL  0 358-3 740   Patients undergoing fluorescein dye angiography may retain small amounts of fluorescein in the body for 48-72 hours post procedure  Samples containing fluorescein can produce falsely depressed TSH values  If the patient had this procedure,a specimen should be resubmitted post fluorescein clearance            The recommended reference ranges for TSH during pregnancy are as follows:  First trimester 0 1 to 2 5 uIU/mL  Second trimester  0 2 to 3 0 uIU/mL  Third trimester 0 3 to 3 0 uIU/m

## 2018-01-10 NOTE — PROGRESS NOTES
Assessment    1  Benign hypertension with chronic kidney disease, stage III (403 10,585 3) (I12 9,N18 3)   2  Atrial fibrillation (427 31) (I48 91)   3  Current use of long term anticoagulation (V58 61) (Z79 01)   4  Hypercholesterolemia (272 0) (E78 00)   5  Hypothyroidism (244 9) (E03 9)   6  Need for prophylactic vaccination and inoculation against influenza (V04 81) (Z23)   7  Skin lesion of neck (709 9) (L98 9)   8  Rib pain on left side (786 50) (R07 81)   9  Rib pain on right side (786 50) (R07 81)   10  B12 deficiency (266 2) (E53 8)    Plan  Abdominal discomfort    · Dicyclomine HCl - 10 MG Oral Capsule; TAKE ONE TABLET 4 TIMES PER WEEK  OR AS DIRECTED  B12 deficiency    · Cyanocobalamin 1000 MCG/ML Injection Solution; 1ml given in the office - Dr Derik Jim  B12 deficiency, Hypothyroidism    · (1) TSH WITH FT4 REFLEX; Status:Active; Requested for:12Apr2018; Flu vaccine need    · Fluzone High-Dose 0 5 ML Intramuscular Suspension Prefilled Syringe  Hypercholesterolemia    · Pravastatin Sodium 20 MG Oral Tablet; take 1 tablet daily at bedtime    Discussion/Summary  Impression: Subsequent Annual Wellness Visit  Cardiovascular screening and counseling: screening not indicated  Diabetes screening and counseling: screening not indicated  Colorectal cancer screening and counseling: screening not indicated  Breast cancer screening and counseling: screening not indicated  Cervical cancer screening and counseling: screening not indicated  Abdominal aortic aneurysm screening and counseling: screening not indicated  Immunizations: the risks and benefits of influenza vaccination were discussed with the patient, influenza vaccination is recommended annually and the lifetime pneumococcal vaccine has been completed  Advance Directive Planning: complete and up to date  Patient Discussion: plan discussed with the patient, plan discussed with the patient's family, follow-up visit needed in 6 months  History of Present Illness  81 y/o F here for annual medicare wellness visit    here with daughter   The patient is being seen for the subsequent annual wellness visit  Medicare Screening and Risk Factors   Hospitalizations: no previous hospitalizations  Medicare Screening Tests Risk Questions   Abdominal aortic aneurysm risk assessment: none indicated  Osteoporosis risk assessment: , female gender and over 48years of age  Drug and Alcohol Use: The patient has never smoked cigarettes  The patient reports never drinking alcohol  Alcohol concern:   The patient has no concerns about alcohol abuse  She has never used illicit drugs  Diet and Physical Activity: Current diet includes low salt food choices, 1-2 servings of fruit per day, 1-2 servings of vegetables per day, 1-2 servings of whole grains per day, 2 cups of tea per day and water-2  She is sedentary  Exercise: walking 2 hours per week  Mood Disorder and Cognitive Impairment Screening: She denies feeling down, depressed, or hopeless over the past two weeks  She denies feeling little interest or pleasure in doing things over the past two weeks  Cognitive impairment screening: denies difficulty learning/retaining new information, denies difficulty handling complex tasks, denies difficulty with reasoning, denies difficulty with spatial ability and orientation, denies difficulty with language and denies difficulty with behavior  Functional Ability/Level of Safety: She uses a hearing aid  The patient is currently able to do activities of daily living with limitations, able to do instrumental activities of daily living with limitations, able to participate in social activities with limitations and unable to drive   Activities of daily living details: does not need help using the phone, no transportation help needed, does not need help shopping, no meal preparation help needed, does not need help doing housework, does not need help doing laundry, does not need help managing medications and does not need help managing money  Fall risk factors: The patient fell 0 times in the past 12 months  Home safety risk factors:  no grab bars in the bathroom and no handrails on the stairs, but no unfamiliar surroundings, no loose rugs, no poor household lighting, no uneven floors and no household clutter  Advance Directives: Advance directives: no living will, no durable power of  for health care directives and no advance directives  Co-Managers and Medical Equipment/Suppliers: See Patient Care Team      Patient Care Team    Care Team Member Role Specialty Office Number   Valentin Maynard MD Specialist Ophthalmology [phone]   Leona Liriano MD Attending Internal Medicine [phone]   Mikael Scott, [address] Specialist Orthopedic Surgery [phone]   Dot SERVIN , [facility] Specialist Hematology Oncology [phone]   [facility]  Internal Medicine [phone], [place] [facility]  Hematology Oncology [phone]     Active Problems    1  Abdominal discomfort (789 00) (R10 9)   2  Abdominal pain (789 00) (R10 9)   3  Abdominal swelling, mass, or lump (789 30) (R19 00)   4  Abnormal finding on imaging (793 99) (R93 8)   5  Aftercare following surgery of the musculoskeletal system (V58 78) (Z47 89)   6  Anemia in CKD (chronic kidney disease) (285 21) (N18 9,D63 1)   7  Atrial fibrillation (427 31) (I48 91)   8  B12 deficiency (266 2) (E53 8)   9  Back pain (724 5) (M54 9)   10  Benign hypertension with chronic kidney disease, stage III (403 10,585 3) (I12 9,N18 3)   11  Benign paroxysmal vertigo, unspecified laterality (386 11) (H81 10)   12  Carotid artery stenosis (433 10) (I65 29)   13  Constipation (564 00) (K59 00)   14  Current use of long term anticoagulation (V58 61) (Z79 01)   15  Dark stools (792 1) (R19 5)   16  Difficulty breathing (786 09) (R06 89)   17   Encounter to discuss test results (V65 49) (Z71 89)   18  Flu vaccine need (V04 81) (Z23)   19  Gastritis (535 50) (K29 70)   20  Heme + stool (792 1) (R19 5)   21  Hiatal hernia (553 3) (K44 9)   22  History of fall (V15 88) (Z91 81)   23  History of hip fracture (V15 51) (Z87 81)   24  Hypercholesterolemia (272 0) (E78 00)   25  Hyperhomocysteinemia (270 4) (E72 11)   26  Hypothyroidism (244 9) (E03 9)   27  Iron deficiency anemia (280 9) (D50 9)   28  Left knee pain (719 46) (M25 562)   29  Medicare annual wellness visit, initial (V70 0) (Z00 00)   30  Need for prophylactic vaccination and inoculation against influenza (V04 81) (Z23)   31  Osteopenia (733 90) (M85 80)   32  Paroxysmal tachycardia (427 2) (I47 9)   33  Pedal edema (782 3) (R60 0)   34  Rib pain on left side (786 50) (R07 81)   35  Rib pain on right side (786 50) (R07 81)   36  Screening for genitourinary condition (V81 6) (Z13 89)   37  Screening for neurological condition (V80 09) (Z13 89)   38  Sick sinus syndrome (427 81) (I49 5)   39  Skin lesion of neck (709 9) (L98 9)   40  Thyroid Nodule   41  Vitamin B12 deficiency (266 2) (E53 8)   42  Vitamin D deficiency (268 9) (E55 9)    Past Medical History    1  History of Angioedema, initial encounter (995 1) (T78 3XXA)   2  History of Breast cancer screening (V76 10) (Z12 39)   3  History of Colon cancer screening (V76 51) (Z12 11)   4  History of Difficulty chewing (783 3) (R63 3)   5  History of anemia (V12 3) (Z86 2)   6  History of iron deficiency anemia (V12 3) (Z86 2)   7  History of paroxysmal atrial tachycardia (V12 59) (Z86 79)   8  History of urinary tract infection (V13 02) (Z87 440)   9  History of Other screening mammogram (V76 12) (Z12 31)   10  History of Pain, dental (525 9) (K08 89)   11  History of Swelling of calf (729 81) (M79 89)   12  History of Urinary frequency (788 41) (R35 0)   13  History of Weight loss (783 21) (R63 4)    The active problems and past medical history were reviewed and updated today        Surgical History    1  History of Anterior Colporrhaphy, Repair Of Cystocele   2  History of Appendectomy   3  History of Cataract Surgery   4  History of Hysterectomy   5  History of Knee Replacement   6  History of Oophorectomy   7  History of Open Treatment Intertrochanteric Fracture, Intramed Implant   8  History of Rectocele Repair    Family History  Mother    1  Family history of Kidney Cancer (V16 51)  Father    2  Family history of Alcohol Abuse   3  Family history of Cirrhosis   4  Family history of Drug Use  Brother    5  Family history of Alcohol Abuse   6  Family history of Diabetes Mellitus (V18 0)   7  Family history of Drug Use   8  Family history of Stroke Syndrome (V17 1)  Family History    9  Denied: Family history of Coronary Artery Disease    Social History    · Denied: History of Alcohol Use (History)   · Daily Coffee Consumption (2  Cups/Day)   · Daily Tea Consumption (1  Cups/Day)   · Never A Smoker   · Work History  The social history was reviewed and updated today  Current Meds   1  Acetaminophen 325 MG Oral Tablet Recorded   2  Dicyclomine HCl - 10 MG Oral Capsule; TAKE ONE TABLET 4 TIMES PER WEEK OR AS   DIRECTED; Therapy: 98Xdn8209 to (Evaluate:12Jan2018)  Requested for: 93NXF2043; Last   Rx:13Nov2017 Ordered   3  Docusate Sodium 100 MG Oral Tablet; TAKE 1 TABLET DAILY AS DIRECTED; Therapy: 52BKN1135 to (Evaluate:86Qir8683); Last Rx:90Srx8666 Ordered   4  Furosemide 40 MG Oral Tablet; TAKE 1 TABLET DAILY AS DIRECTED; Therapy: (Recorded:24Oct2016) to Recorded   5  Levothyroxine Sodium 88 MCG Oral Tablet; TAKE 1 TABLET DAILY; Therapy: 52JFA6983 to (Evaluate:30Jun2018)  Requested for: 26KPT9984; Last   Rx:38Nqg5887 Ordered   6  Meclizine HCl - 12 5 MG Oral Tablet; TAKE 1 TABLET 3 TIMES DAILY AS NEEDED; Therapy: 66QTY6699 to (Evaluate:70Unl1376)  Requested for: 55Art7149; Last   Rx:21Nti7413 Ordered   7  Metoprolol Tartrate 25 MG Oral Tablet; TAKE 1 TABLET TWICE DAILY;    Therapy: (Miranda Abler) to Recorded   8  Omeprazole 40 MG Oral Capsule Delayed Release; 1 po daily; Therapy: (Recorded:15Oqw2031) to Recorded   9  Potassium Chloride ER 10 MEQ Oral Capsule Extended Release; Therapy: (Recorded:27Mar2017) to Recorded   10  Pravastatin Sodium 20 MG Oral Tablet; take 1 tablet daily at bedtime; Therapy: 77UVK0393 to (LDRNWCFF:22RNK9466)  Requested for: 29EAO5108; Last    Rx:70Kju1673 Ordered   11  Vitamin D3 2000 UNIT Oral Capsule; TAKE 1 CAPSULE ONCE DAILY; Therapy: 18GIC1979 to (Evaluate:26Jkd5888)  Requested for: 42Xac4776; Last    Rx:10Xgd9875 Ordered   12  Warfarin Sodium 2 5 MG Oral Tablet; TAKE 1 TABLET DAILY AS DIRECTED; Therapy: (Recorded:24Oct2016) to Recorded    The medication list was reviewed and updated today  Allergies    1  ACE Inhibitors   2  Angiotensin Receptor Blockers   3  Aspirin Buffered TABS   4  Codeine Sulfate TABS   5   Penicillins    Immunizations  Influenza --- Shaan Chapa: Nov 2013; Series2: [id number] Settle: 24-Oct-2016   PCV --- Series1: 13-Jan-2015   PPSV --- Shaan Chapa: 2009     Vitals  Signs   Recorded: 29QYI2568 01:55PM   Temperature: 97 6 F  Heart Rate: 86  Respiration: 16  Systolic: 927  Diastolic: 80  Height: 4 ft 9 in  Weight: 130 lb 4 oz  BMI Calculated: 28 19  BSA Calculated: 1 5  O2 Saturation: 95    Future Appointments    Date/Time Provider Specialty Site   04/11/2018 01:15 PM SMITHA Longo , DO, [facility] Hematology Oncology [facility] [place]   04/23/2018 02:00 PM Nkechi Andrews DO Internal Medicine [facility] INTERNAL MED     Signatures   Electronically signed by : Justin Cuello DO; Nov 13 2017  5:38PM EST                       (Author)

## 2018-01-10 NOTE — MISCELLANEOUS
Message   Recorded as Task   Date: 10/13/2016 06:49 PM, Created By: Cora Reyes   Task Name: Call Patient with results   Assigned To: Neno Awad   Regarding Patient: Colletta Pean, Status: In Progress   Comment:    Neno Awad - 13 Oct 2016 6:49 PM     Patient Phone: [phone]      please see if you can get Ms Irma Ugalde daughter on phone with me   there are additional thyroid blood test results that came back later I would like to discuss with pt's daughter, John Bermeo - 14 Oct 2016 8:02 AM     TASK REASSIGNED: Previously Assigned To Cait Zuñiga - 14 Oct 2016 1:07 PM     TASK IN PROGRESS   Bety Healy - 14 Oct 2016 1:09 PM     TASK EDITED  conected Dr Yessy Cuello with pts daughter Inna Castro   spoke with pt's daughter    on her way to seeing cardiologist for patient/her mother    reviewed TSH and Free T4 results with patient    taking levothyroxine 112 mcg once a day currently  was taking 100mcg once a day and was feeling fine but TSH elevated & PCP spoke with pt's daughter who requested to increase dose to 112 mcg per day    plan - pt's daughter okay with reducing dose to 100mcg per day   pt's daughter will call back if needs refill on 100 mcg per day dose levothyroxine   f/u as scheduled      Signatures   Electronically signed by : Mary Petty DO; Oct 14 2016  1:24PM EST                       (Author)

## 2018-01-10 NOTE — MISCELLANEOUS
History of Present Illness  TCM Communication [facility]: The patient is being contacted for follow-up after hospitalization and Patient daughter refused appointment  She was hospitalized at [facility]  The dates of hospitalization: 11/10/16-11/11/16, date of admission: 11/10/16, date of discharge: 11/11/16  Diagnosis: Chest pain  She was discharged to home  Medications reviewed and updated today  She did not schedule a follow up appointment  She refused a follow up appointment due to Daughter refused and states it is not needed   Follow-up appointments with other specialists: Cardiology 12/2/16  The patient is currently asymptomatic  Counseling was provided to patient's family  Pt daughter Greta Cortez  Communication performed and completed by AE 11/15/16      Active Problems    1  Abdominal discomfort (789 00) (R10 9)   2  Abdominal pain (789 00) (R10 9)   3  Abdominal swelling, mass, or lump (789 30) (R19 00)   4  Abnormal finding on imaging (793 99) (R93 8)   5  Aftercare following surgery of the musculoskeletal system (V58 78) (Z47 89)   6  Anemia in CKD (chronic kidney disease) (285 21,585 9) (N18 9,D63 1)   7  Atrial fibrillation (427 31) (I48 91)   8  Back pain (724 5) (M54 9)   9  Benign paroxysmal vertigo, unspecified laterality (386 11) (H81 10)   10  Carotid artery stenosis (433 10) (I65 29)   11  Constipation (564 00) (K59 00)   12  Current use of long term anticoagulation (V58 61) (Z79 01)   13  Dark stools (792 1) (R19 5)   14  Difficulty breathing (786 09) (R06 89)   15  Encounter to discuss test results (V65 49) (Z71 89)   16  Gastritis (535 50) (K29 70)   17  Heme + stool (792 1) (R19 5)   18  Hiatal hernia (553 3) (K44 9)   19  History of fall (V15 88) (Z91 81)   20  History of hip fracture (V15 51) (Z87 81)   21  Hypercholesterolemia (272 0) (E78 00)   22  Hyperhomocysteinemia (270 4) (E72 11)   23  Hypertension (401 9) (I10)   24  Hypothyroidism (244 9) (E03 9)   25   Iron deficiency anemia (280 9) (D50 9)   26  Left knee pain (719 46) (M25 562)   27  Medicare annual wellness visit, initial (V70 0) (Z00 00)   28  Need for prophylactic vaccination and inoculation against influenza (V04 81) (Z23)   29  Osteopenia (733 90) (M85 80)   30  Paroxysmal tachycardia (427 2) (I47 9)   31  Pedal edema (782 3) (R60 0)   32  Screening for genitourinary condition (V81 6) (Z13 89)   33  Screening for neurological condition (V80 09) (Z13 89)   34  Sick sinus syndrome (427 81) (I49 5)   35  Thyroid Nodule   36  Vitamin B12 deficiency (266 2) (E53 8)   37  Vitamin D deficiency (268 9) (E55 9)    Past Medical History    1  History of Angioedema, initial encounter (995 1) (T78 3XXA)   2  History of Breast cancer screening (V76 10) (Z12 39)   3  History of Colon cancer screening (V76 51) (Z12 11)   4  History of Difficulty chewing (783 3) (R63 3)   5  History of anemia (V12 3) (Z86 2)   6  History of iron deficiency anemia (V12 3) (Z86 2)   7  History of paroxysmal atrial tachycardia (V12 59) (Z86 79)   8  History of urinary tract infection (V13 02) (Z87 440)   9  History of Other screening mammogram (V76 12) (Z12 31)   10  History of Pain, dental (525 9) (K08 89)   11  History of Swelling of calf (729 81) (M79 89)   12  History of Urinary frequency (788 41) (R35 0)   13  History of Weight loss (783 21) (R63 4)    Surgical History    1  History of Anterior Colporrhaphy, Repair Of Cystocele   2  History of Appendectomy   3  History of Cataract Surgery   4  History of Hysterectomy   5  History of Knee Replacement   6  History of Oophorectomy   7  History of Open Treatment Intertrochanteric Fracture, Intramed Implant   8  History of Rectocele Repair    Family History  Mother    1  Family history of Kidney Cancer (V16 51)  Father    2  Family history of Alcohol Abuse   3  Family history of Cirrhosis   4  Family history of Drug Use  Brother    5  Family history of Alcohol Abuse   6   Family history of Diabetes Mellitus (V18 0) 7  Family history of Drug Use   8  Family history of Stroke Syndrome (V17 1)  Family History    9  Denied: Family history of Coronary Artery Disease    Social History    · Denied: History of Alcohol Use (History)   · Daily Coffee Consumption (2  Cups/Day)   · Daily Tea Consumption (1  Cups/Day)   · Never A Smoker   · Work History    Current Meds   1  Acetaminophen 325 MG Oral Tablet Recorded   2  Dicyclomine HCl - 10 MG Oral Capsule Recorded   3  Docusate Sodium 100 MG Oral Tablet; TAKE 1 TABLET DAILY AS DIRECTED; Therapy: 29DVT3618 to (Evaluate:43Vzk8743); Last Rx:80Gwh6973 Ordered   4  Furosemide 40 MG Oral Tablet; TAKE 1 TABLET DAILY AS DIRECTED; Therapy: (Recorded:24Oct2016) to Recorded   5  Levothyroxine Sodium 100 MCG Oral Tablet; TAKE 1 TABLET DAILY; Therapy: 92EWB5949 to (Evaluate:22Jan2017)  Requested for: 24Oct2016; Last   Rx:24Oct2016 Ordered   6  Meclizine HCl - 12 5 MG Oral Tablet; TAKE 1 TABLET 3 TIMES DAILY AS NEEDED; Therapy: 90IIL3601 to (Evaluate:46Jiz3134)  Requested for: 18Aug2016; Last   Rx:89Zlp5366 Ordered   7  Metoprolol Tartrate 25 MG Oral Tablet; TAKE 1 TABLET TWICE DAILY; Therapy: (Recorded:24Oct2016) to Recorded   8  Omeprazole 40 MG Oral Capsule Delayed Release; 1 po daily; Therapy: (Recorded:18Aug2016) to Recorded   9  Potassium TABS; TAKE 1 TABLET DAILY AS DIRECTED; Therapy: (Recorded:24Oct2016) to Recorded   10  Pravastatin Sodium 20 MG Oral Tablet; TAKE 1 TABLET DAILY AT BEDTIME  Requested    for: 01Aug2016; Last Rx:01Aug2016 Ordered   11  Vitamin D3 2000 UNIT Oral Capsule; TAKE 1 CAPSULE ONCE DAILY; Therapy: 51EPZ3445 to (Evaluate:18Oct2016)  Requested for: 21Apr2016; Last    Rx:21Apr2016 Ordered   12  Warfarin Sodium 2 5 MG Oral Tablet; TAKE 1 TABLET DAILY AS DIRECTED; Therapy: (Recorded:24Oct2016) to Recorded    Allergies    1  ACE Inhibitors   2  Angiotensin Receptor Blockers   3  Aspirin Buffered TABS   4  Codeine Sulfate TABS   5   Penicillins    Future Appointments    Date/Time Provider Specialty Site   03/15/2017 01:00 PM SMITHA Leung , DO, [facility] Hematology Oncology [facility] [place]   02/21/2017 01:30 PM Diana Jean DO Internal Medicine [facility] INTERNAL MED     Signatures   Electronically signed by : Reggie Murphy DO; Nov 15 2016 12:11PM EST                       (Author)

## 2018-01-11 NOTE — PROGRESS NOTES
Assessment    1  Anemia in CKD (chronic kidney disease) (285 21,585 9) (N18 9,D63 1)    Plan  Anemia in CKD (chronic kidney disease)    · Nystatin 493780 UNIT/GM External Powder; APPLY 2-3 TIMES DAILY TO AFFECTED  AREA(S)   Rx By: James Melgar; Dispense: 30 Days ; #:500 GM; Refill: 0; For: Anemia in CKD (chronic kidney disease); LOBITO = N; Verified Transmission to [facility]; Last Updated By: System, [employer]; 3/9/2016 3:52:59 PM   · Drink plenty of fluids ; Status:Complete;   Done: 97EKN4313   Ordered; Sarah Otero in CKD (chronic kidney disease); Ordered By:Kirti Hammonds;   · Follow-up visit in 6 months Evaluation and Treatment  Follow-up  Status: Hold For -  Scheduling  Requested for: 43PHI3500   Ordered; For: Anemia in CKD (chronic kidney disease); Ordered By: James Melgar Performed:  Due: 68ZGP0625   · (1) CBC/PLT/DIFF; Status:Active; Requested NCJ:36QBQ2150;    Perform:[facility]; [place]; Sarah Otero in CKD (chronic kidney disease); Ordered By:Kirti Hammonds;   · (1) COMPREHENSIVE METABOLIC PANEL; Status:Active; Requested WQV:63BCA7949;    Perform:[facility]; [place]; Sarah Otero in CKD (chronic kidney disease); Ordered By:Kirti Hammonds;   · (1) FERRITIN; Status:Active; Requested HWC:52HJF3561;    Perform:[facility]; [place]; Sarah Otero in CKD (chronic kidney disease); Ordered By:Felix Hammonds;   · (1) IRON SATURATION %, TIBC; Status:Active; Requested WCT:88XJE7514;    Perform:[facility]; [place]; Sarah Otero in CKD (chronic kidney disease); Ordered By:Kirti Hammonds;    Discussion/Summary  Discussion Summary:   In summary, this is a 80year old female with a history of anemia as outlined  During her recent hospitalization in December for a right hip fracture  She was found to have anemia with hemoglobin below 8  She was given 2 units of red blood cells  At that time   Her platelet count was about 100,000  I suspect that these problems related to bleeding internally, secondary to fracture, as well as exacerbation by operative blood loss, and consumptive thrombocytopenia  Fortunately, these problems have resolved  CBC in January showed normalization of her hematologic status  Overall she's doing as well as could be expected  I made arrangements to see her back in 6 months with repeat blood work just prior to that visit  We'll continue her B12 injections on a monthly basis  Additionally, I note that there was a fungal dermatitis inferior to a skin fold in the anterior inguinal region on the right  I suggested taping her skin up to allow more air circulation as well as prescribed nystatin powder  I reviewed the above with the patient and her daughter  They voiced understanding and agreement  Understands and agrees with treatment plan: The treatment plan was reviewed with the patient/guardian  The patient/guardian understands and agrees with the treatment plan   Counseling Documentation With Imm: The patient was counseled regarding diagnostic results, instructions for management, patient and family education, impressions  total time of encounter was 25 minutes  History of Present Illness  HPI: 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" approximately 200  She had been on chronic B12 injections  September 2012 decision was made to hold her B12 shots  After 4 months  She felt weak and tired  B12 shots were restarted  In February 2013  Her B12 level is "less than 200 "  2013- colonoscopy- incomplete  September 2015- EGD gastric polyp removed  +Hiatal hernia  September 2015- got Feraheme x 2 doses  Previous Therapy:   Feraheme Early July 2013 , weekly B12 through July into early aug 2013  Current Therapy: B12 1000 mcg q month  Interval History: [place] Nguyen and fx left hip, December 2015  Review of Systems  Complete-Female:   Constitutional: recent 10 lbs past few months   lb weight loss, but No fever, no chills, feels well, no tiredness, no recent weight gain or weight loss  Eyes: No complaints of eye pain, no red eyes, no eyesight problems, no discharge, no dry eyes, no itching of eyes  ENT: no complaints of earache, no loss of hearing, no nose bleeds, no nasal discharge, no sore throat, no hoarseness  Cardiovascular: No complaints of slow heart rate, no fast heart rate, no chest pain, no palpitations, no leg claudication, no lower extremity edema  Respiratory: shortness of breath during exertion  Gastrointestinal: No complaints of abdominal pain, no constipation, no nausea or vomiting, no diarrhea, no bloody stools  Genitourinary: No complaints of dysuria, no incontinence, no pelvic pain, no dysmenorrhea, no vaginal discharge or bleeding  Musculoskeletal: arthralgias and weakness in the knees  occasional, but better than it had been  Integumentary: No complaints of skin rash or lesions, no itching, no skin wounds, no breast pain or lump  Neurological: tingling and some BL toe tingling in bed  lasts 10 minutes or so  Resolves woithout intervention  Psychiatric: Not suicidal, no sleep disturbance, no anxiety or depression, no change in personality, no emotional problems  Endocrine: No complaints of proptosis, no hot flashes, no muscle weakness, no deepening of the voice, no feelings of weakness  Hematologic/Lymphatic: No complaints of swollen glands, no swollen glands in the neck, does not bleed easily, does not bruise easily  Active Problems    1  Abdominal discomfort (789 00) (R10 9)   2  Abdominal pain (789 00) (R10 9)   3  Abdominal swelling, mass, or lump (789 30) (R19 00)   4  Abnormal finding on imaging (793 99) (R93 8)   5  Aftercare following surgery of the musculoskeletal system (V58 78) (Z47 89)   6  Anemia in CKD (chronic kidney disease) (285 21,585 9) (N18 9,D63 1)   7  Back pain (724 5) (M54 9)   8   Benign paroxysmal vertigo, unspecified laterality (386 11) (H81 10)   9  Carotid artery stenosis (433 10) (I65 29)   10  Constipation (564 00) (K59 00)   11  Dark stools (792 1) (R19 5)   12  Difficulty breathing (786 09) (R06 89)   13  Gastritis (535 50) (K29 70)   14  Heme + stool (792 1) (R19 5)   15  Hiatal hernia (553 3) (K44 9)   16  History of fall (V15 88) (Z91 81)   17  History of hip fracture (V15 51) (Z87 81)   18  Hypercholesterolemia (272 0) (E78 0)   19  Hyperhomocysteinemia (270 4) (E72 11)   20  Hypertension (401 9) (I10)   21  Hypothyroidism (244 9) (E03 9)   22  Iron deficiency anemia (280 9) (D50 9)   23  Left knee pain (719 46) (M25 562)   24  Need for prophylactic vaccination and inoculation against influenza (V04 81) (Z23)   25  Osteopenia (733 90) (M85 80)   26  Paroxysmal tachycardia (427 2) (I47 9)   27  Screening for genitourinary condition (V81 6) (Z13 89)   28  Screening for neurological condition (V80 09) (Z13 89)   29  Sick sinus syndrome (427 81) (I49 5)   30  Thyroid Nodule   31  Vitamin B12 deficiency (266 2) (E53 8)   32  Vitamin D deficiency (268 9) (E55 9)    Past Medical History    1  History of Breast cancer screening (V76 10) (Z12 39)   2  History of Colon cancer screening (V76 51) (Z12 11)   3  History of Difficulty chewing (783 3) (R63 3)   4  History of anemia (V12 3) (Z86 2)   5  History of iron deficiency anemia (V12 3) (Z86 2)   6  History of paroxysmal atrial tachycardia (V12 59) (Z86 79)   7  History of urinary tract infection (V13 02) (Z87 440)   8  History of Other screening mammogram (V76 12) (Z12 31)   9  History of Pain, dental (525 9) (K08 8)   10  History of Swelling of calf (729 81) (M79 89)   11  History of Urinary frequency (788 41) (R35 0)   12  History of Weight loss (783 21) (R63 4)    Surgical History    1  History of Anterior Colporrhaphy, Repair Of Cystocele   2  History of Appendectomy   3  History of Cataract Surgery   4  History of Hysterectomy   5  History of Knee Replacement   6   History of Oophorectomy   7  History of Open Treatment Intertrochanteric Fracture, Intramed Implant   8  History of Rectocele Repair    Family History    1  Family history of Kidney Cancer (V16 51)    2  Family history of Alcohol Abuse   3  Family history of Cirrhosis   4  Family history of Drug Use    5  Family history of Alcohol Abuse   6  Family history of Diabetes Mellitus (V18 0)   7  Family history of Drug Use   8  Family history of Stroke Syndrome (V17 1)    9  Denied: Family history of Coronary Artery Disease    Social History    · Denied: History of Alcohol Use (History)   · Daily Coffee Consumption (2  Cups/Day)   · Daily Tea Consumption (1  Cups/Day)   · Never A Smoker   · Work History    Current Meds   1  Acetaminophen 325 MG Oral Tablet Recorded   2  Carafate 1 GM/10ML Oral Suspension Recorded   3  Dicyclomine HCl - 10 MG Oral Capsule Recorded   4  Docusate Sodium 100 MG Oral Tablet; TAKE 1 TABLET DAILY AS DIRECTED; Therapy: 41VNZ9350 to (Evaluate:91Jqs8388); Last Rx:21Jul2015 Ordered   5  Hydrochlorothiazide 25 MG Oral Tablet; TAKE 1 TABLET EVERY DAY; Therapy: 94XZZ2145 to (Evaluate:23Jna9347); Last Rx:13Jan2016 Ordered   6  Levothyroxine Sodium 88 MCG Oral Tablet; TAKE 1 TABLET x 6 days, 2 tabs 1 day; Therapy: 03UWK9979 to (Evaluate:01Jun2016)  Requested for: 65QBC7220; Last   Rx:03Mar2016 Ordered   7  Meclizine HCl - 12 5 MG Oral Tablet; TAKE 1 TABLET 3 TIMES DAILY AS NEEDED; Therapy: 26HUQ0797 to (Evaluate:10Oct2015)  Requested for: 13Apr2015; Last   Rx:13Apr2015 Ordered   8  Omeprazole 40 MG Oral Capsule Delayed Release Recorded   9  Pravastatin Sodium 20 MG Oral Tablet; TAKE 1 TABLET DAILY AT BEDTIME  Requested   for: 93MDP6784; Last Rx:13Jan2016 Ordered   10  Senokot TABS Recorded   11  Verapamil HCl  MG Oral Tablet Extended Release; Take 1 tablet daily  Requested    for: 10Aug2015; Last Rx:10Aug2015 Ordered   12  Vitamin D3 2000 UNIT Oral Capsule; TAKE 1 CAPSULE ONCE DAILY;     Therapy: 67HKT5993 to (Amilcar Altamirano)  Requested for: 21MMT3965; Last    Rx:12Aut6515 Ordered    Allergies    1  Aspirin Buffered TABS   2  Codeine Sulfate TABS   3  Penicillins    Vitals  Vital Signs [Data Includes: Current Encounter]    Recorded: 34PUY5004 03:07PM   Temperature 97 7 F   Heart Rate 86   Respiration 18   Systolic 291   Diastolic 82   Height 4 ft 9 8 in   Weight 116 lb    BMI Calculated 24 41   BSA Calculated 1 44     Physical Exam    Constitutional   General appearance: No acute distress, well appearing and well nourished  Eyes   Conjunctiva and lids: No swelling, erythema or discharge  Ears, Nose, Mouth, and Throat   External inspection of ears and nose: Normal     Oropharynx: Normal with no erythema, edema, exudate or lesions  Pulmonary   Auscultation of lungs: Clear to auscultation  Cardiovascular   Auscultation of heart: Normal rate and rhythm, normal S1 and S2, without murmurs  Examination of extremities for edema and/or varicosities: Abnormal   bilateral ankle ~U+ pitting edema  Abdomen   Abdomen: Non-tender, no masses  Liver and spleen: No hepatomegaly or splenomegaly  Lymphatic   Palpation of lymph nodes in neck: No lymphadenopathy  Musculoskeletal   Gait and station: Normal     Skin   Skin and subcutaneous tissue: Normal without rashes or lesions  Neurologic   Cranial nerves: Cranial nerves 2-12 intact      Psychiatric   Orientation to person, place, and time: Normal          Future Appointments    Date/Time Provider Specialty Site   05/16/2016 01:30 PM Nahed Jensen DO Internal Medicine [facility]   06/01/2016 01:10 PM Magdalena Welsh DO Orthopedic Surgery [address]     Signatures   Electronically signed by : SMITHA Stephens ,DO; Mar  9 2016  3:56PM EST                       (Author)

## 2018-01-11 NOTE — RESULT NOTES
Verified Results  (1) VITAMIN D 25-HYDROXY 46EPY2994 06:15PM Jesus Pabon   This assay is a certified procedure of the CDC Vitamin D Standardization Certification Program (VDSCP)    Deficiency <20ng/ml  Insufficiency 20-30ng/ml  Sufficient  ng/ml    *Patients undergoing fluorescein dye angiography may retain small amounts of fluorescein in the body for 48-72 hours post procedure  Samples containing fluorescein can produce falsely elevated Vitamin D values  If the patient had this procedure, a specimen should be resubmitted post fluorescein clearance  Test Name Result Flag Reference   VIT D 25-HYDROX 34 7 ng/mL  30 0-100 0     (1) T4, FREE 15JMD7086 01:45PM yletari Pak     Test Name Result Flag Reference   T4,FREE 1 30 ng/dL  0 76-1 46     (1) TSH 54BYN9255 11:26AM [employer] Mellisa   Patients undergoing fluorescein dye angiography may retain small amounts of fluorescein in the body for 48-72 hours post procedure  Samples containing fluorescein can produce falsely depressed TSH values  If the patient had this procedure,a specimen should be resubmitted post fluorescein clearance  The recommended reference ranges for TSH during pregnancy are as follows:  First trimester 0 1 to 2 5 uIU/mL  Second trimester  0 2 to 3 0 uIU/mL  Third trimester 0 3 to 3 0 uIU/m     Test Name Result Flag Reference   TSH 6 865 uIU/mL H 0 358-3 740     (1) LIPID PANEL, FASTING 00UII4988 11:26AM [employer] Mellisa   Triglyceride:         Normal              <150 mg/dl       Borderline High    150-199 mg/dl       High               200-499 mg/dl       Very High          >499 mg/dl  Cholesterol:         Desirable        <200 mg/dl      Borderline High  200-239 mg/dl      High             >239 mg/dl  HDL Cholesterol:        High    >59 mg/dL      Low     <41 mg/dL  LDL CALCULATED:    This screening LDL is a calculated result    It does not have the accuracy of the Direct Measured LDL in the monitoring of patients with hyperlipidemia and/or statin therapy  Direct Measure LDL (ICF890) must be ordered separately in these patients       Test Name Result Flag Reference   CHOLESTEROL 163 mg/dL     HDL,DIRECT 56 mg/dL  40-60   LDL CHOLESTEROL CALCULATED 76 mg/dL  0-100   TRIGLYCERIDES 157 mg/dL H <=150

## 2018-01-12 NOTE — RESULT NOTES
Verified Results  (1) TSH WITH FT4 REFLEX 20Apr2017 02:54PM Mike Otero    Order Number: [id number]     Test Name Result Flag Reference   TSH 0 355 uIU/mL L 0 358-3 740   A FT4 has been ordered      Patients undergoing fluorescein dye angiography may retain small amounts of fluorescein in the body for 48-72 hours post procedure  Samples containing fluorescein can produce falsely depressed TSH values  If the patient had this procedure,a specimen should be resubmitted post fluorescein clearance            The recommended reference ranges for TSH during pregnancy are as follows:  First trimester 0 1 to 2 5 uIU/mL  Second trimester  0 2 to 3 0 uIU/mL  Third trimester 0 3 to 3 0 uIU/m

## 2018-01-12 NOTE — RESULT NOTES
Message   let patient know - Chest x-ray results are back  there is a hiatal hernia on x-ray but lungs are clear on x-ray  if patient is feeling fine, no further follow up testing is needed for this, thanks     Verified Results  * XR CHEST PA & LATERAL 01AEM6408 12:36PM Gladis Hayes   TW Order Number: [id number]   Performing Comments: 81 y/o F with abnl finding on imaging while in hospital -> tree in bud appearance in left lung apex for follow up CXR     Test Name Result Flag Reference   XR CHEST PA & LATERAL (Report)     CHEST      INDICATION: Follow-up of prior abnormal chest x-ray  Previously with mild vascular congestion     COMPARISON: 12/9/2015     VIEWS: Frontal and lateral projections; 2 images     FINDINGS: There is a better depth of inspiration on today's examination, without residual atelectasis or pulmonary congestion  There is no significant pleural effusion  Hiatal hernia is noted     Cardiomediastinal silhouette appears unremarkable  There is no pneumothorax     Exaggerated kyphosis of thoracic spine present       IMPRESSION:     No acute pulmonary disease  Hiatal hernia       Signed by:   Pina Frost MD   2/2/16

## 2018-01-13 NOTE — RESULT NOTES
Verified Results  (1) TSH 28Apr2016 10:48AM Vinetta Salt   TW Order Number: [id number]    TW Order Number: [id number] Order Number: [id number]        The recommended reference ranges for TSH during pregnancy are as follows:  First trimester 0 1 to 2 5 uIU/mL  Second trimester  0 2 to 3 0 uIU/mL  Third trimester 0 3 to 3 0 uIU/m     Test Name Result Flag Reference   TSH 9 059 uIU/mL H 0 358-3 740     (1) COMPREHENSIVE METABOLIC PANEL 07OUS9065 92:27VG [employer]   TW Order Number: [id number]    TW Order Number: [id number] Order Number: [id number]  National Kidney Disease Education Program recommendations are as follows:  GFR calculation is accurate only with a steady state creatinine  Chronic Kidney disease less than 60 ml/min/1 73 sq  meters  Kidney failure less than 15 ml/min/1 73 sq  meters  Test Name Result Flag Reference   GLUCOSE,RANDM 72 mg/dL     If the patient is fasting, the ADA then defines impaired fasting glucose as > 100 mg/dL and diabetes as > or equal to 123 mg/dL     SODIUM 142 mmol/L  136-145   POTASSIUM 4 4 mmol/L  3 5-5 3   CHLORIDE 106 mmol/L  100-108   CARBON DIOXIDE 26 mmol/L  21-32   ANION GAP (CALC) 10 mmol/L  4-13   BLOOD UREA NITROGEN 30 mg/dL H 5-25   CREATININE 1 20 mg/dL  0 60-1 30   Standardized to IDMS reference method   CALCIUM 8 7 mg/dL  8 3-10 1   BILI, TOTAL 0 80 mg/dL  0 20-1 00   ALK PHOSPHATAS 108 U/L     ALT (SGPT) 20 U/L  12-78   AST(SGOT) 25 U/L  5-45   ALBUMIN 3 6 g/dL  3 5-5 0   TOTAL PROTEIN 7 3 g/dL  6 4-8 2   eGFR Non-African American 42 0 ml/min/1 73sq m       (1) T4, FREE 28Apr2016 10:48AM Vinetta Salt   TW Order Number: [id number]    TW Order Number: [id number]     Test Name Result Flag Reference   T4,FREE 0 93 ng/dL  0 76-1 46

## 2018-01-14 NOTE — MISCELLANEOUS
Message   Recorded as Task   Date: 11/30/2017 01:05 PM, Created By: [address] [place] [address]   Task Name: Call Back   Assigned To: Neno Awad   Regarding Patient: Tu Lundy, Status: In Progress   Comment:    Elise Dos Santos - 30 Nov 2017 1:05 PM     TASK CREATED  Caller: Tamiko Alcantara; General Medical Question  PT  IS THERE FOR REHAB  THEY NEED EXACT ORDER FOR B12 SHOT AND WHEN SHE IS DUE FOR THE NEXT ONE  CALL BACK [phone]   Neno Awad - 30 Nov 2017 1:52 PM     TASK REPLIED TO: Previously Assigned To [facility],Team  I can send an order but she adminsters at home  [facility] Defer date for next adminstration to pt's daughter who helps manage her care but B12 IM dose is given every 4 weeks    thanks   Ariadne Rosado - 30 Nov 2017 2:39 PM     TASK  Av  Habib Bourguiba - 30 Nov 2017 2:44 PM     TASK REPLIED TO: Previously Assigned To [facility] with Echo one of the nurses at [facility] who will be contacting patient's daughter to verify when her b12 was last given  Please fax prescription to [phone]          Plan  B12 deficiency    · From  Cyanocobalamin 1000 MCG/ML Injection Solution 1ml given in the  office - Dr Zan Ramirez To Cyanocobalamin 1000 MCG/ML Injection Solution INJECT 1 ML  INTRAMUSCULARLY ONCE AS DIRECTED    Signatures   Electronically signed by : Patrice Harvey DO; Nov 30 2017  2:46PM EST                       (Author)

## 2018-01-15 NOTE — RESULT NOTES
Discussion/Summary   Dear Yasir Helms      Please see thyroid lab results as below  The results look fine  Let's continue with current thyroid medication dose  I hope this letter finds you well  Regards      Jayne Paiz, [address] [facility]   [phone]     Verified Results  (1) TSH WITH FT4 REFLEX 11Oct2017 10:02AM Desiree Ruffin   TW Order Number: [id number]     Test Name Result Flag Reference   TSH 1 021 uIU/mL  0 358-3 740   Patients undergoing fluorescein dye angiography may retain small amounts of fluorescein in the body for 48-72 hours post procedure  Samples containing fluorescein can produce falsely depressed TSH values  If the patient had this procedure,a specimen should be resubmitted post fluorescein clearance            The recommended reference ranges for TSH during pregnancy are as follows:  First trimester 0 1 to 2 5 uIU/mL  Second trimester  0 2 to 3 0 uIU/mL  Third trimester 0 3 to 3 0 uIU/m

## 2018-01-15 NOTE — MISCELLANEOUS
Message  called and spoke to pt's daughter Shira Corona signed consent in pt's chart)    reviewed most recent thyroid blood test results with pt's daughter    patient taking thyroid medication on empty stomach with plain water at 100mcg per day dose  currently staying with other daughter in [address] [place], [place] [address]  - discussed options at this point -> increase thyroid medication dose to 100mcg 6 days per week and 200 mcg on sunday Versus no change in dose  pt's daughter opted for mild dose increase, advised if patient has any symptoms with dose increase to resume previous dose of 100mcg per day  re-check TFTs at time of upcoming appt in august      Plan  Hypothyroidism    · From  Levothyroxine Sodium 100 MCG Oral Tablet TAKE 1 TABLET DAILY To  Levothyroxine Sodium 100 MCG Oral Tablet TAKE 1 TABLET DAILY monday thru  saturday and take 2 tablets on sunday    Verified Results  (1) TSH 53UAI9272 02:13PM [employer]asson    Order Number: [id number]  TW Order Number: [id number]   [Jun 21, 2016 5:51PM Manju Hankins  discussed results with patient's daughter(POA)     Test Name Result Flag Reference   TSH 4 869 uIU/mL H 0 358-3 740   The recommended reference ranges for TSH during pregnancy are as follows:  First trimester 0 1 to 2 5 uIU/mL  Second trimester  0 2 to 3 0 uIU/mL  Third trimester 0 3 to 3 0 uIU/m     (1) T4, FREE 17Jun2016 02:13PM [employer]    Order Number: [id number]  TW Order Number: [id number]   [Jun 21, 2016 5:51PM Manju Hankins  discussed results with patient's daughter(POA)     Test Name Result Flag Reference   T4,FREE 1 16 ng/dL  0 76-1 46       Signatures   Electronically signed by : Flower Alaniz DO; Jun 21 2016  6:00PM EST                       (Author)

## 2018-01-16 NOTE — RESULT NOTES
Verified Results  (1) TSH WITH FT4 REFLEX 20Apr2017 02:54PM Desiree Ruffin    Order Number: [id number]     Test Name Result Flag Reference   TSH 0 355 uIU/mL L 0 358-3 740   A FT4 has been ordered      Patients undergoing fluorescein dye angiography may retain small amounts of fluorescein in the body for 48-72 hours post procedure  Samples containing fluorescein can produce falsely depressed TSH values  If the patient had this procedure,a specimen should be resubmitted post fluorescein clearance            The recommended reference ranges for TSH during pregnancy are as follows:  First trimester 0 1 to 2 5 uIU/mL  Second trimester  0 2 to 3 0 uIU/mL  Third trimester 0 3 to 3 0 uIU/m   T4,FREE 1 57 ng/dL H 0 76-1 46

## 2018-01-16 NOTE — RESULT NOTES
Message   please see if you can get Ms Roselia Curran daughter on phone with me   there are additional thyroid blood test results that came back later I would like to discuss with pt's daughter, thanks     Verified Results  (1) TSH WITH FT4 REFLEX 10Oct2016 01:22PM Sally Amadorbrenda Order Number: [id number]     Test Name Result Flag Reference   TSH 3 771 uIU/mL H 0 358-3 740   A FT4 has been ordered      Patients undergoing fluorescein dye angiography may retain small amounts of fluorescein in the body for 48-72 hours post procedure  Samples containing fluorescein can produce falsely depressed TSH values  If the patient had this procedure,a specimen should be resubmitted post fluorescein clearance            The recommended reference ranges for TSH during pregnancy are as follows:  First trimester 0 1 to 2 5 uIU/mL  Second trimester  0 2 to 3 0 uIU/mL  Third trimester 0 3 to 3 0 uIU/m   T4,FREE 1 54 ng/dL H 0 76-1 46

## 2018-01-17 NOTE — MISCELLANEOUS
Message  called and spoke to pt's daughter (per pt's request during appt today)    reviewed most recent blood test results with Patient's daughter over phone    pt not drinking enough H20 "as usual" per daughter    no other complaints    plan - daughter requests to continue current levothyroxine dose for now   daughter(POA) requests labs faxed to pt's cardiologist/Dr Lucas, pt has appt with him in 3-4 days   pt's daughter appreciative of phone call         Verified Results  (1) TSH WITH FT4 REFLEX 10Oct2016 01:22PM J[facility] Pac Order Number: [id number]     Test Name Result Flag Reference   TSH 3 771 uIU/mL H 0 358-3 740   A FT4 has been ordered      Patients undergoing fluorescein dye angiography may retain small amounts of fluorescein in the body for 48-72 hours post procedure  Samples containing fluorescein can produce falsely depressed TSH values  If the patient had this procedure,a specimen should be resubmitted post fluorescein clearance  The recommended reference ranges for TSH during pregnancy are as follows:  First trimester 0 1 to 2 5 uIU/mL  Second trimester  0 2 to 3 0 uIU/mL  Third trimester 0 3 to 3 0 uIU/m     (1) COMPREHENSIVE METABOLIC PANEL [id number] 05:48XZ Jade Pac Order Number: [id number]     Test Name Result Flag Reference   GLUCOSE,RANDM 94 mg/dL     If the patient is fasting, the ADA then defines impaired fasting glucose as > 100 mg/dL and diabetes as > or equal to 123 mg/dL     SODIUM 141 mmol/L  136-145   POTASSIUM 4 1 mmol/L  3 5-5 3   CHLORIDE 105 mmol/L  100-108   CARBON DIOXIDE 26 mmol/L  21-32   ANION GAP (CALC) 10 mmol/L  4-13   BLOOD UREA NITROGEN 22 mg/dL  5-25   CREATININE 1 46 mg/dL H 0 60-1 30   Standardized to IDMS reference method   CALCIUM 9 1 mg/dL  8 3-10 1   BILI, TOTAL 0 80 mg/dL  0 20-1 00   ALK PHOSPHATAS 142 U/L H    ALT (SGPT) 22 U/L  12-78   AST(SGOT) 34 U/L  5-45   ALBUMIN 3 2 g/dL L 3 5-5 0   TOTAL PROTEIN 7 6 g/dL  6 4-8 2   eGFR Non-African American 33 4 ml/min/1 73sq m     [facility] Disease Education Program recommendations are as follows:  GFR calculation is accurate only with a steady state creatinine  Chronic Kidney disease less than 60 ml/min/1 73 sq  meters  Kidney failure less than 15 ml/min/1 73 sq  meters  (1) CBC/PLT/DIFF 10Oct2016 01:22PM Africa Townsend    Order Number: [id number]     Test Name Result Flag Reference   WBC COUNT 4 67 Thousand/uL  4 31-10 16   RBC COUNT 3 72 Million/uL L 3 81-5 12   HEMOGLOBIN 11 7 g/dL  11 5-15 4   HEMATOCRIT 36 3 %  34 8-46  1   MCV 98 fL  82-98   MCH 31 5 pg  26 8-34 3   MCHC 32 2 g/dL  31 4-37 4   RDW 13 4 %  11 6-15 1   MPV 9 5 fL  8 9-12 7   PLATELET COUNT 479 Thousands/uL  149-390   NEUTROPHILS RELATIVE PERCENT 75 %  43-75   LYMPHOCYTES RELATIVE PERCENT 19 %  14-44   MONOCYTES RELATIVE PERCENT 5 %  4-12   EOSINOPHILS RELATIVE PERCENT 1 %  0-6   BASOPHILS RELATIVE PERCENT 0 %  0-1   NEUTROPHILS ABSOLUTE COUNT 3 48 Thousands/?L  1 85-7 62   LYMPHOCYTES ABSOLUTE COUNT 0 89 Thousands/?L  0 60-4 47   MONOCYTES ABSOLUTE COUNT 0 24 Thousand/?L  0 17-1 22   EOSINOPHILS ABSOLUTE COUNT 0 05 Thousand/?L  0 00-0 61   BASOPHILS ABSOLUTE COUNT 0 01 Thousands/?L  0 00-0 10   - Patient Instructions: This bloodwork is non-fasting  Please drink two glasses of water morning of bloodwork  - Patient Instructions: This bloodwork is non-fasting  Please drink two glasses of water morning of bloodwork         Signatures   Electronically signed by : Geoff Fonseca DO; Oct 10 2016  6:38PM EST                       (Author)

## 2018-02-05 NOTE — TELEPHONE ENCOUNTER
Will send Rx to pharmacy for B-12 injection  [facility] will come to patients home while she is recovering from surgery so she does not miss her monthly injection    Last injection was in January

## 2018-02-16 PROBLEM — B37.0 ORAL THRUSH: Status: ACTIVE | Noted: 2018-01-01

## 2018-02-16 NOTE — TELEPHONE ENCOUNTER
Can order clotrimazole cruz for thrush, taken 5x per day for 10-14 days    pls confirm [facility]   in [place], [place]?

## 2018-02-16 NOTE — TELEPHONE ENCOUNTER
Juaquin from [address] called stating that patient has thrush in her mouth  Can they get a rx? It is on her tongue, cheeks, and throat  No other symptoms present

## 2018-02-20 PROBLEM — E78.00 HYPERCHOLESTEREMIA: Status: ACTIVE | Noted: 2018-01-01

## 2018-02-20 NOTE — PROGRESS NOTES
Patient Name:  Corrinne Push  MRN:  [id number]    [address] [place] [address]    Right nondisplaced distal femur fracture adjacent to long trochanteric femoral nail 11/23/17  1  Advance to weight-bearing as tolerated right lower extremity  Discontinue knee immobilizer  2  Continue physical therapy  3  Follow-up in two months for repeat evaluation with repeat x-rays of the right femur  Subjective    80year-old female returns to the office today for follow-up regarding her Right nondisplaced distal femur fracture adjacent to long trochanteric femoral nail 11/23/17  Today she denies any significant right leg pain  She states she has been compliant with the partial weight-bearing restriction  She denies numbness and tingling  She was participating in physical therapy however was recently hospitalized for pneumonia and RSV and has not had therapy in a week or two  She notes mild calf discomfort  No fevers or chills  Objective    /81   Pulse 92   LMP  (LMP Unknown)     Right leg:  No gross deformity  Skin intact  No erythema ecchymosis or swelling  No tenderness to palpation along the distal femur  Minimal discomfort to palpation posterior calf  No calf swelling erythema or palpable cord  Knee range of motion includes full extension and flexion to 110° without discomfort  Stable to varus and valgus stress  Data Review    I have personally reviewed pertinent films in PACS, and my interpretation follows  X-rays performed today of the right femur reveals no interval displacement or angulation about the fracture  Interval callus formation noted        Scribe Attestation    I,:   Tamera Hagen PA-C am acting as a scribe while in the presence of the attending physician :        I,:   Beni Pinto MD personally performed the services described in this documentation    as scribed in my presence :

## 2018-02-20 NOTE — PATIENT INSTRUCTIONS
1  Thyroid blood work with next month's INR  2  Atorvastatin instead of pravastatin  3  Cardiology appointment  4  Return in 3 months or sooner if questions    Fall Prevention   AMBULATORY CARE:   Fall prevention  includes ways to make your home and other areas safer  It also includes ways you can move more carefully to prevent a fall  Health conditions that cause changes in your blood pressure, vision, or muscle strength and coordination may increase your risk for falls  Medicines may also increase your risk for falls if they make you dizzy, weak, or sleepy  Call 911 or have someone else call if:   · You have fallen and are unconscious  · You have fallen and cannot move part of your body  Contact your healthcare provider if:   · You have fallen and have pain or a headache  · You have questions or concerns about your condition or care  Fall prevention tips:   · Stand or sit up slowly  This may help you keep your balance and prevent falls  · Use assistive devices as directed  Your healthcare provider may suggest that you use a cane or walker to help you keep your balance  You may need to have grab bars put in your bathroom near the toilet or in the shower  · Wear shoes that fit well and have soles that   Wear shoes both inside and outside  Use slippers with good   Do not wear shoes with high heels  · Wear a personal alarm  This is a device that allows you to call 911 if you fall and need help  Ask your healthcare provider for more information  · Stay active  Exercise can help strengthen your muscles and improve your balance  Your healthcare provider may recommend water aerobics or walking  He or she may also recommend physical therapy to improve your coordination  Never start an exercise program without talking to your healthcare provider first      · Manage your medical conditions  Keep all appointments with your healthcare providers  Visit your eye doctor as directed         Home safety tips:   · Add items to prevent falls in the bathroom  Put nonslip strips on your bath or shower floor to prevent you from slipping  Use a bath mat if you do not have carpet in the bathroom  This will prevent you from falling when you step out of the bath or shower  Use a shower seat so you do not need to stand while you shower  Sit on the toilet or a chair in your bathroom to dry yourself and put on clothing  This will prevent you from losing your balance from drying or dressing yourself while you are standing  · Keep paths clear  Remove books, shoes, and other objects from walkways and stairs  Place cords for telephones and lamps out of the way so that you do not need to walk over them  Tape them down if you cannot move them  Remove small rugs  If you cannot remove a rug, secure it with double-sided tape  This will prevent you from tripping  · Install bright lights in your home  Use night lights to help light paths to the bathroom or kitchen  Always turn on the light before you start walking  · Keep items you use often on shelves within reach  Do not use a step stool to help you reach an item  · Paint or place reflective tape on the edges of your stairs  This will help you see the stairs better  Follow up with your healthcare provider as directed:  Write down your questions so you remember to ask them during your visits  © 2017 [address] Information is for End User's use only and may not be sold, redistributed or otherwise used for commercial purposes  All illustrations and images included in CareNotes® are the copyrighted property of A D A M , Inc  or [employer]  The above information is an  only  It is not intended as medical advice for individual conditions or treatments  Talk to your doctor, nurse or pharmacist before following any medical regimen to see if it is safe and effective for you

## 2018-02-20 NOTE — PROGRESS NOTES
Assessment/Plan:     Diagnoses and all orders for this visit:    Oral thrush  Comments:  improved, continue 1 week of clotrimazole cruz    Atrial fibrillation, unspecified type ([address] )  Comments:  on warfarin(no bleeding sx), BB  Denies CP/SOB, deu for BW and cardiology appt next week    Hypothyroidism, unspecified type  Comments:  due for repeat TFTs with next INR BW in 1 month    Hypercholesteremia  Comments:  pt's daughter requests atorvastatin instead of pravastatin due to cost, rx reviewed and changed  Orders:  -     atorvastatin (LIPITOR) 20 mg tablet; Take 1 tablet (20 mg total) by mouth daily          Subjective:      Patient ID: Micheline Wallace is a 80 y o  female  HPI     Here for follow up, here with daughters during appt  Had fall in Nov 2018 and fractured her right femur, admitted to hospital but treated conservatively, no surgery  Sent to acute rehab at [facility] where per pt's daughter, Bowen Pfeiffer did not have a good experience  Went home to stay with daughter in [place] but was re-admitted to [facility] with RSV and pneumonia  rec'd abx(doxycycline) and prednisone which helped jovana to recover but ended up with abnormally high INR(9) and oral thrush  Treated with nystatin swish and swallow by her Rehab dr, daughter asked for an rx from me & I ordered clotrimazole cruz rx  Feeling better now, still having some tongue irritation and burning  Walking on foot/leg and without leg/knee brace, using walker    Having some leg swelling and fluid retention since being admitted to [facility], wasn't given diuretics all thru this time & lost 5+ lbs since leaving hospital after re-starting diuretics  Had BW to be done today for cardiology f/u appt next week  Denies any other complaints    Social History     Social History    Marital status:       Spouse name: N/A    Number of children: N/A    Years of education: N/A     Occupational History    [employer]      Social History Main Topics    Smoking status: Never Smoker    Smokeless tobacco: Never Used    Alcohol use No    Drug use: No    Sexual activity: No     Other Topics Concern    Not on file     Social History Narrative    DAILY COFFEE CONSUMPTION 2 CUPS  A DAY    TEA CONSUMPTION 1 CUP A DAY     Past Medical History:   Diagnosis Date    Anemia     pernicious    Anemia of chronic disease 11/27/2017    Arthritis     Cardiac disease     CHF (congestive heart failure) (HCC)     Hiatal hernia     HTN (hypertension)     Hypercholesteremia     Hypothyroidism      Vitals:    02/20/18 1536   BP: 116/80   Pulse: 94   Resp: 18   Temp: 98 °F (36 7 °C)   SpO2: 98%   Weight: 59 2 kg (130 lb 9 6 oz)   Height: 5' (1 524 m)       Current Outpatient Prescriptions:     acetaminophen (TYLENOL) 325 mg tablet, Take 3 tablets by mouth every 8 (eight) hours, Disp: 30 tablet, Rfl: 0    albuterol (2 5 mg/3 mL) 0 083 % nebulizer solution, INHALE THE CONTENTS OF 1 VIAL BY NEBULIZATION FOUR TIMES DAILY, Disp: , Rfl: 0    bisacodyl (DULCOLAX) 10 mg suppository, Insert into the rectum, Disp: , Rfl:     Cholecalciferol (VITAMIN D) 2000 UNITS CAPS, Take 2,000 Units by mouth daily  , Disp: , Rfl:     Cyanocobalamin 1000 MCG/ML KIT, Inject 1 mL (1,000 mcg total) as directed every 30 (thirty) days, Disp: 1 kit, Rfl: 1    Docusate Sodium 100 MG capsule, Take 1 tablet by mouth daily, Disp: , Rfl:     fluticasone (FLONASE) 50 mcg/act nasal spray, into each nostril, Disp: , Rfl:     furosemide (LASIX) 20 mg tablet, Take 40 mg by mouth every other day  , Disp: , Rfl:     levothyroxine 75 mcg tablet, Take 88 mcg by mouth daily  , Disp: , Rfl:     meclizine (ANTIVERT) 12 5 MG tablet, Take 1 tablet by mouth 3 (three) times a day as needed, Disp: , Rfl:     metoprolol tartrate (LOPRESSOR) 25 mg tablet, Take 25 mg by mouth every 12 (twelve) hours, Disp: , Rfl:     omeprazole (PriLOSEC) 40 MG capsule, Take 40 mg by mouth daily, Disp: , Rfl:    potassium chloride (K-DUR,KLOR-CON) 20 mEq tablet, Take 10 mEq by mouth every other day  , Disp: , Rfl:     warfarin (COUMADIN) 1 mg tablet, Take 2 5 mg by mouth daily Dosing is based on INR  , Disp: , Rfl:     aluminum-magnesium hydroxide-simethicone (MAALOX MAX) 400-400-40 MG/5ML suspension, Take by mouth, Disp: , Rfl:     atorvastatin (LIPITOR) 20 mg tablet, Take 1 tablet (20 mg total) by mouth daily, Disp: 90 tablet, Rfl: 1    clotrimazole (MYCELEX) 10 mg cruz, Take 1 tablet (10 mg total) by mouth 5 (five) times a day for 14 days, Disp: 70 tablet, Rfl: 0    cyanocobalamin 1,000 mcg/mL, INJECT 1ML AS DIRECTED EVERY 30 DAYS, Disp: , Rfl: 0    dicyclomine (BENTYL) 10 mg capsule, Take by mouth, Disp: , Rfl:     guaiFENesin (MUCINEX) 600 mg 12 hr tablet, Take by mouth, Disp: , Rfl:     magnesium hydroxide (MILK OF MAGNESIA) 400 mg/5 mL oral suspension, Take by mouth, Disp: , Rfl:     oxyCODONE (ROXICODONE) 5 mg immediate release tablet, Take by mouth, Disp: , Rfl:     polyethylene glycol (MIRALAX) powder, Take by mouth, Disp: , Rfl:   Allergies   Allergen Reactions    Ace Inhibitors Angioedema     Annotation - [id number]: angioedema to ARB  Other reaction(s): Angioedema  Pt and family state not an allergy    Angiotensin Receptor Blockers Angioedema    Erythromycin Ethylsuccinate GI Intolerance    Losartan      Other reaction(s): Angioedema  Pt and family state this is not an allergy      Aspirin Dermatitis and Edema    Codeine Edema    Erythromycin Base Other (See Comments)    Ibuprofen Other (See Comments)     Patient denies allergy    Penicillins Hives and Edema         Review of Systems   Constitutional: Negative for fever  HENT: Negative for congestion  Eyes: Negative for visual disturbance  Respiratory: Negative for cough and shortness of breath  Cardiovascular: Positive for leg swelling (but improved)  Gastrointestinal: Negative for abdominal pain     Genitourinary: Negative for difficulty urinating  Musculoskeletal: Positive for arthralgias (s/p right femur fx but improved)  Skin: Negative for rash  Neurological: Negative for light-headedness  Psychiatric/Behavioral: Negative for behavioral problems and dysphoric mood  Objective:      /80   Pulse 94   Temp 98 °F (36 7 °C)   Resp 18   Ht 5' (1 524 m)   Wt 59 2 kg (130 lb 9 6 oz)   LMP  (LMP Unknown)   SpO2 98%   BMI 25 51 kg/m²          Physical Exam   Constitutional: She is oriented to person, place, and time  She appears well-developed and well-nourished  HENT:   Head: Normocephalic and atraumatic  Mild erythema of tongue but no whitish plaques on buccal mucosa   Eyes: Conjunctivae are normal  Pupils are equal, round, and reactive to light  Cardiovascular: Normal rate and normal heart sounds  An irregularly irregular rhythm present  No murmur heard  Pulmonary/Chest: Effort normal and breath sounds normal  She has no wheezes  She has no rales  Abdominal: Soft  Bowel sounds are normal  There is no tenderness  Musculoskeletal: She exhibits edema (<1+ edema of both ankles)  Neurological: She is alert and oriented to person, place, and time  Psychiatric: She has a normal mood and affect  Her behavior is normal    Vitals reviewed

## 2018-02-23 NOTE — TELEPHONE ENCOUNTER
Daughter said PT is still at sister's house  Please send March's Vitamin B12 shot to [facility] on [id number] in [place] like last month

## 2018-04-11 PROBLEM — D50.0 IRON DEFICIENCY ANEMIA DUE TO CHRONIC BLOOD LOSS: Status: ACTIVE | Noted: 2018-01-01

## 2018-04-11 NOTE — LETTER
April 11, 2018     SILVANA [address]res[address]  [address] [facility],[address]  [place] [address]    Patient: Erlin Pérez   YOB: 1923   Date of Visit: 4/11/2018       Dear Dr Maury Walsh:    Thank you for referring Erlin Pérez to me for evaluation  Below are my notes for this consultation  If you have questions, please do not hesitate to call me  I look forward to following your patient along with you  Sincerely,        Carlota Stark DO        CC: No Recipients  Carlota Stark DO  4/11/2018  2:02 PM  Sign at close encounter  Erlin Pérez  [birth year]  [address] [place] [address]    Chief Complaint   Patient presents with    Follow-up            No history exists  History of Present Illness:  -Loco Ziegler DO:  -Previous Therapy (if not listed in Oncology History):  -Current Therapy (if not listed in Oncology History):  -Disease Status:  -Interval History:  -Tumor Markers:    Review of Systems:  Review of Systems   Constitutional: Negative for appetite change, diaphoresis, fatigue and fever  HENT: Negative for sinus pain  Eyes: Negative for discharge  Respiratory: Negative for cough and shortness of breath  Cardiovascular: Negative for chest pain  Gastrointestinal: Negative for abdominal pain, constipation and diarrhea  Endocrine: Negative for cold intolerance  Genitourinary: Negative for difficulty urinating and hematuria  Musculoskeletal: Negative for joint swelling  Skin: Negative for rash  Allergic/Immunologic: Negative for environmental allergies  Neurological: Negative for dizziness and headaches  Hematological: Negative for adenopathy  Psychiatric/Behavioral: Negative for agitation         Patient Active Problem List   Diagnosis    Nondisplaced spiral fracture of shaft of right femur, initial encounter for closed fracture ([facility] [address] )    CKD (chronic kidney disease), stage III    Hypothyroidism  Essential hypertension    Atrial fibrillation (HCC)    Anemia of chronic disease    Oral thrush    Hypercholesteremia     Past Medical History:   Diagnosis Date    Anemia     pernicious    Anemia of chronic disease 11/27/2017    Arthritis     Cardiac disease     CHF (congestive heart failure) (HCC)     Hiatal hernia     HTN (hypertension)     Hypercholesteremia     Hypothyroidism      Past Surgical History:   Procedure Laterality Date    APPENDECTOMY      CATARACT EXTRACTION Bilateral     CATARACT EXTRACTION      HIP FRACTURE SURGERY Right 12/2015    HYSTERECTOMY      OOPHORECTOMY      REPAIR RECTOCELE      REPLACEMENT TOTAL KNEE Right      Family History   Problem Relation Age of Onset    Kidney cancer Mother     Alcohol abuse Father     Cirrhosis Father      LIVER    Drug abuse Father     Alcohol abuse Brother     Diabetes Brother     Drug abuse Brother     Stroke Brother     Coronary artery disease Family      Social History     Social History    Marital status:       Spouse name: N/A    Number of children: N/A    Years of education: N/A     Occupational History    [employer]      Social History Main Topics    Smoking status: Never Smoker    Smokeless tobacco: Never Used    Alcohol use No    Drug use: No    Sexual activity: No     Other Topics Concern    Not on file     Social History Narrative    DAILY COFFEE CONSUMPTION 2 CUPS  A DAY    TEA CONSUMPTION 1 CUP A DAY       Current Outpatient Prescriptions:     acetaminophen (TYLENOL) 325 mg tablet, Take 3 tablets by mouth every 8 (eight) hours, Disp: 30 tablet, Rfl: 0    albuterol (2 5 mg/3 mL) 0 083 % nebulizer solution, INHALE THE CONTENTS OF 1 VIAL BY NEBULIZATION FOUR TIMES DAILY, Disp: , Rfl: 0    aluminum-magnesium hydroxide-simethicone (MAALOX MAX) 400-400-40 MG/5ML suspension, Take by mouth, Disp: , Rfl:     atorvastatin (LIPITOR) 20 mg tablet, Take 1 tablet (20 mg total) by mouth daily, Disp: 90 tablet, Rfl: 1    bisacodyl (DULCOLAX) 10 mg suppository, Insert into the rectum, Disp: , Rfl:     Cholecalciferol (VITAMIN D) 2000 UNITS CAPS, Take 2,000 Units by mouth daily  , Disp: , Rfl:     cyanocobalamin 1,000 mcg/mL, INJECT 1ML AS DIRECTED EVERY 30 DAYS, Disp: , Rfl: 0    Cyanocobalamin 1000 MCG/ML KIT, Inject 1 mL (1,000 mcg total) as directed every 30 (thirty) days, Disp: 1 kit, Rfl: 0    dicyclomine (BENTYL) 10 mg capsule, Take by mouth, Disp: , Rfl:     Docusate Sodium 100 MG capsule, Take 1 tablet by mouth daily, Disp: , Rfl:     fluticasone (FLONASE) 50 mcg/act nasal spray, into each nostril, Disp: , Rfl:     furosemide (LASIX) 20 mg tablet, Take 40 mg by mouth every other day  , Disp: , Rfl:     guaiFENesin (MUCINEX) 600 mg 12 hr tablet, Take by mouth, Disp: , Rfl:     levothyroxine 75 mcg tablet, Take 88 mcg by mouth daily  , Disp: , Rfl:     magnesium hydroxide (MILK OF MAGNESIA) 400 mg/5 mL oral suspension, Take by mouth, Disp: , Rfl:     meclizine (ANTIVERT) 12 5 MG tablet, Take 1 tablet by mouth 3 (three) times a day as needed, Disp: , Rfl:     metoprolol tartrate (LOPRESSOR) 25 mg tablet, Take 25 mg by mouth every 12 (twelve) hours, Disp: , Rfl:     omeprazole (PriLOSEC) 40 MG capsule, Take 40 mg by mouth daily, Disp: , Rfl:     oxyCODONE (ROXICODONE) 5 mg immediate release tablet, Take by mouth, Disp: , Rfl:     polyethylene glycol (MIRALAX) powder, Take by mouth, Disp: , Rfl:     potassium chloride (K-DUR,KLOR-CON) 20 mEq tablet, Take 10 mEq by mouth every other day  , Disp: , Rfl:     warfarin (COUMADIN) 1 mg tablet, Take 2 5 mg by mouth daily Dosing is based on INR  , Disp: , Rfl:   Allergies   Allergen Reactions    Ace Inhibitors Angioedema     Annotation - [id number]: angioedema to ARB  Other reaction(s): Angioedema   Pt and family state not an allergy    Angiotensin Receptor Blockers Angioedema    Erythromycin Ethylsuccinate GI Intolerance    Losartan Other reaction(s): Angioedema  Pt and family state this is not an allergy      Aspirin Dermatitis and Edema    Codeine Edema    Erythromycin Base Other (See Comments)    Ibuprofen Other (See Comments)     Patient denies allergy    Penicillins Hives and Edema     Vitals:    04/11/18 1348   BP: 128/72   Pulse: 80   Resp: 16   Temp: 98 2 °F (36 8 °C)         Physical Exam   Constitutional: She is oriented to person, place, and time  She appears well-developed  HENT:   Head: Normocephalic  Eyes: Pupils are equal, round, and reactive to light  Neck: Neck supple  Cardiovascular: Normal rate  No murmur heard  Pulmonary/Chest: No respiratory distress  She has no wheezes  She has no rales  Abdominal: Soft  She exhibits no distension  There is no tenderness  There is no rebound  Musculoskeletal: She exhibits no edema  Lymphadenopathy:     She has no cervical adenopathy  Neurological: She is alert and oriented to person, place, and time  She displays normal reflexes  Skin: Skin is warm  No rash noted  Psychiatric: She has a normal mood and affect  Thought content normal            Performance Status: ECOG/Zubrod/WHO: 2 - Symptomatic, <50% confined to bed    Labs:  CBC, Coags, BMP, Mg, Phos     Imaging  No results found  I reviewed the above laboratory and imaging data  Discussion/Summary:  A 80year-old female history of iron deficiency and, sometimes, B12 deficiency  She had been hospitalized a few months ago after fracture of the right femur  Fortunately, no surgical intervention was required  She did not require red blood cell transfusion  Recent blood work shows depressed iron saturation  Feraheme 510 mg IV x2 doses is arranged  B12 supplementation continues on a regular basis  I reviewed the above with the patient and her daughter  They voiced understanding and agreement      Shauna Fry DO  4/11/2018  2:02 PM  Incomplete Alana Sacks  [birth year]  [address] [place] [address] [facility] [place] [id number]    Chief Complaint   Patient presents with    Follow-up            No history exists  History of Present Illness:  -Loco Ziegler DO:  -Previous Therapy (if not listed in Oncology History):  -Current Therapy (if not listed in Oncology History):  -Disease Status:  -Interval History:  -Tumor Markers:    Review of Systems:  Review of Systems   Constitutional: Negative for appetite change, diaphoresis, fatigue and fever  HENT: Negative for sinus pain  Eyes: Negative for discharge  Respiratory: Negative for cough and shortness of breath  Cardiovascular: Negative for chest pain  Gastrointestinal: Negative for abdominal pain, constipation and diarrhea  Endocrine: Negative for cold intolerance  Genitourinary: Negative for difficulty urinating and hematuria  Musculoskeletal: Negative for joint swelling  Skin: Negative for rash  Allergic/Immunologic: Negative for environmental allergies  Neurological: Negative for dizziness and headaches  Hematological: Negative for adenopathy  Psychiatric/Behavioral: Negative for agitation         Patient Active Problem List   Diagnosis    Nondisplaced spiral fracture of shaft of right femur, initial encounter for closed fracture ([facility] [address] )    CKD (chronic kidney disease), stage III    Hypothyroidism    Essential hypertension    Atrial fibrillation (HCC)    Anemia of chronic disease    Oral thrush    Hypercholesteremia     Past Medical History:   Diagnosis Date    Anemia     pernicious    Anemia of chronic disease 11/27/2017    Arthritis     Cardiac disease     CHF (congestive heart failure) (HCC)     Hiatal hernia     HTN (hypertension)     Hypercholesteremia     Hypothyroidism      Past Surgical History:   Procedure Laterality Date    APPENDECTOMY      CATARACT EXTRACTION Bilateral     CATARACT EXTRACTION      HIP FRACTURE SURGERY Right 12/2015    HYSTERECTOMY      OOPHORECTOMY      REPAIR RECTOCELE      REPLACEMENT TOTAL KNEE Right      Family History   Problem Relation Age of Onset    Kidney cancer Mother     Alcohol abuse Father     Cirrhosis Father      LIVER    Drug abuse Father     Alcohol abuse Brother     Diabetes Brother     Drug abuse Brother     Stroke Brother     Coronary artery disease Family      Social History     Social History    Marital status:      Spouse name: N/A    Number of children: N/A    Years of education: N/A     Occupational History    [employer]      Social History Main Topics    Smoking status: Never Smoker    Smokeless tobacco: Never Used    Alcohol use No    Drug use: No    Sexual activity: No     Other Topics Concern    Not on file     Social History Narrative    DAILY COFFEE CONSUMPTION 2 CUPS  A DAY    TEA CONSUMPTION 1 CUP A DAY       Current Outpatient Prescriptions:     acetaminophen (TYLENOL) 325 mg tablet, Take 3 tablets by mouth every 8 (eight) hours, Disp: 30 tablet, Rfl: 0    albuterol (2 5 mg/3 mL) 0 083 % nebulizer solution, INHALE THE CONTENTS OF 1 VIAL BY NEBULIZATION FOUR TIMES DAILY, Disp: , Rfl: 0    aluminum-magnesium hydroxide-simethicone (MAALOX MAX) 400-400-40 MG/5ML suspension, Take by mouth, Disp: , Rfl:     atorvastatin (LIPITOR) 20 mg tablet, Take 1 tablet (20 mg total) by mouth daily, Disp: 90 tablet, Rfl: 1    bisacodyl (DULCOLAX) 10 mg suppository, Insert into the rectum, Disp: , Rfl:     Cholecalciferol (VITAMIN D) 2000 UNITS CAPS, Take 2,000 Units by mouth daily  , Disp: , Rfl:     cyanocobalamin 1,000 mcg/mL, INJECT 1ML AS DIRECTED EVERY 30 DAYS, Disp: , Rfl: 0    Cyanocobalamin 1000 MCG/ML KIT, Inject 1 mL (1,000 mcg total) as directed every 30 (thirty) days, Disp: 1 kit, Rfl: 0    dicyclomine (BENTYL) 10 mg capsule, Take by mouth, Disp: , Rfl:     Docusate Sodium 100 MG capsule, Take 1 tablet by mouth daily, Disp: , Rfl:     fluticasone (FLONASE) 50 mcg/act nasal spray, into each nostril, Disp: , Rfl:     furosemide (LASIX) 20 mg tablet, Take 40 mg by mouth every other day  , Disp: , Rfl:     guaiFENesin (MUCINEX) 600 mg 12 hr tablet, Take by mouth, Disp: , Rfl:     levothyroxine 75 mcg tablet, Take 88 mcg by mouth daily  , Disp: , Rfl:     magnesium hydroxide (MILK OF MAGNESIA) 400 mg/5 mL oral suspension, Take by mouth, Disp: , Rfl:     meclizine (ANTIVERT) 12 5 MG tablet, Take 1 tablet by mouth 3 (three) times a day as needed, Disp: , Rfl:     metoprolol tartrate (LOPRESSOR) 25 mg tablet, Take 25 mg by mouth every 12 (twelve) hours, Disp: , Rfl:     omeprazole (PriLOSEC) 40 MG capsule, Take 40 mg by mouth daily, Disp: , Rfl:     oxyCODONE (ROXICODONE) 5 mg immediate release tablet, Take by mouth, Disp: , Rfl:     polyethylene glycol (MIRALAX) powder, Take by mouth, Disp: , Rfl:     potassium chloride (K-DUR,KLOR-CON) 20 mEq tablet, Take 10 mEq by mouth every other day  , Disp: , Rfl:     warfarin (COUMADIN) 1 mg tablet, Take 2 5 mg by mouth daily Dosing is based on INR  , Disp: , Rfl:   Allergies   Allergen Reactions    Ace Inhibitors Angioedema     Annotation - [id number]: angioedema to ARB  Other reaction(s): Angioedema  Pt and family state not an allergy    Angiotensin Receptor Blockers Angioedema    Erythromycin Ethylsuccinate GI Intolerance    Losartan      Other reaction(s): Angioedema  Pt and family state this is not an allergy      Aspirin Dermatitis and Edema    Codeine Edema    Erythromycin Base Other (See Comments)    Ibuprofen Other (See Comments)     Patient denies allergy    Penicillins Hives and Edema     Vitals:    04/11/18 1348   BP: 128/72   Pulse: 80   Resp: 16   Temp: 98 2 °F (36 8 °C)         Physical Exam   Constitutional: She is oriented to person, place, and time  She appears well-developed  HENT:   Head: Normocephalic  Eyes: Pupils are equal, round, and reactive to light  Neck: Neck supple  Cardiovascular: Normal rate  No murmur heard  Pulmonary/Chest: No respiratory distress  She has no wheezes  She has no rales  Abdominal: Soft  She exhibits no distension  There is no tenderness  There is no rebound  Musculoskeletal: She exhibits no edema  Lymphadenopathy:     She has no cervical adenopathy  Neurological: She is alert and oriented to person, place, and time  She displays normal reflexes  Skin: Skin is warm  No rash noted  Psychiatric: She has a normal mood and affect  Thought content normal            Performance Status: ECOG/Zubrod/WHO: 2 - Symptomatic, <50% confined to bed    Labs:  CBC, Coags, BMP, Mg, Phos     Imaging  No results found  I reviewed the above laboratory and imaging data        Discussion/Summary:

## 2018-04-11 NOTE — PROGRESS NOTES
Brody Render  [birth year]  [address] [facility]  [facility]Replaced [facility] [id number]    Chief Complaint   Patient presents with    Follow-up            No history exists  History of Present Illness:  -Osiris Harrell DO:  -Previous Therapy (if not listed in Oncology History):  -Current Therapy (if not listed in Oncology History):  -Disease Status:  -Interval History:  -Tumor Markers:    Review of Systems:  Review of Systems   Constitutional: Negative for appetite change, diaphoresis, fatigue and fever  HENT: Negative for sinus pain  Eyes: Negative for discharge  Respiratory: Negative for cough and shortness of breath  Cardiovascular: Negative for chest pain  Gastrointestinal: Negative for abdominal pain, constipation and diarrhea  Endocrine: Negative for cold intolerance  Genitourinary: Negative for difficulty urinating and hematuria  Musculoskeletal: Negative for joint swelling  Skin: Negative for rash  Allergic/Immunologic: Negative for environmental allergies  Neurological: Negative for dizziness and headaches  Hematological: Negative for adenopathy  Psychiatric/Behavioral: Negative for agitation         Patient Active Problem List   Diagnosis    Nondisplaced spiral fracture of shaft of right femur, initial encounter for closed fracture ([facility] [address]    CKD (chronic kidney disease), stage III    Hypothyroidism    Essential hypertension    Atrial fibrillation (HCC)    Anemia of chronic disease    Oral thrush    Hypercholesteremia     Past Medical History:   Diagnosis Date    Anemia     pernicious    Anemia of chronic disease 11/27/2017    Arthritis     Cardiac disease     CHF (congestive heart failure) (HCC)     Hiatal hernia     HTN (hypertension)     Hypercholesteremia     Hypothyroidism      Past Surgical History:   Procedure Laterality Date    APPENDECTOMY      CATARACT EXTRACTION Bilateral     CATARACT EXTRACTION  HIP FRACTURE SURGERY Right 12/2015    HYSTERECTOMY      OOPHORECTOMY      REPAIR RECTOCELE      REPLACEMENT TOTAL KNEE Right      Family History   Problem Relation Age of Onset    Kidney cancer Mother     Alcohol abuse Father     Cirrhosis Father      LIVER    Drug abuse Father     Alcohol abuse Brother     Diabetes Brother     Drug abuse Brother     Stroke Brother     Coronary artery disease Family      Social History     Social History    Marital status:      Spouse name: N/A    Number of children: N/A    Years of education: N/A     Occupational History    [employer]      Social History Main Topics    Smoking status: Never Smoker    Smokeless tobacco: Never Used    Alcohol use No    Drug use: No    Sexual activity: No     Other Topics Concern    Not on file     Social History Narrative    DAILY COFFEE CONSUMPTION 2 CUPS  A DAY    TEA CONSUMPTION 1 CUP A DAY       Current Outpatient Prescriptions:     acetaminophen (TYLENOL) 325 mg tablet, Take 3 tablets by mouth every 8 (eight) hours, Disp: 30 tablet, Rfl: 0    albuterol (2 5 mg/3 mL) 0 083 % nebulizer solution, INHALE THE CONTENTS OF 1 VIAL BY NEBULIZATION FOUR TIMES DAILY, Disp: , Rfl: 0    aluminum-magnesium hydroxide-simethicone (MAALOX MAX) 400-400-40 MG/5ML suspension, Take by mouth, Disp: , Rfl:     atorvastatin (LIPITOR) 20 mg tablet, Take 1 tablet (20 mg total) by mouth daily, Disp: 90 tablet, Rfl: 1    bisacodyl (DULCOLAX) 10 mg suppository, Insert into the rectum, Disp: , Rfl:     Cholecalciferol (VITAMIN D) 2000 UNITS CAPS, Take 2,000 Units by mouth daily  , Disp: , Rfl:     cyanocobalamin 1,000 mcg/mL, INJECT 1ML AS DIRECTED EVERY 30 DAYS, Disp: , Rfl: 0    Cyanocobalamin 1000 MCG/ML KIT, Inject 1 mL (1,000 mcg total) as directed every 30 (thirty) days, Disp: 1 kit, Rfl: 0    dicyclomine (BENTYL) 10 mg capsule, Take by mouth, Disp: , Rfl:     Docusate Sodium 100 MG capsule, Take 1 tablet by mouth daily, Disp: , Rfl:     fluticasone (FLONASE) 50 mcg/act nasal spray, into each nostril, Disp: , Rfl:     furosemide (LASIX) 20 mg tablet, Take 40 mg by mouth every other day  , Disp: , Rfl:     guaiFENesin (MUCINEX) 600 mg 12 hr tablet, Take by mouth, Disp: , Rfl:     levothyroxine 75 mcg tablet, Take 88 mcg by mouth daily  , Disp: , Rfl:     magnesium hydroxide (MILK OF MAGNESIA) 400 mg/5 mL oral suspension, Take by mouth, Disp: , Rfl:     meclizine (ANTIVERT) 12 5 MG tablet, Take 1 tablet by mouth 3 (three) times a day as needed, Disp: , Rfl:     metoprolol tartrate (LOPRESSOR) 25 mg tablet, Take 25 mg by mouth every 12 (twelve) hours, Disp: , Rfl:     omeprazole (PriLOSEC) 40 MG capsule, Take 40 mg by mouth daily, Disp: , Rfl:     oxyCODONE (ROXICODONE) 5 mg immediate release tablet, Take by mouth, Disp: , Rfl:     polyethylene glycol (MIRALAX) powder, Take by mouth, Disp: , Rfl:     potassium chloride (K-DUR,KLOR-CON) 20 mEq tablet, Take 10 mEq by mouth every other day  , Disp: , Rfl:     warfarin (COUMADIN) 1 mg tablet, Take 2 5 mg by mouth daily Dosing is based on INR  , Disp: , Rfl:   Allergies   Allergen Reactions    Ace Inhibitors Angioedema     Annotation - [id number]: angioedema to ARB  Other reaction(s): Angioedema  Pt and family state not an allergy    Angiotensin Receptor Blockers Angioedema    Erythromycin Ethylsuccinate GI Intolerance    Losartan      Other reaction(s): Angioedema  Pt and family state this is not an allergy      Aspirin Dermatitis and Edema    Codeine Edema    Erythromycin Base Other (See Comments)    Ibuprofen Other (See Comments)     Patient denies allergy    Penicillins Hives and Edema     Vitals:    04/11/18 1348   BP: 128/72   Pulse: 80   Resp: 16   Temp: 98 2 °F (36 8 °C)         Physical Exam   Constitutional: She is oriented to person, place, and time  She appears well-developed  HENT:   Head: Normocephalic     Eyes: Pupils are equal, round, and reactive to light  Neck: Neck supple  Cardiovascular: Normal rate  No murmur heard  Pulmonary/Chest: No respiratory distress  She has no wheezes  She has no rales  Abdominal: Soft  She exhibits no distension  There is no tenderness  There is no rebound  Musculoskeletal: She exhibits no edema  Lymphadenopathy:     She has no cervical adenopathy  Neurological: She is alert and oriented to person, place, and time  She displays normal reflexes  Skin: Skin is warm  No rash noted  Psychiatric: She has a normal mood and affect  Thought content normal            Performance Status: ECOG/Zubrod/WHO: 2 - Symptomatic, <50% confined to bed    Labs:  CBC, Coags, BMP, Mg, Phos     Imaging  No results found  I reviewed the above laboratory and imaging data  Discussion/Summary:  A 80year-old female history of iron deficiency and, sometimes, B12 deficiency  She had been hospitalized a few months ago after fracture of the right femur  Fortunately, no surgical intervention was required  She did not require red blood cell transfusion  Recent blood work shows depressed iron saturation  Feraheme 510 mg IV x2 doses is arranged  B12 supplementation continues on a regular basis  I reviewed the above with the patient and her daughter  They voiced understanding and agreement

## 2018-04-11 NOTE — PROGRESS NOTES
[facility]  0/84/7813  [address] [facility]  [facility] [place]  [address] [address]    Chief Complaint   Patient presents with    Follow-up            No history exists  History of Present Illness:  -Willy Worthy, DO:  -Previous Therapy (if not listed in Oncology History):  -Current Therapy (if not listed in Oncology History):  -Disease Status:  -Interval History:  -Tumor Markers:    Review of Systems:  Review of Systems   Constitutional: Negative for appetite change, diaphoresis, fatigue and fever  HENT: Negative for sinus pain  Eyes: Negative for discharge  Respiratory: Negative for cough and shortness of breath  Cardiovascular: Negative for chest pain  Gastrointestinal: Negative for abdominal pain, constipation and diarrhea  Endocrine: Negative for cold intolerance  Genitourinary: Negative for difficulty urinating and hematuria  Musculoskeletal: Negative for joint swelling  Skin: Negative for rash  Allergic/Immunologic: Negative for environmental allergies  Neurological: Negative for dizziness and headaches  Hematological: Negative for adenopathy  Psychiatric/Behavioral: Negative for agitation         Patient Active Problem List   Diagnosis    Nondisplaced spiral fracture of shaft of right femur, initial encounter for closed fracture ([facility] [address] )    CKD (chronic kidney disease), stage III    Hypothyroidism    Essential hypertension    Atrial fibrillation (HCC)    Anemia of chronic disease    Oral thrush    Hypercholesteremia     Past Medical History:   Diagnosis Date    Anemia     pernicious    Anemia of chronic disease 11/27/2017    Arthritis     Cardiac disease     CHF (congestive heart failure) (HCC)     Hiatal hernia     HTN (hypertension)     Hypercholesteremia     Hypothyroidism      Past Surgical History:   Procedure Laterality Date    APPENDECTOMY      CATARACT EXTRACTION Bilateral     CATARACT EXTRACTION  HIP FRACTURE SURGERY Right 12/2015    HYSTERECTOMY      OOPHORECTOMY      REPAIR RECTOCELE      REPLACEMENT TOTAL KNEE Right      Family History   Problem Relation Age of Onset    Kidney cancer Mother     Alcohol abuse Father     Cirrhosis Father      LIVER    Drug abuse Father     Alcohol abuse Brother     Diabetes Brother     Drug abuse Brother     Stroke Brother     Coronary artery disease Family      Social History     Social History    Marital status:      Spouse name: N/A    Number of children: N/A    Years of education: N/A     Occupational History    [employer]      Social History Main Topics    Smoking status: Never Smoker    Smokeless tobacco: Never Used    Alcohol use No    Drug use: No    Sexual activity: No     Other Topics Concern    Not on file     Social History Narrative    DAILY COFFEE CONSUMPTION 2 CUPS  A DAY    TEA CONSUMPTION 1 CUP A DAY       Current Outpatient Prescriptions:     acetaminophen (TYLENOL) 325 mg tablet, Take 3 tablets by mouth every 8 (eight) hours, Disp: 30 tablet, Rfl: 0    albuterol (2 5 mg/3 mL) 0 083 % nebulizer solution, INHALE THE CONTENTS OF 1 VIAL BY NEBULIZATION FOUR TIMES DAILY, Disp: , Rfl: 0    aluminum-magnesium hydroxide-simethicone (MAALOX MAX) 400-400-40 MG/5ML suspension, Take by mouth, Disp: , Rfl:     atorvastatin (LIPITOR) 20 mg tablet, Take 1 tablet (20 mg total) by mouth daily, Disp: 90 tablet, Rfl: 1    bisacodyl (DULCOLAX) 10 mg suppository, Insert into the rectum, Disp: , Rfl:     Cholecalciferol (VITAMIN D) 2000 UNITS CAPS, Take 2,000 Units by mouth daily  , Disp: , Rfl:     cyanocobalamin 1,000 mcg/mL, INJECT 1ML AS DIRECTED EVERY 30 DAYS, Disp: , Rfl: 0    Cyanocobalamin 1000 MCG/ML KIT, Inject 1 mL (1,000 mcg total) as directed every 30 (thirty) days, Disp: 1 kit, Rfl: 0    dicyclomine (BENTYL) 10 mg capsule, Take by mouth, Disp: , Rfl:     Docusate Sodium 100 MG capsule, Take 1 tablet by mouth daily, Disp: , Rfl:     fluticasone (FLONASE) 50 mcg/act nasal spray, into each nostril, Disp: , Rfl:     furosemide (LASIX) 20 mg tablet, Take 40 mg by mouth every other day  , Disp: , Rfl:     guaiFENesin (MUCINEX) 600 mg 12 hr tablet, Take by mouth, Disp: , Rfl:     levothyroxine 75 mcg tablet, Take 88 mcg by mouth daily  , Disp: , Rfl:     magnesium hydroxide (MILK OF MAGNESIA) 400 mg/5 mL oral suspension, Take by mouth, Disp: , Rfl:     meclizine (ANTIVERT) 12 5 MG tablet, Take 1 tablet by mouth 3 (three) times a day as needed, Disp: , Rfl:     metoprolol tartrate (LOPRESSOR) 25 mg tablet, Take 25 mg by mouth every 12 (twelve) hours, Disp: , Rfl:     omeprazole (PriLOSEC) 40 MG capsule, Take 40 mg by mouth daily, Disp: , Rfl:     oxyCODONE (ROXICODONE) 5 mg immediate release tablet, Take by mouth, Disp: , Rfl:     polyethylene glycol (MIRALAX) powder, Take by mouth, Disp: , Rfl:     potassium chloride (K-DUR,KLOR-CON) 20 mEq tablet, Take 10 mEq by mouth every other day  , Disp: , Rfl:     warfarin (COUMADIN) 1 mg tablet, Take 2 5 mg by mouth daily Dosing is based on INR  , Disp: , Rfl:   Allergies   Allergen Reactions    Ace Inhibitors Angioedema     Annotation - [id number]: angioedema to ARB  Other reaction(s): Angioedema  Pt and family state not an allergy    Angiotensin Receptor Blockers Angioedema    Erythromycin Ethylsuccinate GI Intolerance    Losartan      Other reaction(s): Angioedema  Pt and family state this is not an allergy      Aspirin Dermatitis and Edema    Codeine Edema    Erythromycin Base Other (See Comments)    Ibuprofen Other (See Comments)     Patient denies allergy    Penicillins Hives and Edema     Vitals:    04/11/18 1348   BP: 128/72   Pulse: 80   Resp: 16   Temp: 98 2 °F (36 8 °C)         Physical Exam   Constitutional: She is oriented to person, place, and time  She appears well-developed  HENT:   Head: Normocephalic     Eyes: Pupils are equal, round, and reactive to light  Neck: Neck supple  Cardiovascular: Normal rate  No murmur heard  Pulmonary/Chest: No respiratory distress  She has no wheezes  She has no rales  Abdominal: Soft  She exhibits no distension  There is no tenderness  There is no rebound  Musculoskeletal: She exhibits no edema  Lymphadenopathy:     She has no cervical adenopathy  Neurological: She is alert and oriented to person, place, and time  She displays normal reflexes  Skin: Skin is warm  No rash noted  Psychiatric: She has a normal mood and affect  Thought content normal            Performance Status: ECOG/Zubrod/WHO: 2 - Symptomatic, <50% confined to bed    Labs:  CBC, Coags, BMP, Mg, Phos     Imaging  No results found  I reviewed the above laboratory and imaging data        Discussion/Summary:

## 2018-04-13 NOTE — PROGRESS NOTES
Daily Note     Today's date: 2018  Patient name: Abby Yip  : [birth year]  MRN: [id number]  Referring provider: Tanvi Jones PA-C  Dx:   Encounter Diagnosis     ICD-10-CM    1  Closed nondisplaced spiral fracture of shaft of right femur with routine healing, subsequent encounter S72 817D Ambulatory referral to Physical Therapy     PT plan of care cert/re-cert                  Subjective: See IE      Objective: See treatment diary below      Assessment: Tolerated treatment fair  Patient demonstrated fatigue post treatment      Plan: Continue per plan of care        Precautions: HTN, Fall hx, R TKR, ORIF in     Daily Treatment Diary       Manuals             Knee PROM             Consider swelling STM                                                     Exercise Diary             bike             Walking for time             Standing hip abd 2x10            Gastroc ST slant bd             Heel slides             LAQ             Quad set             SLR- visit 3             Sidesteps Ue support             Sit to stand- c foam                                                                                                                                                            Modalities

## 2018-04-13 NOTE — PROGRESS NOTES
PT Evaluation     Today's date: 2018  Patient name: Mark Benson  :   MRN: [id number]  Referring provider: Dariusz Rivera PA-C  Dx:   Encounter Diagnosis     ICD-10-CM    1  Closed nondisplaced spiral fracture of shaft of right femur with routine healing, subsequent encounter S72 236D Ambulatory referral to Physical Therapy                  Assessment  Impairments: abnormal or restricted ROM and impaired physical strength    Patient is not irritable  Assessment details: Patient presents with referring diagnosis and does not need any further referral at this time  HTN was controlled and pitting edema present although it is not a concern at this time  She has most limitations in knee ext/flexion mobility and decreased endurance with WB on RLE  This is affecting her ability to walk for more than 10 minutes  She is at fall risk with objective testing and would be most appropriate to continue walking in rolling walker  Negative prognostic factors: falls, hx of R knee limited motion  Positive prognostic factors: Good motivation and family support  Understanding of Dx/Px/POC: good   Prognosis: good    Goals  STGs  1  Decrease pain by 20% in 2-4 weeks  2  Improve knee ROM by 10 degrees in 2-4 weeks  3  Improve hip strength by 1/3 grade in 2-4 weeks  4  Tug to 25 sec in 4 weeks  LTGs  1  Decrease pain by 60% in 6-8 weeks  2  Improve walking tolerance to >30 minutes in 6-8 weeks  3  Perform reaching and bending activities independently s pain in 6-8 weeks  4  TUG <21 sec in 8 weeks  5  5XSST in 20 sec in 8 weeks      Plan  Patient would benefit from: skilled PT  Planned therapy interventions: manual therapy, therapeutic exercise, stretching, strengthening and neuromuscular re-education  Frequency: 2x week  Duration in weeks: 8  Treatment plan discussed with: patient        Subjective Evaluation    History of Present Illness  Mechanism of injury: She had a fall and fracture 2 years back with IM nixon in R femur  Then she had a fall on 2017 and a fracture to her distal femur  She was standing and reached around the closet and fell backwards and they called 911  She was in rehab in the hospital and is making it up stairs very well  She denies numbness or tingling     Pain  At best pain ratin  At worst pain ratin  Location: anterior and lateral thigh/leg  Quality: dull ache    Social Support  Steps to enter house: yes  3  Lives in: multiple-level home  Lives with: adult children    Hand dominance: right      Diagnostic Tests  X-ray: abnormal        Objective     Active Range of Motion   Left Hip   Flexion: 100 degrees     Right Hip   Flexion: 100 degrees     Additional Active Range of Motion Details  Observation: severe kyphosis  Gait: RW decreased step length LLE after 70ft of walking, slight R trunk lean  Steps: 4 steps BUEs on L rail step to pattern RLE up first  Swelling: RLE 10 sec pitting edema, 7 sec pitting edema  Sit to stand: excessive lumbar flexion and pain in lumbar spine  TU 98 s  Sensation:WNL  Myotomes:WNL     Hip ROM: WNL  Knee flex P/AROM: L 125/121 R 100/86  Knee ext P/AROM: L 2/4 R 12/17  Slump: SLR: 10 reps      Hip strength abd: R 4/5 L 4+/5  Flex: 4+/5

## 2018-04-17 NOTE — PROGRESS NOTES
Daily Note     Today's date: 2018  Patient name: Luma Lawrence  : [birth year]  MRN: [id number]  Referring provider: Yazmin Chun PA-C  Dx:   Encounter Diagnosis     ICD-10-CM    1  Closed nondisplaced spiral fracture of shaft of right femur with routine healing, subsequent encounter S72 624D                   Subjective: She states she felt sore a day after last visit  She is still having some difficulty bending her knee  Objective: See treatment diary below      Assessment: Tolerated treatment well  Patient demonstrated fatigue post treatment  She was able to do sit to stands s UE support  Had some difficulty with R PROM knee flexion  Plan: Continue per plan of care        Precautions: HTN, Fall hx, R TKR, ORIF in     Daily Treatment Diary       Manuals            Knee PROM R and hip ROM   10'           Consider swelling STM                                                     Exercise Diary             bike  6'           Walking for time  nv           Standing hip abd 2x10 2x10           Gastroc ST slant bd  3x :20           Heel slides  30x           LAQ- B  20x            Quad set             SLR- visit 3             Sidesteps Ue support  5 laps-slow           Sit to stand- c foam  2x10                                                                                                                                                          Modalities

## 2018-04-20 NOTE — PROGRESS NOTES
Daily Note     Today's date: 2018  Patient name: Abby Yip  :   MRN: [id number]  Referring provider: Tanvi Jones PA-C  Dx:   Encounter Diagnosis     ICD-10-CM    1  Closed nondisplaced spiral fracture of shaft of right femur with routine healing, subsequent encounter S72 556D                   Subjective: She states she felt sore a day after last visit  She is still having some difficulty bending her knee  She will see MD on Tuesday  Objective: See treatment diary below      Assessment: Tolerated treatment well  Patient demonstrated fatigue post treatment  She was able to do sit to stands s UE support  Had some difficulty with R PROM knee flexion  Plan: Continue per plan of care        Precautions: HTN, Fall hx, R TKR, ORIF in     Daily Treatment Diary       Manuals           Knee PROM R and hip ROM   10' 12'          Consider swelling STM                                                     Exercise Diary             bike  6' 8'          Walking for time  nv 1' 45s          Standing hip abd 2x10 2x10 2x10 #2          Gastroc ST slant bd  3x :20 7x :05          Heel slides  30x           LAQ- B  20x  20x #2          Quad set             SLR- visit 3             Sidesteps Ue support  5 laps-slow 7 laps          Sit to stand- c foam  2x10 2x10          SLB R/L UE support   5x :10                                                                                                                                            Modalities

## 2018-04-24 NOTE — PROGRESS NOTES
Patient to [facility] / Vitamin B12: Offers no complaints at present time: Lab work 9 03/26/18 ) reviewed: Ferritin level - 26: Left FA PIV initiated without incident

## 2018-04-24 NOTE — PROGRESS NOTES
Patient Name:  Magali Vinson  MRN:  [id number]    [address] [place] [address]    Right nondisplaced distal femur fracture adjacent to long trochanteric femoral nail 11/23/17  1  Weightbearing as tolerated right lower extremity  2  Continue physical therapy  3  Activities as tolerated  4  Follow-up in six months for repeat evaluation with repeat x-rays of the right femur  Subjective    80year-old female returns to the office today for follow-up regarding her Right nondisplaced distal femur fracture adjacent to long trochanteric femoral nail 11/23/17  Today she still states she feels overall very well  She does note soreness after therapy  She ambulates with a walker  She takes Tylenol  No numbness or tingling  No fevers or chills  Objective    /81   Pulse 105   Ht 5' (1 524 m)   LMP  (LMP Unknown)     Right lower extremity:  No gross deformity  Mild tenderness to palpation along the distal thigh  No gross instability noted  Knee range of motion is intact and full  Full hip flexion strength and abduction strength  Knee stable to varus and valgus stress  Sensation intact right lower extremity      Data Review    I have personally reviewed pertinent films in PACS, and my interpretation follows  X-rays performed today of the right femur reveals interval healing of the femur fracture   Knee prosthesis and femoral TFN in stable alignment      Scribe Attestation    I,:   Phyllis Ambrocio PA-C am acting as a scribe while in the presence of the attending physician :        I,:   Mick Slaughter MD personally performed the services described in this documentation    as scribed in my presence :

## 2018-04-27 NOTE — PROGRESS NOTES
Daily Note     Today's date: 2018  Patient name: Alana Sacks  :   MRN: [id number]  Referring provider: Ji Delarosa PA-C  Dx:   Encounter Diagnosis     ICD-10-CM    1  Closed nondisplaced spiral fracture of shaft of right femur with routine healing, subsequent encounter S70 606W                   Subjective: She states she did feel soreness after last visit and she was quite sick yesterday but is doing better today  Objective: See treatment diary below      Assessment: Tolerated treatment well  Patient demonstrated fatigue post treatment  She was able to do sit to stands s UE support  PROM painful still in lateral thigh  Held on a few exercises due to soreness  Plan: Continue per plan of care        Precautions: HTN, Fall hx, R TKR, ORIF in     Daily Treatment Diary       Manuals          Knee PROM R and hip ROM   10' 12' 10'         Consider swelling STM                                                     Exercise Diary             bike  6' 8' 6'         Walking for time  nv 1' 45s 1m 30s         Standing hip abd 2x10 2x10 2x10 #2 np         Gastroc ST slant bd  3x :20 7x :05 10x :05         Heel slides  30x           LAQ- B  20x  20x #2 20x #2         Quad set             SLR-     2x10         Sidesteps Ue support  5 laps-slow 7 laps np         Sit to stand- c foam  2x10 2x10 2x8         SLB R/L UE support   5x :10 np                                                                                                                                           Modalities

## 2018-05-01 NOTE — PROGRESS NOTES
Patient to [facility]: Offers no complaints at present time: Lab work ( 03/26/18 ) reviewed: Left FA PIV initiated without incident

## 2018-05-01 NOTE — PROGRESS NOTES
Daily Note     Today's date: 2018  Patient name: Verna Rivera  : [birth year]  MRN: [id number]  Referring provider: Pierce Ren PA-C  Dx:   Encounter Diagnosis     ICD-10-CM    1  Closed nondisplaced spiral fracture of shaft of right femur with routine healing, subsequent encounter S72 573D                   Subjective: Patient states that she is fatigued to begin today's treatment session and continues to experience pain in R hip at night   Objective: See treatment diary below  Precautions: HTN, Fall hx, R TKR, ORIF in     Daily Treatment Diary       Manuals         Knee PROM R and hip ROM   10' 12' 10' 10        Consider swelling STM                                                     Exercise Diary             bike  6' 8' 6' 6'        Walking for time  nv 1' 45s 1m 30s 2:25        Standing hip abd 2x10 2x10 2x10 #2 np 2# x20        Gastroc ST slant bd  3x :20 7x :05 10x :05 :05x10        Heel slides  30x           LAQ- B  20x  20x #2 20x #2 2#x20        Quad set             SLR-     2x10         Sidesteps Ue support  5 laps-slow 7 laps np 5 laps        Sit to stand- c foam  2x10 2x10 2x8 10 no foam        SLB R/L UE support   5x :10 np                                                                                                                                           Modalities                                             Assessment: Tolerated treatment well  Patient exhibited good technique with therapeutic exercises      Plan: Continue per plan of care

## 2018-05-04 NOTE — PROGRESS NOTES
Daily Note     Today's date: 2018  Patient name: Brody Johnson  : [birth year]  MRN: [id number]  Referring provider: Landon Dennis PA-C  Dx:   Encounter Diagnosis     ICD-10-CM    1  Closed nondisplaced spiral fracture of shaft of right femur with routine healing, subsequent encounter S72 708D                   Subjective: Patient states that she was a bit fatigued after last visit but better the next day  Objective: See treatment diary below  Precautions: HTN, Fall hx, R TKR, ORIF in 2016    Daily Treatment Diary       Manuals        Knee PROM R and hip ROM   10' 12' 10' 10 10       Consider swelling STM                                                     Exercise Diary             bike  6' 8' 6' 6' 6'       Walking for time  nv 1' 45s 1m 30s 2:25 np       Standing hip abd 2x10 2x10 2x10 #2 np 2# x20 2# x20       Gastroc ST slant bd  3x :20 7x :05 10x :05 :05x10 :05x 10       Heel slides  30x           LAQ- B  20x  20x #2 20x #2 2#x20 2x20       Quad set             SLR-     2x10         Sidesteps Ue support  5 laps-slow 7 laps np 5 laps        Sit to stand- c foam  2x10 2x10 2x8 10 no foam 15x foam       SLB R/L UE support   5x :10 np         Stairs      2 flights down                                                                                                                            Modalities                                             Assessment: Tolerated treatment well  Patient exhibited good technique with therapeutic exercises  Walked down steps to door this visit and she tolerated this well with breaks in between staircases  Knee flexion still limited  Plan: Continue per plan of care

## 2018-05-08 NOTE — PROGRESS NOTES
PT Evaluation     Today's date: 2018  Patient name: Jesus Meade  :   MRN: [id number]  Referring provider: Emmanuelle Hugo PA-C  Dx:   Encounter Diagnosis     ICD-10-CM    1  Closed nondisplaced spiral fracture of shaft of right femur with routine healing, subsequent encounter S77 201V                   Assessment  Impairments: abnormal or restricted ROM and impaired physical strength    Patient is not irritable  Assessment details: Patient presents with improvements of knee ROM, walking speed and endurance  She still does have limitations in knee ROM primarily ext, Sit to stand speed, and swelling in BLEs  She would benefit from continued PT to maximize motion and function around her home  Thank you again for the kind referral    Understanding of Dx/Px/POC: good   Prognosis: good    Goals  STGs  1  Decrease pain by 20% in 2-4 weeks  2  Improve knee ROM by 10 degrees in 2-4 weeks  -partially met  3  Improve hip strength by 1/3 grade in 2-4 weeks  - partially met  4  Tug to 25 sec in 4 weeks  -met    LTGs  1  Decrease pain by 60% in 6-8 weeks  2  Improve walking tolerance to >30 minutes in 6-8 weeks  3  Perform reaching and bending activities independently s pain in 6-8 weeks  4  TUG <21 sec in 8 weeks  5  5XSST in 20 sec in 8 weeks  Plan  Patient would benefit from: skilled PT  Planned therapy interventions: manual therapy, therapeutic exercise, stretching, strengthening and neuromuscular re-education  Frequency: 2x week  Duration in weeks: 8  Treatment plan discussed with: patient        Subjective Evaluation    History of Present Illness  Mechanism of injury: She had a fall and fracture 2 years back with IM nixon in R femur  Then she had a fall on 2017 and a fracture to her distal femur  She was standing and reached around the closet and fell backwards and they called 911  She was in rehab in the hospital and is making it up stairs very well  She denies numbness or tingling  Pain  Current pain ratin  At best pain ratin  At worst pain ratin  Location: anterior and lateral thigh/leg  Quality: dull ache    Social Support  Steps to enter house: yes  3  Lives in: multiple-level home  Lives with: adult children    Hand dominance: right      Diagnostic Tests  X-ray: abnormal        Objective     Active Range of Motion   Left Hip   Flexion: 100 degrees     Right Hip   Flexion: 100 degrees     Additional Active Range of Motion Details  Observation: severe kyphosis  Gait: RW decreased step length LLE after 70ft of walking, slight R trunk lean  Steps: 2 flights steps BUEs on L rail step to pattern RLE up first  Swelling: RLE 11 sec pitting edema, 7 sec pitting edema  Sit to stand: excessive lumbar flexion and pain in lumbar spine  TU 37 sec with RW  5XSST: 28 sec forward trunk lean  Sensation:WNL   Myotomes:WNL     Hip ROM: WNL  Knee flex P/AROM: L 125/121 R 102/94  Knee ext P/AROM: L 2/4 R 12/17  Slump: SLR: 10 reps 20 degree lag     Hip strength abd: R 4-/5 L 4+/5  Flex: 4+/5      Flowsheet Rows      Most Recent Value   PT/OT G-Codes   Assessment Type  Re-evaluation   G code set  Mobility: Walking & Moving Around   Mobility: Walking and Moving Around Current Status ()  CK   Mobility: Walking and Moving Around Goal Status ()  CJ

## 2018-05-08 NOTE — PROGRESS NOTES
Daily Note     Today's date: 2018  Patient name: Emile Montejo  :   MRN: [id number]  Referring provider: Bandar Ziegler PA-C  Dx:   Encounter Diagnosis     ICD-10-CM    1  Closed nondisplaced spiral fracture of shaft of right femur with routine healing, subsequent encounter S72 346D PT plan of care cert/re-cert                  Subjective: Patient states that she was a bit fatigued after last visit but better the next day  Objective: See treatment diary below  Precautions: HTN, Fall hx, R TKR, ORIF in 2016    Daily Treatment Diary       Manuals       Knee PROM R and hip ROM   10' 12' 10' 10 10 10'      Consider swelling STM                                                     Exercise Diary             bike  6' 8' 6' 6' 6' 6'      Walking for time  nv 1' 45s 1m 30s 2:25 np       Standing hip abd 2x10 2x10 2x10 #2 np 2# x20 2# x20 #2x20      Gastroc ST slant bd  3x :20 7x :05 10x :05 :05x10 :05x 10 :05x 10      Heel slides  30x           LAQ- B  20x  20x #2 20x #2 2#x20 2x20 #2 x20      Quad set             SLR-     2x10   10x      Sidesteps Ue support  5 laps-slow 7 laps np 5 laps  5 laps      Sit to stand- c foam  2x10 2x10 2x8 10 no foam 15x foam 2x8 no foam      SLB R/L UE support   5x :10 np         Stairs      2 flights down np      Hip abd slidesin supine      nv       Seated clamshells GTB                                                                                                        Modalities                                             Assessment: Hip abd weakness apparent and increase knee ext ROM  Plan: Continue per plan of care

## 2018-05-11 NOTE — PROGRESS NOTES
Daily Note     Today's date: 2018  Patient name: Fabiola Villasenor  :   MRN: [id number]  Referring provider: Fleeta Sever, PA-C  Dx:   No diagnosis found  Subjective: Patient states that she has been in a lot of pain lately due to the knee as well as her R tooth which she hurt while chewing a mint  Objective: See treatment diary below  Precautions: HTN, Fall hx, R TKR, ORIF in 2016    Daily Treatment Diary       Manuals      Knee PROM R and hip ROM   10' 12' 10' 10 10 10' 6'     Consider swelling STM        4'                                             Exercise Diary             bike  6' 8' 6' 6' 6' 6' 6'     Walking for time  nv 1' 45s 1m 30s 2:25 np       Standing hip abd 2x10 2x10 2x10 #2 np 2# x20 2# x20 #2x20 #2x20     Gastroc ST slant bd  3x :20 7x :05 10x :05 :05x10 :05x 10 :05x 10 :05x10     Heel slides  30x           LAQ- B  20x  20x #2 20x #2 2#x20 2x20 #2 x20 #2x20     Quad set             SLR-     2x10   10x 2x10     Sidesteps Ue support  5 laps-slow 7 laps np 5 laps  5 laps 5 laps     Sit to stand- c foam  2x10 2x10 2x8 10 no foam 15x foam 2x8 no foam 2x10 foam     SLB R/L UE support   5x :10 np         Stairs      2 flights down np      Hip abd slidesin supine      nv  4x10     Seated clamshells GTB        2x10 GTB                                                                                                Modalities                                             Assessment: Added in STM to R knee and hip abduction exercises  Continue to progress as tolerated  Plan: Continue per plan of care

## 2018-05-15 NOTE — PROGRESS NOTES
Daily Note     Today's date: 5/15/2018  Patient name: Dank Canchola  :   MRN: [id number]  Referring provider: Georgette Starr PA-C  Dx:   Encounter Diagnosis     ICD-10-CM    1  Closed nondisplaced spiral fracture of shaft of right femur with routine healing, subsequent encounter S72 126D                   Subjective: Patient states that she experiences constant lateral L hip and knee pain  States that she experiences some soreness following her PT treatments   Continues to have the most limitation with hip flexion due to pain  Objective: See treatment diary below    Manuals     Knee PROM R and hip ROM   10' 12' 10' 10 10 10' 6' 8    Consider swelling STM        4'                                             Exercise Diary             bike  6' 8' 6' 6' 6' 6' 6' 6'    Walking for time  nv 1' 45s 1m 30s 2:25 np   2 laps    Standing hip abd 2x10 2x10 2x10 #2 np 2# x20 2# x20 #2x20 #2x20 2# x 20    Gastroc ST slant bd  3x :20 7x :05 10x :05 :05x10 :05x 10 :05x 10 :05x10 :05 x10    Heel slides  30x           LAQ- B  20x  20x #2 20x #2 2#x20 2x20 #2 x20 #2x20 2# x 20     Quad set             SLR-     2x10   10x 2x10     Sidesteps Ue support  5 laps-slow 7 laps np 5 laps  5 laps 5 laps     Sit to stand- c foam  2x10 2x10 2x8 10 no foam 15x foam 2x8 no foam 2x10 foam 2x10    SLB R/L UE support   5x :10 np         Stairs      2 flights down np      Hip abd slidesin supine      nv  4x10 4x10     Seated clamshells GTB        2x10 GTB GTB 2 x 10                                                                                                Modalities                                           Assessment: Tolerated treatment well  Patient exhibited good technique with therapeutic exercises      Plan: Continue per plan of care

## 2018-05-18 NOTE — PROGRESS NOTES
Daily Note     Today's date: 2018  Patient name: Rosemarie Fish  :   MRN: [id number]  Referring provider: Gretel Andrea PA-C  Dx:   Encounter Diagnosis     ICD-10-CM    1  Closed nondisplaced spiral fracture of shaft of right femur with routine healing, subsequent encounter S72 112D                   Subjective: Patient states she still has the knee pain but is feeling alright today  Objective: See treatment diary below      Assessment: Tolerated treatment well she did need few rest breaks  Sit to stand difficult today  Plan: Continue per plan of care         Manuals    Knee PROM R and hip ROM  10' 10' 12' 10' 10 10 10' 6' 8   Consider swelling STM        4'                                         Exercise Diary            bike 6' 6' 8' 6' 6' 6' 6' 6' 6'   Walking for time  nv 1' 45s 1m 30s 2:25 np   2 laps   Standing hip abd 2#2 x10 2x10 2x10 #2 np 2# x20 2# x20 #2x20 #2x20 2# x 20   Gastroc ST slant bd 3x :20 3x :20 7x :05 10x :05 :05x10 :05x 10 :05x 10 :05x10 :05 x10   Heel slides 30x 30x          LAQ- B #2 20x 20x  20x #2 20x #2 2#x20 2x20 #2 x20 #2x20 2# x 20                SLR-  2x10   2x10   10x 2x10    Sidesteps Ue support  5 laps-slow 7 laps np 5 laps  5 laps 5 laps    Sit to stand- c foam 2x10 2x10 2x10 2x8 10 no foam 15x foam 2x8 no foam 2x10 foam 2x10   SLB R/L UE support   5x :10 np        Stairs      2 flights down np     Hip abd slidesin supine 4x10     nv  4x10 4x10    Seated clamshells GTB GTB 2x10       2x10 GTB GTB 2 x 10                                                                                        Modalities

## 2018-05-22 NOTE — PROGRESS NOTES
Assessment/Plan:     Diagnoses and all orders for this visit:    Hypothyroidism, unspecified type  Comments:  BW at goal and pt overall feeling well, c/w TRH at current dose      I apologized about delay in obtaining dental clearance form & spoke with Dr Castellon's(dentist) office who morgan fax request again to pt/s cardiologist to advise/address    Subjective:      Patient ID: Katy Heredia is a 80 y o  female  HPI    Here for follow up  Having dental surgery tomorrow and needs form completed for prior to having dental extraction  Form was faxed to office(sent to central fax) but not rec'd by my office  Reviewed thyroid BW with Carolyn at appt today  Pt taking warfarin which is managed by pt's cardiologist/Dr Lucas  Reviewed most recent BW with Carolyn during today's appt  Going to PT after today's appt  Denies CP/SOB/leg swelling or any other complaints  Past Medical History:   Diagnosis Date    Anemia of chronic disease 11/27/2017    Arthritis     Cardiac disease     CHF (congestive heart failure) (HCC)     Hiatal hernia     Hypercholesteremia     Hypothyroidism      Vitals:    05/22/18 1528   BP: 138/82   Pulse: 96   Resp: 16   SpO2: 98%   Weight: 60 4 kg (133 lb 3 2 oz)   Height: 5' (1 524 m)     Body mass index is 26 01 kg/m²  Current Outpatient Prescriptions:     acetaminophen (TYLENOL) 325 mg tablet, Take 3 tablets by mouth every 8 (eight) hours, Disp: 30 tablet, Rfl: 0    albuterol (2 5 mg/3 mL) 0 083 % nebulizer solution, INHALE THE CONTENTS OF 1 VIAL BY NEBULIZATION FOUR TIMES DAILY, Disp: , Rfl: 0    atorvastatin (LIPITOR) 20 mg tablet, Take 1 tablet (20 mg total) by mouth daily, Disp: 90 tablet, Rfl: 1    bisacodyl (DULCOLAX) 10 mg suppository, Insert into the rectum, Disp: , Rfl:     Cholecalciferol (VITAMIN D) 2000 UNITS CAPS, Take 2,000 Units by mouth daily  , Disp: , Rfl:     cyanocobalamin 1,000 mcg/mL, Inject 1 mL (1,000 mcg total) into the shoulder, thigh, or buttocks every 30 (thirty) days, Disp: 1 mL, Rfl: 0    Cyanocobalamin 1000 MCG/ML KIT, Inject 1 mL (1,000 mcg total) as directed every 30 (thirty) days, Disp: 1 kit, Rfl: 0    dicyclomine (BENTYL) 10 mg capsule, Take by mouth, Disp: , Rfl:     Docusate Sodium 100 MG capsule, Take 1 tablet by mouth daily, Disp: , Rfl:     fluticasone (FLONASE) 50 mcg/act nasal spray, into each nostril, Disp: , Rfl:     furosemide (LASIX) 20 mg tablet, Take 40 mg by mouth every other day  , Disp: , Rfl:     levothyroxine 88 mcg tablet, Take 88 mcg by mouth daily  , Disp: , Rfl:     metoprolol tartrate (LOPRESSOR) 25 mg tablet, Take 25 mg by mouth every 12 (twelve) hours, Disp: , Rfl:     omeprazole (PriLOSEC) 40 MG capsule, Take 1 capsule (40 mg total) by mouth daily, Disp: 90 capsule, Rfl: 1    potassium chloride (K-DUR,KLOR-CON) 20 mEq tablet, Take 10 mEq by mouth every other day  , Disp: , Rfl:     warfarin (COUMADIN) 1 mg tablet, Take 2 5 mg by mouth daily Dosing is based on INR  , Disp: , Rfl:   Allergies   Allergen Reactions    Ace Inhibitors Angioedema     [facility] - [id number]: angioedema to ARB  Other reaction(s): Angioedema  Pt and family state not an allergy    Angiotensin Receptor Blockers Angioedema    Erythromycin Ethylsuccinate GI Intolerance    Losartan      Other reaction(s): Angioedema  Pt and family state this is not an allergy      Aspirin Dermatitis and Edema    Codeine Edema    Erythromycin Base Other (See Comments)    Ibuprofen Other (See Comments)     Patient denies allergy    Penicillins Hives and Edema         Review of Systems   Constitutional: Negative for fever  HENT: Negative for congestion  Eyes: Negative for visual disturbance  Respiratory: Negative for shortness of breath  Cardiovascular: Negative for chest pain and leg swelling  Gastrointestinal: Negative for abdominal pain  Endocrine: Negative for polyuria  Genitourinary: Negative for difficulty urinating     Musculoskeletal: Positive for arthralgias  Neurological: Negative for headaches  Objective:      /82   Pulse 96   Resp 16   Ht 5' (1 524 m)   Wt 60 4 kg (133 lb 3 2 oz)   LMP  (LMP Unknown)   SpO2 98%   BMI 26 01 kg/m²          Physical Exam   Constitutional: She is oriented to person, place, and time  She appears well-developed and well-nourished  HENT:   Head: Normocephalic and atraumatic  Mouth/Throat: Oropharynx is clear and moist    Eyes: Conjunctivae are normal  Pupils are equal, round, and reactive to light  Cardiovascular: Normal rate and normal heart sounds  An irregularly irregular rhythm present  No murmur heard  Pulmonary/Chest: Effort normal and breath sounds normal  She has no wheezes  She has no rales  Abdominal: Soft  Bowel sounds are normal  There is no tenderness  Musculoskeletal: She exhibits no edema  Neurological: She is alert and oriented to person, place, and time  Psychiatric: She has a normal mood and affect  Her behavior is normal    Vitals reviewed        Results for orders placed or performed in visit on 05/18/18   TSH, 3rd generation with T4 reflex   Result Value Ref Range    TSH 3RD GENERATON 2 561 0 358 - 3 740 uIU/mL

## 2018-05-22 NOTE — PROGRESS NOTES
Daily Note     Today's date: 2018  Patient name: Anthony Foley  :   MRN: [id number]  Referring provider: Verenice Velázquez PA-C  Dx:   Encounter Diagnosis     ICD-10-CM    1  Closed nondisplaced spiral fracture of shaft of right femur with routine healing, subsequent encounter S72 370B                   Subjective: Patient states that she experienced soreness/pain for 4+ days following last treatment session  Objective: See treatment diary below    Manuals    Knee PROM R and hip ROM  10' 10' 12' 10' 10 10 10' 6' 8   Consider swelling STM        4'                                         Exercise Diary            bike 6' 6' 8' 6' 6' 6' 6' 6' 6'   Walking for time  2 laps 1' 45s 1m 30s 2:25 np   2 laps   Standing hip abd 2#2 x10 2# 2x10 2x10 #2 np 2# x20 2# x20 #2x20 #2x20 2# x 20   Gastroc ST slant bd 3x :20 3x :20 7x :05 10x :05 :05x10 :05x 10 :05x 10 :05x10 :05 x10   Heel slides 30x 30x          LAQ- B #2 20x 20x  20x #2 20x #2 2#x20 2x20 #2 x20 #2x20 2# x 20                SLR-  2x10 np  2x10   10x 2x10    Sidesteps Ue support   7 laps np 5 laps  5 laps 5 laps    Sit to stand- c foam 2x10 2x10 2x10 2x8 10 no foam 15x foam 2x8 no foam 2x10 foam 2x10   SLB R/L UE support   5x :10 np        Stairs      2 flights down np     Hip abd slidesin supine 4x10 4x10    nv  4x10 4x10    Seated clamshells GTB GTB 2x10 [id number]      2x10 GTB GTB 2 x 10                                                                                        Modalities                                        Assessment: Tolerated treatment fair  Patient demonstrated fatigue post treatment      Plan: Continue per plan of care

## 2018-05-22 NOTE — PATIENT INSTRUCTIONS
1  Continue current thryoid medication dose  2   Return to office in 6 months or sooner if questions

## 2018-05-25 NOTE — PROGRESS NOTES
Daily Note     Today's date: 2018  Patient name: Sharifa Nolan  :   MRN: [id number]  Referring provider: Nilson Yarbrough PA-C  Dx:   Encounter Diagnosis     ICD-10-CM    1  Closed nondisplaced spiral fracture of shaft of right femur with routine healing, subsequent encounter S71 569S                   Subjective: Patient states that she experienced soreness/pain for 2 days after last session  Objective: See treatment diary below    Precautions:     Manuals    Knee PROM R and hip ROM  10' 10' 10' 10' 10 10 10' 6' 8   Consider swelling STM        4'                                         Exercise Diary            bike 6' 6' 6' 6' 6' 6' 6' 6' 6'   Walking for time  2 laps np 1m 30s 2:25 np   2 laps   Standing hip abd 2#2 x10 2# 2x10 2x10  np 2# x20 2# x20 #2x20 #2x20 2# x 20   Gastroc ST slant bd 3x :20 3x :20 3x :20 10x :05 :05x10 :05x 10 :05x 10 :05x10 :05 x10   Heel slides 30x 30x 30x         LAQ- B #2 20x 20x  20x #2 20x #2 2#x20 2x20 #2 x20 #2x20 2# x 20                SLR-  2x10 np 2x10 2x10   10x 2x10    Sidesteps Ue support   7 laps np 5 laps  5 laps 5 laps    Sit to stand- c foam 2x10 2x10 2x10 2x8 10 no foam 15x foam 2x8 no foam 2x10 foam 2x10   SLB R/L UE support   5x :10 np        Stairs      2 flights down np     Hip abd slidesin supine 4x10 4x10 4x10   nv  4x10 4x10    Seated clamshells GTB GTB 2x10 [id number]     2x10 GTB GTB 2 x 10                                                                                        Modalities                                        Assessment: Tolerated treatment fair  Patient demonstrated fatigue post treatment  Held on many TE and intensity due to post exercise soreness  Issued compression sleeve this visit for edema management  Plan: Continue per plan of care

## 2018-06-01 NOTE — PROGRESS NOTES
Daily Note     Today's date: 2018  Patient name: Marysol Hernandez  :   MRN: [id number]  Referring provider: Faustino Nuñez PA-C  Dx:   Encounter Diagnosis     ICD-10-CM    1  Closed nondisplaced spiral fracture of shaft of right femur with routine healing, subsequent encounter S72 344D        Start Time: 5872  Stop Time: 1439  Total time in clinic (min): 40 minutes    Subjective: Patient notes she is doing okay, but still gets sore for 2 days post therapy  Objective: See treatment diary below  Precautions:      Manuals    Knee PROM R and hip ROM  10' 10' 10' 8'  10 10' 6' 8   Consider swelling STM             4'                                                                 Exercise Diary                   bike 10' 6' 6' 6'  6' 6' 6' 6'   Walking for time   2 laps np   np     2 laps   Standing hip abd 2#2 x10 2# 2x10 2x10  2x10  2# x20 #2x20 #2x20 2# x 20   Gastroc ST slant bd 3x :20 3x :20 3x :20 3x 20"  :05x 10 :05x 10 :05x10 :05 x10   Heel slides 30x 30x 30x x20            LAQ- B #2 20x 20x  20x #2 20x 2#  2x20 #2 x20 #2x20 2# x 20                        SLR-  2x10 np 2x10 1x10    10x 2x10     Sidesteps Ue support     7 laps 7 laps    5 laps 5 laps     Sit to stand- c foam 2x10 2x10 2x10 x15  15x foam 2x8 no foam 2x10 foam 2x10   SLB R/L UE support     5x :10 10 x5"            Stairs         2 flights down np       Hip abd slidesin supine 4x10 4x10 4x10 x15  nv   4x10 4x10    Seated clamshells GTB GTB 2x10 [id number]  RTB 2x10       2x10 GTB GTB 2 x 10                                                                                                                                                              Modalities                                                                        Assessment: Tolerated treatment well  Patient demonstrated fatigue post treatment and would benefit from continued PT    Reduces intensity of exercises to attempt to reduce post exercise soreness  Plan: Continue per plan of care

## 2018-06-05 NOTE — PROGRESS NOTES
PT Re-Evaluation     Today's date: 2018  Patient name: Sharifa Nolan  :   MRN: [id number]  Referring provider: Nilson Yarbrough PA-C  Dx:   Encounter Diagnosis     ICD-10-CM    1  Closed nondisplaced spiral fracture of shaft of right femur with routine healing, subsequent encounter S74 277M                   Assessment  Impairments: abnormal or restricted ROM and impaired physical strength  Patient presents with symptom irritability no  Assessment details: Patient has with improvements of walking speed and endurance  She still does have limitations in knee ROM primarily ext, Sit to stand speed, and swelling in BLEs  She does have decreased endurance and is always willing to push despite pain  She would benefit from continued PT with soft tissue mobilization to decrease pain to touch and tolerance in walking in her community  Thank you again for the kind referral    Understanding of Dx/Px/POC: good   Prognosis: good    Goals  STGs  1  Decrease pain by 20% in 2-4 weeks  -met  2  Improve knee ROM by 10 degrees in 2-4 weeks  -partially met  3  Improve hip strength by 1/3 grade in 2-4 weeks  - not met  4  Tug to 18 sec in 4 weeks  -met    LTGs  1  Decrease pain by 60% in 6-8 weeks  2  Improve walking tolerance to >30 minutes in 6-8 weeks  -not met  3  Perform reaching and bending activities independently s pain in 6-8 weeks  4  TUG <21 sec in 8 weeks  -met  5  5XSST in 20 sec in 8 weeks  -not met    Plan  Patient would benefit from: skilled PT  Planned therapy interventions: manual therapy, therapeutic exercise, stretching, strengthening and neuromuscular re-education  Frequency: 2x week  Duration in weeks: 4  Treatment plan discussed with: patient        Subjective Evaluation    History of Present Illness  Mechanism of injury: She had a fall and fracture 2 years back with IM nixon in R femur  Then she had a fall on 2017 and a fracture to her distal femur       Her R shoulder has been having a bit of pain lately but has had pain for a couple years  She feels like she would benefit from cortisone shot  She feels her pain is better as she is able to go to the [employer] walking for 10-15 mins  She feels 80 % better in her leg at this time     Pain  No pain reported  Current pain ratin  At best pain ratin  At worst pain ratin  Location: anterior and lateral thigh/leg  Quality: dull ache    Social Support  Steps to enter house: yes  3  Lives in: multiple-level home  Lives with: adult children    Hand dominance: right      Diagnostic Tests  X-ray: abnormal  Patient Goals  Patient goals for therapy: increased motion and independence with ADLs/IADLs          Objective     Active Range of Motion   Left Hip   Flexion: 100 degrees     Right Hip   Flexion: 100 degrees     Additional Active Range of Motion Details  Observation: severe kyphosis  Gait: RW decreased step length LLE after 70ft of walking, slight R trunk lean  Steps: 2 flights steps BUEs on L rail step to pattern RLE up first (deferred today)  Swelling: RLE 8 sec pitting edema, 7 sec pitting edema    TU 20 sec with RW  5XSST: 26 09 sec forward trunk lean and UE support       Hip ROM: WNL  Knee flex P/AROM: L 125/121 R 100/94  Knee ext P/AROM: L 2/4 R 14/20  : SLR: 10 reps 12 degree lag     Hip strength abd: R 4-/5 L 4+/5  Flex: R 4/5  Palpation: R ITB TTP

## 2018-06-05 NOTE — PROGRESS NOTES
Daily Note     Today's date: 2018  Patient name: Aline Goff  :   MRN: [id number]  Referring provider: Bridget Funez PA-C  Dx:   Encounter Diagnosis     ICD-10-CM    1  Closed nondisplaced spiral fracture of shaft of right femur with routine healing, subsequent encounter S72 519D                   Subjective: Patient states that she is experiencing knee and R shoulder pain to begin today's treatment session and overall is feeling fatigued  Objective: See treatment diary below  Precautions:      Manuals    Knee PROM R and hip ROM  10' 10' 10' 8'  5 10' 6' 8   Consider swelling STM             4'                                                                 Exercise Diary                   bike 10' 10' 6' 6'  6' 6' 6' 6'   Walking for time   2 laps np        2 laps   Standing hip abd 2#2 x10 2# 2x10 2x10  2x10  1x10 #2x20 #2x20 2# x 20   Gastroc ST slant bd 3x :20 3x :20 3x :20 3x 20"  :20 x3 :05x 10 :05x10 :05 x10   Heel slides 30x 30x 30x x20  20x         LAQ- B #2 20x 20x  20x #2 20x 2#  10 #2 x20 #2x20 2# x 20                       SLR-  2x10 np 2x10 1x10  1x10 10x 2x10     Sidesteps Ue support     7 laps 7 laps   5 laps 5 laps     Sit to stand- c foam 2x10 2x10 2x10 x15 x10 x5 2x8 no foam 2x10 foam 2x10   SLB R/L UE support     5x :10 10 x5"           Stairs          np       Hip abd slidesin supine 4x10 4x10 4x10 x15     4x10 4x10    Seated clamshells GTB GTB 2x10 [id number]  RTB 2x10      2x10 GTB GTB 2 x 10                                                                                                                                                       Modalities                                                                     Assessment: Patient did not tolerate treatment well today and requested to leave early due to general fatigue  Plan: Continue per plan of care

## 2018-06-08 NOTE — PROGRESS NOTES
Daily Note     Today's date: 2018  Patient name: Tevin Catalan  :   MRN: [id number]  Referring provider: Basil Ruiz PA-C  Dx:   Encounter Diagnosis     ICD-10-CM    1  Closed nondisplaced spiral fracture of shaft of right femur with routine healing, subsequent encounter S72 813D                   Subjective: See RE       Objective: See treatment diary below  Precautions:      Manuals    Knee PROM R and hip ROM  10' 10' 10' 8' 5 5' 6' 8    R Lateral knee/ITB STM           5' 4'                                                              Exercise Diary                  bike 10' 10' 6' 6' 6' 6' 6' 6'   Walking for time   2 laps np       2 laps   Standing hip abd 2#2 x10 2# 2x10 2x10  2x10 1x10 #2x20 #2x20 2# x 20   Gastroc ST slant bd 3x :20 3x :20 3x :20 3x 20" :20 x3 :05x 10 :05x10 :05 x10   Heel slides 30x 30x 30x x20 20x  20x       LAQ- B #2 20x 20x  20x #2 20x 2# 10 #2 x20 #2x20 2# x 20                      SLR-  2x10 np 2x10 1x10 1x10 2x10 2x10     Sidesteps Ue support     7 laps 7 laps  5 laps 5 laps     Sit to stand- c foam 2x10 2x10 2x10 x15 x5 np 2x10 foam 2x10   SLB R/L UE support     5x :10 10 x5"          Stairs         np       Hip abd slidesin supine 4x10 4x10 4x10 x15    4x10 4x10    Seated clamshells GTB GTB 2x10 [id number]  RTB 2x10   GTB 2x20 2x10 GTB GTB 2 x 10                                                                                                                                         Modalities                                                               Assessment: Patient did not tolerate treatment well today and requested to leave early due to general fatigue  Plan: Continue per plan of care

## 2018-06-12 NOTE — PROGRESS NOTES
Daily Note     Today's date: 2018  Patient name: Anthony Foley  : [birth year]  MRN: [id number]  Referring provider: Verenice Velázquez PA-C  Dx:   Encounter Diagnosis     ICD-10-CM    1  Closed nondisplaced spiral fracture of shaft of right femur with routine healing, subsequent encounter S72 278D                   Subjective: She was not sore after last visit but states she feels a bit tired today  Objective: See treatment diary below  Precautions:      Manuals    Knee PROM R and hip ROM  10' 10' 10' 8' 5 5' 5' 8    R Lateral knee/ITB STM           5' 5'                                                              Exercise Diary                  bike 10' 10' 6' 6' 6' 6' 6' 6'   Walking for time   2 laps np      2 laps 2 laps   Standing hip abd 2#2 x10 2# 2x10 2x10  2x10 1x10 #2x20 #2x20 2# x 20   Gastroc ST slant bd 3x :20 3x :20 3x :20 3x 20" :20 x3 :05x 10 np :05 x10   Heel slides 30x 30x 30x x20 20x  20x       LAQ- B #2 20x 20x  20x #2 20x 2# 10 #2 x20 #2x20 2# x 20                      SLR-  2x10 np 2x10 1x10 1x10 2x10 2x10     Sidesteps Ue support     7 laps 7 laps  5 laps 5 laps     Sit to stand- c foam 2x10 2x10 2x10 x15 x5 np 2x10 foam 2x10   SLB R/L UE support     5x :10 10 x5"          Stairs         np       Hip abd slidesin supine 4x10 4x10 4x10 x15    4x10 4x10    Seated clamshells GTB GTB 2x10 [id number]  RTB 2x10   GTB 2x20 2x10 GTB GTB 2 x 10                                                                                                                                         Modalities                                                               Assessment: Patient tolerated exercises well and did not have pain with MT  Will assess lateral knee pain nv  Plan: Continue per plan of care

## 2018-06-15 NOTE — PROGRESS NOTES
Daily Note     Today's date: 6/15/2018  Patient name: Cheyenne Champagne  :   MRN: [id number]  Referring provider: Froilan Kessler PA-C  Dx:   Encounter Diagnosis     ICD-10-CM    1  Closed nondisplaced spiral fracture of shaft of right femur with routine healing, subsequent encounter S72 344D        Start Time: 1402  Stop Time: 1500  Total time in clinic (min): 58 minutes    Subjective: Pt reports she has no pain before or after session  Objective: See treatment diary below  Precautions:      Manuals 5/18 5/22 5/25 6/1 6/5 6/8 6/12 6/15   Knee PROM R and hip ROM  10' 10' 10' 8' 5 5' 5' 5'    R Lateral knee/ITB STM           5' 5' 5'                                                         Exercise Diary                 bike 10' 10' 6' 6' 6' 6' 6' 6'   Walking for time   2 laps np      2 laps 2 laps   Standing hip abd 2#2 x10 2# 2x10 2x10  2x10 1x10 #2x20 #2x20 #2x20   Gastroc ST slant bd 3x :20 3x :20 3x :20 3x 20" :20 x3 :05x 10 np np   Heel slides 30x 30x 30x x20 20x  20x      LAQ- B #2 20x 20x  20x #2 20x 2# 10 #2 x20 #2x20 #2x20                    SLR-  2x10 np 2x10 1x10 1x10 2x10 2x10 2x10    Sidesteps Ue support     7 laps 7 laps  5 laps 5 laps  5 laps   Sit to stand- c foam 2x10 2x10 2x10 x15 x5 np 2x10 foam np   SLB R/L UE support     5x :10 10 x5"         Stairs         np      Hip abd slidesin supine 4x10 4x10 4x10 x15    4x10 4x10   Seated clamshells GTB GTB 2x10 [id number]  RTB 2x10   GTB 2x20 2x10 GTB 2x10  GTB                                                                                                                                 Modalities                                                            Assessment: Patient tolerated treatment well  Pt unable to complete full revolution on recumbent bike  Pt noted discomfort during right hip prom internal/external rotation  Plan: Continue per plan of care

## 2018-06-19 NOTE — PROGRESS NOTES
Daily Note     Today's date: 2018  Patient name: Natalia Wu  :   MRN: [id number]  Referring provider: Beatrice Raza PA-C  Dx:   Encounter Diagnosis     ICD-10-CM    1  Closed nondisplaced spiral fracture of shaft of right femur with routine healing, subsequent encounter S72 344D        Start Time: 1400  Stop Time: 5831  Total time in clinic (min): 45 minutes    Subjective: Pt reports no pain  Pt states that she fell on Saturday and did not sustain any injuries  Pt did not see MD after fall  Objective: See treatment diary below  Precautions:      Manuals 6/19 5/22 5/25 6/1 6/5 6/8 6/12 6/15   Knee PROM R and hip ROM  5' 10' 10' 8' 5 5' 5' 5'    R Lateral knee/ITB STM 5'         5' 5' 5'                                                      Exercise Diary                bike 10' 10' 6' 6' 6' 6' 6' 6'   Walking for time 2# 2 laps np      2 laps 2 laps   Standing hip abd #2 x20 2# 2x10 2x10  2x10 1x10 #2x20 #2x20 #2x20   Gastroc ST slant bd np 3x :20 3x :20 3x 20" :20 x3 :05x 10 np np   Heel slides  30x 30x x20 20x  20x      LAQ- B #2x20 20x  20x #2 20x 2# 10 #2 x20 #2x20 #2x20                   SLR-  2x10 np 2x10 1x10 1x10 2x10 2x10 2x10    Sidesteps Ue support 5 laps   7 laps 7 laps  5 laps 5 laps  5 laps   Sit to stand- c foam np 2x10 2x10 x15 x5 np 2x10 foam np   SLB R/L UE support    5x :10 10 x5"         Stairs        np      Hip abd slidesin supine S/L  x10  4x10 4x10 x15    4x10 4x10   Seated clamshells GTB 2x10  GTB [id number]  RTB 2x10   GTB 2x20 2x10 GTB 2x10  GTB    SLS Hand support  2x15" (B) CG                                                                                                                     Modalities                                                         Assessment: Patient tolerated treatment fair   DPT informed of pt fall  SLS balance added this session for balance  Pt needed UE and CG to complete exercise   Pt educated about the importance of using RW during ambulation  Plan: Continue per plan of care

## 2018-06-22 NOTE — PROGRESS NOTES
Daily Note     Today's date: 2018  Patient name: Shelia Pierre  :   MRN: [id number]  Referring provider: Gurmeet Rodriguez PA-C  Dx:   Encounter Diagnosis     ICD-10-CM    1  Closed nondisplaced spiral fracture of shaft of right femur with routine healing, subsequent encounter S72 792D                   Subjective: Pt reports she was quite sore the next day after last visit  She thinks the stretching into flexion may have been too much  She also felt nauseated yesterday  Objective: See treatment diary below  Precautions:      Manuals 6/19 6/22 5/25 6/1 6/5 6/8 6/12 6/15   Knee PROM R and hip ROM  5' 5' 10' 8' 5 5' 5' 5'    R Lateral knee/ITB STM 5'  5'       5' 5' 5'                                                      Exercise Diary                bike 10' 10' 6' 6' 6' 6' 6' 6'   Walking for time 2# 2 laps np      2 laps 2 laps   Standing hip abd #2 x20 2# 2x10 2x10  2x10 1x10 #2x20 #2x20 #2x20   Gastroc ST slant bd np 3x :20 3x :20 3x 20" :20 x3 :05x 10 np np   Heel slides  30x 30x x20 20x  20x      LAQ- B #2x20 20x  #2 20x #2 20x 2# 10 #2 x20 #2x20 #2x20                   SLR-  2x10 2x10 2x10 1x10 1x10 2x10 2x10 2x10    Sidesteps Ue support 5 laps  np 7 laps 7 laps  5 laps 5 laps  5 laps   Sit to stand- c foam np np 2x10 x15 x5 np 2x10 foam np                Stairs        np      Hip abd slidesin supine S/L  x10  np 4x10 x15    4x10 4x10   Seated clamshells GTB 2x10  GTB [id number]  RTB 2x10   GTB 2x20 2x10 GTB 2x10  GTB    SLS Hand support  2x15" (B) CG  np                                                                                                                   Modalities                                                         Assessment: Patient tolerated treatment fairly well but many TE held due to fatigue  Her coughing and laughing sounded a bit labored today  Lungs assessed and were WNL  Plan: Continue per plan of care

## 2018-06-26 NOTE — PROGRESS NOTES
Daily Note     Today's date: 2018  Patient name: Sheron Bernard  : 89  MRN: [id number]  Referring provider: Nomi Marquis PA-C  Dx:   Encounter Diagnosis     ICD-10-CM    1  Closed nondisplaced spiral fracture of shaft of right femur with routine healing, subsequent encounter S73 378M        Start Time: 1400          Subjective: Pt reports "I feel sore all over"  Objective: See treatment diary below  Precautions:      Manuals 6/19 6/22 6/26 6/1 6/5 6/8 6/12 6/15   Knee PROM R and hip ROM  5' 5' 5'  8' 5 5' 5' 5'    R Lateral knee/ITB STM 5'  5'      5' 5' 5'                                                   Exercise Diary               bike 10' 10' 6' 6' 6' 6' 6' 6'   Walking for time 2# 2 laps 2 laps      2 laps 2 laps   Standing hip abd #2 x20 2# 2x10 2# 2x10 2x10 1x10 #2x20 #2x20 #2x20   Gastroc ST slant bd np 3x :20  3x 20" :20 x3 :05x 10 np np   Heel slides  30x 30x x20 20x  20x      LAQ- B #2x20 20x  #2 20x 2# 20x 2# 10 #2 x20 #2x20 #2x20                  SLR-  2x10 2x10 2x10 1x10 1x10 2x10 2x10 2x10    Sidesteps Ue support 5 laps  np 5 laps 7 laps  5 laps 5 laps  5 laps   Sit to stand- c foam np np np x15 x5 np 2x10 foam np                Stairs       np      Hip abd slidesin supine S/L  x10  np np x15    4x10 4x10   Seated clamshells GTB 2x10  GTB [id number] np  RTB 2x10   GTB 2x20 2x10 GTB 2x10  GTB    SLS Hand support  2x15" (B) CG  np np                                                                                                           Modalities                                                      Assessment: Patient tolerated treatment fair  Pt was fatigued and some TE was held  Patient reports pain during knee flex/ext PROM  Plan: Continue per plan of care

## 2018-06-29 NOTE — PROGRESS NOTES
Daily Note     Today's date: 2018  Patient name: Angelo Brand  :   MRN: [id number]  Referring provider: Kristi Soliz PA-C  Dx:   Encounter Diagnosis     ICD-10-CM    1  Closed nondisplaced spiral fracture of shaft of right femur with routine healing, subsequent encounter S72 996D                   Subjective: Patient presents without complaints of pain this session, notes "it (RLE) feels ok "      Objective: See treatment diary below  Precautions:      Manuals  6/5 6/8 6/12 6/15   Knee PROM R and hip ROM  5' 5' 5'  10' 5 5' 5' 5'    R Lateral knee/ITB STM 5'  5'      5' 5' 5'                                                   Exercise Diary               bike 10' 10' 6' 6' 6' 6' 6' 6'   Walking for time 2# 2 laps 2 laps 3 laps     2 laps 2 laps   Standing hip abd #2 x20 2# 2x10 2# 2x10  1x10 #2x20 #2x20 #2x20   Gastroc ST slant bd np 3x :20   :20 x3 :05x 10 np np   Heel slides  30x 30x  20x  20x      LAQ- B #2x20 20x  #2 20x 2#  10 #2 x20 #2x20 #2x20                  SLR-  2x10 2x10 2x10  1x10 2x10 2x10 2x10    Sidesteps Ue support 5 laps  np 5 laps 5 laps  5 laps 5 laps  5 laps   Sit to stand- c foam np np np  x5 np 2x10 foam np                Stairs       np      Hip abd slidesin supine S/L  x10  np np     4x10 4x10   Seated clamshells GTB 2x10  GTB [id number] np    GTB 2x20 2x10 GTB 2x10  GTB    SLS Hand support  2x15" (B) CG  np np            Sit to stand      2x5         Standing march      2x10                                                                         Modalities                                                      Assessment: Patient demonstrates good tolerance to intervention as charted, fatigue with new TE as charted  Increased guarding with manual PROM  Will continue per pt tolerance NV  Plan: Continue per plan of care

## 2018-07-03 NOTE — PROGRESS NOTES
Daily Note     Today's date: 7/3/2018  Patient name: Fabiola Villasenor  : [id number]  MRN: [id number]  Referring provider: Fleeta Sever, PA-C  Dx:   Encounter Diagnosis     ICD-10-CM    1  Closed nondisplaced spiral fracture of shaft of right femur with routine healing, subsequent encounter V82 565E                   Subjective: Patient offers no new complaints  Objective: See treatment diary below  Precautions:      Manuals 6/19 6/22 6/26 6/29 7/3 6/8 6/12 6/15   Knee PROM R and hip ROM  5' 5' 5'  10' 10' 5' 5' 5'    R Lateral knee/ITB STM 5'  5'     5' 5' 5'                                                Exercise Diary              bike 10' 10' 6' 6' np 6' 6' 6'   Walking for time 2# 2 laps 2 laps 3 laps 3 laps    2 laps 2 laps   Standing hip abd #2 x20 2# 2x10 2# 2x10  20x #2x20 #2x20 #2x20   Gastroc ST slant bd np 3x :20    :05x 10 np np   Heel slides  30x 30x 30x 30x  20x      LAQ- B #2x20 20x  #2 20x 2#   #2 x20 #2x20 #2x20                  SLR-  2x10 2x10 2x10  10x 2x10 2x10 2x10    Sidesteps Ue support 5 laps  np 5 laps 5 laps 5 laps 5 laps 5 laps  5 laps   Sit to stand- c foam np np np   np 2x10 foam np                Stairs       np      Hip abd slidesin supine S/L  x10  np np     4x10 4x10   Seated clamshells GTB 2x10  GTB [id number] np    GTB 2x20 2x10 GTB 2x10  GTB    SLS Hand support  2x15" (B) CG  np np            Sit to stand      2x5 2x5        Standing march      2x10 2x10                                                                        Modalities                                                    Assessment: Patient tolerated treatment well  TE not progressed this session due to pt reports of soreness from last session  Pt had no difficulty with sit to stands this session  Pt continues to be limited by pain during PROM knee flexion  Plan: Continue per plan of care

## 2018-07-06 NOTE — PROGRESS NOTES
PT Re-Evaluation     Today's date: 2018  Patient name: Rosemarie Fish  :   MRN: [id number]  Referring provider: Gretel Andrea PA-C  Dx:   Encounter Diagnosis     ICD-10-CM    1  Closed nondisplaced spiral fracture of shaft of right femur with routine healing, subsequent encounter S75 546K                   Assessment  Impairments: abnormal or restricted ROM and impaired physical strength  Patient presents with symptom irritability no  Assessment details: Heri Doran has made improvements with walking speed, sit to stand speed, swelling and walking endurance  She still does have limitations in knee ROM primarily ext, decreased hip strength and pain with ROM  She does have limited endurance and would benefit from a following visit to establish HEP and follow up if necessary  Thank you again for the kind referral    Understanding of Dx/Px/POC: good   Prognosis: good    Goals  STGs  1  Decrease pain by 20% in 2-4 weeks  -met  2  Improve knee ROM by 10 degrees in 2-4 weeks  -partially met  3  Improve hip strength by 1/3 grade in 2-4 weeks  - not met  4  Tug to 18 sec in 4 weeks  -not met    LTGs  1  Decrease pain by 60% in 6-8 weeks  2  Improve walking tolerance to >30 minutes in 6-8 weeks  -not met  3  Perform reaching and bending activities independently s pain in 6-8 weeks  -met  4  TUG <21 sec in 8 weeks  -met  5  5XSST in 20 sec in 8 weeks  - met    Plan  Patient would benefit from: skilled PT  Planned therapy interventions: manual therapy, therapeutic exercise, stretching, strengthening and neuromuscular re-education  Frequency: 2x week  Duration in weeks: 4  Treatment plan discussed with: patient        Subjective Evaluation    History of Present Illness  Mechanism of injury: She had a fall and fracture 2 years back with IM nixon in R femur  Then she had a fall on 2017 and a fracture to her distal femur  She states she is sleeping better and not waking up in the middle of the night   She has not been walking outside much due to the heat  Her R shoulder has been having a bit of pain lately but has had pain for a couple years but will consult MD about this first  She feels 80 % better in her leg at this time  Pain  At best pain ratin  At worst pain ratin  Location: anterior and lateral thigh/leg  Quality: dull ache    Social Support  Steps to enter house: yes  3  Lives in: multiple-level home  Lives with: adult children    Hand dominance: right      Diagnostic Tests  X-ray: abnormal  Patient Goals  Patient goals for therapy: increased motion and independence with ADLs/IADLs          Objective     Active Range of Motion   Left Hip   Flexion: 100 degrees     Right Hip   Flexion: 100 degrees     Additional Active Range of Motion Details  Observation: severe kyphosis  Gait: RW slight R trunk lean  Steps: 2 flights steps BUEs on L rail step to pattern RLE up first (deferred today)  Swelling: RLE 5 sec pitting edema, LLE no pitting    TU 58 sec with RW  5XSST: 17 52 sec forward trunk lean and UE support  Walking 2 min 33 sec       Hip ROM: WNL  Knee flex P/AROM: L 125/121 R 101/99  Knee ext P/AROM: L 2/4 R 14/21  : SLR: 10 reps 12 degree lag     Hip strength abd: R 4-/5L 4+/5  Flex: R 4/5 pain  Palpation: R ITB TTP

## 2018-07-06 NOTE — PROGRESS NOTES
Daily Note     Today's date: 2018  Patient name: Marysol Hernandez  :   MRN: [id number]  Referring provider: Faustino Nuñez PA-C  Dx:   Encounter Diagnosis     ICD-10-CM    1  Closed nondisplaced spiral fracture of shaft of right femur with routine healing, subsequent encounter S72 468I                   Subjective:See RE      Objective: See treatment diary below  Precautions:      Manuals 6/19 6/22 6/26 6/29 7/3 7/6 6/12 6/15   Knee PROM R and hip ROM  5' 5' 5'  10' 10' 5' 5' 5'    R Lateral knee/ITB STM MFD trialed 5'  5'     5' 5' 5'                                                Exercise Diary              bike 10' 6' 6' 6' np 6' 6' 6'   Walking for time 2# 2 laps 2 laps 3 laps 3 laps  2' 33"  2 laps 2 laps   Standing hip abd #2 x20 2# 2x10 2# 2x10  20x np #2x20 #2x20   Gastroc ST slant bd np 3x :20    :05x 10 np np   Heel slides  30x 30x 30x 30x  np      LAQ- B #2x20 20x  #2 20x 2#   #2 x20 #2x20 #2x20                  SLR-  2x10 2x10 2x10  10x 2x10 2x10 2x10    Sidesteps Ue support 5 laps  np 5 laps 5 laps 5 laps 5 laps 5 laps  5 laps   Sit to stand- c foam np np np   np 2x10 foam np                Stairs       np      Hip abd slidesin supine S/L  x10  np np     4x10 4x10   Seated clamshells GTB 2x10  GTB [id number] np    GTB 2x20 2x10 GTB 2x10  GTB    SLS Hand support  2x15" (B) CG  np np            Sit to stand      2x5 2x5  2x5      Standing march      2x10 2x10  2x10                                                                      Modalities                                                    Assessment: Patient tolerated treatment well  Blister had burst this visit and was cleaned and put gauze  Plan: Continue per plan of care  NV will be last visit and will update HEP

## 2018-07-13 NOTE — PROGRESS NOTES
PT Discharge     Today's date: 2018  Patient name: Sheron Bernard  :   MRN: [id number]  Referring provider: Nomi Marquis PA-C  Dx:   Encounter Diagnosis     ICD-10-CM    1  Closed nondisplaced spiral fracture of shaft of right femur with routine healing, subsequent encounter S72 918D                   Subjective:She states her blister still is giving her some trouble and she does have a wound which is not fully healed  Objective: See treatment diary below  Precautions:      Manuals 6/19 6/22 6/26 6/29 7/3 7/6 7/13 6/15   Knee PROM R and hip ROM  5' 5' 5'  10' 10' 5' 5' 5'    R Lateral knee/ITB STM MFD trialed 5'  5'     5' 5' 5'                                                Exercise Diary              bike 10' 6' 6' 6' np 6' 6' 6'   Walking for time 2# 2 laps 2 laps 3 laps 3 laps  2' 33"  2 laps 2 laps   Standing hip abd #2 x20 2# 2x10 2# 2x10  20x np #2x20 #2x20   Gastroc ST slant bd np 3x :20    :05x 10 np np   Heel slides  30x 30x 30x 30x  np      LAQ- B #2x20 20x  #2 20x 2#   #2 x20 #2x20 #2x20                  SLR-  2x10 2x10 2x10  10x 2x10 2x10 2x10    Sidesteps Ue support 5 laps  np 5 laps 5 laps 5 laps 5 laps 5 laps  5 laps   Sit to stand- c foam np np np   np 2x10 foam np                Stairs       np      Hip abd slidesin supine S/L  x10  np np     4x10 4x10   Seated clamshells GTB 2x10  GTB [id number] np    GTB 2x20 2x10 GTB 2x10  GTB    SLS Hand support  2x15" (B) CG  np np            Sit to stand      2x5 2x5  2x5      Standing march      2x10 2x10  2x10                                                                      Modalities                                                    Assessment: Patient tolerated treatment well she was updated in HEP  Plan: Continue per plan of care

## 2018-07-27 NOTE — PROGRESS NOTES
Pt  ofers no complaints  B12 admin  IM in [facility] without incident  AVS declined, next appt  Confirmed for 8/12/18

## 2018-08-24 NOTE — PROGRESS NOTES
Pt here for B12 injection  Offers no complaints  Given to left deltoid  Site checked, no bleeding noted  Pt tolerated well  Aware of next appointment  AVS given

## 2018-09-08 NOTE — DISCHARGE INSTRUCTIONS
Diagnosis; fall/ head injury // right 5th rib fracture     - except for ribs/ right chest / right upper back to be sore and achy for next month -- should improve over time    - for pain- over the counter tylenol 500 mg every 4 hrs     - the lidocaine patch needs to be removed in 12 hrs- can use over the counter lidocaine patches to areas as needed    - please use incentive spirometer - 10 times per hr while awake-  - like during commercials when watching tv- daily for as long as ribs hurt- sometimes 2-3 weeks     - please return to  [facility] er for any  Increasing difficulty breathing// any new cough/fevers- or any new/ worsening/concerning symptoms to you     - your coumadin level- inr was 2 56- should be between 2 and 3-

## 2018-09-08 NOTE — ED PROVIDER NOTES
History  Chief Complaint   Patient presents with    Back Pain     pt reports falling 1 week ago onto back  increased pain, SOB started yesterday  patient on warfarin      89 yr female  As per daughter-- walking with walker 8 days ago- wheel of walker got caught and pt feel back blas hitting head and r upper back- r laterlachest --  No loc-- no other comps- pt started to complaint yesterday of r rib pain-sob-- no fever/cough -- no other comps or injuries        History provided by:  Relative and patient   used: No        Prior to Admission Medications   Prescriptions Last Dose Informant Patient Reported? Taking? Cholecalciferol (VITAMIN D) 2000 UNITS CAPS  Self Yes No   Sig: Take 2,000 Units by mouth daily     Cyanocobalamin 1000 MCG/ML KIT  Self No No   Sig: Inject 1 mL (1,000 mcg total) as directed every 30 (thirty) days   Docusate Sodium 100 MG capsule  Self Yes No   Sig: Take 1 tablet by mouth daily   acetaminophen (TYLENOL) 325 mg tablet  Self No No   Sig: Take 3 tablets by mouth every 8 (eight) hours   albuterol (2 5 mg/3 mL) 0 083 % nebulizer solution  Self Yes No   Sig: INHALE THE CONTENTS OF 1 VIAL BY NEBULIZATION FOUR TIMES DAILY   atorvastatin (LIPITOR) 20 mg tablet  Self No No   Sig: Take 1 tablet (20 mg total) by mouth daily   bisacodyl (DULCOLAX) 10 mg suppository  Self Yes No   Sig: Insert into the rectum   cyanocobalamin 1,000 mcg/mL  Self No No   Sig: Inject 1 mL (1,000 mcg total) into the shoulder, thigh, or buttocks every 30 (thirty) days   dicyclomine (BENTYL) 10 mg capsule  Self Yes No   Sig: Take by mouth   fluticasone (FLONASE) 50 mcg/act nasal spray  Self Yes No   Sig: into each nostril   furosemide (LASIX) 20 mg tablet  Self Yes No   Sig: Take 40 mg by mouth every other day     levothyroxine 88 mcg tablet  Self Yes No   Sig: Take 88 mcg by mouth daily     metoprolol tartrate (LOPRESSOR) 25 mg tablet  Self Yes No   Sig: Take 25 mg by mouth every 12 (twelve) hours omeprazole (PriLOSEC) 40 MG capsule  Self No No   Sig: Take 1 capsule (40 mg total) by mouth daily   potassium chloride (K-DUR,KLOR-CON) 20 mEq tablet  Self Yes No   Sig: Take 10 mEq by mouth every other day     warfarin (COUMADIN) 1 mg tablet  Self Yes No   Sig: Take 2 5 mg by mouth daily Dosing is based on INR        Facility-Administered Medications: None       Past Medical History:   Diagnosis Date    Anemia of chronic disease 11/27/2017    Arthritis     Cardiac disease     CHF [facility]congestive [facility] failure) ([facility] [address] [facility]     Hiatal hernia     Hypercholesteremia     Hypothyroidism        Past Surgical History:   Procedure Laterality Date    APPENDECTOMY      CATARACT EXTRACTION Bilateral     CATARACT EXTRACTION      HIP FRACTURE SURGERY Right 12/2015    HYSTERECTOMY      OOPHORECTOMY      REPAIR RECTOCELE      REPLACEMENT TOTAL KNEE Right        Family History   Problem Relation Age of Onset    Kidney cancer Mother     Alcohol abuse Father     Cirrhosis Father         LIVER    Drug abuse Father     Alcohol abuse Brother     Diabetes Brother     Drug abuse Brother     Stroke Brother     Coronary artery disease Family      I have reviewed and agree with the history as documented  Social History   Substance Use Topics    Smoking status: Never Smoker    Smokeless tobacco: Never Used    Alcohol use No        Review of Systems   Constitutional: Negative  HENT: Negative  Eyes: Negative  Respiratory: Positive for shortness of breath  Negative for apnea, cough, choking, chest tightness, wheezing and stridor  Cardiovascular: Negative for chest pain, palpitations and leg swelling  Gastrointestinal: Negative  Endocrine: Negative  Genitourinary: Negative  Musculoskeletal: Negative  Skin: Negative  Allergic/Immunologic: Negative  Neurological: Negative  Hematological: Negative  Psychiatric/Behavioral: Negative          Physical Exam  Physical Exam   Constitutional: She is oriented to person, place, and time  No distress  avss-- pulse ox 97 % on ra- interpretation is normal- no intervention - in nad   HENT:   Head: Normocephalic and atraumatic  Eyes: Conjunctivae and EOM are normal  Pupils are equal, round, and reactive to light  Right eye exhibits no discharge  Left eye exhibits no discharge  No scleral icterus  Mm pink   Neck: Normal range of motion  Neck supple  No JVD present  No tracheal deviation present  No thyromegaly present  No pmt c/t/l/s spine-    Cardiovascular: Exam reveals no gallop and no friction rub  Murmur heard  irreg irreg rhythm   Pulmonary/Chest: Effort normal and breath sounds normal  No stridor  No respiratory distress  She has no wheezes  She has no rales  She exhibits tenderness  r mid axillary lateral chest wall tenderness-- no crepitus-eechymosis/ palpable defromity   Abdominal: Soft  Bowel sounds are normal  She exhibits no distension and no mass  There is no tenderness  There is no rebound and no guarding  No hernia  Musculoskeletal: Normal range of motion  She exhibits edema  She exhibits no tenderness or deformity  Trace ble pretibial edema- no erythema- tendenress/assym- equal bilateral radial/dp    Lymphadenopathy:     She has no cervical adenopathy  Neurological: She is alert and oriented to person, place, and time  No cranial nerve deficit or sensory deficit  She exhibits normal muscle tone  Coordination normal    Skin: Skin is warm  Capillary refill takes less than 2 seconds  No rash noted  She is not diaphoretic  No erythema  No pallor  Pos mid t spine superfical abrasion - nt -    Psychiatric: She has a normal mood and affect  Her behavior is normal    Nursing note and vitals reviewed        Vital Signs  ED Triage Vitals   Temperature Pulse Respirations Blood Pressure SpO2   09/08/18 1227 09/08/18 1140 09/08/18 1140 09/08/18 1140 09/08/18 1140   98 °F (36 7 °C) 69 20 119/75 95 %      Temp Source Heart Rate Source Patient Position - Orthostatic VS BP Location FiO2 (%)   09/08/18 1140 09/08/18 1140 09/08/18 1140 09/08/18 1140 --   Oral Monitor Lying Right arm       Pain Score       --                  Vitals:    09/08/18 1140 09/08/18 1330   BP: 119/75    Pulse: 69 66   Patient Position - Orthostatic VS: Lying        Visual Acuity  Visual Acuity      Most Recent Value   L Pupil Size (mm)  4   R Pupil Size (mm)  4          ED Medications  Medications   lidocaine (LIDODERM) 5 % patch 1 patch (1 patch Transdermal Medication Applied 9/8/18 1226)   acetaminophen (TYLENOL) tablet 650 mg (650 mg Oral Not Given 9/8/18 1304)       Diagnostic Studies  Results Reviewed     Procedure Component Value Units Date/Time    Protime-INR [[id number]]  (Abnormal) Collected:  09/08/18 1235    Lab Status:  Final result Specimen:  Blood from Arm, Left Updated:  09/08/18 1326     Protime 26 7 (H) seconds      INR 2 56 (H)                 CT head without contrast   Final Result by Ulises Whittaker DO (09/08 1238)      No acute intracranial abnormality  Microangiopathic changes  No significant change  New small right mastoid effusion  Workstation performed: [id number]         XR ribs with pa chest min 3 views RIGHT    (Results Pending)              Procedures  Procedures       Phone Contacts  ED Phone Contact    ED Course  ED Course as of Sep 08 1401   Sat Sep 08, 2018   1347 Cxr / r rib xrays-- no free /sq air-- - no  ptx/ pulm contusion - pos r 5th rib fx -- elevated r hd-  cxr similar to previous cxr from 11/23/17                                [facility]  CritCare Time    Disposition  Final diagnoses:   None     ED Disposition     None      Follow-up Information    None         Patient's Medications   Discharge Prescriptions    No medications on file     No discharge procedures on file      ED Provider  Electronically Signed by           Bob Campuzano MD  09/09/18 5129

## 2018-09-11 PROBLEM — R10.9 ABDOMINAL CRAMPING: Status: ACTIVE | Noted: 2018-01-01

## 2018-09-11 PROBLEM — I48.20 CHRONIC ATRIAL FIBRILLATION (HCC): Status: ACTIVE | Noted: 2017-11-26

## 2018-09-21 NOTE — PROGRESS NOTES
Assessment/Plan:     Diagnoses and all orders for this visit:    Closed fracture of one rib of right side, initial encounter  Comments:  rest, medication for pain, pt wearing bra for first time today(left house for appts), advised to limit pressure on rib area  precautions advised  Orders:  -     oxyCODONE (ROXICODONE) 5 mg immediate release tablet; Take 1 tablet (5 mg total) by mouth every 12 (twelve) hours as needed (pain) Max Daily Amount: 10 mg    Flu vaccine need  Comments:  risk/benefit discussed today and pt elected to proceed -> administered  Orders:  -     influenza vaccine, 7064-7155, high-dose, PF 0 5 mL, for patients 65 yr+ (FLUZONE HIGH-DOSE)    Other orders  -     potassium chloride (K-DUR) 10 mEq tablet; Take 10 mEq by mouth      discussed natural course of rib fracture & healing  [place] PDMP reviewed    Subjective:      Patient ID: Jared Doe is a 80 y o  female  HPI    Here with daughter with c/o right rib fracture with worsening pain today  Trdedra Goodening 3 weeks ago and seen in ER 2nd to lateral chest wall pain, dx'd s 6th right rib fx and discharged on acetaminophen  Takes warfarin for Afib  Pain was doing okay until pt left house today for majority of the day and having more pain now  Has hx of leg fracture in past and has oxycodone leftover from that which she tolerated fine & requests a refill of rx  No problems moving bowels  INR managed by her cardiologist, no bleeding or bruising sx  Requests flu vaccine today  No other complaints  Past Medical History:   Diagnosis Date    Anemia of chronic disease 11/27/2017    Arthritis     Cardiac disease     [facility]) ([facility] [address]     Hiatal hernia     Hypercholesteremia     Hypothyroidism      Vitals:    09/21/18 1523   BP: 130/80   Pulse: 102   Resp: 18   Temp: (!) 96 8 °F (36 °C)   TempSrc: Oral   SpO2: 97%     There is no height or weight on file to calculate BMI      Current Outpatient Prescriptions:     acetaminophen (TYLENOL) 325 mg tablet, Take 3 tablets by mouth every 8 (eight) hours, Disp: 30 tablet, Rfl: 0    albuterol (2 5 mg/3 mL) 0 083 % nebulizer solution, INHALE THE CONTENTS OF 1 VIAL BY NEBULIZATION FOUR TIMES DAILY, Disp: , Rfl: 0    atorvastatin (LIPITOR) 20 mg tablet, Take 1 tablet (20 mg total) by mouth daily, Disp: 90 tablet, Rfl: 1    bisacodyl (DULCOLAX) 10 mg suppository, Insert into the rectum, Disp: , Rfl:     Cholecalciferol (VITAMIN D) 2000 UNITS CAPS, Take 2,000 Units by mouth daily  , Disp: , Rfl:     cyanocobalamin 1,000 mcg/mL, Inject 1 mL (1,000 mcg total) into the shoulder, thigh, or buttocks every 30 (thirty) days, Disp: 1 mL, Rfl: 0    Cyanocobalamin 1000 MCG/ML KIT, Inject 1 mL (1,000 mcg total) as directed every 30 (thirty) days, Disp: 1 kit, Rfl: 0    dicyclomine (BENTYL) 10 mg capsule, Take 1 capsule (10 mg total) by mouth daily as needed (abdominal cramping/pain) As needed, Disp: 60 capsule, Rfl: 1    Docusate Sodium 100 MG capsule, Take 1 tablet by mouth daily, Disp: , Rfl:     fluticasone (FLONASE) 50 mcg/act nasal spray, into each nostril, Disp: , Rfl:     furosemide (LASIX) 20 mg tablet, Take 40 mg by mouth every other day  , Disp: , Rfl:     levothyroxine 88 mcg tablet, Take 1 tablet (88 mcg total) by mouth daily, Disp: 90 tablet, Rfl: 1    metoprolol tartrate (LOPRESSOR) 25 mg tablet, Take 25 mg by mouth every 12 (twelve) hours, Disp: , Rfl:     omeprazole (PriLOSEC) 40 MG capsule, Take 1 capsule (40 mg total) by mouth daily, Disp: 90 capsule, Rfl: 1    potassium chloride (K-DUR) 10 mEq tablet, Take 10 mEq by mouth, Disp: , Rfl:     potassium chloride (K-DUR,KLOR-CON) 20 mEq tablet, Take 10 mEq by mouth every other day  , Disp: , Rfl:     warfarin (COUMADIN) 1 mg tablet, Take 2 5 mg by mouth daily Dosing is based on INR  , Disp: , Rfl:     oxyCODONE (ROXICODONE) 5 mg immediate release tablet, Take 1 tablet (5 mg total) by mouth every 12 (twelve) hours as needed (pain) Max Daily Amount: 10 mg, Disp: 20 tablet, Rfl: 0  No current facility-administered medications for this visit  Allergies   Allergen Reactions    Ace Inhibitors Angioedema     Annotation - [id number]: angioedema to ARB  Other reaction(s): Angioedema  Pt and family state not an allergy    Angiotensin Receptor Blockers Angioedema    Erythromycin Ethylsuccinate GI Intolerance    Losartan      Other reaction(s): Angioedema  Pt and family state this is not an allergy      Aspirin Dermatitis and Edema    Codeine Edema    Erythromycin Base Other (See Comments)    Ibuprofen Other (See Comments)     Patient denies allergy    Penicillins Hives, Edema and Other (See Comments)         Review of Systems   Constitutional: Negative for fever  HENT: Negative for congestion  Eyes: Negative for visual disturbance  Respiratory: Negative for shortness of breath  Cardiovascular: Positive for chest pain  Gastrointestinal: Negative for abdominal pain  Genitourinary: Negative for difficulty urinating  Musculoskeletal: Negative for myalgias  Skin: Negative for rash  Psychiatric/Behavioral: Negative for dysphoric mood  Objective:      /80   Pulse 102   Temp (!) 96 8 °F (36 °C) (Oral)   Resp 18   LMP  (LMP Unknown)   SpO2 97%          Physical Exam   Constitutional: She appears well-developed and well-nourished  Grabbing her right side and c/o right sided rib pain but otherwise in no acute distress   HENT:   Head: Normocephalic and atraumatic  Cardiovascular: Normal rate, regular rhythm and normal heart sounds  No murmur heard  Pulmonary/Chest: Effort normal and breath sounds normal  She has no wheezes  She has no rales  Abdominal: Soft  Bowel sounds are normal  There is no tenderness  Musculoskeletal: She exhibits no edema  Neurological: She is alert  Psychiatric: She has a normal mood and affect  Her behavior is normal    Vitals reviewed

## 2018-09-21 NOTE — PLAN OF CARE
Problem: Potential for Falls  Goal: Patient will remain free of falls  INTERVENTIONS:  - Assess patient frequently for physical needs  -  Identify cognitive and physical deficits and behaviors that affect risk of falls    -  [place] fall precautions as indicated by assessment   - Educate patient/family on patient safety including physical limitations  - Instruct patient to call for assistance with activity based on assessment  - Modify environment to reduce risk of injury  - Consider OT/PT consult to assist with strengthening/mobility   Outcome: Progressing

## 2018-10-05 NOTE — ED PROVIDER NOTES
History  Chief Complaint   Patient presents with    Rib Pain     PT from home via EMS after falling 1 month ago  R rib fracture, PT complains of pain at site  Daughter claims abdominal pain  Patient brought to the emergency department by her daughter for evaluation of persistent pain in the right chest wall where she allegedly had a rib fracture 1 month ago  She denies sob or anterior chest pain  The daughter is also concerned about belly pain that the patient has had for the past couple months  She admits to constipation and is taking Percocet for her rib fracture and pain  There is no vomiting or diarrhea  She denies urinary symptoms  Denies back pain  Denies subsequent fall trauma or injury  Prior to Admission Medications   Prescriptions Last Dose Informant Patient Reported? Taking? Cholecalciferol (VITAMIN D) 2000 UNITS CAPS  Self Yes Yes   Sig: Take 2,000 Units by mouth daily     Cyanocobalamin 1000 MCG/ML KIT  Self No Yes   Sig: Inject 1 mL (1,000 mcg total) as directed every 30 (thirty) days   Docusate Sodium 100 MG capsule  Self Yes Yes   Sig: Take 1 tablet by mouth daily   acetaminophen (TYLENOL) 325 mg tablet  Self No Yes   Sig: Take 3 tablets by mouth every 8 (eight) hours   albuterol (2 5 mg/3 mL) 0 083 % nebulizer solution  Self Yes Yes   Sig: INHALE THE CONTENTS OF 1 VIAL BY NEBULIZATION FOUR TIMES DAILY   atorvastatin (LIPITOR) 20 mg tablet  Self No Yes   Sig: Take 1 tablet (20 mg total) by mouth daily   bisacodyl (DULCOLAX) 10 mg suppository  Self Yes Yes   Sig: Insert into the rectum   cyanocobalamin 1,000 mcg/mL  Self No Yes   Sig: Inject 1 mL (1,000 mcg total) into the shoulder, thigh, or buttocks every 30 (thirty) days   dicyclomine (BENTYL) 10 mg capsule  Self No Yes   Sig: Take 1 capsule (10 mg total) by mouth daily as needed (abdominal cramping/pain) As needed   fluticasone (FLONASE) 50 mcg/act nasal spray  Self Yes Yes   Sig: into each nostril   furosemide (LASIX) 20 mg tablet  Self Yes Yes   Sig: Take 40 mg by mouth every other day     levothyroxine 88 mcg tablet  Self No Yes   Sig: Take 1 tablet (88 mcg total) by mouth daily   metoprolol tartrate (LOPRESSOR) 25 mg tablet  Self Yes Yes   Sig: Take 25 mg by mouth every 12 (twelve) hours   omeprazole (PriLOSEC) 40 MG capsule  Self No Yes   Sig: Take 1 capsule (40 mg total) by mouth daily   oxyCODONE (ROXICODONE) 5 mg immediate release tablet   No Yes   Sig: Take 1 tablet (5 mg total) by mouth every 12 (twelve) hours as needed (pain) Max Daily Amount: 10 mg   potassium chloride (K-DUR) 10 mEq tablet   Yes Yes   Sig: Take 10 mEq by mouth   potassium chloride (K-DUR,KLOR-CON) 20 mEq tablet  Self Yes Yes   Sig: Take 10 mEq by mouth every other day     warfarin (COUMADIN) 1 mg tablet  Self Yes Yes   Sig: Take 2 5 mg by mouth daily Dosing is based on INR        Facility-Administered Medications: None       Past Medical History:   Diagnosis Date    Anemia of chronic disease 11/27/2017    Arthritis     Cardiac disease     CHF (congestive heart failure) (HCC)     Hiatal hernia     Hypercholesteremia     Hypothyroidism        Past Surgical History:   Procedure Laterality Date    APPENDECTOMY      CATARACT EXTRACTION Bilateral     CATARACT EXTRACTION      HIP FRACTURE SURGERY Right 12/2015    HYSTERECTOMY      OOPHORECTOMY      REPAIR RECTOCELE      REPLACEMENT TOTAL KNEE Right        Family History   Problem Relation Age of Onset    Kidney cancer Mother     Alcohol abuse Father     Cirrhosis Father         LIVER    Drug abuse Father     Alcohol abuse Brother     Diabetes Brother     Drug abuse Brother     Stroke Brother     Coronary artery disease Family      I have reviewed and agree with the history as documented  Social History   Substance Use Topics    Smoking status: Never Smoker    Smokeless tobacco: Never Used    Alcohol use No        Review of Systems   Constitutional: Negative    Negative for activity change, appetite change, chills, diaphoresis, fatigue and fever  HENT: Negative  Negative for congestion, drooling, ear pain, rhinorrhea, sore throat, trouble swallowing and voice change  Eyes: Negative  Respiratory: Negative  Negative for cough, chest tightness, shortness of breath and stridor  Cardiovascular: Positive for chest pain  Negative for palpitations and leg swelling  Gastrointestinal: Positive for abdominal pain and constipation  Negative for abdominal distention, anal bleeding, blood in stool, diarrhea, nausea and vomiting  Endocrine: Negative  Genitourinary: Negative  Negative for difficulty urinating, dysuria, flank pain, frequency, hematuria, urgency, vaginal bleeding, vaginal discharge and vaginal pain  Musculoskeletal: Negative  Negative for back pain, gait problem, neck pain and neck stiffness  Skin: Negative  Negative for rash and wound  Allergic/Immunologic: Negative  Neurological: Negative  Negative for dizziness, tremors, seizures, syncope, facial asymmetry, speech difficulty, weakness, light-headedness, numbness and headaches  Hematological: Negative  Does not bruise/bleed easily  Psychiatric/Behavioral: Negative  Negative for confusion  Physical Exam  Physical Exam   Constitutional: She is oriented to person, place, and time  She appears well-developed and well-nourished  Nontoxic appearance  No respiratory distress  Patient looks comfortable sitting upright in the stretcher  HENT:   Head: Normocephalic and atraumatic  Right Ear: External ear normal    Left Ear: External ear normal    Mouth/Throat: Oropharynx is clear and moist    Eyes: Pupils are equal, round, and reactive to light  Conjunctivae and EOM are normal    Neck: Normal range of motion  Neck supple  Cardiovascular: Normal rate, regular rhythm, normal heart sounds and intact distal pulses  Pulmonary/Chest: Effort normal and breath sounds normal  No respiratory distress   She has no wheezes  She has no rales  She exhibits tenderness  Mild right-sided chest wall tenderness to palpation  No bruising or signs of infection  No subcutaneous air crepitance  Abdominal: Soft  Bowel sounds are normal  She exhibits no distension and no mass  There is tenderness  There is no rebound and no guarding  No hernia  Mild generalized tenderness to palpation without peritoneal signs masses or hernias  Musculoskeletal: Normal range of motion  She exhibits no edema, tenderness or deformity  Neurological: She is alert and oriented to person, place, and time  She has normal reflexes  She displays normal reflexes  No cranial nerve deficit  She exhibits normal muscle tone  Coordination normal    Skin: Skin is warm and dry  No rash noted  No erythema  No pallor  Psychiatric: She has a normal mood and affect  Her behavior is normal  Judgment and thought content normal    Nursing note and vitals reviewed        Vital Signs  ED Triage Vitals [10/05/18 1859]   Temperature Pulse Respirations Blood Pressure SpO2   97 6 °F (36 4 °C) 60 18 135/75 95 %      Temp Source Heart Rate Source Patient Position - Orthostatic VS BP Location FiO2 (%)   Oral Monitor Lying Right arm --      Pain Score       9           Vitals:    10/05/18 1859 10/05/18 1900   BP: 135/75 135/75   Pulse: 60 74   Patient Position - Orthostatic VS: Lying        Visual Acuity      ED Medications  Medications   sodium chloride 0 9 % bolus 1,000 mL (not administered)   oxyCODONE-acetaminophen (PERCOCET) 5-325 mg per tablet 1 tablet (1 tablet Oral Given 10/5/18 1920)       Diagnostic Studies  Results Reviewed     Procedure Component Value Units Date/Time    Comprehensive metabolic panel [[id number]]  (Abnormal) Collected:  10/05/18 2008    Lab Status:  Final result Specimen:  Blood from Arm, Right Updated:  10/05/18 2031     Sodium 133 (L) mmol/L      Potassium 4 3 mmol/L      Chloride 98 (L) mmol/L      CO2 25 mmol/L      ANION GAP 10 mmol/L BUN 42 (H) mg/dL      Creatinine 1 55 (H) mg/dL      Glucose 113 mg/dL      Calcium 9 5 mg/dL      AST 40 U/L      ALT 22 U/L      Alkaline Phosphatase 272 (H) U/L      Total Protein 7 6 g/dL      Albumin 3 3 (L) g/dL      Total Bilirubin 1 60 (H) mg/dL      eGFR 28 ml/min/1 73sq m     Narrative:         National Kidney Disease Education Program recommendations are as follows:  GFR calculation is accurate only with a steady state creatinine  Chronic Kidney disease less than 60 ml/min/1 73 sq  meters  Kidney failure less than 15 ml/min/1 73 sq  meters      Lipase [[id number]]  (Normal) Collected:  10/05/18 2008    Lab Status:  Final result Specimen:  Blood from Arm, Right Updated:  10/05/18 2031     Lipase 293 u/L     APTT [[id number]]  (Abnormal) Collected:  10/05/18 2008    Lab Status:  Final result Specimen:  Blood from Arm, Right Updated:  10/05/18 2027     PTT 45 (H) seconds     Protime-INR [[id number]]  (Abnormal) Collected:  10/05/18 2008    Lab Status:  Final result Specimen:  Blood from Arm, Right Updated:  10/05/18 2027     Protime 38 4 (H) seconds      INR 4 08 (H)    CBC and differential [[id number]]  (Abnormal) Collected:  10/05/18 2008    Lab Status:  Final result Specimen:  Blood from Arm, Right Updated:  10/05/18 2014     WBC 5 47 Thousand/uL      RBC 4 84 Million/uL      Hemoglobin 16 1 (H) g/dL      Hematocrit 47 8 (H) %      MCV 99 (H) fL      MCH 33 3 pg      MCHC 33 7 g/dL      RDW 13 6 %      MPV 10 2 fL      Platelets 601 Thousands/uL      nRBC 0 /100 WBCs      Neutrophils Relative 85 (H) %      Immat GRANS % 0 %      Lymphocytes Relative 10 (L) %      Monocytes Relative 5 %      Eosinophils Relative 0 %      Basophils Relative 0 %      Neutrophils Absolute 4 59 Thousands/µL      Immature Grans Absolute 0 02 Thousand/uL      Lymphocytes Absolute 0 57 (L) Thousands/µL      Monocytes Absolute 0 26 Thousand/µL      Eosinophils Absolute 0 02 Thousand/µL      Basophils Absolute 0 01 Thousands/µL     UA w Reflex to Microscopic [[id number]]     Lab Status:  No result Specimen:  Urine from Urine, Straight Cath                  XR chest 2 views   Final Result by Monica Amaya MD (10/05 1927)      Diminished lung volumes with no obvious infiltrate or pleural effusion  Stable mild cardiomegaly  Moderate to severe thoracic kyphoscoliosis  Workstation performed: [id number]                    Procedures  Procedures       Phone Contacts  ED Phone Contact    ED Course  ED Course as of Oct 05 2145   Fri Oct 05, 2018   2142 Patient is stable for discharge  The urine that was sent to the lab came out of the specimen cup and into the bag and could not be run  Patient's daughter does not want us to give the patient fluid and for her to be read cathed for urine  Patient does not have urinary symptoms but I was working upper abdominal pain  I am comfortable allowing the patient to be discharged  I discussed signs and symptoms that would require return to the emergency department  I advised the daughter to hold the patient's Coumadin until next week and she is to follow up with her family physician and get a repeat blood draw next week  MDM  CritCare Time    Disposition  Final diagnoses:   Chest wall pain   Abdominal pain   Elevated INR     Time reflects when diagnosis was documented in both MDM as applicable and the Disposition within this note     Time User Action Codes Description Comment    10/5/2018  9:43 PM Fara Silence Add [R07 89] Chest wall pain     10/5/2018  9:44 PM Herring, Lendell Harada Add [R10 9] Abdominal pain     10/5/2018  9:44 PM Herring, Lendell Harada Add [R79 1] Elevated INR       ED Disposition     ED Disposition Condition Comment    Discharge  Samy Rodriguez discharge to home/self care      Condition at discharge: Stable        Follow-up Information     Follow up With Specialties Details [facility] physician  Schedule an appointment as soon as possible for a visit            Patient's Medications   Discharge Prescriptions    No medications on file     No discharge procedures on file      ED Provider  Electronically Signed by           Clemencia Staley MD  10/05/18 6209

## 2018-10-06 NOTE — TELEPHONE ENCOUNTER
pls contact pt's daughter    I saw Aman Irizarry was at ER over weekend    Her gallbladder/liver BW was mildly elevated    Does Carolyn still have her gallbladder?     Recommend to complete US of liver/gallbladder    will order to complete ASAP    Let me know if ?'s

## 2018-10-06 NOTE — DISCHARGE INSTRUCTIONS
Abdominal Pain, Ambulatory Care   GENERAL INFORMATION:   Abdominal pain  can be dull, achy, or sharp  You may have pain in one area of your abdomen, or in your entire abdomen  Your pain may be caused by constipation, food sensitivity or poisoning, infection, or a blockage  Abdominal pain can also be caused by a hernia, appendicitis, or an ulcer  The cause of your abdominal pain may be unknown  Seek immediate care for the following symptoms:   · New chest pain or shortness of breath    · Pulsing pain in your upper abdomen or lower back that suddenly becomes constant    · Pain in the right lower abdominal area that worsens with movement    · Fever over 100 4°F (38°C) or shaking chills    · Vomiting and you cannot keep food or fluids down    · Pain does not improve or gets worse over the next 8 to 12 hours    · Blood in your vomit or bowel movements, or they look black and tarry    · Skin or the whites of your eyes turn yellow    · Large amount of vaginal bleeding that is not your monthly period  Treatment for abdominal pain  may include medicine to calm your stomach, prevent vomiting, or decrease pain  Follow up with your healthcare provider as directed:  Write down your questions so you remember to ask them during your visits  CARE AGREEMENT:   You have the right to help plan your care  Learn about your health condition and how it may be treated  Discuss treatment options with your caregivers to decide what care you want to receive  You always have the right to refuse treatment  The above information is an  only  It is not intended as medical advice for individual conditions or treatments  Talk to your doctor, nurse or pharmacist before following any medical regimen to see if it is safe and effective for you  © 2014 [address] is for End User's use only and may not be sold, redistributed or otherwise used for commercial purposes   All illustrations and images included in [facility] are the copyrighted property of [employer]  or [employer]fred Doty  Chest Wall Pain, Ambulatory Care   GENERAL INFORMATION:   Chest wall pain  may be caused by problems with the muscles, cartilage, or bones of the chest wall  Chest wall pain may also be caused by pain that spreads to your chest from another part of your body  The pain may be aching, severe, dull, or sharp  It may come and go, or it may be constant  The pain may be worse when you move in certain ways, breathe deeply, or cough  Seek immediate care for the following symptoms:   · Severe pain    · You have any of the following signs of a heart attack:      ¨ Squeezing, pressure, or pain in your chest that lasts longer than 5 minutes or returns    ¨ Discomfort or pain in your back, neck, jaw, stomach, or arm     ¨ Trouble breathing    ¨ Nausea or vomiting    ¨ Lightheadedness or a sudden cold sweat, especially with chest pain or trouble breathing  Treatment  depends on the cause of your chest wall pain  You may need any of the following:  · NSAIDs  help decrease swelling and pain or fever  This medicine is available with or without a doctor's order  NSAIDs can cause stomach bleeding or kidney problems in certain people  If you take blood thinner medicine, always ask your healthcare provider if NSAIDs are safe for you  Always read the medicine label and follow directions  · Acetaminophen  decreases pain  It is available without a doctor's order  Ask how much to take and how often to take it  Follow directions  Acetaminophen can cause liver damage if not taken correctly  · Apply heat  on your chest for 20 to 30 minutes every 2 hours for as many days as directed  Heat helps decrease pain and muscle spasms  · Apply ice  on your chest for 15 to 20 minutes every hour or as directed  Use an ice pack, or put crushed ice in a plastic bag  Cover it with a towel   Ice helps prevent tissue damage and decreases swelling and pain   Follow up with your healthcare provider as directed:  Write down your questions so you remember to ask them during your visits  CARE AGREEMENT:   You have the right to help plan your care  Learn about your health condition and how it may be treated  Discuss treatment options with your caregivers to decide what care you want to receive  You always have the right to refuse treatment  The above information is an  only  It is not intended as medical advice for individual conditions or treatments  Talk to your doctor, nurse or pharmacist before following any medical regimen to see if it is safe and effective for you  © 2014 [address] is for End User's use only and may not be sold, redistributed or otherwise used for commercial purposes  All illustrations and images included in [employer]Notes® are the copyrighted property of A [employer] M [employer]  or [employer]fred Doty

## 2018-10-08 NOTE — TELEPHONE ENCOUNTER
DAUGHTER CALLED BACK  SHE DOES STILL HAVE HER GALLBLADDER  ADV'D OF U/S  DAUGHTER DOESN'T HINK SHE IS GOING TO BE ABLE TO GET PT  TO DO [facility]/S BECAUSE SHE IS IN A LOT OF PAIN FROM HER RIB  PLEASE ENTER ORDER AND LET US KNOW WHEN ORDER IS IN SO WE CAN LET DAUGHTER KNOW  **ALSO SEE ANOTHER MESSAGE FROM TODAY RE: PT 'S INR

## 2018-10-08 NOTE — TELEPHONE ENCOUNTER
I can't, She needs to call lab that [facility] normally uses about home blood draw and to get someone to come out    I know [facility] have home Blood draws    Pt and daughter should really d/w the coumadin clinic/Dr Lucas's office as well    thx

## 2018-10-08 NOTE — TELEPHONE ENCOUNTER
PT  IS SUPPOSED TO HAVE INR DRAWN -LAST ONE WAS 4 8  SHOULD HAVE TODAY OR TOMORROW PER THE ER  PT  WAS SEEN IN ER SAT  AND DAUGHTER DOESN'T THINK SHE CAN GET HER OUT TO HAVE IT DONE BECAUSE OF HER CRACKED RIB  CAN YOU HAVE SOMEONE COME TO THE HOUSE TO DRAW IT? DR PETERS IS FOLLOWING PT'S COUMADIN

## 2018-10-09 NOTE — TELEPHONE ENCOUNTER
Pt's daughter called she got her mom schedule for an Ultrasound tomorrow on 10/10 and will also get blood work done

## 2018-10-10 NOTE — TELEPHONE ENCOUNTER
Ultrasound shows no gallstones or gallbladder inflammation    There is a small right kidney cyst but no other acute changes    It is not clear to me what is causing Carolyn's intense pain    If she is moving her bowels regularly and If pain has not improved since having fall/injury -> Recommend to see [facility] management/specialist -> maybe they can perform a nerve block    Let me know

## 2018-10-10 NOTE — TELEPHONE ENCOUNTER
Patients daughter called and states the US was done today  Wilbert Montes would like to know what the next step for patients plan of care is going to be

## 2018-10-10 NOTE — TELEPHONE ENCOUNTER
U/S GALLBALDDER SHOWS NO VISIBLE BILIARY OBSTRUCTION OR GALLSTONES  NO ACUTE FINDINGS  RT  RENAL CYST NOTED INCIDENTALLY

## 2018-10-10 NOTE — TELEPHONE ENCOUNTER
Patient daughter notified  States she is concerned because Conrado Hernandes has not experiencing nausea, stomach pain, and loss of appetite  Patient stated she has not been moving her bowels regularly and the stool softeners have not been working  Daughter would like to know what could be causing the nausea and stomach pain  States Conrado Hernandes has not been drinking much fluids and she has not been urinating as much

## 2018-10-10 NOTE — TELEPHONE ENCOUNTER
Called and spoke to pt's daughter    Pt having abd pain and nausea    No fever, vomiting and moving her bowels with stool softener but pain persistent    Had BW WNL except mild elevation in liver enzymes, had US Of gallbladder which showed no gallstones or cholecystitis    Taking oxycodone, potassium and other medications in morning  Not eating as much either lately  Has been to ER for nausea/abd pain, told it was from pain medication  Plan - reduce oxycodone(6 weeks out from rib fracture), t/c potassium in powder packet form or mix with food(as approved by pharmacist)  Go back and see GI/Dr Ashley Valenzuela may need CT A/P, Ashley Leavitt prefers to try the above first and call if not improving for CT A/P order

## 2018-10-18 NOTE — TELEPHONE ENCOUNTER
Recommend CAT scan of abdomen, NO IV contrast but should take take oral barium before CAT scan    Test ordered, pls notify Sherri(pt's daughter) to schedule

## 2018-10-18 NOTE — TELEPHONE ENCOUNTER
PT still having some of the stomach pain but goes away when she lays down  Daughter states doesn't make a difference if she takes OXY or not  Few years ago she had the same issue it is was a  hiadal hernia  Feels the worse when she is sitting in chair with pillow behind back during the day  Daughter states pt is not getting enough fluid , drinking bearly 16 oz in a day, And not eating very much, little bit oatmeal and soup  130lbs When she seen Dr Bindu Baez she is down to 119lbs, she was 125lbs few weeks ago  We can call daughter and leave message she will be at work  Wondering is maybe she needs to have more tests done or anything  Daughter said she is trying to worse her to eat more

## 2018-10-21 PROBLEM — S22.070A CLOSED WEDGE COMPRESSION FRACTURE OF NINTH THORACIC VERTEBRA (HCC): Status: ACTIVE | Noted: 2018-01-01

## 2018-10-21 PROBLEM — N30.00 ACUTE CYSTITIS WITHOUT HEMATURIA: Status: ACTIVE | Noted: 2018-01-01

## 2018-10-21 PROBLEM — R10.9 ABDOMINAL PAIN: Status: ACTIVE | Noted: 2018-01-01

## 2018-10-21 PROBLEM — I10 ESSENTIAL HYPERTENSION: Chronic | Status: ACTIVE | Noted: 2017-11-23

## 2018-10-21 NOTE — H&P
[address] [facility]  H&P- Dov Marshall [birth year], 80 y o  female MRN: [id number]    Unit/Bed#: SIVAN Encounter: [id number]    Primary Care Provider: Zee Myers DO   Date and time admitted to hospital: 10/21/2018  1:50 PM        * Abdominal pain   Assessment & Plan    · POA, has been on going for the last few weeks  Similar in character to prior abdominal pain that was attributed to hiatal hernia  ? gastritis  CT scan negative for acute findings and outpatient RUQ US negative for evidence of GB stones in the setting of elevated LFTs which I believe are incidental and due to poor oral intake and dehdyration  She is tender to palpation  No vomiting or diarrhea   · Admit patient to med/surg under inpatient status   · Consult gastroenterology   · Supportive care   · Carafate Q6  · Protonix 40 mg BID  · Zofran IV PRN nausea   · Lo Fat diet   · Could consider hepatitis panel, AFP tumor markers, etc however will defer this to GI      Acute cystitis without hematuria   Assessment & Plan    · POA, noted on urinalysis   · Continue with Cipro due to antibiotic allergies   · Monitor renal function   · Monitor mental status   · Follow up urine, blood cultures      Chronic diastolic CHF (congestive heart failure) (HCC)   Assessment & Plan    · Patient appears dehydrate, had only been on QOD Lasix   · Hydrate patient   · Hold Lasix   · Monitor volume status closely      CKD (chronic kidney disease), stage III (HCC)   Assessment & Plan    · Creatinine at baseline, expect slight improvement with hydration   · Monitor BMP      Chronic atrial fibrillation (HCC)   Assessment & Plan    · Rate controlled on admission, INR therapeutic    · Continue Metoprolol   · Coumadin 1 25 mg QD     Essential hypertension   Assessment & Plan    · BP acceptable   · Continue home medication regimen      Closed wedge compression fracture of ninth thoracic vertebra [facility])   Assessment & Plan    · Incidental finding on CT scan today   Unclear inciting event, however she did have a recent fall and is chronically kyphotic  Had been constipated before due to narcotic analgesia  · Trial non-narcotic regimen   · Aqua K  · Voltaren  · Lidoderm  · Tylenol   · PT eval and treat        VTE Prophylaxis: Warfarin (Coumadin)  / sequential compression device   Code Status: Full Code  POLST: POLST form is not discussed and not completed at this time  Discussion with family: Discussed with daughter at bedside     Anticipated Length of Stay:  Patient will be admitted on an Inpatient basis with an anticipated length of stay of  Greater than 2 midnights  Justification for Hospital Stay: Abdominal pain needing inpatient GI evaluation due to unclear etiology, UTI, dehydration needing IV fluids     Total Time for Visit, including Counseling / Coordination of Care: 1 hour  Greater than 50% of this total time spent on direct patient counseling and coordination of care  Chief Complaint:   Abdominal Pain     History of Present Illness:    Margarita Mckeon is a 80 y o  female with a history of hiatal hernia, chronic diastolic CHF, Chronic A  Fib, and HTN who presents with abdominal pain for a few weeks  Patient had been worked up by her outpatient provider with a [address] [place] [address] which was negative  She reports that her PCP Dr Gina Espino wanted her to have a CT scan  The patient's daughter reports that the patient had an EGD a few years ago because she was having similar abdominal pain and was diagnosed with a hiatal hernia, but no other abnormalities were found  She reports that she was started on omeprazole and her symptoms improved  It appears that her abdominal pain has returned  The patient reports that bending over and putting pressure on her abdomen alleviates her pain some  She reports nausea, but denies any episodes of vomiting  The patient's daughter reports that she had been prescribed opiates for her pain and this caused constipation which was resolved with stool softeners   Her daughter reports a decreased appetite and a roughly 15 lb weight loss due to her not eating or drinking well  The patient's daughter notes that her urine is very dark, but the patient denies dysuria  Denies fevers or chills  Denies chest pain or difficulty breathing  Of note, the patient had a mechanical fall leaving her one doctor's appointment, but was thought to be uninjured at that time, but then started with chest wall pain a few days later  She had been diagnosed with a rib fracture at that time, but the patient and her daughter are unsure what could have caused the thoracic vertebrae fracture  She denies numbness, tingling, or weakness, but endorses back pain, but thought it was related to the rib pain  Review of Systems:    Review of Systems   Constitutional: Positive for appetite change and unexpected weight change  Negative for chills, diaphoresis, fatigue and fever  HENT: Negative for congestion, rhinorrhea and sore throat  Eyes: Negative for visual disturbance  Respiratory: Negative for cough, chest tightness, shortness of breath and wheezing  Cardiovascular: Negative for chest pain, palpitations and leg swelling  Gastrointestinal: Positive for abdominal pain, constipation and nausea  Negative for diarrhea and vomiting  Genitourinary: Positive for decreased urine volume  Negative for dysuria  Musculoskeletal: Positive for back pain  Negative for arthralgias and myalgias  Neurological: Negative for dizziness, syncope, weakness, light-headedness, numbness and headaches  All other systems reviewed and are negative        Past Medical and Surgical History:     Past Medical History:   Diagnosis Date    Anemia of chronic disease 11/27/2017    Arthritis     Cardiac disease     CHF [facility]congestive heart failure) ([facility] [address] )     Hiatal hernia     Hypercholesteremia     Hypothyroidism        Past Surgical History:   Procedure Laterality Date    APPENDECTOMY      CATARACT EXTRACTION Bilateral     CATARACT EXTRACTION      HIP FRACTURE SURGERY Right 12/2015    HYSTERECTOMY      OOPHORECTOMY      REPAIR RECTOCELE      REPLACEMENT TOTAL KNEE Right        Meds/Allergies:    Prior to Admission medications    Medication Sig Start Date End Date Taking? Authorizing Provider   acetaminophen (TYLENOL) 325 mg tablet Take 3 tablets by mouth every 8 (eight) hours 11/28/17   Nikolay Alaniz MD   albuterol (2 5 mg/3 mL) 0 083 % nebulizer solution INHALE THE CONTENTS OF 1 VIAL BY NEBULIZATION FOUR TIMES DAILY 1/20/18   Historical Provider, MD   atorvastatin (LIPITOR) 20 mg tablet Take 1 tablet (20 mg total) by mouth daily 2/20/18   Neno Awad DO   bisacodyl (DULCOLAX) 10 mg suppository Insert into the rectum    Historical Provider, MD   Cholecalciferol (VITAMIN D) 2000 UNITS CAPS Take 2,000 Units by mouth daily      Historical Provider, MD   cyanocobalamin 1,000 mcg/mL Inject 1 mL (1,000 mcg total) into the shoulder, thigh, or buttocks every 30 (thirty) days 4/11/18   Margarita Chambers DO   Cyanocobalamin 1000 MCG/ML KIT Inject 1 mL (1,000 mcg total) as directed every 30 (thirty) days 4/11/18   Margarita Chambers DO   dicyclomine (BENTYL) 10 mg capsule Take 1 capsule (10 mg total) by mouth daily as needed (abdominal cramping/pain) As needed 9/11/18   Neno Awad DO   Docusate Sodium 100 MG capsule Take 1 tablet by mouth daily 7/21/15   Historical Provider, MD   fluticasone (FLONASE) 50 mcg/act nasal spray into each nostril    Historical Provider, MD   furosemide (LASIX) 20 mg tablet Take 40 mg by mouth every other day      Historical Provider, MD   levothyroxine 88 mcg tablet Take 1 tablet (88 mcg total) by mouth daily 9/11/18   Neno Awad DO   metoprolol tartrate (LOPRESSOR) 25 mg tablet Take 25 mg by mouth every 12 (twelve) hours    Historical Provider, MD   omeprazole (PriLOSEC) 40 MG capsule Take 1 capsule (40 mg total) by mouth daily 4/30/18   Neno Awad DO   oxyCODONE (ROXICODONE) 5 mg immediate release tablet Take 1 tablet (5 mg total) by mouth every 12 (twelve) hours as needed (pain) Max Daily Amount: 10 mg 10/2/18   Neno Awad DO   potassium chloride (K-DUR) 10 mEq tablet Take 10 mEq by mouth 5/31/18 5/31/19  Historical Provider, MD   potassium chloride (K-DUR,KLOR-CON) 20 mEq tablet Take 10 mEq by mouth every other day      Historical Provider, MD   warfarin (COUMADIN) 1 mg tablet Take 2 5 mg by mouth daily Dosing is based on INR      Historical Provider, MD     I have reviewed home medications with a medical source (PCP, Pharmacy, other)  Allergies: Allergies   Allergen Reactions    Ace Inhibitors Angioedema     [facility] - [id number]: angioedema to ARB  Other reaction(s): Angioedema  Pt and family state not an allergy    Angiotensin Receptor Blockers Angioedema    Erythromycin Ethylsuccinate GI Intolerance    Losartan      Other reaction(s): Angioedema  Pt and family state this is not an allergy      Aspirin Dermatitis and Edema    Codeine Edema    Erythromycin Base Other (See Comments)    Ibuprofen Other (See Comments)     Patient denies allergy    Penicillins Hives, Edema and Other (See Comments)       Social History:     Marital Status:     Occupation: None  Patient Pre-hospital Living Situation: Home with daughter   Patient Pre-hospital Level of Mobility: Walker  Patient Pre-hospital Diet Restrictions: None  Substance Use History:   History   Alcohol Use No     History   Smoking Status    Never Smoker   Smokeless Tobacco    Never Used     History   Drug Use No       Family History:    Family History   Problem Relation Age of Onset    Kidney cancer Mother     Alcohol abuse Father     Cirrhosis Father         LIVER    Drug abuse Father     Alcohol abuse Brother     Diabetes Brother     Drug abuse Brother     Stroke Brother     Coronary artery disease Family        Physical Exam:     Vitals:   Blood Pressure: 120/75 (10/21/18 1357)  Pulse: 61 (10/21/18 1357)  Temperature: 98 4 °F (36 9 °C) (10/21/18 1357)  Temp Source: Oral (10/21/18 1357)  Respirations: 20 (10/21/18 1357)  Weight - Scale: 55 6 kg (122 lb 9 2 oz) (10/21/18 1357)  SpO2: 100 % (10/21/18 1357)    Physical Exam   Constitutional: She is oriented to person, place, and time  Vital signs are normal  She appears well-developed and well-nourished  Non-toxic appearance  No distress  HENT:   Head: Normocephalic and atraumatic  Mouth/Throat: Mucous membranes are dry  Eyes: Pupils are equal, round, and reactive to light  Conjunctivae and EOM are normal  No scleral icterus  Pupils are equal    Neck: Neck supple  Cardiovascular: Normal rate, S1 normal, S2 normal and intact distal pulses  An irregularly irregular rhythm present  Exam reveals no S3 and no S4  No murmur heard  Pulmonary/Chest: Effort normal and breath sounds normal  No accessory muscle usage  No respiratory distress  She has no decreased breath sounds  She has no wheezes  She has no rhonchi  She has no rales  She exhibits no tenderness  Abdominal: Soft  Bowel sounds are normal  She exhibits no distension and no mass  There is tenderness in the right upper quadrant, epigastric area and left upper quadrant  There is no rigidity, no rebound and no guarding  Musculoskeletal:        Thoracic back: She exhibits pain  She exhibits no tenderness, no bony tenderness and no deformity  Neurological: She is alert and oriented to person, place, and time  She is not disoriented  GCS eye subscore is 4  GCS verbal subscore is 5  GCS motor subscore is 6  Skin: Skin is warm and dry  Additional Data:     Lab Results: I have personally reviewed pertinent reports          Results from last 7 days  Lab Units 10/21/18  1438   WBC Thousand/uL 5 70   HEMOGLOBIN g/dL 16 1*   HEMATOCRIT % 48 9*   PLATELETS Thousands/uL 175   NEUTROS ABS Thousands/µL 4 66   NEUTROS PCT % 82*   LYMPHS PCT % 11*   MONOS PCT % 6   EOS PCT % 1       Results from last 7 days  Lab Units 10/21/18  1438   SODIUM mmol/L 136   POTASSIUM mmol/L 4 8   CHLORIDE mmol/L 98*   CO2 mmol/L 27   BUN mg/dL 37*   CREATININE mg/dL 1 44*   ANION GAP mmol/L 11   CALCIUM mg/dL 9 6   ALBUMIN g/dL 3 2*   TOTAL BILIRUBIN mg/dL 2 40*   ALK PHOS U/L 223*   ALT U/L 21   AST U/L 48*       Results from last 7 days  Lab Units 10/21/18  1439   INR  2 31*               Results from last 7 days  Lab Units 10/21/18  1502   LACTIC ACID mmol/L 2 4*       Imaging: I have personally reviewed pertinent reports  CT abdomen pelvis wo contrast   Final Result by Demetrice Sims MD (10/21 7381)   Limited exam without IV and oral contrast    1   Small amount of air in the bladder lumen  Correlate for recent catheterization versus gas from bladder infection  2   Moderate compression fracture of the T9 vertebral body of indeterminate age  This is new from the prior exam       The study was marked in EPIC for significant notification  Workstation performed: [id number]             EKG, Pathology, and Other Studies Reviewed on Admission:   · EKG: A  Fib, 67 BPM  · CT abdomen pelvis without contrast: Small amount of air in the bladder lumen  Correlate for recent catheterization versus gas from bladder infection  Moderate compression fracture of the T9 vertebral body of indeterminate age  New from prior  · RUQ US: (outpatient) No visible biliary obstruction/gallstones  No acute findings  Incidental right renal cyst      Allscripts / Epic Records Reviewed: Yes     ** Please Note: This note has been constructed using a voice recognition system   **

## 2018-10-21 NOTE — ASSESSMENT & PLAN NOTE
· POA, noted on urinalysis   · Continue with Cipro due to antibiotic allergies   · Monitor renal function   · Monitor mental status   · Follow up urine, blood cultures

## 2018-10-21 NOTE — ASSESSMENT & PLAN NOTE
· POA, has been on going for the last few weeks  Similar in character to prior abdominal pain that was attributed to hiatal hernia  ? gastritis  CT scan negative for acute findings and outpatient RUQ US negative for evidence of GB stones in the setting of elevated LFTs which I believe are incidental and due to poor oral intake and dehdyration  She is tender to palpation   No vomiting or diarrhea   · Admit patient to med/surg under inpatient status   · Consult gastroenterology   · Supportive care   · Carafate Q6  · Protonix 40 mg BID  · Zofran IV PRN nausea   · Lo Fat diet   · Could consider hepatitis panel, AFP tumor markers, etc however will defer this to GI

## 2018-10-21 NOTE — ED PROVIDER NOTES
History  Chief Complaint   Patient presents with    Abdominal Pain     Pt  presents to the ED with complaints of lower abdominal pain  HPI   79-year-old female with history of who AFib on warfarin, fall back in August with resulting right rib fracture, who presents for evaluation of lower abdominal pain, lack of appetite, and weight loss  Abdominal pain has been present for the last month, worsening in severity  It is described as a severe ache over the entire lower abdomen along the waistline, present constantly, not worse with eating  Patient has been seen by her primary care doctor multiple times for this  Has been found to have mild elevated liver enzymes, so right upper quadrant ultrasound was performed on the 10th of this month, that showed no visible biliary obstruction or gall stones  Patient's primary care doctor ordered a CT of the abdomen pelvis without contrast, the patient's daughter decided to bring her in here today because the patient has not been eating or drinking well secondary to the pain  She has lost 15 lb over the last month  Over the last 4 days she has stopped taking Percocet for her rib pain secondary to constipation, and states that she has been having more regular bowel movements since stopping the Percocet, but continues to require stool softeners  Denies diarrhea, blood in her stool, or black tarry stools  No dysuria or frequency  Denies vomiting but endorses nausea  Patient does have a history of hiatal hernia, as well as hysterectomy when she was in her 35s, but does not remember the indication for it  No other abdominal surgeries  She denies fevers or chills  Patient denies chest pain, with the exception of pain along her right posterior and lateral rib cage  No shortness of breath or cough  Prior to Admission Medications   Prescriptions Last Dose Informant Patient Reported? Taking?    Cholecalciferol (VITAMIN D) 2000 UNITS CAPS  Self Yes No   Sig: Take 2,000 Units by mouth daily     Cyanocobalamin 1000 MCG/ML KIT  Self No No   Sig: Inject 1 mL (1,000 mcg total) as directed every 30 (thirty) days   Docusate Sodium 100 MG capsule  Self Yes No   Sig: Take 1 tablet by mouth daily   acetaminophen (TYLENOL) 325 mg tablet  Self No No   Sig: Take 3 tablets by mouth every 8 (eight) hours   albuterol (2 5 mg/3 mL) 0 083 % nebulizer solution  Self Yes No   Sig: INHALE THE CONTENTS OF 1 VIAL BY NEBULIZATION FOUR TIMES DAILY   atorvastatin (LIPITOR) 20 mg tablet  Self No No   Sig: Take 1 tablet (20 mg total) by mouth daily   bisacodyl (DULCOLAX) 10 mg suppository  Self Yes No   Sig: Insert into the rectum   cyanocobalamin 1,000 mcg/mL  Self No No   Sig: Inject 1 mL (1,000 mcg total) into the shoulder, thigh, or buttocks every 30 (thirty) days   dicyclomine (BENTYL) 10 mg capsule  Self No No   Sig: Take 1 capsule (10 mg total) by mouth daily as needed (abdominal cramping/pain) As needed   fluticasone (FLONASE) 50 mcg/act nasal spray  Self Yes No   Sig: into each nostril   furosemide (LASIX) 20 mg tablet  Self Yes No   Sig: Take 40 mg by mouth every other day     levothyroxine 88 mcg tablet  Self No No   Sig: Take 1 tablet (88 mcg total) by mouth daily   metoprolol tartrate (LOPRESSOR) 25 mg tablet  Self Yes No   Sig: Take 25 mg by mouth every 12 (twelve) hours   omeprazole (PriLOSEC) 40 MG capsule  Self No No   Sig: Take 1 capsule (40 mg total) by mouth daily   oxyCODONE (ROXICODONE) 5 mg immediate release tablet   No No   Sig: Take 1 tablet (5 mg total) by mouth every 12 (twelve) hours as needed (pain) Max Daily Amount: 10 mg   potassium chloride (K-DUR) 10 mEq tablet   Yes No   Sig: Take 10 mEq by mouth   potassium chloride (K-DUR,KLOR-CON) 20 mEq tablet  Self Yes No   Sig: Take 10 mEq by mouth every other day     warfarin (COUMADIN) 1 mg tablet  Self Yes No   Sig: Take 2 5 mg by mouth daily Dosing is based on INR        Facility-Administered Medications: None       Past Medical History:   Diagnosis Date    Anemia of chronic disease 11/27/2017    Arthritis     Cardiac disease     CHF [facility]congestive heart failure) ([facility] [address] [facility]     Hiatal hernia     Hypercholesteremia     Hypothyroidism        Past Surgical History:   Procedure Laterality Date    APPENDECTOMY      CATARACT EXTRACTION Bilateral     CATARACT EXTRACTION      HIP FRACTURE SURGERY Right 12/2015    HYSTERECTOMY      OOPHORECTOMY      REPAIR RECTOCELE      REPLACEMENT TOTAL KNEE Right        Family History   Problem Relation Age of Onset    Kidney cancer Mother     Alcohol abuse Father     Cirrhosis Father         LIVER    Drug abuse Father     Alcohol abuse Brother     Diabetes Brother     Drug abuse Brother     Stroke Brother     Coronary artery disease Family      I have reviewed and agree with the history as documented  Social History   Substance Use Topics    Smoking status: Never Smoker    Smokeless tobacco: Never Used    Alcohol use No        Review of Systems   Constitutional: Positive for appetite change (decreased ) and unexpected weight change (weight loss)  Negative for chills and fever  HENT: Negative for congestion  Eyes: Negative for visual disturbance  Respiratory: Negative for cough and shortness of breath  Cardiovascular: Negative for chest pain and leg swelling  Gastrointestinal: Positive for abdominal pain and nausea  Negative for abdominal distention, blood in stool, constipation, diarrhea and vomiting  Genitourinary: Negative for difficulty urinating, dysuria, flank pain, frequency and pelvic pain  Musculoskeletal: Negative for arthralgias, back pain, neck pain and neck stiffness  Skin: Negative for rash  Neurological: Negative for weakness, numbness and headaches  Psychiatric/Behavioral: Negative for agitation, behavioral problems and confusion  Physical Exam  Physical Exam   Constitutional: She is oriented to person, place, and time   She appears well-developed  No distress  thin   HENT:   Head: Normocephalic and atraumatic  Right Ear: External ear normal    Left Ear: External ear normal    Nose: Nose normal    Mouth/Throat: Oropharynx is clear and moist    Eyes: Conjunctivae are normal    Neck: Normal range of motion  Neck supple  Cardiovascular: Normal rate, regular rhythm, normal heart sounds and intact distal pulses  Exam reveals no gallop and no friction rub  No murmur heard  Pulmonary/Chest: Effort normal and breath sounds normal  No respiratory distress  She has no wheezes  She has no rales  Abdominal: Soft  Bowel sounds are normal  She exhibits no distension  There is tenderness (generalized, worse over the lower abdomen)  There is no guarding  Musculoskeletal: Normal range of motion  She exhibits tenderness (perispinous TTP over the T and L spine, no midline tenderness)  She exhibits no edema (No LE edema) or deformity  Neurological: She is alert and oriented to person, place, and time  She exhibits normal muscle tone  Skin: Skin is warm and dry  She is not diaphoretic  Vital Signs  ED Triage Vitals [10/21/18 1357]   Temperature Pulse Respirations Blood Pressure SpO2   98 4 °F (36 9 °C) 61 20 120/75 100 %      Temp Source Heart Rate Source Patient Position - Orthostatic VS BP Location FiO2 (%)   Oral Monitor Lying Right arm --      Pain Score       8           Vitals:    10/21/18 1357   BP: 120/75   Pulse: 61   Patient Position - Orthostatic VS: Lying       Visual Acuity      ED Medications  Medications   sodium chloride 0 9 % bolus 1,000 mL (1,000 mL Intravenous New Bag 10/21/18 6798)   ciprofloxacin (CIPRO) IVPB (premix) 400 mg (not administered)   oxyCODONE (ROXICODONE) IR tablet 2 5 mg (not administered)       Diagnostic Studies  Results Reviewed     Procedure Component Value Units Date/Time    Blood culture #1 [[id number]] Collected:  10/21/18 5987    Lab Status:   In process Specimen:  Blood from Arm, Left Updated: 10/21/18 1704    Blood culture #2 [[id number]] Collected:  10/21/18 1659    Lab Status: In process Specimen:  Blood from Arm, Left Updated:  10/21/18 1704    Urinalysis with microscopic [[id number]]  (Abnormal) Collected:  10/21/18 1621    Lab Status:  Final result Specimen:  Urine from Urine, Clean Catch Updated:  10/21/18 1649     Clarity, UA Slightly Cloudy     Color, UA Yellow     Specific Gravity, UA 1 010     pH, UA 5 5     Glucose, UA Negative mg/dl      Ketones, UA Negative mg/dl      Blood, UA Trace-Intact (A)     Protein, UA Negative mg/dl      Nitrite, UA Positive (A)     Bilirubin, UA Negative     Urobilinogen, UA 0 2 E U /dl      Leukocytes, UA Moderate (A)     WBC, UA 10-20 (A) /hpf      RBC, UA 0-1 (A) /hpf      Bacteria, UA Moderate (A) /hpf      OTHER OBSERVATIONS Transitional Epithelial Cells     Epithelial Cells Occasional /hpf     Urine culture [[id number]] Collected:  10/21/18 1621    Lab Status: In process Specimen:  Urine from Urine, Clean Catch Updated:  10/21/18 1624    Troponin I [[id number]]  (Normal) Collected:  10/21/18 1549    Lab Status:  Final result Specimen:  Blood from Arm, Left Updated:  10/21/18 1623     Troponin I 0 04 ng/mL     Procalcitonin [[id number]] Collected:  10/21/18 1549    Lab Status:   In process Specimen:  Blood from Arm, Left Updated:  10/21/18 1553    Comprehensive metabolic panel [[id number]]  (Abnormal) Collected:  10/21/18 1438    Lab Status:  Final result Specimen:  Blood from Arm, Left Updated:  10/21/18 1523     Sodium 136 mmol/L      Potassium 4 8 mmol/L      Chloride 98 (L) mmol/L      CO2 27 mmol/L      ANION GAP 11 mmol/L      BUN 37 (H) mg/dL      Creatinine 1 44 (H) mg/dL      Glucose 105 mg/dL      Calcium 9 6 mg/dL      AST 48 (H) U/L      ALT 21 U/L      Alkaline Phosphatase 223 (H) U/L      Total Protein 7 7 g/dL      Albumin 3 2 (L) g/dL      Total Bilirubin 2 40 (H) mg/dL      eGFR 31 ml/min/1 73sq m     Narrative:         National Kidney Disease Education Program recommendations are as follows:  GFR calculation is accurate only with a steady state creatinine  Chronic Kidney disease less than 60 ml/min/1 73 sq  meters  Kidney failure less than 15 ml/min/1 73 sq  meters  Lipase [[id number]]  (Abnormal) Collected:  10/21/18 1438    Lab Status:  Final result Specimen:  Blood from Arm, Left Updated:  10/21/18 1523     Lipase 434 (H) u/L     Lactic acid x2 Q2H [[id number]]  (Abnormal) Collected:  10/21/18 1502    Lab Status:  Final result Specimen:  Blood Updated:  10/21/18 1517     LACTIC ACID 2 4 (HH) mmol/L     Narrative:         Result may be elevated if tourniquet was used during collection      Protime-INR [[id number]]  (Abnormal) Collected:  10/21/18 1439    Lab Status:  Final result Specimen:  Blood from Arm, Left Updated:  10/21/18 1515     Protime 24 7 (H) seconds      INR 2 31 (H)    Troponin I [[id number]]  (Abnormal) Collected:  10/21/18 1439    Lab Status:  Final result Specimen:  Blood from Arm, Left Updated:  10/21/18 1512     Troponin I 0 05 (H) ng/mL     CBC and differential [[id number]]  (Abnormal) Collected:  10/21/18 1438    Lab Status:  Final result Specimen:  Blood from Arm, Left Updated:  10/21/18 1455     WBC 5 70 Thousand/uL      RBC 4 85 Million/uL      Hemoglobin 16 1 (H) g/dL      Hematocrit 48 9 (H) %       (H) fL      MCH 33 2 pg      MCHC 32 9 g/dL      RDW 14 6 %      MPV 10 5 fL      Platelets 912 Thousands/uL      nRBC 0 /100 WBCs      Neutrophils Relative 82 (H) %      Immat GRANS % 0 %      Lymphocytes Relative 11 (L) %      Monocytes Relative 6 %      Eosinophils Relative 1 %      Basophils Relative 0 %      Neutrophils Absolute 4 66 Thousands/µL      Immature Grans Absolute 0 02 Thousand/uL      Lymphocytes Absolute 0 61 Thousands/µL      Monocytes Absolute 0 36 Thousand/µL      Eosinophils Absolute 0 03 Thousand/µL      Basophils Absolute 0 02 Thousands/µL                  CT abdomen pelvis wo contrast   Final Result by Lindsay Hayes MD (10/21 7365)   Limited exam without IV and oral contrast    1   Small amount of air in the bladder lumen  Correlate for recent catheterization versus gas from bladder infection  2   Moderate compression fracture of the T9 vertebral body of indeterminate age  This is new from the prior exam       The study was marked in EPIC for significant notification  Workstation performed: [id number]                    Procedures  Procedures       Phone Contacts  ED Phone Contact    ED Course                               MDM  Number of Diagnoses or Management Options  Elevated troponin: minor  Lactic acidosis: new and requires workup  Lower abdominal pain: new and requires workup  Total bilirubin, elevated: new and requires workup  Diagnosis management comments: Patient appears nontoxic, afebrile and hemodynamically stable, but thin  Endorses lower abdominal pain, and has tenderness to palpation generalized over the abdomen, worse over the lower quadrants  She denies chest pain, but endorses nausea and decreased appetite  For this reason, EKG was obtained which I personally interpreted, which shows AFib, normal rate, left axis deviation, Q-waves in leads AVF, V1 through V3, new from prior, new T-wave inversion in lead 2, no ST segment deviation  Patient's troponin is mildly elevated to 0 05  In the absence of chest pain, will obtain delta troponin prior to starting anticoagulation  ASA not given as the pt reports an allergy to this  Labs remarkable for lactic acidosis to 2 4, no leukocytosis, hemoconcentration with hemoglobin of 16 1  CMP with creatinine of 1 44, which is improved from recent baseline  Patient continues to have mildly elevated AST of 48, normal ALT  Alk-phos elevated to 223 (slightly decreased from prior), with T bili of 2 4 (increased from 1 6)    Patient does not have focal right upper quadrant tenderness, and recent right upper quadrant ultrasound did not show any gallstones or evidence of acute cholecystitis  INR is therapeutic at 2 3  In the absence of leukocytosis or fever, initially I had a low suspicion of infection and suspect that the patient's lactic acidosis is in the setting of dehydration from poor oral intake rather than infection, so antibiotics were not given on arrival      CT abdomen pelvis obtained that shows a small amount of air in the bladder lumen  Patient did have an in and out catheterization 2 weeks ago, but has not had any bladder instrumentation since then that would explain this air  Additional possibility includes UTI, which could also explain the patient's lactic acidosis and lower abdominal pain  Will obtain an in and out catheterization for UA  Patient has multiple antibiotic allergies, so will give ciprofloxacin pending urine results  Blood culture ordered  No other findings on the patient's CT abdomen pelvis to explain her pain  Lactic acidosis with abdominal pain is concerning for mesenteric ischemia, but I have a lower suspicion for this given persistent nature of pain over the last month, therapeutic INR, and lack of worsening of symptoms with oral intake  Patient does have a moderate compression fracture to T9 of indeterminate age found on her CT abdomen pelvis  On initial exam she had only paraspinous tenderness, but on repeat she does have some midline tenderness in this area, but states this has been present for some time  This may be from her recent fall in August   She will need PT evaluation as an inpatient  Lower extremities neurovascularly intact  Patient will likely need inpatient gastroenterology consultation for further workup of her abdominal pain with elevated T bili and alk phos in the absence of findings on CT or right upper quadrant ultrasound  She was admitted in stable condition         Amount and/or Complexity of Data Reviewed  Clinical lab tests: ordered and reviewed  Tests in the radiology section of CPT®: ordered and reviewed  Review and summarize past medical records: yes  Independent visualization of images, tracings, or specimens: yes    Patient Progress  Patient progress: stable    CritCare Time               Disposition  Final diagnoses:   Lower abdominal pain   Lactic acidosis   Elevated troponin   Total bilirubin, elevated     Time reflects when diagnosis was documented in both MDM as applicable and the Disposition within this note     Time User Action Codes Description Comment    10/21/2018  4:25 PM Clearnce Sever Add [R10 30] Lower abdominal pain     10/21/2018  4:25 PM Clearnce Sever Add [E87 2] Lactic acidosis     10/21/2018  4:25 PM Little Mace [R74 8] Elevated troponin     10/21/2018  4:26 PM Clearnce Sever Add [R17] Total bilirubin, elevated       ED Disposition     None      Follow-up Information    None         Patient's Medications   Discharge Prescriptions    No medications on file     No discharge procedures on file      ED Provider  Electronically Signed by           Svetlana Lynne MD  10/21/18 9456

## 2018-10-21 NOTE — ASSESSMENT & PLAN NOTE
· Patient appears dehydrate, had only been on QOD Lasix   · Hydrate patient   · Hold Lasix   · Monitor volume status closely

## 2018-10-22 NOTE — PHYSICAL THERAPY NOTE
PHYSICAL THERAPY EVALUATION  NAME:  Mickael Litten  DATE: [birth year]    AGE:   89 y o  Mrn:   [id number]  ADMIT DX:  Lactic acidosis [E87 2]  Abdominal pain [R10 9]  Elevated troponin [R74 8]  Lower abdominal pain [R10 30]  Total bilirubin, elevated [R17]  Pain of upper abdomen [R10 10]    Past Medical History:   Diagnosis Date    Anemia of chronic disease 11/27/2017    Arthritis     Cardiac disease     CHF (congestive heart failure) ([facility] [address] )     Hiatal hernia     Hypercholesteremia     Hypothyroidism      Length Of Stay: 1  Performed at least 2 patient identifiers during session: Name and Birthday  PHYSICAL THERAPY EVALUATION :    10/22/18 1430   Note Type   Note type Eval only   Pain Assessment   Pain Assessment 0-10   Pain Score 7   Pain Type Acute pain   Pain Location Abdomen;Back  (upper back around T5)   Effect of Pain on Daily Activities limits upright postures, speed and indep of mobility, limits amb distance   Patient's Stated Pain Goal No pain   Hospital Pain Intervention(s) Repositioned; Ambulation/increased activity; Emotional support  (declined AquaK heating pad)   Home Living   Type of [facility] (3 vs 4 TEMI with railing)   Home Equipment Walker   Prior Function   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   Lives With Daughter  (pt is home alone some of the time)   Receives Help From Family   Restrictions/Precautions   Weight Bearing Precautions Per Order No   Braces or Orthoses (none ordered)   Other Precautions Fall Risk;Pain;Multiple lines; Bed Alarm; Chair Alarm;Cognitive   General   Additional Pertinent History Noted incidental finding of Compression fx in documentation, no brace ordered via SLIM   COnfirmed with Yas Patel PAC that pt does not require a brace at this time   Family/Caregiver Present (son in law at start of session)   Cognition   Overall Cognitive Status Impaired   Arousal/Participation Alert   Orientation Level Oriented to person;Oriented to place; Disoriented to time;Disoriented to situation   Memory Decreased recall of precautions;Decreased recall of recent events   Following Commands Follows one step commands with increased time or repetition   RUE Strength   RUE Overall Strength Deficits;Due to pain  (limited AROM shoulder >90)   LUE Strength   LUE Overall Strength Deficits  (limited AROM shoulder >90)   RLE Assessment   RLE Assessment (redness noted @ feet)   Strength RLE   R Hip Flexion 4/5   R Knee Extension 4/5   R Ankle Dorsiflexion 4/5   LLE Assessment   LLE Assessment (redness noted @ feet)   Strength LLE   L Hip Flexion 4/5   L Knee Extension 4/5   L Ankle Dorsiflexion 4/5   Coordination   Sensation X  (mildly Inupiat)   Light Touch   RLE Light Touch Grossly intact   LLE Light Touch Grossly intact   Bed Mobility   Supine to Sit 3  Moderate assistance   Additional items Assist x 1; Increased time required;Verbal cues; Bedrails;HOB elevated   Transfers   Sit to Stand 5  Supervision   Additional items Armrests; Increased time required   Stand to Sit 4  Minimal assistance   Additional items Assist x 1; Increased time required;Verbal cues;Armrests   Additional Comments Posture: flexed trunk /kyphosis noted  Ambulation/Elevation   Gait pattern Short stride; Excessively slow; Step to  (reaching for UE support)   Gait Assistance 4  Minimal assist   Additional items Assist x 1;Verbal cues   Assistive Device None   Distance steppage transfer bed to chair 2'   Stair Management Assistance Not tested   Balance   Static Sitting Fair +   Static Standing Poor +   Ambulatory Poor +   Endurance Deficit   Endurance Deficit Yes   Endurance Deficit Description limited amb distance, declined further ambulation with rolling walker   Activity Tolerance   Activity Tolerance Patient limited by fatigue;Patient limited by pain   Medical Staff Made Aware spoke to Dariel from [address] spoke to Ivan RN   Assessment   Prognosis Fair   Problem List Decreased strength;Decreased range of motion;Decreased endurance;Pain;Orthopedic restrictions; Impaired hearing;Decreased cognition;Decreased mobility; Impaired balance  (gait deviations)   Barriers to Discharge Decreased caregiver support; Inaccessible home environment  (TEMI, home alone for periods during the day)   Goals   Patient Goals to go home and work on my puzzles   STG Expiration Date 11/02/18   Treatment Day 0   Plan   Treatment/Interventions Functional transfer training;LE strengthening/ROM; Elevations;Cognitive reorientation; Bed mobility;Gait training;Equipment eval/education;Patient/family training; Endurance training; Therapeutic exercise;Spoke to nursing;Spoke to advanced practitioner   PT Frequency Other (Comment)  (3-5x/wk)   Recommendation   Recommendation OT consult  (rehab vs home based on interdiciplinary recommendations)   Equipment Recommended Walker   Barthel Index   Feeding 10   Bathing 0   Grooming Score 0   Dressing Score 5   Bladder Score 10   Bowels Score 10   Toilet Use Score 5   Transfers (Bed/Chair) Score 10   Mobility (Level Surface) Score 0   Stairs Score 0   Barthel Index Score 50   (Please find full objective findings from PT assessment regarding body systems outlined above)  Assessment: Pt is a 80 y o  female seen for PT evaluation s/p admit to [address] [place] [address] on 10/21/2018 w/ Abdominal pain  Order placed for PT  Prior to admission, pt lived in one floor environment, had 3 or 4 TEMI with railing, lived with daughter who works and used rolling walker for mobility  Upon evaluation: Pt requires mod assistance for bed mobilty, intermittent min assistance for transfers and min assistance for ambulation with out device, but limited from bed to chair    Pt's clinical presentation is currently unstable/unpredictable given the functional mobility deficits above, especially weakness, decreased ROM, decreased endurance, gait deviations, pain and decreased functional mobility tolerance, coupled with fall risks including hx of falls, impaired balance and decreased cognition, and combined with medical complications of abnormal WBCs, multiple readmissions and abnormal potassium values  Pt to benefit from continued skilled PT tx while in hospital and upon DC to address deficits as defined above and maximize level of functional mobility  From PT/mobility standpoint, recommendation at time of d/c would be inpatient rehab vs  Home PT pending progress and interdisciplinary recommendations in order to maximize pt's functional independence and consistency w/ mobility in order to facilitate return to PLOF  Recommend trial with walker next 1-2 sessions, ther ex next 1-2 sessions, OT consult and case management consult  Goals: Pt will: Perform bed mobility tasks to Supervision to increase Indep in prior living environment  Perform transfers to Supervision to increase Indep in prior living environment  Perform ambulation with rolling walker for >50' to  Supervision  to decrease risk for falls and increase indep while others at work  Perofrm 3 steps with railing to return to home with TEMI     The following objective measures were performed on IE: Barthel Index 50/100  Comorbidities affecting pt's physical performance at time of assessment include: orthopedic surgery, CHF, CAD and hip fracture surgery, arthritis  Personal factors affecting pt at time of IE include: steps to enter environment, limited home support, advanced age, inability to perform IADLs, inability to navigate community distances and recent fall(s)       Collette Marshal, PT, DPT

## 2018-10-22 NOTE — PHYSICIAN ADVISOR
Current patient class: Inpatient  The patient is currently on Hospital Day: 2 at [address] [facility]      The patient was admitted to the hospital at [phone] on 10/21/18 for the following diagnosis:  Lactic acidosis [E87 2]  Abdominal pain [R10 9]  Elevated troponin [R74 8]  Lower abdominal pain [R10 30]  Total bilirubin, elevated [R17]  Pain of upper abdomen [R10 10]       There is documentation in the medical record of an expected length of stay of at least 2 midnights  The patient is therefore expected to satisfy the 2 midnight benchmark and given the 2 midnight presumption is appropriate for INPATIENT ADMISSION  Given this expectation of a satisfying stay, CMS instructs us that the patient is most often appropriate for inpatient admission under part A provided medical necessity is documented in the chart  After review of the relevant documentation, labs, vital signs and test results, the patient is appropriate for INPATIENT ADMISSION  Admission to the hospital as an inpatient is a complex decision making process which requires the practitioner to consider the patients presenting complaint, history and physical examination and all relevant testing  With this in mind, in this case, the patient was deemed appropriate for INPATIENT ADMISSION  After review of the documentation and testing available at the time of the admission I concur with this clinical determination of medical necessity  26-year-old woman admitted to the hospital with abdominal pain  She has been seen by Gastroenterology  She also has a compression fracture causing pain and urinary tract infection for which she is on IV antibiotics  Since the patient will require more than 2 midnight stay for the evaluation of their condition, they are appropriate for INPATIENT status  Rationale is as follows:     The patient is a 80 yrs old Female who presented to the ED at 10/21/2018  1:50 PM with a chief complaint of Abdominal Pain (Pt  presents to the ED with complaints of lower abdominal pain  )    The patients vitals on arrival were ED Triage Vitals [10/21/18 1357]   Temperature Pulse Respirations Blood Pressure SpO2   98 4 °F (36 9 °C) 61 20 120/75 100 %      Temp Source Heart Rate Source Patient Position - Orthostatic VS BP Location FiO2 (%)   Oral Monitor Lying Right arm --      Pain Score       8           Past Medical History:   Diagnosis Date    Anemia of chronic disease 11/27/2017    Arthritis     Cardiac disease     CHF (congestive heart failure) (HCC)     Hiatal hernia     Hypercholesteremia     Hypothyroidism      Past Surgical History:   Procedure Laterality Date    APPENDECTOMY      CATARACT EXTRACTION Bilateral     CATARACT EXTRACTION      HIP FRACTURE SURGERY Right 12/2015    HYSTERECTOMY      OOPHORECTOMY      REPAIR RECTOCELE      REPLACEMENT TOTAL KNEE Right            Consults have been placed to:   IP CONSULT TO GASTROENTEROLOGY    Vitals:    10/21/18 1831 10/21/18 2215 10/22/18 0719 10/22/18 1601   BP: 112/89 144/70 153/65 139/75   BP Location: Right arm Right arm Right arm Left arm   Pulse: 74 70 69 81   Resp: 20 18 18 18   Temp: 97 5 °F (36 4 °C) 98 1 °F (36 7 °C) 98 3 °F (36 8 °C) 97 5 °F (36 4 °C)   TempSrc: Oral Oral Oral Oral   SpO2: 93% 95% 93% 93%   Weight: 54 4 kg (119 lb 14 9 oz)          Most recent labs:    Recent Labs      10/21/18   1438   10/21/18   1439  10/21/18   1549  10/22/18   0453   WBC  5 70   --    --    --   4 00*   HGB  16 1*   --    --    --   13 8   HCT  48 9*   --    --    --   42 3   PLT  175   --    --    --   124*   K  4 8   --    --    --   3 3*   NA  136   --    --    --   140   CALCIUM  9 6   --    --    --   8 7   BUN  37*   --    --    --   30*   CREATININE  1 44*   --    --    --   1 21   LIPASE  434*   --    --    --    --    INR   --    --   2 31*   --    --    TROPONINI   --    < >  0 05*  0 04   --    AST  48*   --    --    --   29   ALT  21   --    --    --   15 ALKPHOS  223*   --    --    --   182*    < > = values in this interval not displayed         Scheduled Meds:  Current Facility-Administered Medications:  acetaminophen 975 mg Oral Q8H Dariel Kaur PA-C    albuterol 2 5 mg Nebulization Q6H PRN Dariel Stone PA-C    atorvastatin 20 mg Oral Daily With Viveros'sLINDSAY    ciprofloxacin 400 mg Intravenous Q24H Dariel Kaur PA-C Last Rate: 400 mg (10/22/18 1713)   diclofenac sodium 2 g Topical 4x Daily Dariel Kaur PA-C    dicyclomine 10 mg Oral Daily PRN Dariel Stone PA-C    fluticasone 1 spray Each Nare Daily Dariel Stone PA-C    levothyroxine 88 mcg Oral Early Morning Dariel Stone PA-C    lidocaine 1 patch Topical Daily Dariel Kaur PA-C    metoprolol tartrate 25 mg Oral Q12H [address] Dariel Kaur PA-C    ondansetron 4 mg Intravenous Q6H PRN Dariel Stone PA-C    pantoprazole 40 mg Oral BID AC Dariel Kaur PA-C    polyethylene glycol 17 g Oral Daily Alley Joyce PA-C    [START ON 10/23/2018] potassium chloride 20 mEq Oral Daily Dariel Stone PA-C    senna 1 tablet Oral HS Dariel Stone PA-C    sodium chloride 60 mL/hr Intravenous Continuous Margarita Ortiz MD Last Rate: 60 mL/hr (10/22/18 0806)   sucralfate 1,000 mg Oral Q6H [address] Dariel Kaur PA-C    warfarin 1 25 mg Oral Once per day on Mon Tue Wed Thu Fri Dariel Kaur PA-C    warfarin 2 5 mg Oral Once per day on Sun Sat Dariel Kaur PA-C      Continuous Infusions:  sodium chloride 60 mL/hr Last Rate: 60 mL/hr (10/22/18 0806)     PRN Meds: albuterol    dicyclomine    ondansetron

## 2018-10-22 NOTE — MEDICAL STUDENT
MEDICAL STUDENT  Inpatient H&P for TRAINING ONLY   Not Part of Legal Medical Record       H&P Exam - Madhavi Jeong 80 y o  female MRN: [id number]    Unit/Bed#: -01 Encounter: [id number]    Assessment:  1  Abdominal Pain  GI is consulted to help determine the source of worsening abdominal pain  CT scan is negative for acute findings and RUQ US negative for evidence of GB stones in the setting of elevated LFTs  Continue supportive care with carafate, protonix, zofran, and senna and miralax  Continue low fat diet  Consider hepatitis panel and colonoscopy but will defer to GI      2  UTI  Patient's symptoms are improving  Continue cipro  Monitor renal function and mental status  Awaiting results of blood cultures  3  Chronic Diastolic CHF  Continue hydrating patient  Her BUN/Creatinine ratio is improving but still indicates dehydration  Continue to hold lasix and monitor volume status closely  4  Chronic Kidney Disease Stage 3  Continue to monitor BMP closely    5  Essential hypertension:   BP is acceptable  Continue metoprolol    6  Chronic Atrial Fibrillation:  Continue metoprolol and warfarin  INR is therapeutic  7  Closed Wedge Compression Fracture of 9th Cervical Vertebra  Patient continues to complain of pain due to rib fracture and states that she has "pain all the way through " She had a recent fall which caused her rib fracture and could be the cause of this compression fracture  Continue non-opioid pain regimen of tylenol, lidoderm, Aqua K and Voltoren  PT consulted  History of Present Illness   Patient is a 80year old female with significant PMH of hiatal hernia, chronic diastolic CHF, rib fracture from recent fall, chronic atrial fibrillation and hypertension who presented to the ER yesterday with worsening abdominal pain that had been going on for a couple of weeks  Daughter reported that outpatient RUQ US showed no abnormalities   The abdominal pain was described as similar to the pain associated with her hiatal hernia for which she was treated previously with protonix  Patient and her daughter also reported a 15 lb weight loss in the past month  Urinalysis showed a UTI  She is being treated with Cipro  CT scan showed a closed wedge compression fracture of the ninth thoracic vertebrae  She is being treated with a non-narcotic pain regimen due to history of opioid induced constipation  Today she states she is feeling "lousy " She states that her abdominal pain is 9/10 but a little better than yesterday  When asked about back pain she states she has pain "all the way through " She is nausea but has not vomited  She was only able to eat a couple bites of toast with breakfast  She states that she has no pain with urination and noticed the color was a dark yellow  ROS:   Consitutional: +15 lb weight loss in past month, +fatigue, -fever, -chills    Skin: -rashes, -lesions  ENT: -sore throat, -rhinorrhea, -otalgia  Pulmonary: -coughing, -SOB  Cardiac: -chest pain, -palpitations  GI: +abdominal pain, +nausea, -constipation, -diarrhea  : -dysuria, -urgency, -frequency       Current Vitals:   Blood Pressure: 153/65 (10/22/18 0719)  Pulse: 69 (10/22/18 0719)  Temperature: 98 3 °F (36 8 °C) (10/22/18 0719)  Temp Source: Oral (10/22/18 0719)  Respirations: 18 (10/22/18 0719)  Weight - Scale: 54 4 kg (119 lb 14 9 oz) (10/21/18 1831)  SpO2: 93 % (10/22/18 0719)      Intake/Output Summary (Last 24 hours) at 10/22/18 1524  Last data filed at 10/22/18 0719   Gross per 24 hour   Intake              515 ml   Output              350 ml   Net              165 ml       Invasive Devices     Peripheral Intravenous Line            Peripheral IV 10/22/18 Right Forearm less than 1 day                Physical Exam:   General: Elderly and frail appearing  Fatigued  Alert and oriented to person, place, and situation  Skin: No rashes, sores, or lesions   ENT: Atraumatic and normocephalic head   Mucous membranes are moist    Pulmonary: CTA ABDULLAHI  No wheezes, rales, or rhonchi  Cardiac: Irregularly irregular rhythm   Musculoskeletal: No lower extremity swelling  GI: Soft, tenderness to palpation in RUQ and RLQ  Normoactive bowel sounds  : Deferred     Lab Results:   K: 3 3  Bun: 30  Creatinine: 1 21  UA (10-21-18): Positive LE and Nitrite with bacteriuria indicative of UTI       Imaging:   CT of abdomen and pelvis without contrast:   Limited exam without IV and oral contrast   1   Small amount of air in the bladder lumen  Correlate for recent catheterization versus gas from bladder infection  2   Moderate compression fracture of the T9 vertebral body of indeterminate age    This is new from the prior exam       Code Status: Level 1 - Full Code  Advance Directive and Living Will:      Power of :    POLST:

## 2018-10-22 NOTE — ASSESSMENT & PLAN NOTE
· Patient appears to be approaching euvolemia, had only been on QOD Lasix   · Hydrate patient   · Hold Lasix   · Monitor volume status closely

## 2018-10-22 NOTE — ASSESSMENT & PLAN NOTE
· POA, noted on urinalysis   Improving   · Continue with Cipro due to antibiotic allergies   · Monitor renal function   · Monitor mental status   · Follow up urine, blood cultures

## 2018-10-22 NOTE — UTILIZATION REVIEW
Initial Clinical Review    Admission: Date/Time/Statement: 10/21/18 @ [facility]  81      Orders Placed This Encounter   Procedures    Inpatient Admission (expected length of stay for this patient is greater than two midnights)     Standing Status:   Standing     Number of Occurrences:   1     Order Specific Question:   Admitting Physician     Answer:   Duglas King [[id number]]     Order Specific Question:   Level of Care     Answer:   Med Surg [[id number]]     Order Specific Question:   Estimated length of stay     Answer:   More than 2 Midnights     Order Specific Question:   Certification     Answer:   I certify that inpatient services are medically necessary for this patient for a duration of greater than two midnights  See H&P and MD Progress Notes for additional information about the patient's course of treatment  ED: Date/Time/Mode of Arrival:   ED Arrival Information     Expected Arrival Acuity Means of Arrival Escorted By Service Admission Type    - 10/21/2018 13:35 Urgent Wheelchair Family Member General Medicine Urgent    Arrival Complaint    Abd pain          Chief Complaint:   Chief Complaint   Patient presents with    Abdominal Pain     Pt  presents to the ED with complaints of lower abdominal pain  History of Illness: 80 y o  female with a history of hiatal hernia, chronic diastolic CHF, Chronic A  Fib, and HTN who presents with abdominal pain for a few weeks  Patient had been worked up by her outpatient provider with a [facility] which was negative  She reports that her PCP Dr Jenny Brown wanted her to have a CT scan  The patient's daughter reports that the patient had an EGD a few years ago because she was having similar abdominal pain and was diagnosed with a hiatal hernia, but no other abnormalities were found  She reports that she was started on omeprazole and her symptoms improved  It appears that her abdominal pain has returned   The patient reports that bending over and putting pressure on her abdomen alleviates her pain some  She reports nausea, but denies any episodes of vomiting  The patient's daughter reports that she had been prescribed opiates for her pain and this caused constipation which was resolved with stool softeners  Her daughter reports a decreased appetite and a roughly 15 lb weight loss due to her not eating or drinking well  The patient's daughter notes that her urine is very dark       Of note, the patient had a mechanical fall leaving her one doctor's appointment, but was thought to be uninjured at that time, but then started with chest wall pain a few days later  She had been diagnosed with a rib fracture at that time, but the patient and her daughter are unsure what could have caused the thoracic vertebrae fracture  She denies numbness, tingling, or weakness, but endorses back pain, but thought it was related to the rib pain       ED Vital Signs:   ED Triage Vitals [10/21/18 1357]   Temperature Pulse Respirations Blood Pressure SpO2   98 4 °F (36 9 °C) 61 20 120/75 100 %      Temp Source Heart Rate Source Patient Position - Orthostatic VS BP Location FiO2 (%)   Oral Monitor Lying Right arm --      Pain Score       8        Wt Readings from Last 1 Encounters:   10/21/18 54 4 kg (119 lb 14 9 oz)       Vital Signs (abnormal): none    Abnormal Labs/Diagnostic Test Results:   UA trace blood  + nitrite  Moderate leukocytes  Moderate bacteria  Cl 98  Bun 37  Creatinine 1 44   ast 48  Alkaline phosphatase 223  Albumin 3 2  Total bilirubin 2 40  Lipase 434  Lactic acid 2 4  INR 2 31  Troponin 0 05  Wbc 5 70, hgb 16 1, hct 48 9  CT abdomen - Limited exam without IV and oral contrast   1   Small amount of air in the bladder lumen   Correlate for recent catheterization versus gas from bladder infection  2   Moderate compression fracture of the T9 vertebral body of indeterminate age  Mariam Ryan is new from the prior exam     10/22/2018-  K 3 3  Alkaline phosphatase 182  Total protein 6  Albumin 2 5  Total bilirubin 1 7  Wbc 4  ED Treatment: blood cultures  Medication Administration from 10/21/2018 1335 to 10/21/2018 1821       Date/Time Order Dose Route Action Comments     10/21/2018 1548 sodium chloride 0 9 % bolus 1,000 mL 1,000 mL Intravenous New Bag      10/21/2018 1710 ciprofloxacin (CIPRO) IVPB (premix) 400 mg 400 mg Intravenous New Bag      10/21/2018 1708 oxyCODONE (ROXICODONE) IR tablet 2 5 mg 2 5 mg Oral Given           Past Medical/Surgical History:   Past Medical History:   Diagnosis Date    Anemia of chronic disease 11/27/2017    Arthritis     Cardiac disease     CHF (congestive heart failure) (HCC)     Hiatal hernia     Hypercholesteremia     Hypothyroidism        Admitting Diagnosis: Lactic acidosis [E87 2]  Abdominal pain [R10 9]  Elevated troponin [R74 8]  Lower abdominal pain [R10 30]  Total bilirubin, elevated [R17]  Pain of upper abdomen [R10 10]    Age/Sex: 80 y o  female    Assessment/Plan:   Abdominal pain   Assessment & Plan     · POA, has been on going for the last few weeks  Similar in character to prior abdominal pain that was attributed to hiatal hernia  ? gastritis  CT scan negative for acute findings and outpatient RUQ US negative for evidence of GB stones in the setting of elevated LFTs which I believe are incidental and due to poor oral intake and dehdyration  She is tender to palpation  No vomiting or diarrhea   ? Admit patient to med/surg under inpatient status   ? Consult gastroenterology   ? Supportive care   § Carafate Q6  § Protonix 40 mg BID  § Zofran IV PRN nausea   § Lo Fat diet   ? Could consider hepatitis panel, AFP tumor markers, etc however will defer this to GI       Acute cystitis without hematuria   Assessment & Plan     · POA, noted on urinalysis   ? Continue with Cipro due to antibiotic allergies   § Monitor renal function   § Monitor mental status   ?  Follow up urine, blood cultures       Chronic diastolic CHF (congestive heart failure) ([facility] [address] )   Assessment & Plan     · Patient appears dehydrate, had only been on QOD Lasix   ? Hydrate patient   ? Hold Lasix   ? Monitor volume status closely       CKD (chronic kidney disease), stage III (HCC)   Assessment & Plan     · Creatinine at baseline, expect slight improvement with hydration   ? Monitor BMP       Chronic atrial fibrillation (HCC)   Assessment & Plan     · Rate controlled on admission, INR therapeutic    ? Continue Metoprolol   ? Coumadin 1 25 mg QD      Essential hypertension   Assessment & Plan     · BP acceptable   ? Continue home medication regimen       Closed wedge compression fracture of ninth thoracic vertebra [facility])   Assessment & Plan     · Incidental finding on CT scan today  Unclear inciting event, however she did have a recent fall and is chronically kyphotic  Had been constipated before due to narcotic analgesia  ? Trial non-narcotic regimen   § Aqua K  § Voltaren  § Lidoderm  § Tylenol   ?  PT eval and treat          Admission Orders:  10/21/2018  1646 INPATIENT   Scheduled Meds:   Current Facility-Administered Medications:  acetaminophen 975 mg Oral Q8H    albuterol 2 5 mg Nebulization Q6H PRN    atorvastatin 20 mg Oral Daily With Dinner    ciprofloxacin 400 mg Intravenous Q24H    diclofenac sodium 2 g Topical 4x Daily    dicyclomine 10 mg Oral Daily PRN    fluticasone 1 spray Each Nare Daily    levothyroxine 88 mcg Oral Early Morning    lidocaine 1 patch Topical Daily    metoprolol tartrate 25 mg Oral Q12H KENISHA    ondansetron 4 mg Intravenous Q6H PRN    pantoprazole 40 mg Oral BID AC    polyethylene glycol 17 g Oral Daily    [START ON 10/23/2018] potassium chloride 20 mEq Oral Daily    senna 1 tablet Oral HS    sodium chloride 60 mL/hr Intravenous Continuous Last Rate: 60 mL/hr (10/22/18 0806)   sucralfate 1,000 mg Oral Q6H [facility]    warfarin 1 25 mg Oral Once per day on Mon Tue Wed Thu Fri    warfarin 2 5 mg Oral Once per day on Sun Sat      Continuous Infusions:   sodium chloride 60 mL/hr Last Rate: 60 mL/hr (10/22/18 0806)     PRN Meds:    Dicyclomine - used x 1    Ondansetron  4 mg iv - used x 1      scds  Aqua K   Urine culture  Consult GI  PT

## 2018-10-22 NOTE — ASSESSMENT & PLAN NOTE
· POA, has been on going for the last few weeks  Similar in character to prior abdominal pain that was attributed to hiatal hernia  ? gastritis  CT scan negative for acute findings and outpatient RUQ US negative for evidence of GB stones in the setting of elevated LFTs which I believe are incidental and due to poor oral intake and dehydration, possible hepatitis as per GI note, but these are trending down  She is tender to palpation  No vomiting or diarrhea   ?Pain slightly improved as per patient, but still present   · Consult gastroenterology   · Supportive care   · Carafate Q6  · Protonix 40 mg BID  · Zofran IV PRN nausea  · Add Senna and Miralax    · Lo Fat diet   · Could consider hepatitis panel, AFP tumor markers, colonoscopy, etc however will defer this to GI

## 2018-10-22 NOTE — PLAN OF CARE
GASTROINTESTINAL - ADULT     Maintains adequate nutritional intake Progressing        INFECTION - ADULT     Absence or prevention of progression during hospitalization Progressing        MUSCULOSKELETAL - ADULT     Maintain or return mobility to safest level of function Progressing        Nutrition/Hydration-ADULT     Nutrient/Hydration intake appropriate for improving, restoring or maintaining nutritional needs Progressing        PAIN - ADULT     Verbalizes/displays adequate comfort level or baseline comfort level Progressing        Potential for Falls     Patient will remain free of falls Progressing        Prexisting or High Potential for Compromised Skin Integrity     Skin integrity is maintained or improved Progressing

## 2018-10-22 NOTE — ASSESSMENT & PLAN NOTE
· Incidental finding on CT scan today  Unclear inciting event, however she did have a recent fall and is chronically kyphotic  Had been constipated before due to narcotic analgesia   She continues to complain of more rib pain than back pain   · Trial non-narcotic regimen   · Aqua K  · Voltaren  · Lidoderm  · Tylenol   · PT eval and treat

## 2018-10-22 NOTE — CONSULTS
Consultation - [facility]  Arnol Saenz 80 y o  female MRN: [id number]  Unit/Bed#: -01 Encounter: [id number]        Inpatient consult to gastroenterology  Consult performed by: Charlotte Rowell ordered by: Brittany Gan          Reason for Consult / Principal Problem: Abdominal pain    HPI: Arnol Saenz is a 80y o  year old female with history of chronic diastolic heart failure, chronic atrial fibrillation and anticoagulated with Coumadin who presented to the emergency room yesterday afternoon  With complete of abdominal pain over the last few weeks, sometimes relieved with bending over and putting pressure on her abdomen, associated with nausea but no vomiting  She had apparently lost about 15 lb recently, associated with poor oral intake  She had also reported being constipated, having been prescribed opiates for chest wall pain and back pain after sustaining a mechanical fall  She underwent a right upper quadrant ultrasound with her outpatient provider which was negative, did not show any gallbladder stones or sludge  She apparently had EGD a few years ago when she was having similar abdominal pain and was diagnosed with a hiatal hernia  CT scan was done on this presentation showing evidence of possible urinary tract infection, but otherwise no acute intra-abdominal / intrapelvic pathology  She appeared to have some elevated liver enzymes with bilirubin of 2 4, alkaline phosphatase of 223, AST 48, ALT 21  This morning bilirubin is down to 1 7, alkaline phosphatase down to 182, and AST down to 29  At this time, the patient says she still has some discomfort in her lower abdomen, which is improved by bending forwards  She seems to correlate the onset of her discomfort with her recent fall  She denies any worsening of symptoms with eating  She does not recall when her last colonoscopy was        REVIEW OF SYSTEMS:    CONSTITUTIONAL: Denies any fever, chills, or rigors  Positive for poor appetite and weight loss of 15 lb  HEENT: No earache or tinnitus  Denies hearing loss or visual disturbances  CARDIOVASCULAR: No chest pain or palpitations  RESPIRATORY: Denies any cough, hemoptysis, shortness of breath or dyspnea on exertion  GASTROINTESTINAL: As noted in the History of Present Illness  GENITOURINARY: No problems with urination  Denies any hematuria or dysuria  NEUROLOGIC: No dizziness or vertigo, denies headaches  MUSCULOSKELETAL: Denies any muscle or joint pain  SKIN: Denies skin rashes or itching  ENDOCRINE: Denies excessive thirst  Denies intolerance to heat or cold  PSYCHOSOCIAL: Denies depression or anxiety  Denies any recent memory loss         Historical Information   Past Medical History:   Diagnosis Date    Anemia of chronic disease 11/27/2017    Arthritis     Cardiac disease     [facility]     Hiatal hernia     Hypercholesteremia     Hypothyroidism      Past Surgical History:   Procedure Laterality Date    APPENDECTOMY      CATARACT EXTRACTION Bilateral     CATARACT EXTRACTION      HIP FRACTURE SURGERY Right 12/2015    HYSTERECTOMY      OOPHORECTOMY      REPAIR RECTOCELE      REPLACEMENT TOTAL KNEE Right      Social History   History   Alcohol Use No     History   Drug Use No     History   Smoking Status    Never Smoker   Smokeless Tobacco    Never Used     Family History   Problem Relation Age of Onset    Kidney cancer Mother     Alcohol abuse Father     Cirrhosis Father         LIVER    Drug abuse Father     Alcohol abuse Brother     Diabetes Brother     Drug abuse Brother     Stroke Brother     Coronary artery disease Family        Meds/Allergies     Prescriptions Prior to Admission   Medication    acetaminophen (TYLENOL) 325 mg tablet    albuterol (2 5 mg/3 mL) 0 083 % nebulizer solution    atorvastatin (LIPITOR) 20 mg tablet    bisacodyl (DULCOLAX) 10 mg suppository    Cholecalciferol (VITAMIN D) 2000 UNITS CAPS    cyanocobalamin 1,000 mcg/mL    Cyanocobalamin 1000 MCG/ML KIT    dicyclomine (BENTYL) 10 mg capsule    Docusate Sodium 100 MG capsule    fluticasone (FLONASE) 50 mcg/act nasal spray    furosemide (LASIX) 20 mg tablet    levothyroxine 88 mcg tablet    metoprolol tartrate (LOPRESSOR) 25 mg tablet    omeprazole (PriLOSEC) 40 MG capsule    oxyCODONE (ROXICODONE) 5 mg immediate release tablet    potassium chloride (K-DUR) 10 mEq tablet    potassium chloride (K-DUR,KLOR-CON) 20 mEq tablet    warfarin (COUMADIN) 1 mg tablet     Current Facility-Administered Medications   Medication Dose Route Frequency    acetaminophen (TYLENOL) tablet 975 mg  975 mg Oral Q8H    albuterol inhalation solution 2 5 mg  2 5 mg Nebulization Q6H PRN    atorvastatin (LIPITOR) tablet 20 mg  20 mg Oral Daily With Dinner    ciprofloxacin (CIPRO) IVPB (premix) 400 mg  400 mg Intravenous Q24H    diclofenac sodium (VOLTAREN) 1 % topical gel 2 g  2 g Topical 4x Daily    dicyclomine (BENTYL) capsule 10 mg  10 mg Oral Daily PRN    fluticasone (FLONASE) 50 mcg/act nasal spray 1 spray  1 spray Each Nare Daily    levothyroxine tablet 88 mcg  88 mcg Oral Early Morning    lidocaine (LIDODERM) 5 % patch 1 patch  1 patch Topical Daily    metoprolol tartrate (LOPRESSOR) tablet 25 mg  25 mg Oral Q12H [address]    ondansetron (ZOFRAN) injection 4 mg  4 mg Intravenous Q6H PRN    pantoprazole (PROTONIX) EC tablet 40 mg  40 mg Oral BID AC    potassium chloride (K-DUR,KLOR-CON) CR tablet 10 mEq  10 mEq Oral Every Other Day    sodium chloride 0 9 % infusion  60 mL/hr Intravenous Continuous    sucralfate (CARAFATE) oral suspension 1,000 mg  1,000 mg Oral Q6H [address]    warfarin (COUMADIN) tablet 1 25 mg  1 25 mg Oral Once per day on Mon Tue Wed Thu Fri    warfarin (COUMADIN) tablet 2 5 mg  2 5 mg Oral Once per day on Sun Sat       Allergies   Allergen Reactions    Ace Inhibitors Angioedema     [facility] - [id number]: angioedema to ARB  Other reaction(s): Angioedema  Pt and family state not an allergy    Angiotensin Receptor Blockers Angioedema    Erythromycin Ethylsuccinate GI Intolerance    Losartan      Other reaction(s): Angioedema  Pt and family state this is not an allergy      Aspirin Dermatitis and Edema    Codeine Edema    Erythromycin Base Other (See Comments)    Ibuprofen Other (See Comments)     Patient denies allergy    Penicillins Hives, Edema and Other (See Comments)           Objective     Blood pressure 153/65, pulse 69, temperature 98 3 °F (36 8 °C), temperature source Oral, resp  rate 18, weight 54 4 kg (119 lb 14 9 oz), SpO2 93 %, not currently breastfeeding  Intake/Output Summary (Last 24 hours) at 10/22/18 1111  Last data filed at 10/22/18 0719   Gross per 24 hour   Intake              515 ml   Output              350 ml   Net              165 ml         PHYSICAL EXAM     General Appearance:   Alert, cooperative, no distress, appears stated age    HEENT:   Normocephalic, atraumatic, anicteric      Neck:  Supple, symmetrical, trachea midline, no adenopathy;    thyroid: no enlargement/tenderness/nodules; no carotid  bruit or JVD    Lungs:   Clear to auscultation bilaterally; no rales, rhonchi or wheezing; respirations unlabored    Heart[de-identified]   S1 and S2 normal; regular rate and rhythm; no murmur, rub, or gallop     Abdomen:   Soft, non-tender, non-distended; normal bowel sounds; no masses, no organomegaly    Genitalia:   Deferred    Rectal:   Deferred    Extremities:  No cyanosis, clubbing or edema    Pulses:  2+ and symmetric all extremities    Skin:  Skin color, texture, turgor normal, no rashes or lesions    Lymph nodes:  No palpable cervical, axillary or inguinal lymphadenopathy        Lab Results:   Admission on 10/21/2018   Component Date Value    WBC 10/21/2018 5 70     RBC 10/21/2018 4 85     Hemoglobin 10/21/2018 16 1*    Hematocrit 10/21/2018 48 9*    MCV 10/21/2018 101*    MCH 10/21/2018 33 2     MCHC 10/21/2018 32 9     RDW 10/21/2018 14 6     MPV 10/21/2018 10 5     Platelets 16/22/3449 175     nRBC 10/21/2018 0     Neutrophils Relative 10/21/2018 82*    Immat GRANS % 10/21/2018 0     Lymphocytes Relative 10/21/2018 11*    Monocytes Relative 10/21/2018 6     Eosinophils Relative 10/21/2018 1     Basophils Relative 10/21/2018 0     Neutrophils Absolute 10/21/2018 4 66     Immature Grans Absolute 10/21/2018 0 02     Lymphocytes Absolute 10/21/2018 0 61     Monocytes Absolute 10/21/2018 0 36     Eosinophils Absolute 10/21/2018 0 03     Basophils Absolute 10/21/2018 0 02     Sodium 10/21/2018 136     Potassium 10/21/2018 4 8     Chloride 10/21/2018 98*    CO2 10/21/2018 27     ANION GAP 10/21/2018 11     BUN 10/21/2018 37*    Creatinine 10/21/2018 1 44*    Glucose 10/21/2018 105     Calcium 10/21/2018 9 6     AST 10/21/2018 48*    ALT 10/21/2018 21     Alkaline Phosphatase 10/21/2018 223*    Total Protein 10/21/2018 7 7     Albumin 10/21/2018 3 2*    Total Bilirubin 10/21/2018 2 40*    eGFR 10/21/2018 31     Lipase 10/21/2018 434*    LACTIC ACID 10/21/2018 2 4*    Clarity, UA 10/21/2018 Slightly Cloudy     Color, UA 10/21/2018 Yellow     Specific Gravity, UA 10/21/2018 1 010     pH, UA 10/21/2018 5 5     Glucose, UA 10/21/2018 Negative     Ketones, UA 10/21/2018 Negative     Blood, UA 10/21/2018 Trace-Intact*    Protein, UA 10/21/2018 Negative     Nitrite, UA 10/21/2018 Positive*    Bilirubin, UA 10/21/2018 Negative     Urobilinogen, UA 10/21/2018 0 2     Leukocytes, UA 10/21/2018 Moderate*    WBC, UA 10/21/2018 10-20*    RBC, UA 10/21/2018 0-1*    Bacteria, UA 10/21/2018 Moderate*    OTHER OBSERVATIONS 10/21/2018 Transitional Epithelial Cells     Epithelial Cells 10/21/2018 Occasional     Troponin I 10/21/2018 0 05*    Protime 10/21/2018 24 7*    INR 10/21/2018 2 31*    Procalcitonin 10/21/2018 0 09     Troponin I 10/21/2018 0 04  Ventricular Rate 10/21/2018 67     Atrial Rate 10/21/2018 241     QRSD Interval 10/21/2018 76     QT Interval 10/21/2018 376     QTC Interval 10/21/2018 397     QRS Axis 10/21/2018 -32     T Wave Axis 10/21/2018 263     Sodium 10/22/2018 140     Potassium 10/22/2018 3 3*    Chloride 10/22/2018 105     CO2 10/22/2018 25     ANION GAP 10/22/2018 10     BUN 10/22/2018 30*    Creatinine 10/22/2018 1 21     Glucose 10/22/2018 82     Calcium 10/22/2018 8 7     AST 10/22/2018 29     ALT 10/22/2018 15     Alkaline Phosphatase 10/22/2018 182*    Total Protein 10/22/2018 6 0*    Albumin 10/22/2018 2 5*    Total Bilirubin 10/22/2018 1 70*    eGFR 10/22/2018 38     WBC 10/22/2018 4 00*    RBC 10/22/2018 4 21     Hemoglobin 10/22/2018 13 8     Hematocrit 10/22/2018 42 3     MCV 10/22/2018 101*    MCH 10/22/2018 32 8     MCHC 10/22/2018 32 6     RDW 10/22/2018 14 6     Platelets 01/82/1293 124*    MPV 10/22/2018 10 2      Results for Paola Beebe (MRN [id number]) as of 10/22/2018 11:12   Ref   Range 10/21/2018 15:02 10/21/2018 15:49 10/21/2018 16:21 10/21/2018 16:59 10/21/2018 16:59 10/22/2018 04:53   eGFR Latest Units: ml/min/1 73sq m      38   Sodium Latest Ref Range: 136 - 145 mmol/L      140   Potassium Latest Ref Range: 3 5 - 5 3 mmol/L      3 3 (L)   Chloride Latest Ref Range: 100 - 108 mmol/L      105   CO2 Latest Ref Range: 21 - 32 mmol/L      25   Anion Gap Latest Ref Range: 4 - 13 mmol/L      10   BUN Latest Ref Range: 5 - 25 mg/dL      30 (H)   Creatinine Latest Ref Range: 0 60 - 1 30 mg/dL      1 21   Glucose Latest Ref Range: 65 - 140 mg/dL      82   Calcium Latest Ref Range: 8 3 - 10 1 mg/dL      8 7   AST (SGOT) Latest Ref Range: 5 - 45 U/L      29   ALT Latest Ref Range: 12 - 78 U/L      15   ALK PHOS Latest Ref Range: 46 - 116 U/L      182 (H)   Total Protein Latest Ref Range: 6 4 - 8 2 g/dL      6 0 (L)   Albumin Latest Ref Range: 3 5 - 5 0 g/dL      2 5 (L)   TOTAL BILIRUBIN Latest Ref Range: 0 20 - 1 00 mg/dL      1 70 (H)   Troponin I Latest Ref Range: <=0 04 ng/mL  0 04       LACTIC ACID Latest Ref Range: 0 5 - 2 0 mmol/L 2 4 (HH)        WBC Latest Ref Range: 4 31 - 10 16 Thousand/uL      4 00 (L)   RBC Latest Ref Range: 3 81 - 5 12 Million/uL      4 21   Hemoglobin Latest Ref Range: 11 5 - 15 4 g/dL      13 8   HCT Latest Ref Range: 34 8 - 46 1 %      42 3   MCV Latest Ref Range: 82 - 98 fL      101 (H)   MCH Latest Ref Range: 26 8 - 34 3 pg      32 8   MCHC Latest Ref Range: 31 4 - 37 4 g/dL      32 6   RDW Latest Ref Range: 11 6 - 15 1 %      14 6   Platelet Count Latest Ref Range: 149 - 390 Thousands/uL      124 (L)   MPV Latest Ref Range: 8 9 - 12 7 fL      10 2   Epithelial Cells Latest Ref Range: None Seen, Occasional /hpf   Occasional      Color, UA Unknown   Yellow      Clarity, UA Unknown   Slightly Cloudy      SL AMB SPECIFIC GRAVITY_URINE Latest Ref Range: 1 003 - 1 030    1 010      Glucose, UA Latest Ref Range: Negative mg/dl   Negative      Ketones, UA Latest Ref Range: Negative mg/dl   Negative      Blood, UA Latest Ref Range: Negative    Trace-Intact (A)      Nitrite, UA Latest Ref Range: Negative    Positive (A)      Leukocytes, UA Latest Ref Range: Negative    Moderate (A)      pH, UA Latest Ref Range: 4 5 - 8 0    5 5      Protein, UA Latest Ref Range: Negative mg/dl   Negative      Bilirubin, UA Latest Ref Range: Negative    Negative      Urobilinogen, UA Latest Ref Range: 0 2, 1 0 E U /dl E U /dl   0 2      RBC, UA Latest Ref Range: None Seen, 0-5 /hpf   0-1 (A)      WBC, UA Latest Ref Range: None Seen, 0-5, 5-55, 5-65 /hpf   10-20 (A)      Bacteria, UA Latest Ref Range: None Seen, Occasional /hpf   Moderate (A)      OTHER OBSERVATIONS Unknown   Transitional Epit          BLOOD CULTURE Unknown    Rpt ((NONE)) Rpt ((NONE))    URINE CULTURE Unknown   Rpt ((NONE))      Procalcitonin Latest Ref Range: <=0 25 ng/ml  0 09           Imaging Studies: I have personally reviewed pertinent reports  RIGHT UPPER QUADRANT ULTRASOUND     INDICATION:     R10 9: Unspecified abdominal pain  R74 8: Abnormal levels of other serum enzymes  R17: Unspecified jaundice      COMPARISON:  None     TECHNIQUE:   Real-time ultrasound of the right upper quadrant was performed with a curvilinear transducer with both volumetric sweeps and still imaging techniques      FINDINGS:     PANCREAS:  The pancreas is mostly obscured by bowel gas and cannot be adequately assessed      AORTA AND IVC:  Visualized portions are normal for patient age      LIVER:  Size:  Within normal range  The liver measures 12 cm in the midclavicular line  Contour:  Surface contour is smooth  Parenchyma:  Echogenicity and echotexture are within normal limits  No evidence of suspicious mass  Limited imaging of the main portal vein shows it to be patent and hepatopetal      BILIARY:  The gallbladder is normal in caliber  No wall thickening or pericholecystic fluid  No stones or sludge identified  No sonographic Catalan's sign  No intrahepatic biliary dilatation  CBD measures 4-5 mm  No choledocholithiasis      KIDNEY:   Right kidney measures 8 2 x 3 8 cm  There is a 1 3 cm simple appearing parapelvic cyst at the midpole and there is an 8 mm cyst at the lower pole which is partly exophytic from the cortex  No hydronephrosis or kidney stone identified      ASCITES:   None      IMPRESSION:     No visible biliary obstruction or gallstones  No acute findings  Right renal cysts noted incidentally  CT ABDOMEN AND PELVIS WITHOUT IV CONTRAST     INDICATION:   Abdominal pain      COMPARISON:  CT from 9/5/2015     TECHNIQUE:  CT examination of the abdomen and pelvis was performed without intravenous contrast   Axial, sagittal, and coronal 2D reformatted images were created from the source data and submitted for interpretation       Radiation dose length product (DLP) for this visit:  191 mGy-cm     This examination, like all CT scans performed in the [facility], was performed utilizing techniques to minimize radiation dose exposure, including the use of iterative   reconstruction and automated exposure control       Enteric contrast was not administered       FINDINGS:  Exam is limited without IV and oral contrast   ABDOMEN     LOWER CHEST:  There is a moderate-sized hiatal hernia  Scattered atelectatic changes noted of the bilateral lung bases      LIVER/BILIARY TREE:  Unremarkable      GALLBLADDER:  No calcified gallstones  No pericholecystic inflammatory change      SPLEEN:  Unremarkable      PANCREAS:  Diffuse fatty replacement      ADRENAL GLANDS:  Unremarkable      KIDNEYS/URETERS:  Unremarkable  No hydronephrosis      STOMACH AND BOWEL:  There is colonic diverticulosis without acute abdomen colitis  No bowel obstruction      APPENDIX:  No findings to suggest appendicitis      ABDOMINOPELVIC CAVITY:  No ascites or free intraperitoneal air  No lymphadenopathy      VESSELS:  Abdominal aorta is ectatic measuring up to 2 1 cm in diameter with dense wall calcification      PELVIS     REPRODUCTIVE ORGANS:  Patient is status post hysterectomy      URINARY BLADDER:  Small amount of air in the bladder lumen      ABDOMINAL WALL/INGUINAL REGIONS:  Unremarkable      OSSEOUS STRUCTURES:  Prior right hip ORIF  S-shaped scoliosis of the thoracolumbar spine  Multilevel degenerative spurring  Grade 1 anterolisthesis of L5 on S1  Moderate compression fracture of the T9 vertebral body of indeterminate age  This is new   from the prior exam   No significant bony retropulsion      IMPRESSION:  Limited exam without IV and oral contrast   1   Small amount of air in the bladder lumen  Correlate for recent catheterization versus gas from bladder infection  2   Moderate compression fracture of the T9 vertebral body of indeterminate age  This is new from the prior exam       ASSESSMENT/PLAN:     1   Lower abdominal pain with elevated bilirubin and alkaline phosphatase; recent ultrasound showed no evidence of biliary obstruction, and neither does her CT scan  Could possibly be related to urinary tract infection  With regards to possible bowel pathology, could potentially be related to bowel spasm associated with constipation; she has been on narcotic pain medications recently  No obvious evidence of biliary ductal obstruction at this time    -  Monitor LFTs and PT INR  -  If liver enzymes remain elevated or increase, may need to consider possibility of drug-induced hepatitis and re-evaluate her medications list  - check direct bilirubin level as well as GGT  - treat underlying constipation; will start MiraLax once daily, and encourage regular physical activity/ambulation within the patient's capabilities  - continue Protonix twice daily for now, empirically treat for any potential peptic ulcer disease  - may continue Carafate as well      2  Constipation    - see above; start MiraLax once daily      3  Hiatal hernia     4  Anticoagulation with Coumadin in the setting of chronic atrial fibrillation     5  Recent mechanical fall in August with resultant right rib fracture    The patient was seen and examined by Dr Obey Holloway, all ballesteros medical decisions were made with Dr Obey Holloway  Thank you for allowing us to participate in the care of this pleasant patient  We will follow up with you closely

## 2018-10-22 NOTE — PLAN OF CARE
INFECTION - ADULT     Absence or prevention of progression during hospitalization Progressing        Nutrition/Hydration-ADULT     Nutrient/Hydration intake appropriate for improving, restoring or maintaining nutritional needs Progressing        Potential for Falls     Patient will remain free of falls Progressing

## 2018-10-22 NOTE — PLAN OF CARE
Problem: PHYSICAL THERAPY ADULT  Goal: Performs mobility at highest level of function for planned discharge setting  See evaluation for individualized goals  Treatment/Interventions: Functional transfer training, LE strengthening/ROM, Elevations, Cognitive reorientation, Bed mobility, Gait training, Equipment eval/education, Patient/family training, Endurance training, Therapeutic exercise, Spoke to nursing, Spoke to advanced practitioner  Equipment Recommended: Susan Ferreira       See flowsheet documentation for full assessment, interventions and recommendations  Prognosis: Fair  Problem List: Decreased strength, Decreased range of motion, Decreased endurance, Pain, Orthopedic restrictions, Impaired hearing, Decreased cognition, Decreased mobility, Impaired balance (gait deviations)  Assessment: Pt is a 80 y o  female seen for PT evaluation s/p admit to [address] [place] [address] on 10/21/2018 w/ Abdominal pain  Order placed for PT  Prior to admission, pt lived in one floor environment, had 3 or 4 TEMI with railing, lived with daughter who works and used rolling walker for mobility  Upon evaluation: Pt requires mod assistance for bed mobilty, intermittent min assistance for transfers and min assistance for ambulation with out device, but limited from bed to chair  Pt's clinical presentation is currently unstable/unpredictable given the functional mobility deficits above, especially weakness, decreased ROM, decreased endurance, gait deviations, pain and decreased functional mobility tolerance, coupled with fall risks including hx of falls, impaired balance and decreased cognition, and combined with medical complications of abnormal WBCs, multiple readmissions and abnormal potassium values  Pt to benefit from continued skilled PT tx while in hospital and upon DC to address deficits as defined above and maximize level of functional mobility   From PT/mobility standpoint, recommendation at time of d/c would be inpatient rehab vs  Home PT pending progress and interdisciplinary recommendations in order to maximize pt's functional independence and consistency w/ mobility in order to facilitate return to PLOF  Recommend trial with walker next 1-2 sessions, ther ex next 1-2 sessions, OT consult and case management consult  Barriers to Discharge: Decreased caregiver support, Inaccessible home environment (TEMI, home alone for periods during the day)     Recommendation: OT consult (rehab vs home based on interdiciplinary recommendations)          See flowsheet documentation for full assessment

## 2018-10-22 NOTE — MALNUTRITION/BMI
This medical record reflects one or more clinical indicators suggestive of malnutrition and/or morbid obesity  Malnutrition Findings:   Malnutrition type: Acute illness  Degree of Malnutrition: Malnutrition of moderate degree (inadequate oral intake)  Malnutrition Characteristics: Fat loss, Muscle loss, Inadequate energy, Weight loss (temporal indentation, depletion fat and muscle scapula, 9 2% weight decrease x 2 months)Currently being treated with oral supplementation  BMI Findings: Body mass index is 23 42 kg/m²  See Nutrition note dated 10/22/18 for additional details  Completed nutrition assessment is viewable in the nutrition documentation

## 2018-10-22 NOTE — PROGRESS NOTES
[facility] Internal Medicine  Progress Note Dane Rivera [birth year], 80 y o  female MRN: [id number]    Unit/Bed#: -01 Encounter: [id number]    Primary Care Provider: Malik Elise DO   Date and time admitted to hospital: 10/21/2018  1:50 PM        * Abdominal pain   Assessment & Plan    · POA, has been on going for the last few weeks  Similar in character to prior abdominal pain that was attributed to hiatal hernia  ? gastritis  CT scan negative for acute findings and outpatient RUQ US negative for evidence of GB stones in the setting of elevated LFTs which I believe are incidental and due to poor oral intake and dehydration, possible hepatitis as per GI note, but these are trending down  She is tender to palpation  No vomiting or diarrhea  ?Pain slightly improved as per patient, but still present   · Consult gastroenterology   · Supportive care   · Carafate Q6  · Protonix 40 mg BID  · Zofran IV PRN nausea  · Add Senna and Miralax    · Lo Fat diet   · Could consider hepatitis panel, AFP tumor markers, colonoscopy, etc however will defer this to GI      Acute cystitis without hematuria   Assessment & Plan    · POA, noted on urinalysis   Improving   · Continue with Cipro due to antibiotic allergies   · Monitor renal function   · Monitor mental status   · Follow up urine, blood cultures      Chronic diastolic CHF (congestive heart failure) ([facility] [address] )   Assessment & Plan    · Patient appears to be approaching euvolemia, had only been on QOD Lasix   · Hydrate patient   · Hold Lasix   · Monitor volume status closely      CKD (chronic kidney disease), stage III (HCC)   Assessment & Plan    · Creatinine normalized  · Monitor BMP      Chronic atrial fibrillation (HCC)   Assessment & Plan    · Rate controlled on admission, INR therapeutic    · Continue Metoprolol   · Coumadin 1 25 mg QD     Essential hypertension   Assessment & Plan    · BP acceptable   · Continue home medication regimen      Closed wedge compression fracture of ninth thoracic vertebra [facility])   Assessment & Plan    · Incidental finding on CT scan today  Unclear inciting event, however she did have a recent fall and is chronically kyphotic  Had been constipated before due to narcotic analgesia  She continues to complain of more rib pain than back pain   · Trial non-narcotic regimen   · Aqua K  · Voltaren  · Lidoderm  · Tylenol   · PT eval and treat        VTE Pharmacologic Prophylaxis:   Pharmacologic: Warfarin (Coumadin)  Mechanical VTE Prophylaxis in Place: No    Patient Centered Rounds: I have performed bedside rounds with nursing staff today  Discussions with Specialists or Other Care Team Provider: Discussed with GI, RN, CM    Education and Discussions with Family / Patient: Discussed with patient, left message for daughter     Time Spent for Care: 30 minutes  More than 50% of total time spent on counseling and coordination of care as described above  Current Length of Stay: 1 day(s)    Current Patient Status: Inpatient   Certification Statement: The patient will continue to require additional inpatient hospital stay due to on going management as per GI     Discharge Plan: Pending finalized plan from GI, final urine cultures, and PT recommendations     Code Status: Level 1 - Full Code      Subjective:   Patient continues to report abdominal pain, but she thinks that it may be slightly better  Denies vomiting or diarrhea  Denies any bowel movements whatsoever  She is unsure if her urine has changed colors  Denies fevers or chills  Objective:     Vitals:   Temp (24hrs), Av °F (36 7 °C), Min:97 5 °F (36 4 °C), Max:98 3 °F (36 8 °C)    Temp:  [97 5 °F (36 4 °C)-98 3 °F (36 8 °C)] 98 3 °F (36 8 °C)  HR:  [69-74] 69  Resp:  [18-20] 18  BP: (112-153)/(65-89) 153/65  SpO2:  [93 %-95 %] 93 %  Body mass index is 23 42 kg/m²  Input and Output Summary (last 24 hours):        Intake/Output Summary (Last 24 hours) at 10/22/18 1358  Last data filed at 10/22/18 0719   Gross per 24 hour   Intake              515 ml   Output              350 ml   Net              165 ml       Physical Exam:     Physical Exam   Constitutional: She is oriented to person, place, and time  Vital signs are normal  She appears well-developed and well-nourished  Non-toxic appearance  No distress  HENT:   Head: Normocephalic and atraumatic  Mouth/Throat: Mucous membranes are not dry  Eyes: Pupils are equal, round, and reactive to light  Conjunctivae and EOM are normal    Neck: Neck supple  Cardiovascular: Normal rate, regular rhythm, S1 normal, S2 normal, normal heart sounds and intact distal pulses  Exam reveals no S3 and no S4  No murmur heard  Pulmonary/Chest: Effort normal and breath sounds normal  No accessory muscle usage  No respiratory distress  She has no decreased breath sounds  She has no wheezes  She has no rhonchi  She has no rales  She exhibits no tenderness  Kyphosis noted   Abdominal: Soft  Bowel sounds are normal  She exhibits no distension and no mass  There is generalized tenderness  There is no rebound and no guarding  Neurological: She is alert and oriented to person, place, and time  She is not disoriented  GCS eye subscore is 4  GCS verbal subscore is 5  GCS motor subscore is 6  Skin: Skin is warm and dry         Additional Data:     Labs:      Results from last 7 days  Lab Units 10/22/18  0453 10/21/18  1438   WBC Thousand/uL 4 00* 5 70   HEMOGLOBIN g/dL 13 8 16 1*   HEMATOCRIT % 42 3 48 9*   PLATELETS Thousands/uL 124* 175   NEUTROS PCT %  --  82*   LYMPHS PCT %  --  11*   MONOS PCT %  --  6   EOS PCT %  --  1       Results from last 7 days  Lab Units 10/22/18  0453   SODIUM mmol/L 140   POTASSIUM mmol/L 3 3*   CHLORIDE mmol/L 105   CO2 mmol/L 25   BUN mg/dL 30*   CREATININE mg/dL 1 21   CALCIUM mg/dL 8 7   ALK PHOS U/L 182*   ALT U/L 15   AST U/L 29       Results from last 7 days  Lab Units 10/21/18  1439   INR  2 31*       * I Have Reviewed All Lab Data Listed Above  * Additional Pertinent Lab Tests Reviewed: [facility] Admission Reviewed    Imaging:    Imaging Reports Reviewed Today Include: None  Imaging Personally Reviewed by Myself Includes:  None    Recent Cultures (last 7 days):           Last 24 Hours Medication List:     Current Facility-Administered Medications:  acetaminophen 975 mg Oral Q8H Dariel Kaur, PA-SILVANA    albuterol 2 5 mg Nebulization Q6H PRN Dariel Edra Killings, PA-SILVANA    atorvastatin 20 mg Oral Daily With Viveros's, LINDSAY    ciprofloxacin 400 mg Intravenous Q24H Dariel Edra Killings, PA-SILVANA    diclofenac sodium 2 g Topical 4x Daily Dariel P Kaur, PA-SILVANA    dicyclomine 10 mg Oral Daily PRN Dariel Edra Killings, PA-SILVANA    fluticasone 1 spray Each Nare Daily Dariel Edra Killings, PA-SILVANA    levothyroxine 88 mcg Oral Early Morning Dariel Edra Killings, PA-SILVANA    lidocaine 1 patch Topical Daily Dariel Kaur, LINDSAY    metoprolol tartrate 25 mg Oral Q12H [facility] Dariel Kaur PA-C    ondansetron 4 mg Intravenous Q6H PRN Dariel Edra Killings, LINDSAY    pantoprazole 40 mg Oral BID AC Dariel Kaur PA-C    polyethylene glycol 17 g Oral Daily Alley Joyce PA-C    [START ON 10/23/2018] potassium chloride 20 mEq Oral Daily Dariel Edra Killings, LINDSAY    senna 1 tablet Oral HS Dariel Kaur PA-C    sodium chloride 60 mL/hr Intravenous Continuous Hui Bassett MD Last Rate: 60 mL/hr (10/22/18 0806)   sucralfate 1,000 mg Oral Q6H [facility] Dariel Kaur PA-C    warfarin 1 25 mg Oral Once per day on Mon Tue Wed Thu Fri Dariel Kaur PA-C    warfarin 2 5 mg Oral Once per day on Sun Sat Dariel Edra KillingsLINDSAY         Today, Patient Was Seen By: Merrick Mejia PA-C    ** Please Note: Dictation voice to text software may have been used in the creation of this document   **

## 2018-10-23 PROBLEM — E44.0 MODERATE PROTEIN-CALORIE MALNUTRITION (HCC): Status: ACTIVE | Noted: 2018-01-01

## 2018-10-23 PROBLEM — E87.6 HYPOKALEMIA: Status: ACTIVE | Noted: 2018-01-01

## 2018-10-23 NOTE — TELEPHONE ENCOUNTER
CHRIS- pt's daughter called to cancel her apt with Dr Cassie Perez due to her mom being hospitalized

## 2018-10-23 NOTE — CONSULTS
Orthopedics   Venetta Samples 80 y o  female MRN: [id number]  Unit/Bed#: -01      Chief Complaint:   Back Pain    HPI:   80 y  o female complaining of Thoracic and Lumbar back pain  Patient reports that is been going on for a couple weeks but has had back pain for many years  She reports she had a fall couple weeks ago and that seemed to aggravate her pain  She was also a in the past seen by Dr Yelena Romero and had intramedullary nail placed in the right leg she was supposed to follow up with him this week but had a cancel because she was in the hospital   She reports the pain can be as high as 9 out 10 as low 0 10  Bending over seems to make it better standing up them moving seems to make it worse  No radicular symptoms  No change in bowel or bladder per the patient's report does have some nausea however        Review Of Systems:   · Skin: Normal  · Neuro: See HPI  · Musculoskeletal: See HPI  · 14 point review of systems negative except as stated above     Past Medical History:   Past Medical History:   Diagnosis Date    Anemia of chronic disease 11/27/2017    Arthritis     Cardiac disease     CHF (congestive heart failure) ([facility] [address] )     Hiatal hernia     Hypercholesteremia     Hypothyroidism        Past Surgical History:   Past Surgical History:   Procedure Laterality Date    APPENDECTOMY      CATARACT EXTRACTION Bilateral     CATARACT EXTRACTION      HIP FRACTURE SURGERY Right 12/2015    HYSTERECTOMY      OOPHORECTOMY      REPAIR RECTOCELE      REPLACEMENT TOTAL KNEE Right        Family History:  Family history reviewed and non-contributory  Family History   Problem Relation Age of Onset    Kidney cancer Mother     Alcohol abuse Father     Cirrhosis Father         LIVER    Drug abuse Father     Alcohol abuse Brother     Diabetes Brother     Drug abuse Brother     Stroke Brother     Coronary artery disease Family        Social History:  Social History     Social History    Marital status:      Spouse name: N/A    Number of children: N/A    Years of education: N/A     Occupational History    [employer]      Social History Main Topics    Smoking status: Never Smoker    Smokeless tobacco: Never Used    Alcohol use No    Drug use: No    Sexual activity: No     Other Topics Concern    None     Social History Narrative    DAILY COFFEE CONSUMPTION 2 CUPS  A DAY    TEA CONSUMPTION 1 CUP A DAY       Allergies: Allergies   Allergen Reactions    Ace Inhibitors Angioedema     [facility] - [id number]: angioedema to ARB  Other reaction(s): Angioedema  Pt and family state not an allergy    Angiotensin Receptor Blockers Angioedema    Erythromycin Ethylsuccinate GI Intolerance    Losartan      Other reaction(s): Angioedema   Pt and family state this is not an allergy      Aspirin Dermatitis and Edema    Codeine Edema    Erythromycin Base Other (See Comments)    Ibuprofen Other (See Comments)     Patient denies allergy    Penicillins Hives, Edema and Other (See Comments)           Labs:    0  Lab Value Date/Time   HCT 42 3 10/22/2018 0453   HCT 48 9 (H) 10/21/2018 1438   HCT 47 8 (H) 10/05/2018 2008   HCT 30 0 (L) 12/10/2015 0547   HCT 23 4 (L) 12/09/2015 0512   HCT 24 2 (L) 12/08/2015 0621   HGB 13 8 10/22/2018 0453   HGB 16 1 (H) 10/21/2018 1438   HGB 16 1 (H) 10/05/2018 2008   HGB 10 1 (L) 12/10/2015 0547   HGB 7 7 (L) 12/09/2015 0512   HGB 7 8 (L) 12/08/2015 0621   INR 2 31 (H) 10/21/2018 1439   WBC 4 00 (L) 10/22/2018 0453   WBC 5 70 10/21/2018 1438   WBC 5 47 10/05/2018 2008   WBC 4 37 12/10/2015 0547   WBC 5 44 12/09/2015 0512   WBC 5 31 12/08/2015 0621   ESR 16 12/05/2015 0526       Meds:    Current Facility-Administered Medications:     acetaminophen (TYLENOL) tablet 975 mg, 975 mg, Oral, Q8H, Dariel Kaur PA-C, 975 mg at 10/23/18 0541    albuterol inhalation solution 2 5 mg, 2 5 mg, Nebulization, Q6H PRN, [place] J[place]quin, PA-C    atorvastatin (LIPITOR) tablet 20 mg, 20 mg, Oral, Daily With Dinner, Dariel Kaur PA-C, 20 mg at 10/22/18 1548    ceFAZolin (ANCEF) IVPB (premix) 1,000 mg, 1,000 mg, Intravenous, Q12H, Dariel Kaur PA-C    diclofenac sodium (VOLTAREN) 1 % topical gel 2 g, 2 g, Topical, 4x Daily, Dariel Kaur PA-C, 2 g at 10/23/18 1128    dicyclomine (BENTYL) capsule 10 mg, 10 mg, Oral, Daily PRN, Dariel Kaur PA-C, 10 mg at 10/22/18 0807    fluticasone (FLONASE) 50 mcg/act nasal spray 1 spray, 1 spray, Each Nare, Daily, Dariel Kaur PA-C, 1 spray at 10/23/18 0803    [START ON 10/24/2018] furosemide (LASIX) tablet 20 mg, 20 mg, Oral, Every Other Day, Dariel Kaur PA-C    levothyroxine tablet 88 mcg, 88 mcg, Oral, Early Morning, Dariel Kaur PA-C, 88 mcg at 10/23/18 0541    lidocaine (LIDODERM) 5 % patch 1 patch, 1 patch, Topical, Daily, Dariel Kaur PA-C, Stopped at 10/23/18 0803    metoprolol tartrate (LOPRESSOR) tablet 25 mg, 25 mg, Oral, Q12H [address], Dariel Kaur PA-C, 25 mg at 10/23/18 0809    ondansetron (ZOFRAN) injection 4 mg, 4 mg, Intravenous, Q6H PRN, Sha Patton PA-C, 4 mg at 10/22/18 0807    pantoprazole (PROTONIX) EC tablet 40 mg, 40 mg, Oral, BID AC, Dariel Kaur PA-C, 40 mg at 10/23/18 0802    polyethylene glycol (MIRALAX) packet 17 g, 17 g, Oral, Daily, Alley Joyce PA-C, 17 g at 10/23/18 0802    potassium chloride (K-DUR,KLOR-CON) CR tablet 20 mEq, 20 mEq, Oral, Daily, CARLOS Tovar-C, 20 mEq at 10/23/18 0802    senna (SENOKOT) tablet 8 6 mg, 1 tablet, Oral, HS, CARLOS Tovar-C, 8 6 mg at 10/22/18 2143    sucralfate (CARAFATE) oral suspension 1,000 mg, 1,000 mg, Oral, Q6H [address], Dareil Kaur PA-C, 1,000 mg at 10/23/18 1127    warfarin (COUMADIN) tablet 1 25 mg, 1 25 mg, Oral, Once per day on Mon Tue Wed Thu Fri, CARLOS Tovar-C, 1 25 mg at 10/22/18 1712    warfarin (COUMADIN) tablet 2 5 mg, 2 5 mg, Oral, Once per day on Sun Sat, Dariel Kaur PA-C, 2 5 mg at 10/21/18 1857    Blood Culture:   Lab Results Component Value Date    BLOODCX No Growth at 24 hrs  10/21/2018    BLOODCX No Growth at 24 hrs  10/21/2018       Wound Culture:   No results found for: WOUNDCULT    Ins and Outs:  I/O last 24 hours: In: 5134 [P O :570; I V :2201]  Out: 250 [Urine:250]          Physical Exam:   /65   Pulse 62   Temp (!) 97 3 °F (36 3 °C) (Oral)   Resp 16   Wt 54 4 kg (119 lb 14 9 oz)   LMP  (LMP Unknown)   SpO2 94%   BMI 23 42 kg/m²   Gen: Alert and oriented to person, place, time  HEENT: EOMI, eyes clear, moist mucus membranes, hearing intact  Respiratory: Bilateral chest rise  No audible wheezing found  Cardiovascular: Regular Rate and Rhythm  Abdomen: soft nontender/nondistended  Musculoskeletal: BACK  · Skin intact, no open lesions  · Tenderness to palpation over Thoracic and Lumbar spine  · 5/5 strength with hip flexion/extension/abduction, Knee Flexion/extension, ankle dorsi/plantar flexion, EHL/FHL bilateral lower extremities  · Sensation intact L1-S1 bilateral lower extremities  · negative Straight leg raise  · 2+ deep tendon reflexes noted at patella tendon, achilles tendon bilateral lower extremities    Radiology:   I personally reviewed the films  X-rays  Thoracic spine shows compression fracture T9 to be degenerative disc disease increased lumbar lordosis and degenerative joint disease of the lumbar spine     _*_*_*_*_*_*_*_*_*_*_*_*_*_*_*_*_*_*_*_*_*_*_*_*_*_*_*_*_*_*_*_*_*_*_*_*_*_*_*_*_*    Assessment:  89 y  o female complaining of  Thoracic and Lumbar back pain    Plan:   · TLSO will be ordered   · Patient will benefit from steroids to help with pain muscle relaxants possibly as well  · Patient will need follow-up with Dr Sabrina Toledo for possible kyphoplasty in 6 weeks if pain is not improved    If the pain is improved in 6 weeks patient may follow up with Dr Willow Espino  · Dispo: [facility] for discharge from ortho perspective when she receives her brace      Drea Crumble, DO

## 2018-10-23 NOTE — ASSESSMENT & PLAN NOTE
· POA, noted on urinalysis  Improving   Urine culture showing gram negative rods enteric like    · Continue with Cipro due to antibiotic allergies   · Monitor renal function   · Monitor mental status   · Follow up with sensitivities

## 2018-10-23 NOTE — SOCIAL WORK
LOS 2 days,  Bundle Patient: UTI SNF LOS 14-17, 30 day Readmission: No    CM met with pt and daughter at bedside  Pt lives with her daughter in an one story apartment with 3 steps to enter  Pt's daughter does cooking, cleaning and drives pt to appt's  Pt is able to bathe herself and ambulates with walker  Pt has had VNA services with  and [facility]  Pt was in [place] in past [facility]  Pt's pharmacies are [facility]  Pt denies mental health history or drug or alcohol use  Pt is retired and collect SS benefits  DC plan is STR vs Home w/[facility] VNA  Referrals made to [facility] and [facility] as well as [facility]  VNA/PT  CM will  Continue to follow  Patient/caregiver received discharge checklist   Content reviewed  Patient/caregiver encouraged to participate in discharge plan of care prior to discharge home  CM reviewed the bundle program with patient and the following: "As part of your Medicare benefit, you are automatically enrolled in the bundle payment program  (Here is a notice from Medicare that you may keep and read)   The program is designed to assist in coordinating your care after you leave the hospital  A nurse from [facility] will be following you for 90 days  Please anticipate a phone call from the nurse within 48hrs of your discharge    As your Care Manager, I will be providing a handoff to your next level of care to ensure a safe transition "

## 2018-10-23 NOTE — PHYSICAL THERAPY NOTE
PHYSICAL THERAPY TREATMENT NOTE  NAME:  Dov Fischer  DATE: [birth year]    Length Of Stay: 2  Performed at least 2 patient identifiers during session: Name and Birthday  TREATMENT: 13:30-13:40=10 min  S: Pt reports no substantial change in abdominal pain, and does not remember Dr Randy Maddox in room earlier today during consult  Pt's daughter arrived in room during pt education with concerns of benefits and risks of brace  O: Pt and daughter instructed in some of the benefits/risks of TLSO And different models of brace, including pictures on brace on computer monitor  Pt and daughter both agreeable to have outside vendor do an assessment/fitting for TLSO brace  Called and left message @ [facility] @ [address] and informed SLIM Of care coordination  A: Pt/daughter agreeable for bracing trial/assessment with the intention of relieving back/abdominal pain  P: Recommend further therapy intervention post bracing   Tien Zurita, PT      Tien Zurita, PT, DPT

## 2018-10-23 NOTE — ASSESSMENT & PLAN NOTE
· Patient appears euvolemic  · Restart Lasix tomorrow  · Stop fluids   · Monitor volume status closely

## 2018-10-23 NOTE — ASSESSMENT & PLAN NOTE
· Incidental finding on CT scan   Unclear inciting event, however she did have a recent fall and is chronically kyphotic  · Continue non-narcotic regimen   · Aqua K  · Voltaren  · Lidoderm  · Tylenol around the clock  · PT eval and treat --recommend continued use of brace

## 2018-10-23 NOTE — TELEPHONE ENCOUNTER
Anamika Bruno- patient's daughter  [phone]  Dr Bella Tompkins    Patient daughter called in to let you know that her mother is in [facility]  She said if you needs to speak to her about anything you can give her a call

## 2018-10-23 NOTE — ASSESSMENT & PLAN NOTE
· POA, has been on going for the last few weeks  Similar in character to prior abdominal pain that was attributed to hiatal hernia  · Patient's daughter at bedside is frustrated that her mother has had "no improvement" since the time she got to the hospital and wants to know if more tests should be ordered to "get to the bottom of this"  · CT scan without any contrast was negative for acute findings and outpatient RUQ US negative for evidence of GB stones  Doubtful that repeating the imaging with barium would be of much utility and would be hesitant to provide IV contrast in the setting of her low GFR-also do not feel this would provide any additional information at this time  Do not feel an MRI of her abdomen would be of any utility or appropriate  Defer to GI if they feel EGD would be helpful  Per my conversation with them, since the patient is already receiving Carafate and Protonix anyway, there is likely nothing additional they would find on EGD that would be of benefit  Certainly even if there was some undiagnosed malignancy, at her age she would likely not be a candidate for any intervention  · Appreciate GI consult --appreciate their involvement  They do not feel patient requires any additional GI testing  Patient had an EGD 3 years ago which showed a gastric polyp which was removed and hiatal hernia  They recommended ongoing treatment with Protonix and Carafate and nutritional supplements  · Consider possibility that patient's kyphosis from ongoing compression fractures is related to above symptoms especially with her underlying moderate hiatal hernia  Consulted ortho for T9 fracture --appreciate their input  See below  · Supportive care   · Carafate Q6  · Protonix 40 mg BID  · Zofran ODT before each meal  · Senna and Miralax  for constipation--change to p r n   Now that she is having looser bowel movements  · Encourage oral intake and document  · Noted to have GGT elevation on labs, unclear significance  · Daughter is adamant that she does not feel that her mom is taking in enough and I explained to her that the only additional option would be to put in an artificial surgical feeding tube and force feed her

## 2018-10-23 NOTE — ASSESSMENT & PLAN NOTE
· Incidental finding on CT scan today  Unclear inciting event, however she did have a recent fall and is chronically kyphotic  Had been constipated before due to narcotic analgesia  She continues to complain of more rib pain than back pain  Possibly is causing abdominal pain given point tenderness and dermatomal pattern    · Trial non-narcotic regimen   · Aqua K  · Voltaren  · Lidoderm  · Tylenol   · Consult ortho  · ? Kyphoplasty  · PT eval and treat

## 2018-10-23 NOTE — ASSESSMENT & PLAN NOTE
Malnutrition Findings:   Malnutrition type: Acute illness  Degree of Malnutrition: Malnutrition of moderate degree (inadequate oral intake)    BMI Findings: Body mass index is 23 42 kg/m²

## 2018-10-23 NOTE — ASSESSMENT & PLAN NOTE
· POA, has been on going for the last few weeks  Similar in character to prior abdominal pain that was attributed to hiatal hernia  ? gastritis  CT scan negative for acute findings and outpatient RUQ US negative for evidence of GB stones in the setting of elevated LFTs which I believe are incidental and due to poor oral intake and dehydration, possible hepatitis as per GI note, but these are trending down  She is tender to palpation  No vomiting or diarrhea   Possibly related to T9 fracture  · Appreciate GI consult   · Consult ortho for T9 fracture   · Supportive care   · Carafate Q6  · Protonix 40 mg BID  · Zofran IV PRN nausea  · Add Senna and Miralax    · Lo Fat diet   · Could consider hepatitis panel, AFP tumor markers, colonoscopy, etc however will defer this to GI

## 2018-10-23 NOTE — PROGRESS NOTES
[facility]  Progress Note Agustina Hathaway [birth year], 80 y o  female MRN: [id number]    Unit/Bed#: -01 Encounter: [id number]    Primary Care Provider: Justin Cuello DO   Date and time admitted to hospital: 10/21/2018  1:50 PM        * Abdominal pain   Assessment & Plan    · POA, has been on going for the last few weeks  Similar in character to prior abdominal pain that was attributed to hiatal hernia  ? gastritis  CT scan negative for acute findings and outpatient RUQ US negative for evidence of GB stones in the setting of elevated LFTs which I believe are incidental and due to poor oral intake and dehydration, possible hepatitis as per GI note, but these are trending down  She is tender to palpation  No vomiting or diarrhea  Possibly related to T9 fracture  · Appreciate GI consult   · Consult ortho for T9 fracture   · Supportive care   · Carafate Q6  · Protonix 40 mg BID  · Zofran IV PRN nausea  · Add Senna and Miralax    · Lo Fat diet   · Could consider hepatitis panel, AFP tumor markers, colonoscopy, etc however will defer this to GI      Acute cystitis without hematuria   Assessment & Plan    · POA, noted on urinalysis  Improving  Urine culture showing gram negative rods enteric like  Discussed with ID pharmacist - Patient would be safe for Ancef IV given different side chains making PCN crossreaction unlikely  However, Keflex would be unsafe for an oral option at discharge if sensitivities allow  Ceftin would be a better option at discharge if sensitivities are ok     · Continue with ancef renally dosed   · Monitor for reaction   · Monitor renal function   · Monitor mental status   · Follow up with sensitivities      Chronic diastolic CHF (congestive heart failure) ([facility] )   Assessment & Plan    · Patient appears euvolemic  · Restart Lasix tomorrow  · Stop fluids   · Monitor volume status closely      CKD (chronic kidney disease), stage III (HCC)   Assessment & Plan    · Creatinine normalized  · Monitor BMP      Chronic atrial fibrillation (HCC)   Assessment & Plan    · Rate controlled on admission, INR therapeutic    · Continue Metoprolol   · Coumadin 1 25 mg QD     Essential hypertension   Assessment & Plan    · BP acceptable   · Continue home medication regimen      Closed wedge compression fracture of ninth thoracic vertebra [facility])   Assessment & Plan    · Incidental finding on CT scan today  Unclear inciting event, however she did have a recent fall and is chronically kyphotic  Had been constipated before due to narcotic analgesia  She continues to complain of more rib pain than back pain  Possibly is causing abdominal pain given point tenderness and dermatomal pattern    · Trial non-narcotic regimen   · Aqua K  · Voltaren  · Lidoderm  · Tylenol   · Consult ortho  · ? Kyphoplasty  · PT eval and treat      Moderate protein-calorie malnutrition (HCC)   Assessment & Plan    Malnutrition Findings:   Malnutrition type: Acute illness  Degree of Malnutrition: Malnutrition of moderate degree (inadequate oral intake)    BMI Findings: Body mass index is 23 42 kg/m²  Hypokalemia   Assessment & Plan    · Noted on BMP  · Replete  · Monitor        VTE Pharmacologic Prophylaxis:   Pharmacologic: Warfarin (Coumadin)  Mechanical VTE Prophylaxis in Place: Yes    Patient Centered Rounds: I have performed bedside rounds with nursing staff today  Discussions with Specialists or Other Care Team Provider: Discussed with GI, RN, CM    Education and Discussions with Family / Patient: Discussed with patient, called daughter     Time Spent for Care: 30 minutes  More than 50% of total time spent on counseling and coordination of care as described above      Current Length of Stay: 2 day(s)    Current Patient Status: Inpatient   Certification Statement: The patient will continue to require additional inpatient hospital stay due to on going abdominal pain, further evaluation by ortho and OT needed    Discharge Plan: Pending Ot recommendation and ortho recommendations     Code Status: Level 1 - Full Code      Subjective:   Patient still reports abdominal pain that is no improved  Reports BM today  Reports nausea  Denies vomiting  Reports that her back hurts with breathing and positional change  Denies urinary complaints  Objective:     Vitals:   Temp (24hrs), Av 6 °F (36 4 °C), Min:97 3 °F (36 3 °C), Max:98 1 °F (36 7 °C)    Temp:  [97 3 °F (36 3 °C)-98 1 °F (36 7 °C)] 97 3 °F (36 3 °C)  HR:  [58-81] 62  Resp:  [16-18] 16  BP: (132-139)/(65-75) 138/65  SpO2:  [92 %-94 %] 94 %  Body mass index is 23 42 kg/m²  Input and Output Summary (last 24 hours): Intake/Output Summary (Last 24 hours) at 10/23/18 1219  Last data filed at 10/23/18 1126   Gross per 24 hour   Intake             2256 ml   Output              150 ml   Net             2106 ml       Physical Exam:     Physical Exam   Constitutional: She is oriented to person, place, and time  Vital signs are normal  She appears well-developed and well-nourished  Non-toxic appearance  She appears distressed  HENT:   Head: Normocephalic and atraumatic  Mouth/Throat: Mucous membranes are not dry  Eyes: Pupils are equal, round, and reactive to light  Conjunctivae and EOM are normal    Neck: Neck supple  Cardiovascular: Normal rate, regular rhythm, S1 normal, S2 normal, normal heart sounds and intact distal pulses  Exam reveals no S3 and no S4  No murmur heard  Pulmonary/Chest: Effort normal and breath sounds normal  No accessory muscle usage  No respiratory distress  She has no decreased breath sounds  She has no wheezes  She has no rhonchi  She has no rales  She exhibits no tenderness  Abdominal: Soft  Bowel sounds are normal  She exhibits no distension and no mass  There is tenderness in the right lower quadrant, suprapubic area and left lower quadrant  There is no rigidity, no rebound and no guarding     Musculoskeletal: Thoracic back: She exhibits bony tenderness and pain  Neurological: She is alert and oriented to person, place, and time  She is not disoriented  GCS eye subscore is 4  GCS verbal subscore is 5  GCS motor subscore is 6  Skin: Skin is warm and dry  Additional Data:     Labs:      Results from last 7 days  Lab Units 10/22/18  0453 10/21/18  1438   WBC Thousand/uL 4 00* 5 70   HEMOGLOBIN g/dL 13 8 16 1*   HEMATOCRIT % 42 3 48 9*   PLATELETS Thousands/uL 124* 175   NEUTROS PCT %  --  82*   LYMPHS PCT %  --  11*   MONOS PCT %  --  6   EOS PCT %  --  1       Results from last 7 days  Lab Units 10/23/18  0457   SODIUM mmol/L 139   POTASSIUM mmol/L 3 3*   CHLORIDE mmol/L 104   CO2 mmol/L 23   BUN mg/dL 27*   CREATININE mg/dL 1 29   CALCIUM mg/dL 8 8   ALK PHOS U/L 188*   ALT U/L 20   AST U/L 32       Results from last 7 days  Lab Units 10/21/18  1439   INR  2 31*       * I Have Reviewed All Lab Data Listed Above  * Additional Pertinent Lab Tests Reviewed: [facility] Admission Reviewed    Imaging:    Imaging Reports Reviewed Today Include: None  Imaging Personally Reviewed by Myself Includes:  None    Recent Cultures (last 7 days):       Results from last 7 days  Lab Units 10/21/18  1659 10/21/18  1621   BLOOD CULTURE  No Growth at 24 hrs    No Growth at 24 hrs   --    URINE CULTURE   --  >100,000 cfu/ml Gram Negative Bradly Enteric Like*       Last 24 Hours Medication List:     Current Facility-Administered Medications:  acetaminophen 975 mg Oral Q8H Dariel Kaur PA-C   albuterol 2 5 mg Nebulization Q6H PRN Dariel Casey PA-C   atorvastatin 20 mg Oral Daily With Viveros'sLINDSAY   cefazolin 1,000 mg Intravenous Q12H Dariel Kaur PA-C   diclofenac sodium 2 g Topical 4x Daily Dariel Kaur PA-C   dicyclomine 10 mg Oral Daily PRN Dariel Casey PA-C   fluticasone 1 spray Each Nare Daily Dariel Casey PA-C   [START ON 10/24/2018] furosemide 20 mg Oral Every Other Day Lonita Sill, LINDSAY   levothyroxine 88 mcg Oral Early Morning Dariel Isidro, LINDSAY   lidocaine 1 patch Topical Daily Dariel Isidro, LINDSAY   metoprolol tartrate 25 mg Oral Q12H [address] Dariel Kaur, LINDSAY   ondansetron 4 mg Intravenous Q6H PRN Dariel Isidro, LINDSAY   pantoprazole 40 mg Oral BID AC Dariel Kaur PA-C   polyethylene glycol 17 g Oral Daily Alley Joyce, LINDSAY   potassium chloride 20 mEq Oral Daily Dariel Isidro, LINDSAY   senna 1 tablet Oral HS Dariel Kaur PA-C   sucralfate 1,000 mg Oral Q6H [address] Dariel Kaur PA-C   warfarin 1 25 mg Oral Once per day on Mon Tue Wed Thu Fri Dariel Kaur PA-C   warfarin 2 5 mg Oral Once per day on Sun Sat Dariel Isidro PA-C        Today, Patient Was Seen By: Rhonda Ferrera PA-C    ** Please Note: Dictation voice to text software may have been used in the creation of this document   **

## 2018-10-23 NOTE — ASSESSMENT & PLAN NOTE
Malnutrition Findings:   Malnutrition type: Acute illness  Degree of Malnutrition: Malnutrition of moderate degree (inadequate oral intake)    BMI Findings: Body mass index is 23 42 kg/m²     Daughter can bring in her muscle milk" from home since patient refuses ensure

## 2018-10-23 NOTE — PHYSICAL THERAPY NOTE
PHYSICAL THERAPY NOTE  Patient Name: Diana Mark  [id number] Date: 10/23/2018    Received call back from [address] [place] [address] @ 14:03 re: TLSO bracing options  She rpeorts she does have back pack [employer] TLSO in stock, but would have to order MEDI brace if approved  Spoke to Landon Flynn who reports Dr Noe Meng in [place]  Emailed DR Noe Meng and others potentially involved in pt's care re: ortho approval for MEdi brace and/or backpack TLSO brace  Will await callback/recommendations to proceed   Phoenix Daniels PT

## 2018-10-23 NOTE — PLAN OF CARE
Problem: DISCHARGE PLANNING - CARE MANAGEMENT  Goal: Discharge to post-acute care or home with appropriate resources  INTERVENTIONS:  - Conduct assessment to determine patient/family and health care team treatment goals, and need for post-acute services based on payer coverage, community resources, and patient preferences, and barriers to discharge  - Address psychosocial, clinical, and financial barriers to discharge as identified in assessment in conjunction with the patient/family and health care team  - Arrange appropriate level of post-acute services according to patients   needs and preference and payer coverage in collaboration with the physician and health care team  - Communicate with and update the patient/family, physician, and health care team regarding progress on the discharge plan  - Arrange appropriate transportation to post-acute venues  Outcome: Progressing  LOS 2 days,  Bundle Patient: UTI SNF LOS 14-17, 30 day Readmission: No    CM met with pt and daughter at bedside  Pt lives with her daughter in an one story apartment with 3 steps to enter  Pt's daughter does cooking, cleaning and drives pt to appt's  Pt is able to bathe herself and ambulates with walker  Pt has had VNA services with  and [facility]  Pt was in [address] [place] in past [facility]  Pt's pharmacies are [facility]  Pt denies mental health history or drug or alcohol use  Pt is retired and collect SS benefits  DC plan is STR vs Home w/SL VNA  Referrals made to [facility] and CM as well as   VNA/PT  CM will  Continue to follow  Patient/caregiver received discharge checklist   Content reviewed  Patient/caregiver encouraged to participate in discharge plan of care prior to discharge home      CM reviewed the bundle program with patient and the following: "As part of your Medicare benefit, you are automatically enrolled in the bundle payment program  (Here is a notice from Medicare that you may keep and read)   The program is designed to assist in coordinating your care after you leave the hospital  A nurse ruby [facility] will be following you for 90 days  Please anticipate a phone call from the nurse within 48hrs of your discharge    As your Care Manager, I will be providing a handoff to your next level of care to ensure a safe transition "

## 2018-10-23 NOTE — ASSESSMENT & PLAN NOTE
· POA, abnormal urinalysis  Did not have any sepsis criteria but did have lactic acidosis  Urine culture showed greater than 100,000 colonies of E coli  Based on patient's penicillin allergy, my colleague discussed with ID pharmacist - Patient tolerated IV Ancef (different side chains making PCN crossreaction unlikely)   However, Keflex would be unsafe for an oral option therefore we will convert to Ceftin based on sensitivities to complete 2 more days which would be a 5 day course  · Blood cultures negative

## 2018-10-24 NOTE — PROGRESS NOTES
Progress Note - Carlos Avila 80 y o  female MRN: [id number]    Unit/Bed#: -[facility] Encounter: [id number]        Subjective:   Patient is currently wearing brace, she says her appetite is still poor, although I was able to encourage her to start drinking a Boost shake during interview  Still has lower abdominal discomfort which is improved with leaning forward, she says is not really correlated with food intake  Objective:     Vitals: Blood pressure 139/79, pulse 78, temperature 97 9 °F (36 6 °C), temperature source Oral, resp  rate 18, weight 54 4 kg (119 lb 14 9 oz), SpO2 93 %, not currently breastfeeding  ,Body mass index is 23 42 kg/m²  Intake/Output Summary (Last 24 hours) at 10/24/18 1525  Last data filed at 10/24/18 0210   Gross per 24 hour   Intake              100 ml   Output              200 ml   Net             -100 ml       Physical Exam:   General appearance: alert, frail appearing, pleasant affect, appears stated age and cooperative  Lungs: clear to auscultation bilaterally, no labored breathing/accessory muscle use  Heart: regular rate and rhythm, S1, S2 normal, no murmur, click, rub or gallop  Abdomen: soft, non-tender; bowel sounds normal; no masses,  no organomegaly  Extremities: no edema    Invasive Devices     Peripheral Intravenous Line            Peripheral IV 10/22/18 Right Forearm 2 days                Lab, Imaging and other studies: I have personally reviewed pertinent reports      Admission on 10/21/2018   Component Date Value    WBC 10/21/2018 5 70     RBC 10/21/2018 4 85     Hemoglobin 10/21/2018 16 1*    Hematocrit 10/21/2018 48 9*    MCV 10/21/2018 101*    MCH 10/21/2018 33 2     MCHC 10/21/2018 32 9     RDW 10/21/2018 14 6     MPV 10/21/2018 10 5     Platelets 53/32/8408 175     nRBC 10/21/2018 0     Neutrophils Relative 10/21/2018 82*    Immat GRANS % 10/21/2018 0     Lymphocytes Relative 10/21/2018 11*    Monocytes Relative 10/21/2018 6     Eosinophils Relative 10/21/2018 1     Basophils Relative 10/21/2018 0     Neutrophils Absolute 10/21/2018 4 66     Immature Grans Absolute 10/21/2018 0 02     Lymphocytes Absolute 10/21/2018 0 61     Monocytes Absolute 10/21/2018 0 36     Eosinophils Absolute 10/21/2018 0 03     Basophils Absolute 10/21/2018 0 02     Sodium 10/21/2018 136     Potassium 10/21/2018 4 8     Chloride 10/21/2018 98*    CO2 10/21/2018 27     ANION GAP 10/21/2018 11     BUN 10/21/2018 37*    Creatinine 10/21/2018 1 44*    Glucose 10/21/2018 105     Calcium 10/21/2018 9 6     AST 10/21/2018 48*    ALT 10/21/2018 21     Alkaline Phosphatase 10/21/2018 223*    Total Protein 10/21/2018 7 7     Albumin 10/21/2018 3 2*    Total Bilirubin 10/21/2018 2 40*    eGFR 10/21/2018 31     Lipase 10/21/2018 434*    LACTIC ACID 10/21/2018 2 4*    Clarity, UA 10/21/2018 Slightly Cloudy     Color, UA 10/21/2018 Yellow     Specific Gravity, UA 10/21/2018 1 010     pH, UA 10/21/2018 5 5     Glucose, UA 10/21/2018 Negative     Ketones, UA 10/21/2018 Negative     Blood, UA 10/21/2018 Trace-Intact*    Protein, UA 10/21/2018 Negative     Nitrite, UA 10/21/2018 Positive*    Bilirubin, UA 10/21/2018 Negative     Urobilinogen, UA 10/21/2018 0 2     Leukocytes, UA 10/21/2018 Moderate*    WBC, UA 10/21/2018 10-20*    RBC, UA 10/21/2018 0-1*    Bacteria, UA 10/21/2018 Moderate*    OTHER OBSERVATIONS 10/21/2018 Transitional Epithelial Cells     Epithelial Cells 10/21/2018 Occasional     Urine Culture 10/21/2018 >100,000 cfu/ml Escherichia coli*    Troponin I 10/21/2018 0 05*    Protime 10/21/2018 24 7*    INR 10/21/2018 2 31*    Procalcitonin 10/21/2018 0 09     Troponin I 10/21/2018 0 04     Blood Culture 10/21/2018 No Growth at 48 hrs   Blood Culture 10/21/2018 No Growth at 48 hrs       Ventricular Rate 10/21/2018 67     Atrial Rate 10/21/2018 241     QRSD Interval 10/21/2018 76     QT Interval 10/21/2018 376     QTC Interval 10/21/2018 397     QRS Axis 10/21/2018 -32     T Wave Axis 10/21/2018 263     Sodium 10/22/2018 140     Potassium 10/22/2018 3 3*    Chloride 10/22/2018 105     CO2 10/22/2018 25     ANION GAP 10/22/2018 10     BUN 10/22/2018 30*    Creatinine 10/22/2018 1 21     Glucose 10/22/2018 82     Calcium 10/22/2018 8 7     AST 10/22/2018 29     ALT 10/22/2018 15     Alkaline Phosphatase 10/22/2018 182*    Total Protein 10/22/2018 6 0*    Albumin 10/22/2018 2 5*    Total Bilirubin 10/22/2018 1 70*    eGFR 10/22/2018 38     WBC 10/22/2018 4 00*    RBC 10/22/2018 4 21     Hemoglobin 10/22/2018 13 8     Hematocrit 10/22/2018 42 3     MCV 10/22/2018 101*    MCH 10/22/2018 32 8     MCHC 10/22/2018 32 6     RDW 10/22/2018 14 6     Platelets 94/33/1356 124*    MPV 10/22/2018 10 2     GGT 10/22/2018 232*    Bilirubin, Direct 10/23/2018 1 31*    Sodium 10/23/2018 139     Potassium 10/23/2018 3 3*    Chloride 10/23/2018 104     CO2 10/23/2018 23     ANION GAP 10/23/2018 12     BUN 10/23/2018 27*    Creatinine 10/23/2018 1 29     Glucose 10/23/2018 73     Calcium 10/23/2018 8 8     AST 10/23/2018 32     ALT 10/23/2018 20     Alkaline Phosphatase 10/23/2018 188*    Total Protein 10/23/2018 6 3*    Albumin 10/23/2018 2 7*    Total Bilirubin 10/23/2018 1 90*    eGFR 10/23/2018 35     Sodium 10/24/2018 140     Potassium 10/24/2018 3 5     Chloride 10/24/2018 105     CO2 10/24/2018 23     ANION GAP 10/24/2018 12     BUN 10/24/2018 24     Creatinine 10/24/2018 1 25     Glucose 10/24/2018 67     Calcium 10/24/2018 9 0     AST 10/24/2018 40     ALT 10/24/2018 20     Alkaline Phosphatase 10/24/2018 197*    Total Protein 10/24/2018 6 1*    Albumin 10/24/2018 2 4*    Total Bilirubin 10/24/2018 1 50*    eGFR 10/24/2018 37      Results for Dev Peng (MRN [id number]) as of 10/24/2018 15:26   Ref   Range 10/21/2018 16:21 10/21/2018 16:59 10/21/2018 16:59 10/22/2018 04:53 10/23/2018 04:57 10/24/2018 04:50   eGFR Latest Units: ml/min/1 73sq m    38 35 37   Sodium Latest Ref Range: 136 - 145 mmol/L    140 139 140   Potassium Latest Ref Range: 3 5 - 5 3 mmol/L    3 3 (L) 3 3 (L) 3 5   Chloride Latest Ref Range: 100 - 108 mmol/L    105 104 105   CO2 Latest Ref Range: 21 - 32 mmol/L    25 23 23   Anion Gap Latest Ref Range: 4 - 13 mmol/L    10 12 12   BUN Latest Ref Range: 5 - 25 mg/dL    30 (H) 27 (H) 24   Creatinine Latest Ref Range: 0 60 - 1 30 mg/dL    1 21 1 29 1 25   Glucose Latest Ref Range: 65 - 140 mg/dL    82 73 67   Calcium Latest Ref Range: 8 3 - 10 1 mg/dL    8 7 8 8 9 0   AST (SGOT) Latest Ref Range: 5 - 45 U/L    29 32 40   ALT Latest Ref Range: 12 - 78 U/L    15 20 20   ALK PHOS Latest Ref Range: 46 - 116 U/L    182 (H) 188 (H) 197 (H)   Total Protein Latest Ref Range: 6 4 - 8 2 g/dL    6 0 (L) 6 3 (L) 6 1 (L)   Albumin Latest Ref Range: 3 5 - 5 0 g/dL    2 5 (L) 2 7 (L) 2 4 (L)   TOTAL BILIRUBIN Latest Ref Range: 0 20 - 1 00 mg/dL    1 70 (H) 1 90 (H) 1 50 (H)   BILIRUBIN DIRECT Latest Ref Range: 0 00 - 0 20 mg/dL     1 31 (H)    GGT Latest Ref Range: 5 - 85 U/L    232 (H)     WBC Latest Ref Range: 4 31 - 10 16 Thousand/uL    4 00 (L)     RBC Latest Ref Range: 3 81 - 5 12 Million/uL    4 21     Hemoglobin Latest Ref Range: 11 5 - 15 4 g/dL    13 8     HCT Latest Ref Range: 34 8 - 46 1 %    42 3     MCV Latest Ref Range: 82 - 98 fL    101 (H)     MCH Latest Ref Range: 26 8 - 34 3 pg    32 8     MCHC Latest Ref Range: 31 4 - 37 4 g/dL    32 6     RDW Latest Ref Range: 11 6 - 15 1 %    14 6     Platelet Count Latest Ref Range: 149 - 390 Thousands/uL    124 (L)     MPV Latest Ref Range: 8 9 - 12 7 fL    10 2     Epithelial Cells Latest Ref Range: None Seen, Occasional /hpf Occasional        Color, UA Unknown Yellow        Clarity, UA Unknown Slightly Cloudy        SL AMB SPECIFIC GRAVITY_URINE Latest Ref Range: 1 003 - 1 030  1 010        Glucose, UA Latest Ref Range: Negative mg/dl Negative        Ketones, UA Latest Ref Range: Negative mg/dl Negative        Blood, UA Latest Ref Range: Negative  Trace-Intact (A)        Nitrite, UA Latest Ref Range: Negative  Positive (A)        Leukocytes, UA Latest Ref Range: Negative  Moderate (A)        pH, UA Latest Ref Range: 4 5 - 8 0  5 5        Protein, UA Latest Ref Range: Negative mg/dl Negative        Bilirubin, UA Latest Ref Range: Negative  Negative        Urobilinogen, UA Latest Ref Range: 0 2, 1 0 E U /dl E U /dl 0 2        RBC, UA Latest Ref Range: None Seen, 0-5 /hpf 0-1 (A)        WBC, UA Latest Ref Range: None Seen, 0-5, 5-55, 5-65 /hpf 10-20 (A)        Bacteria, UA Latest Ref Range: None Seen, Occasional /hpf Moderate (A)        OTHER OBSERVATIONS Unknown Transitional Epit    BLOOD CULTURE Unknown  Rpt Rpt      URINE CULTURE Unknown Rpt (A)            Assessment/Plan:    1  Poor oral intake, intermittent nausea associated with lower abdominal discomfort that appears to have a strong positional component  Clinically does not appear to resemble peptic ulcer disease or gastritis, and the patient's family notes that she had EGD under similar circumstances a couple of years ago which was unremarkable  Ultrasound showed no gallbladder stones or other apparent biliary pathology, and CT scan was negative for any acute findings    Overall there does not appear to be a cleared GI pathology underlying her symptoms, possible she may be experiencing general deconditioning with her compression fractures, associated pain and poor mobility    - encourage oral intake, provide nutritional shakes with meals  - I discussed this patient's case and plan with Wagner Alexander PA-C ([facility])  - treat empirically for peptic ulcer disease with PPI and Carafate; this can be done without subjecting the patient to risks associated with endoscopic evaluation  - orthopedic follow-up, continue treatment for compression fracture  - closely monitor patient's nutritional input; can consider an appetite stimulant if she does not begin to improve her intake

## 2018-10-24 NOTE — PROGRESS NOTES
Patient resting in bed with daughter at bedside  Offers no complaints at this time  Will continue to monitor

## 2018-10-24 NOTE — PLAN OF CARE
Problem: PHYSICAL THERAPY ADULT  Goal: Performs mobility at highest level of function for planned discharge setting  See evaluation for individualized goals  Treatment/Interventions: Functional transfer training, LE strengthening/ROM, Elevations, Cognitive reorientation, Bed mobility, Gait training, Equipment eval/education, Patient/family training, Endurance training, Therapeutic exercise, Spoke to nursing, Spoke to advanced practitioner  Equipment Recommended: Kavon Cameron       See flowsheet documentation for full assessment, interventions and recommendations  Outcome: Not Progressing  Prognosis: Fair  Problem List: Decreased strength, Decreased range of motion, Decreased endurance, Pain, Orthopedic restrictions, Impaired hearing, Decreased cognition, Decreased mobility, Impaired balance (gait deviations)  Assessment: Pt is a 80 y o  female seen for PT evaluation s/p admit to [facility] on 10/21/2018 w/ Abdominal pain  Order placed for PT  Prior to admission, pt lived in one floor environment, had 3 or 4 TEMI with railing, lived with daughter who works and used rolling walker for mobility  Upon evaluation: Pt requires mod assistance for bed mobilty, intermittent min assistance for transfers and min assistance for ambulation with out device, but limited from bed to chair  Pt's clinical presentation is currently unstable/unpredictable given the functional mobility deficits above, especially weakness, decreased ROM, decreased endurance, gait deviations, pain and decreased functional mobility tolerance, coupled with fall risks including hx of falls, impaired balance and decreased cognition, and combined with medical complications of abnormal WBCs, multiple readmissions and abnormal potassium values  Pt to benefit from continued skilled PT tx while in hospital and upon DC to address deficits as defined above and maximize level of functional mobility   From PT/mobility standpoint, recommendation at time of d/c would be inpatient rehab vs  Home PT pending progress and interdisciplinary recommendations in order to maximize pt's functional independence and consistency w/ mobility in order to facilitate return to PLOF  Recommend trial with walker next 1-2 sessions, ther ex next 1-2 sessions, OT consult and case management consult  Barriers to Discharge: Decreased caregiver support, Inaccessible home environment (TEMI, home alone for periods during the day)     Recommendation: OT consult (rehab vs home based on interdiciplinary recommendations)          See flowsheet documentation for full assessment

## 2018-10-24 NOTE — PLAN OF CARE
Problem: PHYSICAL THERAPY ADULT  Goal: Performs mobility at highest level of function for planned discharge setting  See evaluation for individualized goals  Treatment/Interventions: Functional transfer training, LE strengthening/ROM, Elevations, Cognitive reorientation, Bed mobility, Gait training, Equipment eval/education, Patient/family training, Endurance training, Therapeutic exercise, Spoke to nursing, Spoke to advanced practitioner  Equipment Recommended: Addie Hilario       See flowsheet documentation for full assessment, interventions and recommendations  Outcome: Progressing  Prognosis: Fair  Problem List: Decreased strength, Decreased range of motion, Decreased endurance, Pain, Orthopedic restrictions, Impaired hearing, Decreased cognition, Decreased mobility, Impaired balance (gait deviations)  Assessment: Extensive time with pt, family, and Bartolome Hoffmann from [place] re: benefits and risks of both braces and of pt not using a brace  Concerns from family including  non compliance, increased pain, breakdown, no improvement in symptoms  Questions either answered or deferred to Jennifer Almendarez who was later present in the room  End decision between daughter and pt is that pt is agreeable to being fitted by Bartolome Hoffmann for MEDI brace  Pt did amb further distances this session and more trials either with or without brace  Pt will likely need A from daughter to don/doff brace  Recommend continued trials for mobility with MEDI spinomed back brace  Barriers to Discharge: Decreased caregiver support, Inaccessible home environment (TEMI, home alone for periods during the day)     Recommendation: Post acute IP rehab, OT consult (rehab vs home based on interdiciplinary recommendations)          See flowsheet documentation for full assessment

## 2018-10-24 NOTE — MEDICAL STUDENT
MEDICAL STUDENT  Inpatient Progress Note for TRAINING ONLY  Not Part of Legal Medical Record       Progress Note - Hope Clark 80 y o  female MRN: [id number]    Unit/Bed#: -01 Encounter: [id number]      Assessment:  1  Abdominal pain  Patient has no improvement in her RUQ abdominal pain since admission  GI postulates that her compression fracture is causing epigastric abdominal pain as her pain is positional  They also consider it could be underlying peptic ulcer disease and recommend conservative treatment with BID PPI therapy and carafate  Recommend local therapy for compression fracture  Will continue with Carafate Q6, protonix 40 mg BID, Zofran IV PRN nausea, senna, miralax, and low fat diet  Continue to have GI follow  2  Compression fracture  Treat with non-opiate pain regimen of Voltaren gel, lidoderm patches, Aqua K and tylenol  PT and OT are consulted  3  Acute cystitis  Continue with IV Cefazolin  Patient has penicillin allergy  Monitor for reaction  Monitor renal function with daily BMPs  4  Chronic diastolic heart failure  Patient does not show signs of volume overload  Can restart lasix  Monitor fluid volume closely  5  Chronic Kidney Disease Stage 3:   Creatinine is normal  Repeat BMP in AM  Continue to monitor renal function  6  Chronic Atrial Fibrillation:   Continue metoprolol and coumadin  7  Hypertension  Blood pressure is acceptable, continue metoprolol       Subjective:   Patient is a 81 yo female with past medical history of hiatal hernia, chronic diastolic heart failure, chronic atrial fibrillation, hypertension, a recent rib fracture and vertebral T9 compression fracture both of which may have resulted from a recent fall who presented to the ER three days ago for worsening RUQ abdominal pain  The pain had been going on for a couple of weeks  Daughter reported that outpatient RUQ US showed no abnormalities   The abdominal pain was described as similar to the pain associated with her past hiatal hernia, thus she has been treated with protonix, carafate, zofran, and a constipation regimen  She was dehydrated on admission and was fluid resuscitated  She also presented with a UTI for which she was treated with IV ciprofloxacin and transitioned yesterday to IV cefazolin  Today  she reports she is feeling "lousy " She has had no improvement in her abdominal pain which she rates as a 9/10 and is worse with moving  She has not ambulated out of bed today  She states she feels some relief when leaning forward  She states she has had no appetite due to feeling nauseous and did not eat breakfast  She had a BM yesterday and does not feel that she is constipated  When asked about back pain she states that her pain "goes from back to front" and that her back pain is also exacerbated with movement and is better with rest  She denies UTI symptoms including dysuria, urgency, frequency, hematuria  Review of Systems:   Constitutional: -fever and chills  +15 lb weight loss in past month  ENT: -sore throat -rhinorrhea  Pulmonary: +Shortness of breath -cough  Cardiac: -chest pain -palpitations  GI: +abdominal pain, +nausea -diarrhea -constipation -melena -hematochezia  MS: +back pain   Neuro: -dizziness, -syncope, -headache  : -dysuria -hematuria -urgency -frequency       Objective:     Vitals: Blood pressure 149/77, pulse 70, temperature (!) 97 4 °F (36 3 °C), temperature source Oral, resp  rate 16, weight 54 4 kg (119 lb 14 9 oz), SpO2 97 %, not currently breastfeeding  ,Body mass index is 23 42 kg/m²  Intake/Output Summary (Last 24 hours) at 10/24/18 0916  Last data filed at 10/24/18 0210   Gross per 24 hour   Intake             1906 ml   Output              200 ml   Net             1706 ml       Physical Exam:   Constitutional: Frail appearing elderly female in no acute distress  Appears stated age  Skin: Warm and dry   HEENT: Atraumatic normocephalic head   Mucous membranes are moist  Pupils are equal round and reactive to light  Conjunctiva is normal  Neck is supple  Pulmonary: Lungs are clear to auscultation bilaterally  No wheezes rales or rhonchi  No increased effort apparent  Cardiac: Irregularly irregular heart rhythm, normal rate  GI: Normoactive bowel sounds  Tenderness to palpation in all 4 quadrants  + romeo's sign   Musculoskeletal: No swelling in bilateral lower extremities  Bony tenderness is present in her thoracic back  Neuro: Patient is awake and alert  Patient is oriented to name, place, and situation  Patient is not oriented to date  [place] coma scale is 15  : Deferred       Invasive Devices     Peripheral Intravenous Line            Peripheral IV 10/22/18 Right Forearm 1 day                Labs  BMP 10-24-18  BUN: 24  Creatinine: 1 25  Potassium: 3 5  Alk Phos: 197  Total protein: 6 1  Albumin: 2 4  Total bilirubin: 1 5      Imaging and other studies:   Imaging:   CT of abdomen and pelvis without contrast:   Limited exam without IV and oral contrast   1   Small amount of air in the bladder lumen   Correlate for recent catheterization versus gas from bladder infection  2   Moderate compression fracture of the T9 vertebral body of indeterminate age   This is new from the prior exam     Xray of Right femur (previous fracture)  Stable alignment of distal femoral fracture with some interval healing present        VTE Pharmacologic Prophylaxis: warfarin  VTE Mechanical Prophylaxis: yes

## 2018-10-24 NOTE — PHYSICAL THERAPY NOTE
PHYSICAL THERAPY NOTE  Patient Name: Elly Mendoza  ZUREJ'O Date: 10/23/2018  Late Entry due to no EPIC access  Received email back from Dr Mikael Scott with [facility] to proceed with 2 alternative braces  Multiple calls/texts with Nicol Delgado from [place], and later [facility] and [facility]  Proposed plan is for [facility] to come in for TLSO brace consultation around 11:30/12 N tomorrow with pt's daughter present for determining bracing appropriateness/risks/benefits  Ivan RN reports she will call back daughter to notify her of time  Will follow   Herminia Glass, PT

## 2018-10-24 NOTE — PHYSICAL THERAPY NOTE
PHYSICAL THERAPY TREATMENT NOTE  NAME:  Wisam Jones  DATE: [birth year]    Length Of Stay: 3  Performed at least 2 patient identifiers during session: Name and Birthday  TREATMENT:   10/24/18 1300   Pain Assessment   Pain Assessment FLACC   Pain Type Acute pain   Pain Location Back; Abdomen   Pain Orientation Bilateral   Effect of Pain on Daily Activities limits upright posture, speed and indep of mobility   Patient's Stated Pain Goal No pain   Hospital Pain Intervention(s) Repositioned; Ambulation/increased activity; Emotional support   Pain Rating: FLACC (Rest) - Face 0   Pain Rating: FLACC (Rest) - Legs 1   Pain Rating: FLACC (Rest) - Activity 1   Pain Rating: FLACC (Rest) - Cry 0   Pain Rating: FLACC (Rest) - Consolability 0   Score: FLACC (Rest) 2   Pain Rating: FLACC (Activity) - Face 1   Pain Rating: FLACC (Activity) - Legs 1   Pain Rating: FLACC (Activity) - Activity 1   Pain Rating: FLACC (Activity) - Cry 1   Pain Rating: FLACC (Activity) - Consolability 1   Score: FLACC (Activity) 5   Restrictions/Precautions   Weight Bearing Precautions Per Order No   Braces or Orthoses TLSO  (recently orderd by orthopedics)   Other Precautions Bed Alarm; Chair Alarm; Impulsive; Fall Risk;Pain   General   Chart Reviewed Yes   Response to Previous Treatment Patient with no complaints from previous session  Family/Caregiver Present (daughter throughout session)   Cognition   Overall Cognitive Status Impaired   Arousal/Participation Cooperative   Attention Attends with cues to redirect   Orientation Level Oriented to person;Oriented to place; Disoriented to time;Disoriented to situation   Memory Decreased recall of precautions;Decreased recall of recent events   Following Commands Follows one step commands with increased time or repetition   Subjective   Subjective Daughter reports being frustrated, including with her havin to wait for PT and brace to arrive ( I notified RN that Sheng Obando would be here between 11:30 and 12, but Barbi Gonzalez arrived around 15 N as she was successful in having alternative MEdi TLSO brace overnighted and delivered for pt )  DAughter also reports multiple medical questions, and is thinkging about having her transferred [facility]  Daughter reports not feeling like the brace is the answer to her pain  Bed Mobility   Supine to Sit 3  Moderate assistance   Additional items Assist x 1; Increased time required;Verbal cues   Transfers   Sit to Stand (fluctuates between min and mod; 5 trials)   Additional items Assist x 2;Assist x 1; Increased time required;Verbal cues;Armrests  (fluctuates between A of 1-2)   Stand to Sit (fluctuates between min and Mod; 5 trial)   Additional items Assist x 2; Increased time required;Verbal cues;Armrests  (fluctuates between A of 1-2)   Additional Comments Posture: flexed trunk /kyphosis noted  Pt inconsistent with reports of pain relief when trialing Aspen horizon brace and MEdi brace  Pt reports both increased then decreased pain with upright posture changes, but continued to report having pain relief in flexed (but spinal distraction) posture  Extensive time > 1:15 hours for pt/daughter education with care coordination with Lyn from [facility] and Jacqueline from [place]   Ambulation/Elevation   Gait pattern Short stride; Excessively slow; Step to  (reaching for UE support)   Gait Assistance 4  Minimal assist   Additional items Assist x 1;Verbal cues   Assistive Device Rolling walker  (short)   Distance 20' with Ellender Latin brace+20' with MEdi brace+25' without brace  per pt request   Balance   Static Sitting Fair +   Static Standing Poor +   Ambulatory Poor +   Endurance Deficit   Endurance Deficit Yes   Activity Tolerance   Activity Tolerance Patient limited by fatigue;Patient limited by pain   Medical Staff Made Aware spoke to Paresh Renteria from [place] [id number] spoke to Marilee RN, Danie jackson RN   Exercises   Balance training  standing tolerance time for 1-3 min x 3 trials with BUE support and no worse than Poor + balance   Assessment   Prognosis Fair   Problem List Decreased strength;Decreased range of motion;Decreased endurance;Pain;Orthopedic restrictions; Impaired hearing;Decreased cognition;Decreased mobility; Impaired balance  (gait deviations)   Assessment Extensive time with pt, family, and Odalys Urrutia from [place] re: benefits and risks of both braces and of pt not using a brace  Concerns from family including  non compliance, increased pain, breakdown, no improvement in symptoms  Questions either answered or deferred to Carla Rashid from [address] [place] [address] who was later present in the room  End decision between daughter and pt is that pt is agreeable to being fitted by Odalys Urrutia for MEDI brace  Pt did amb further distances this session and more trials either with or without brace  Pt will likely need A from daughter to don/doff brace  Recommend continued trials for mobility with MEDI spinomed back brace  Barriers to Discharge Decreased caregiver support; Inaccessible home environment  (TEMI, home alone for periods during the day)   Goals   Patient Goals less abdominal pain   STG Expiration Date 11/02/18   Treatment Day 1   Plan   Treatment/Interventions Functional transfer training;LE strengthening/ROM; Elevations; Therapeutic exercise;Equipment eval/education;Gait training;Bed mobility; Patient/family training;Cognitive reorientation; Endurance training;Spoke to nursing;Spoke to case management;Spoke to advanced practitioner   Progress Slow progress, decreased activity tolerance   PT Frequency Other (Comment)  (3-5x/wk)   Recommendation   Recommendation Post acute IP rehab;OT consult  (rehab vs home based on interdiciplinary recommendations)   Equipment Recommended Walker  (short)   Additional Comments Concerned re: caregiver burnout for daughter based on conversations today     Frida Mar, PT, DPT

## 2018-10-24 NOTE — PROGRESS NOTES
Progress Note Boyd Oden [birth year], 80 y o  female MRN: [id number]    Unit/Bed#: -01 Encounter: [id number]    Primary Care Provider: Jass Ugarte DO   Date and time admitted to hospital: 10/21/2018  1:50 PM    * Abdominal pain   Assessment & Plan    · POA, has been on going for the last few weeks  Similar in character to prior abdominal pain that was attributed to hiatal hernia  ? gastritis  · Patient's daughter at bedside is frustrated that her mother has had "no improvement" since the time she got to the hospital and wants to know if more tests should be ordered to "get to the bottom of this"  · CT scan without any contrast was negative for acute findings and outpatient RUQ US negative for evidence of GB stones  Doubtful that repeating the imaging with barium would be of much utility and would be hesitant to provide IV contrast in the setting of her low GFR  Do not feel an MRI of her abdomen would be of any utility or appropriate  Defer to GI if they feel EGD would be helpful  Explained this in detail to the daughter  · Appreciate GI consult --defer to them as to whether they want to proceed with invasive procedures like EGD  Patient had an EGD 3 years ago which showed a gastric polyp which was removed and hiatal hernia  · Consider possibility that patient's kyphosis from ongoing compression fractures is related to above symptoms especially with her underlying moderate hiatal hernia  Consulted ortho for T9 fracture --appreciate their input  See below  · Supportive care   · Carafate Q6  · Protonix 40 mg BID  · Zofran IV PRN nausea  · Senna and Miralax  for constipation  · Lo Fat diet   · Noted to have GGT elevation on labs     Closed wedge compression fracture of ninth thoracic vertebra [facility])   Assessment & Plan    · Incidental finding on CT scan today  Unclear inciting event, however she did have a recent fall and is chronically kyphotic  Had been constipated before due to narcotic analgesia   She continues to complain of more rib pain than back pain  Possibly is causing abdominal pain given point tenderness and dermatomal pattern    · Trial non-narcotic regimen   · Aqua K  · Voltaren  · Lidoderm  · Tylenol   · PT eval and treat --attempting brace     Acute cystitis without hematuria   Assessment & Plan    · POA, abnormal urinalysis  Did not have any sepsis criteria but did have lactic acidosis  Urine culture showed greater than 100,000 colonies of E coli  Based on patient's penicillin allergy, my colleague discussed with ID pharmacist - Patient tolerated IV Ancef (different side chains making PCN crossreaction unlikely)  However, Keflex would be unsafe for an oral option therefore we will convert to Ceftin based on sensitivities to complete 2 more days which would be a 5 day course  · Blood cultures negative     CKD (chronic kidney disease), stage III ([facility])   Assessment & Plan    · Creatinine stable  · Monitor BMP      Essential hypertension   Assessment & Plan    · BP acceptable   · Continue home medication regimen      Chronic atrial fibrillation (HCC)   Assessment & Plan    · Rate controlled on admission, INR therapeutic    · Continue Metoprolol   · Continue Coumadin      Chronic diastolic CHF (congestive heart failure) ([facility] [address] )   Assessment & Plan    · Patient appears euvolemic  · Ok to continue lasix  · Monitor volume status closely      Hypokalemia   Assessment & Plan    · Continue to replete     Moderate protein-calorie malnutrition (HCC)   Assessment & Plan    Malnutrition Findings:   Malnutrition type: Acute illness  Degree of Malnutrition: Malnutrition of moderate degree (inadequate oral intake)    BMI Findings: Body mass index is 23 42 kg/m²     Daughter can bring in her muscle milk" from home since patient refuses ensure       VTE Pharmacologic Prophylaxis:   Pharmacologic: Warfarin (Coumadin)  Mechanical VTE Prophylaxis in Place: No    Patient Centered Rounds: I have performed bedside rounds with nursing staff today  Discussions with Specialists or Other Care Team Provider:  Physical therapy, GI, case management    Education and Discussions with Family / Patient:  Spoke with patient's daughter at length at bedside to address multiple questions and concerns    Time Spent for Care: 45 minutes  More than 50% of total time spent on counseling and coordination of care as described above  After my initial evaluation I came back in the room and discussed with the patient's daughter again in great detail to address all of her many concerns    Current Length of Stay: 3 day(s)    Current Patient Status: Inpatient   Certification Statement: The patient will continue to require additional inpatient hospital stay due to Ongoing pain    Discharge Plan:  Patient's daughter states she will not put her in any kind of nursing home  She will take her home at discharge with visiting nurses  However, she states that she does not want her to leave until she is eating and drinking more and has improvement in her pain    Code Status: Level 1 - Full Code      Subjective:   Patient's daughter states that she is not any better since the time she came into the hospital and is still having the same pain  Physical therapy is placed patient in a spinal brace, and patient does not seem to be providing consistent information as to whether it is helping or not  She is having bowel movements now  She continues to have poor appetite and refuses to drink the Ensure  However she did agree to take some sips of it later in the afternoon  She has not had any visible vomiting reported, but complains of frequent nausea which is limiting her appetite      Objective:     Vitals:   Temp (24hrs), Av 8 °F (36 6 °C), Min:97 4 °F (36 3 °C), Max:98 2 °F (36 8 °C)    Temp:  [97 4 °F (36 3 °C)-98 2 °F (36 8 °C)] 97 4 °F (36 3 °C)  HR:  [70-76] 70  Resp:  [16-18] 16  BP: (128-149)/(67-81) 149/77  SpO2:  [93 %-97 %] 97 %  Body mass index is 23 42 kg/m²  Input and Output Summary (last 24 hours): Intake/Output Summary (Last 24 hours) at 10/24/18 1309  Last data filed at 10/24/18 0210   Gross per 24 hour   Intake              220 ml   Output              200 ml   Net               20 ml       Physical Exam:     Physical Exam   Constitutional: No distress  Thin frail elderly female, sitting up in a chair appears clinically nontoxic and not noted to be in any apparent distress   HENT:   Head: Normocephalic and atraumatic  Eyes: Conjunctivae are normal  Right eye exhibits no discharge  Left eye exhibits no discharge  No scleral icterus  Neck: Neck supple  Cardiovascular: Normal rate  No murmur heard  irregular   Pulmonary/Chest: Effort normal  No respiratory distress  She has no wheezes  Abdominal: Soft  She exhibits no distension  There is no guarding  Nonfocal tenderness to palpation with discomfort described diffusely but not in any specific pattern and not noted to be severe  Musculoskeletal: She exhibits no edema  Very kyphotic   Neurological: She is alert  Patient is awake alert and interactive  She answers questions appropriately  She does not appear to be confused  She ambulated with a roller walker with assistance  Skin: Skin is warm and dry  No rash noted  She is not diaphoretic  No erythema  No pallor  Psychiatric: She has a normal mood and affect  Her behavior is normal    Vitals reviewed          Additional Data:     Labs:      Results from last 7 days  Lab Units 10/22/18  0453 10/21/18  1438   WBC Thousand/uL 4 00* 5 70   HEMOGLOBIN g/dL 13 8 16 1*   HEMATOCRIT % 42 3 48 9*   PLATELETS Thousands/uL 124* 175   NEUTROS PCT %  --  82*   LYMPHS PCT %  --  11*   MONOS PCT %  --  6   EOS PCT %  --  1       Results from last 7 days  Lab Units 10/24/18  0450   SODIUM mmol/L 140   POTASSIUM mmol/L 3 5   CHLORIDE mmol/L 105   CO2 mmol/L 23   BUN mg/dL 24   CREATININE mg/dL 1 25   ANION GAP mmol/L 12 CALCIUM mg/dL 9 0   ALBUMIN g/dL 2 4*   TOTAL BILIRUBIN mg/dL 1 50*   ALK PHOS U/L 197*   ALT U/L 20   AST U/L 40       Results from last 7 days  Lab Units 10/21/18  1439   INR  2 31*               Results from last 7 days  Lab Units 10/21/18  1549 10/21/18  1502   LACTIC ACID mmol/L  --  2 4*   PROCALCITONIN ng/ml 0 09  --        * I Have Reviewed All Lab Data Listed Above  * Additional Pertinent Lab Tests Reviewed: All [place] Admission Reviewed    Imaging:    Imaging Reports Reviewed Today Include:  Reviewed imaging studies including CT scan and ultrasound in detail with the daughter; reviewed old records  Imaging Personally Reviewed by Myself Includes:      Recent Cultures (last 7 days):       Results from last 7 days  Lab Units 10/21/18  1659 10/21/18  1621   BLOOD CULTURE  No Growth at 48 hrs  No Growth at 48 hrs    --    URINE CULTURE   --  >100,000 cfu/ml Escherichia coli*       Last 24 Hours Medication List:     Current Facility-Administered Medications:  acetaminophen 975 mg Oral Q8H Dariel Kaur PA-C   albuterol 2 5 mg Nebulization Q6H PRN Dariel Emerson PA-C   atorvastatin 20 mg Oral Daily With LINDSAY Bautista   [START ON 10/25/2018] cefuroxime 250 mg Oral Q24H Jacqueline Lemus PA-C   diclofenac sodium 2 g Topical 4x Daily Dariel Kaur PA-C   dicyclomine 10 mg Oral Daily PRN Dariel Emerson PA-C   fluticasone 1 spray Each Nare Daily Dariel Emerson PA-C   furosemide 20 mg Oral Every Other Day Dariel Emerson PA-C   levothyroxine 88 mcg Oral Early Morning Dariel Emerson PA-C   lidocaine 1 patch Topical Daily Dariel Emerson PA-C   metoprolol tartrate 25 mg Oral Q12H [facility] Dariel Kaur PA-C   ondansetron 4 mg Intravenous Q6H PRN Dariel Emerson PA-C   pantoprazole 40 mg Oral BID AC Dariel Kaur PA-C   polyethylene glycol 17 g Oral Daily Alley Joyce PA-C   potassium chloride 20 mEq Oral Daily Dariel Emerson PA-C   senna 1 tablet Oral HS Dariel Kaur PA-C   sucralfate 1,000 mg Oral Q6H [facility] Dariel Kaur PA-C   warfarin 1 25 mg Oral Once per day on Mon Tue Wed Thu Fri Dariel Kaur PA-C   warfarin 2 5 mg Oral Once per day on Sun Sat Dariel Varghese PA-C        Today, Patient Was Seen By: Obie Prader, PA-C    ** Please Note: Dictation voice to text software may have been used in the creation of this document   **

## 2018-10-25 PROBLEM — D69.6 THROMBOCYTOPENIA (HCC): Status: ACTIVE | Noted: 2018-01-01

## 2018-10-25 NOTE — PROGRESS NOTES
Progress Note Charlene Stable [birth year], 80 y o  female MRN: [id number]    Unit/Bed#: -01 Encounter: [id number]    Primary Care Provider: Ernesto Rea DO   Date and time admitted to hospital: 10/21/2018  1:50 PM  * Abdominal pain   Assessment & Plan    · POA, has been on going for the last few weeks  Similar in character to prior abdominal pain that was attributed to hiatal hernia  · Yesterday, patient's daughter at bedside was frustrated that her mother has had "no improvement" since the time she got to the hospital and wants to know if more tests should be ordered to "get to the bottom of this "  Unfortunately during that interaction I was not able to speak much with the patient as her daughter repeatedly interjected and provided history for her  · Today I spoke with the patient in detail alone without her daughter present  She clearly expressed to me that she has had improvement since coming to the hospital   She reported to me that her primary symptom of concern is nausea as this is what contributes to her lack of appetite  She has had back pain with movement but was clear with me that it HAS improved since admission  She also tells me that her abdominal pain is improved when she holds a small pillow in front of her abdomen  · CT scan without any contrast was negative for acute findings and outpatient  RUQ US negative for evidence of GB stones  GI is doubtful that repeating the imaging with oral barium would be of much utility and I would be hesitant to provide IV contrast in the setting of her low GFR-also do not feel this would provide any additional information at this time  However, if there is any concern for retroperitoneal hematoma she would need a repeat CT scan done with dye (I confirmed this with radiology department)     Although I am concerned about her rising INR, she has not been hypotensive nor has she had any drop in her hemoglobin or any evidence of ecchymosis or superficial hematoma and therefore I would not proceed with more tests  Do not feel an MRI of her abdomen would be of any utility or appropriate  Deferred to GI if they feel EGD would be helpful  Per my conversation with them, since the patient is already receiving Carafate and Protonix anyway, there is likely nothing additional they would find on EGD that would be of benefit  Certainly even if there was some undiagnosed malignancy, at her age she would likely not be a candidate for any intervention  She had EGD 3 years ago which showed a gastric polyp which was removed and hiatal hernia  · Consider possibility that patient's kyphosis from ongoing compression fractures is contributing to her GI symptoms especially with her underlying moderate hiatal hernia  Consulted ortho for T9 fracture --appreciate their input  See below  · Supportive care as follows:  · Carafate Q6  · Protonix 40 mg BID  · Zofran ODT before each meal for nausea  · Senna and Miralax  for constipation--changed to p r n  Now that she is having looser bowel movements  · Encourage oral intake and document; protein supplements   · Noted to have GGT elevation on labs, unclear significance  · Daughter is adamant that she does not feel that her mom is taking in enough and I explained to her that the only additional option would be to put in an artificial surgical feeding tube and force feed her, which would certainly not be appropriate at her age  · Given the complexity of patient's situation with her symptoms and lack of perceived improvement in addition to her advanced age and multiple medical comorbidities, I would like to involve our palliative care team for assistance in medication management and establishing goals of care  Closed wedge compression fracture of ninth thoracic vertebra [facility])   Assessment & Plan    · Incidental finding on CT scan   Unclear inciting event, however she did have a recent fall and is chronically kyphotic  · Add low-dose oxycodone for severe pain in her back in order to encourage more movement especially in light of the fact that she is going home with her daughter (daughter refused rehab) and will need to be much more mobile than she has been  · In addition continue nonnarcotic management with:  · Aqua K  · Voltaren  · Lidoderm  · Tylenol around the clock  · PT eval and treat --recommended use of brace--patient seems to be refusing  She has given inconsistent information to various staff members as to whether the brace has been helpful or bothersome     Moderate protein-calorie malnutrition ([facility] )   Assessment & Plan    Malnutrition Findings:   Malnutrition type: Acute illness  Degree of Malnutrition: Malnutrition of moderate degree (inadequate oral intake)    BMI Findings: Body mass index is 23 42 kg/m²  Needs to drink protein supplements     Chronic atrial fibrillation ([facility] )   Assessment & Plan    · Rate controlled since admission, INR very supratherapeutic at 5 7, likely due to poor oral intake  Will given vitamin K x 1 (5mg dose)  · Continue Metoprolol   · Hold Coumadin and recheck INR in am     CKD (chronic kidney disease), stage III ([facility])   Assessment & Plan    · Creatinine stable  · Monitor BMP      Essential hypertension   Assessment & Plan    · BP acceptable   · Continue home medication regimen lasix qod and BB     Acute cystitis without hematuria   Assessment & Plan    · POA, abnormal urinalysis  Did not have any sepsis criteria but did have lactic acidosis  Urine culture showed greater than 100,000 colonies of E coli  Based on patient's penicillin allergy, my colleague discussed with ID pharmacist - Patient tolerated IV Ancef (different side chains making PCN crossreaction unlikely)   However, Keflex would be unsafe for an oral option therefore we converted to Ceftin based on sensitivities to complete 2 more days which would be a 5 day course  · Consider possibility that antibiotics were also making her nauseated  Course is now complete  · Blood cultures negative     Hypokalemia   Assessment & Plan    · Continue to replete     Chronic diastolic CHF (congestive heart failure) ([address] )   Assessment & Plan    · Patient appears euvolemic  · Ok to continue lasix qod  · Monitor volume status closely and hold if any concern for dehydration         VTE Pharmacologic Prophylaxis:   Pharmacologic:   Coumadin on hold due to supratherapeutic INR  Mechanical VTE Prophylaxis in Place: No    Patient Centered Rounds: I have performed bedside rounds with nursing staff today  Discussions with Specialists or Other Care Team Provider:   My attending, palliative care, case management    Education and Discussions with Family / Patient:     Time Spent for Care: 35 minutes  More than 50% of total time spent on counseling and coordination of care as described above  Current Length of Stay: 4 day(s)    Current Patient Status: Inpatient   Certification Statement: The patient will continue to require additional inpatient hospital stay due to Additional medication management for symptoms and evaluation by palliative care physician    Discharge Plan:   As per my conversation with the patient, I feel she would be medically ready for discharge home tomorrow but will require significant additional assistance  As of now family is refusing rehab and requesting VNA    Code Status: Level 1 - Full Code      Subjective:   Patient reports when asked that she has had improvement in her pain since arriving in the hospital   When asked specifically what is the symptom that is bothering her the most she reports nausea as this is what is contributing to her lack of appetite  She initially told me the zofran might be helping but then said it's ok as long as she doesn't eat anything  She is agreeable to strawberry ensure    She has not had any reports of worsening back pain and if anything reports some improvement in her back pain--only really having pain with movement  She denies dizziness or lightheadedness or bleeding  She also reports that when she puts a small pillow in front of her abdomen it helps her discomfort  She is not reporting any significant abdominal pain at this time    Objective:     Vitals:   Temp (24hrs), Av 1 °F (36 7 °C), Min:97 9 °F (36 6 °C), Max:98 3 °F (36 8 °C)    Temp:  [97 9 °F (36 6 °C)-98 3 °F (36 8 °C)] 98 3 °F (36 8 °C)  HR:  [64-82] 64  Resp:  [18] 18  BP: (120-139)/(60-79) 131/68  SpO2:  [93 %-94 %] 94 %  Body mass index is 23 42 kg/m²  Input and Output Summary (last 24 hours):     No intake or output data in the 24 hours ending 10/25/18 1005    Physical Exam:     Physical Exam   Constitutional: No distress  Patient is sitting in bed and appears very comfortable during our conversation  Body language does not demonstrate any evidence of pain   HENT:   Head: Normocephalic and atraumatic  Eyes: Conjunctivae are normal  Right eye exhibits no discharge  Left eye exhibits no discharge  No scleral icterus  Cardiovascular: Normal rate  No murmur heard  Irregular rhythm   Pulmonary/Chest: Effort normal and breath sounds normal  No respiratory distress  She has no wheezes  She has no rales  Abdominal: Soft  Bowel sounds are normal  She exhibits no distension  There is no tenderness  There is no rebound and no guarding  NO focal tenderness to palpation noted on exam today  Patient is holding a small pillow in front of her abdomen and reports this provides improvement   Musculoskeletal: She exhibits no edema  Severe kyphosis  NO tenderness to palpation in her back  Neurological: She is alert  Awake alert and interactive  Provides sometimes inconsistent history but does appear to be cognitively intact overall   Skin: Skin is warm and dry  No rash noted  She is not diaphoretic  No erythema  No pallor  Psychiatric: She has a normal mood and affect   Her behavior is normal    Very pleasant and cooperative   Vitals reviewed  Additional Data:     Labs:      Results from last 7 days  Lab Units 10/25/18  0524   WBC Thousand/uL 4 84   HEMOGLOBIN g/dL 14 6   HEMATOCRIT % 44 3   PLATELETS Thousands/uL 112*   NEUTROS PCT % 80*   LYMPHS PCT % 10*   MONOS PCT % 5   EOS PCT % 4       Results from last 7 days  Lab Units 10/25/18  0524   SODIUM mmol/L 140   POTASSIUM mmol/L 3 5   CHLORIDE mmol/L 106   CO2 mmol/L 23   BUN mg/dL 25   CREATININE mg/dL 1 38*   ANION GAP mmol/L 11   CALCIUM mg/dL 8 9   ALBUMIN g/dL 2 2*   TOTAL BILIRUBIN mg/dL 1 20*   ALK PHOS U/L 200*   ALT U/L 15   AST U/L 41       Results from last 7 days  Lab Units 10/25/18  0524   INR  5 73*               Results from last 7 days  Lab Units 10/21/18  1549 10/21/18  1502   LACTIC ACID mmol/L  --  2 4*   PROCALCITONIN ng/ml 0 09  --        * I Have Reviewed All Lab Data Listed Above  * Additional Pertinent Lab Tests Reviewed: [facility] [address] Admission Reviewed    Imaging:    Imaging Reports Reviewed Today Include:   Imaging Personally Reviewed by Myself Includes:      Recent Cultures (last 7 days):       Results from last 7 days  Lab Units 10/21/18  1659 10/21/18  1621   BLOOD CULTURE  No Growth at 72 hrs    No Growth at 72 hrs   --    URINE CULTURE   --  >100,000 cfu/ml Escherichia coli*       Last 24 Hours Medication List:     Current Facility-Administered Medications:  acetaminophen 975 mg Oral Q8H Dariel Kaur, PA-C   albuterol 2 5 mg Nebulization Q6H PRN Dariel Waylon Bishop, PA-C   atorvastatin 20 mg Oral Daily With Viveros's, PA-C   diclofenac sodium 2 g Topical 4x Daily Dariel P Vincent, PA-C   dicyclomine 10 mg Oral Daily PRN Dariel Waylon Bishop, PA-C   fluticasone 1 spray Each Nare Daily Dariel Waylon Bishop, PA-C   furosemide 20 mg Oral Every Other Day Dariel Waylon Bishop, PA-C   levothyroxine 88 mcg Oral Early Morning Dariel Waylon Bishop, PA-C   lidocaine 1 patch Topical Daily Dariel Waylon Bishop, PA-C   metoprolol tartrate 25 mg Oral Q12H [facility] Dariel Isidro PA-C ondansetron 4 mg Oral Q8H [address] Jacqueline Lemus PA-C   oxyCODONE 2 5 mg Oral Q4H PRN Jacqueline Lemus PA-C   pantoprazole 40 mg Oral BID AC Dariel Kaur PA-C   phytonadione 5 mg Oral Once Jacqueline Lemus PA-C   polyethylene glycol 17 g Oral Daily PRN Jacqueline Lemus PA-C   potassium chloride 20 mEq Oral Daily Dariel Kaur PA-C   senna 1 tablet Oral HS PRN Jacqueline Lemus PA-C   sucralfate 1,000 mg Oral Q6H [address] Dariel Sawant PA-C        Today, Patient Was Seen By: Shaylee Arredondo PA-C    ** Please Note: Dictation voice to text software may have been used in the creation of this document   **

## 2018-10-25 NOTE — PHYSICAL THERAPY NOTE
PHYSICAL THERAPY TREATMENT NOTE    Patient Name: Justice Dudley  XFIDP'R Date: 10/25/2018     10/25/18 1535   Pain Assessment   Pain Assessment 0-10   Pain Score 6   Pain Location Back   Hospital Pain Intervention(s) Ambulation/increased activity;Repositioned   Response to Interventions tolerated   Restrictions/Precautions   Weight Bearing Precautions Per Order No   Braces or Orthoses TLSO  (medi brace)   Other Precautions Chair Alarm; Bed Alarm;Cognitive; Fall Risk;Pain   General   Chart Reviewed Yes   Response to Previous Treatment Patient with no complaints from previous session  Family/Caregiver Present No   Cognition   Overall Cognitive Status Impaired   Arousal/Participation Cooperative   Attention Attends with cues to redirect   Subjective   Subjective "I don't think I'll wear this brace at home "   Bed Mobility   Supine to Sit 3  Moderate assistance   Additional items Assist x 1;HOB elevated; Bedrails; Increased time required;Verbal cues;LE management   Sit to Supine Unable to assess  (left OOB in chair post session with alarm intact)   Transfers   Sit to Stand 4  Minimal assistance   Additional items Assist x 1; Increased time required;Verbal cues   Stand to Sit 4  Minimal assistance   Additional items Assist x 1; Increased time required;Verbal cues   Stand pivot 4  Minimal assistance   Additional items Assist x 1; Increased time required;Verbal cues   Toilet transfer 3  Moderate assistance   Additional items Assist x 1; Increased time required;Verbal cues;Standard toilet  (grab bar)   Additional Comments forward flexed posture, kyphosis  (TLSO medi brace donned prior to standing)   Ambulation/Elevation   Gait pattern Narrow LINN; Forward Flexion;Decreased foot clearance; Short stride; Excessively slow   Gait Assistance 4  Minimal assist  (CGA)   Additional items Assist x 1;Verbal cues   Assistive Device Rolling walker   Distance 15` x1, 80`x1, and 8` x1   Balance   Static Sitting Fair +   Dynamic Sitting Fair Static Standing Fair   Dynamic Standing Fair -   Ambulatory Fair -   Endurance Deficit   Endurance Deficit Yes   Endurance Deficit Description limited ambulation distance, pain, fatigue, LE weakness   Activity Tolerance   Activity Tolerance Patient limited by fatigue;Patient limited by pain   Nurse Made Aware Per RN, pt appropriate to treat   Exercises   Knee AROM Long Arc Quad Sitting;10 reps;AROM; Bilateral   Ankle Pumps Sitting;10 reps;AROM; Bilateral  (x2)   Balance training  Pt tolerates standing statically/dynamically for approximately 5` for hygiene care  Assessment   Prognosis Fair   Problem List Decreased strength;Decreased range of motion;Decreased endurance;Pain;Orthopedic restrictions; Impaired hearing;Decreased cognition;Decreased mobility; Impaired balance   Assessment Pt tolerated treatment well and is progressing with overall functional mobility  Able to ambulate increased distance with decreased level of assist   Requires assist for donning back brace in sitting and pt reports she does not think she will use it at home  Requires seated rest breaks between ambulation trials and reports LE fatigue  Pt requests to use bathroom during treatment and requires increased assist from low surface toilet even with use of grab bar  Requires min A for static/dynamic balance with unilateral UE support during hygiene  Will continue to benefit from ongoing skilled PT to maximize her functional mobility and increase her level of independence  Recommending rehab vs  Home PT pending progress and level of assist available from daughter  Goals   Patient Goals to have less pain   STG Expiration Date 11/02/18   Treatment Day 2   Plan   Treatment/Interventions Functional transfer training;LE strengthening/ROM; Therapeutic exercise; Endurance training;Patient/family training;Equipment eval/education; Bed mobility;Gait training   Progress Progressing toward goals   PT Frequency (3-5x/week)   Recommendation Recommendation Post acute IP rehab  (vs  home PT and family support)   Equipment Recommended Walker  (short)   Radha Guzman, PT,DPT

## 2018-10-25 NOTE — MEDICAL STUDENT
MEDICAL STUDENT  Inpatient Progress Note for TRAINING ONLY  Not Part of Legal Medical Record       Progress Note - Corrinne Push 80 y o  female MRN: [id number]    Unit/Bed#: -01 Encounter: [id number]      Assessment:  1  Abdominal pain  Patient has no improvement in her abdominal pain  GI saw her yesterday and did not recommend any further testing  GI believes that the pain may be related to patient's ongoing musculoskeletal problems (recent rib fracture and current T9 vertebral compression fracture)  GI recommends to continue treating empirically for peptic ulcer disease with protonix and carafate  Will follow these recommendations and also continue with zofran for nausea  Also encouraged patient to maintain adequate PO intake as patient states she has no appetite and eats very little  2  T9 Vertebral Wedge Compression Fracture   Patient was given a brace by physical therapy but does not wish to wear it because it "may become a bother " She states that she did not find the brace helpful for her pain  I encouraged Ashwin Sanchez to give the brace another try today as it may be uncomfortable for now but may help relieve her pain with some time  Continue with non-narcotic pain regimen of Voltaren gel, Aqua K, lidoderm patches and tylenol  Continue to follow with PT for therapy  3  Supratherapeutic INR  Patient's INR is 5 7  It was 2 31 on admission  Patient is currently receiving 2 5 mg of warfarin daily  Will plan hold warfarin for 2 doses  Will recheck INR tomorrow and monitor for signs of bleeding  4  Acute Cystitis  Patient presented with a UTI on admission  She is currently being treated with IV Keflex  Patient has a penicillin allergy and should not be prescribed oral Keflex per infectious disease pharmacist oral Ceftin will be a good choice to transition her to pending discharge in order to finish her 5 day course     5  Chronic Kidney Disease  Creatinine is acceptable today at 1 38  Continue to monitor  Will order repeat BMP for tomorrow  6  Chronic Atrial Fibrillation  Continue rate control with metoprolol    7  Chronic Diastolic Heart Failure  Continue lasix    8  Hypertension  Continue metoprolol     9  Hypokalemia  Patient's potassium is 3 5  Continue oral 20 meq supplement daily  Will continue to monitor  Subjective:   MANDI is a 79 yo female with a significant PMH of hiatal hernia, chronic diastolic heart failure, chronic atrial fibrillation, hypertension, a recent rib fracture and vertebral T9 compression fracture which may have resulted from a recent fall who presented to ER three days ago for worsening RUQ abdominal pain assoicated with her past hiatal hernia, thus she has been treated with protonix, carafate, zofran, and a constipation regimen  She reports no significant change in her pain since her admission  She states it feels like "someone punched her in the back" and the pain spreads "throughout " She reports that the pain remains a 9/10 in severity  She still finds relief with leaning forward  She was provided a back brace to wear yesterday from PT but states she finds it "a bother" and does not wish to continue to utilize it  She currently is not wearing it  She also reports that she continues to feel nauseous and has no appetite  She is being treated for a UTI she presented with at admission but reports no dysuria, frequency, urgency, or hematuria  Review of Systems:   Constitutional: -fever -chills -fatigue  Skin: -skin changes, -sores, -rashes  HEENT: -sore throat, -rhinorrhea  Pulmonary: -shortness of breath -cough  Cardiac: -palpitations, -chest pain  GI: +abdomainl pain +nausea, -vomiting, -constipation, -diarrhea   MS: +back pain   : -dysuria, -hematuria, -urgency, -frequency   Objective:     Vitals: Blood pressure 131/68, pulse 64, temperature 98 3 °F (36 8 °C), temperature source Oral, resp  rate 18, weight 54 4 kg (119 lb 14 9 oz), SpO2 94 %, not currently breastfeeding  ,Body mass index is 23 42 kg/m²  No intake or output data in the 24 hours ending 10/25/18 0834    Physical Exam:     General: Frail elderly appearing female  Appears stated age  Pleasant  Awake and alert  Skin: warm and dry  HEENT: Atraumatic and normocephalic head  Normal right and left external ear canal  Pupils are equal round and reactive to light  Pulmonary: Clear to auscultation bilaterally  No wheezes, no rales, no rhonchi  Cardiac: Irregular rhythm  Regular rate  GI: TTP reported in all 4 quadrants  Abdomen is soft and non-distended  Normoactive bowel sounds  MS: No swelling in bilateral extremities  Neuro: Alert and oriented to person, place, and situation  Not oriented to time  Mood congruent with affect  GCS: 15  : Deferred     Invasive Devices     Peripheral Intravenous Line            Peripheral IV 10/22/18 Right Forearm 2 days                Lab, Imaging and other studies:  I reviewed results of today's labwork   Notable labs:   INR: 5 7  Creatinine: 1 38    I have reviewed imaging from this admission      VTE Pharmacologic Prophylaxis: holding warfarin due to supratherapeutic INR  VTE Mechanical Prophylaxis: yes

## 2018-10-25 NOTE — ASSESSMENT & PLAN NOTE
· Rate controlled since admission, INR very supratherapeutic at 5 7, likely due to poor oral intake   Will given vitamin K x 1 (5mg dose)  · Continue Metoprolol   · Hold Coumadin and recheck INR in am

## 2018-10-25 NOTE — PLAN OF CARE
Problem: PHYSICAL THERAPY ADULT  Goal: Performs mobility at highest level of function for planned discharge setting  See evaluation for individualized goals  Treatment/Interventions: Functional transfer training, LE strengthening/ROM, Elevations, Cognitive reorientation, Bed mobility, Gait training, Equipment eval/education, Patient/family training, Endurance training, Therapeutic exercise, Spoke to nursing, Spoke to advanced practitioner  Equipment Recommended: Viri Small       See flowsheet documentation for full assessment, interventions and recommendations  Outcome: Progressing  Prognosis: Fair  Problem List: Decreased strength, Decreased range of motion, Decreased endurance, Pain, Orthopedic restrictions, Impaired hearing, Decreased cognition, Decreased mobility, Impaired balance  Assessment: Pt tolerated treatment well and is progressing with overall functional mobility  Able to ambulate increased distance with decreased level of assist   Requires assist for donning back brace in sitting and pt reports she does not think she will use it at home  Requires seated rest breaks between ambulation trials and reports LE fatigue  Pt requests to use bathroom during treatment and requires increased assist from low surface toilet even with use of grab bar  Requires min A for static/dynamic balance with unilateral UE support during hygiene  Will continue to benefit from ongoing skilled PT to maximize her functional mobility and increase her level of independence  Recommending rehab vs  Home PT pending progress and level of assist available from daughter  Barriers to Discharge: Decreased caregiver support, Inaccessible home environment (TEMI, home alone for periods during the day)     Recommendation: Post acute IP rehab (vs  home PT and family support)          See flowsheet documentation for full assessment

## 2018-10-25 NOTE — CONSULTS
Consultation - [address] 80 y o  female MRN: [id number]  Unit/Bed#: -01 Encounter: [id number]      Assessment/Plan     Assessment:  Fall with right rib fracture  Incident T9 compression fracture  Hx of distal femur fracture status post intramedullary nixon fixation  Impaired ambulation - uses a walker at baseline  Hiatal hernia  Weight loss  Nausea  Decreased appetite  Moderate to severe thoracic kyphoscoliosis  Chronic diastolic heart failure  CKD III  Chronic atrial fibrillation  Chronic use of anticoagulation  HTN  Frailty    Plan:  Symptom management:  Pain - patient is status post a fall a few weeks ago resulting in a known right rib fracture  During this admission an incidental acute T9 compression fracture was also found  Her ambulatory ability was already impaired at baseline with her femur fracture and mod-severe thoracic kyphoscoliosis  Her current analgesic regime includes:   - tylenol 975mg PO TID   - Lidocaine patch   - voltaren gel   - PRN use of oxycodone 2 5mg  Given the nature of her acute injuries, incident pain is expected  Prior to PT sessions or personal care she should be premedicated with oxycodone  Given her overall level of frailty, expectations for her level of function may need to be addressed  For some additional pain management and some appetite stimulating properties a trial of dexamethasone 2mg daily over a defined time period is reasonable  She can not take NSAIDS due to her impaired renal function and use of anticoagulation  Nausea/weight loss/decreased appetite - I imagine that these changes are also likely due to pain, however she also has a significant hiatal hernia  I favor reducing her PPI use to daily and stopping her carafate as it has not contributed to symptom control  I would not recommend use of bentyl  Monitor her bowels for regularity    As above I will be starting low dose dexamethasone daily to see if this helps with pain and appetite stimulation  The other medication to try for both nausea and weight gain is low dose zyprexa at bedtime  I will try this and make zofran as needed  Goals of Care:  Level 1 code status  While there is not one defining end stage illness present in this patient, she is overall very frail  Her acute injuries of her rib and compression fractures have resulted in a functional decline and the beginning stages of adult failure to thrive  Efforts towards symptom management will hopefully help her rehab and recovery efforts but these new injuries may result in permanent changes to her functional status  Family is not accepting of SNF for rehab and will only consider rehab efforts at home or [facility]  I attempted to reach her daughter to discuss the prognostic impact of frailty on her overall recovery and the interventions I outlined above  I was only able to leave a message and left my office phone number  History of Present Illness   Physician Requesting Consult: Katie Higgins MD  Reason for Consult / Principal Problem: pain  HPI: Desire Del Toro is a 80y o  year old female who presents with abdominal pain nausea for a few weeks  She has a known hiatal hernia  CT, US and blood work have been unrevealing of the source of abdominal pain  Patient states she gets relief if she puts pressure over the area  Her nausea and pain have caused a lack of appetite along with weight loss  She has a had a known right rib fracture due to a fall about 3 weeks ago  During her imaging related to her ab pain an incidental T9 compression fracture was also found  She has pain with movement  Prior to her hospital stay she was living with her daughter Christiana Bill and ambulating with a walker  She states that she did her own dishes, dressed and washed herself  She needed very little help      Inpatient consult to Palliative Care  Consult performed by: Eduardo Stoner ordered by: Jaelyn Disla        Review of Systems   Constitutional: Positive for activity change, appetite change, fatigue and unexpected weight change  Gastrointestinal: Positive for abdominal pain and nausea  Musculoskeletal: Positive for arthralgias, back pain and gait problem  Neurological: Positive for weakness  All other systems reviewed and are negative  Historical Information   Past Medical History:   Diagnosis Date    Anemia of chronic disease 11/27/2017    Arthritis     Cardiac disease     CHF (congestive heart failure) (HCC)     Hiatal hernia     Hypercholesteremia     Hypothyroidism      Past Surgical History:   Procedure Laterality Date    APPENDECTOMY      CATARACT EXTRACTION Bilateral     CATARACT EXTRACTION      HIP FRACTURE SURGERY Right 12/2015    HYSTERECTOMY      OOPHORECTOMY      REPAIR RECTOCELE      REPLACEMENT TOTAL KNEE Right      Social History     Social History    Marital status:       Spouse name: N/A    Number of children: N/A    Years of education: N/A     Occupational History    [employer]      Social History Main Topics    Smoking status: Never Smoker    Smokeless tobacco: Never Used    Alcohol use No    Drug use: No    Sexual activity: No     Other Topics Concern    None     Social History Narrative    DAILY COFFEE CONSUMPTION 2 CUPS  A DAY    TEA CONSUMPTION 1 CUP A DAY     Family History   Problem Relation Age of Onset    Kidney cancer Mother     Alcohol abuse Father     Cirrhosis Father         LIVER    Drug abuse Father     Alcohol abuse Brother     Diabetes Brother     Drug abuse Brother     Stroke Brother     Coronary artery disease Family      Meds/Allergies   all current active meds have been reviewed, current meds:   Current Facility-Administered Medications   Medication Dose Route Frequency    acetaminophen (TYLENOL) tablet 975 mg  975 mg Oral Q8H    albuterol inhalation solution 2 5 mg  2 5 mg Nebulization Q6H PRN    atorvastatin (LIPITOR) tablet 20 mg  20 mg Oral Daily With Dinner    diclofenac sodium (VOLTAREN) 1 % topical gel 2 g  2 g Topical 4x Daily    dicyclomine (BENTYL) capsule 10 mg  10 mg Oral Daily PRN    fluticasone (FLONASE) 50 mcg/act nasal spray 1 spray  1 spray Each Nare Daily    furosemide (LASIX) tablet 20 mg  20 mg Oral Every Other Day    levothyroxine tablet 88 mcg  88 mcg Oral Early Morning    lidocaine (LIDODERM) 5 % patch 1 patch  1 patch Topical Daily    metoprolol tartrate (LOPRESSOR) tablet 25 mg  25 mg Oral Q12H [address]    ondansetron (ZOFRAN-ODT) dispersible tablet 4 mg  4 mg Oral Q8H [address]    oxyCODONE (ROXICODONE) IR tablet 2 5 mg  2 5 mg Oral Q4H PRN    pantoprazole (PROTONIX) EC tablet 40 mg  40 mg Oral BID AC    polyethylene glycol (MIRALAX) packet 17 g  17 g Oral Daily PRN    potassium chloride (K-DUR,KLOR-CON) CR tablet 20 mEq  20 mEq Oral Daily    senna (SENOKOT) tablet 8 6 mg  1 tablet Oral HS PRN    sucralfate (CARAFATE) oral suspension 1,000 mg  1,000 mg Oral Q6H [address]    and PTA meds:   Prior to Admission Medications   Prescriptions Last Dose Informant Patient Reported? Taking? Cholecalciferol (VITAMIN D) 2000 UNITS CAPS  Self Yes No   Sig: Take 2,000 Units by mouth daily     Cyanocobalamin 1000 MCG/ML KIT  Self No No   Sig: Inject 1 mL (1,000 mcg total) as directed every 30 (thirty) days   Docusate Sodium 100 MG capsule  Self Yes No   Sig: Take 1 tablet by mouth daily   acetaminophen (TYLENOL) 325 mg tablet  Self No No   Sig: Take 3 tablets by mouth every 8 (eight) hours   albuterol (2 5 mg/3 mL) 0 083 % nebulizer solution  Self Yes No   Sig: INHALE THE CONTENTS OF 1 VIAL BY NEBULIZATION FOUR TIMES DAILY   atorvastatin (LIPITOR) 20 mg tablet  Self No No   Sig: Take 1 tablet (20 mg total) by mouth daily   bisacodyl (DULCOLAX) 10 mg suppository  Self Yes No   Sig: Insert into the rectum   cyanocobalamin 1,000 mcg/mL  Self No No   Sig: Inject 1 mL (1,000 mcg total) into the shoulder, thigh, or buttocks every 30 (thirty) days   dicyclomine (BENTYL) 10 mg capsule  Self No No   Sig: Take 1 capsule (10 mg total) by mouth daily as needed (abdominal cramping/pain) As needed   fluticasone (FLONASE) 50 mcg/act nasal spray  Self Yes No   Sig: into each nostril   furosemide (LASIX) 20 mg tablet  Self Yes No   Sig: Take 40 mg by mouth every other day     levothyroxine 88 mcg tablet  Self No No   Sig: Take 1 tablet (88 mcg total) by mouth daily   metoprolol tartrate (LOPRESSOR) 25 mg tablet  Self Yes No   Sig: Take 25 mg by mouth every 12 (twelve) hours   omeprazole (PriLOSEC) 40 MG capsule  Self No No   Sig: Take 1 capsule (40 mg total) by mouth daily   oxyCODONE (ROXICODONE) 5 mg immediate release tablet   No No   Sig: Take 1 tablet (5 mg total) by mouth every 12 (twelve) hours as needed (pain) Max Daily Amount: 10 mg   potassium chloride (K-DUR) 10 mEq tablet   Yes No   Sig: Take 10 mEq by mouth   potassium chloride (K-DUR,KLOR-CON) 20 mEq tablet  Self Yes No   Sig: Take 10 mEq by mouth every other day     warfarin (COUMADIN) 1 mg tablet  Self Yes No   Sig: Take 2 5 mg by mouth daily Dosing is based on INR        Facility-Administered Medications: None     Allergies   Allergen Reactions    Ace Inhibitors Angioedema     [facility] - [id number]: angioedema to ARB  Other reaction(s): Angioedema  Pt and family state not an allergy    Angiotensin Receptor Blockers Angioedema    Erythromycin Ethylsuccinate GI Intolerance    Losartan      Other reaction(s): Angioedema   Pt and family state this is not an allergy      Aspirin Dermatitis and Edema    Codeine Edema    Erythromycin Base Other (See Comments)    Ibuprofen Other (See Comments)     Patient denies allergy    Penicillins Hives and Edema     However, patient has tolerated dissimilar side chain Cephalosporins (Cefazolin, Cefuroxime)     Objective   Vitals:    10/25/18 0653   BP: 131/68   Pulse: 64   Resp: 18   Temp: 98 3 °F (36 8 °C)   SpO2: 94%     Physical Exam  Constitutional: frail elderly frail, reclining in bed  No acute distress noted at rest    Head: Normocephalic and atraumatic  Eyes: EOM are normal  Right eye exhibits no discharge  Left eye exhibits no discharge  No scleral icterus  Pulmonary/Chest: Effort normal  No stridor  No respiratory distress  Abdominal: No distension  Musculoskeletal: No edema  Even with attempts to reposition herself in bed she displays facial grimacing and breath holding indicative of pain  Neurological: Alert, oriented and appropriately conversant  Skin: Skin is dry, not diaphoretic  Psychiatric: Displays a normal mood and affect  Behavior, judgement and thought content appear normal      Lab Results:   I have personally reviewed pertinent labs  , CBC:   Lab Results   Component Value Date    WBC 4 84 10/25/2018    HGB 14 6 10/25/2018    HCT 44 3 10/25/2018     (H) 10/25/2018     (L) 10/25/2018    MCH 33 2 10/25/2018    MCHC 33 0 10/25/2018    RDW 15 0 10/25/2018    MPV 10 2 10/25/2018    NRBC 0 10/25/2018   , CMP:   Lab Results   Component Value Date     10/25/2018    K 3 5 10/25/2018     10/25/2018    CO2 23 10/25/2018    BUN 25 10/25/2018    CREATININE 1 38 (H) 10/25/2018    CALCIUM 8 9 10/25/2018    AST 41 10/25/2018    ALT 15 10/25/2018    ALKPHOS 200 (H) 10/25/2018    EGFR 33 10/25/2018     Imaging Studies: I have personally reviewed pertinent reports  Code Status: Level 1 - Full Code  Advance Directive and Living Will:      Power of :    POLST:      Counseling / Coordination of Care  Total floor / unit time spent today 60 minutes  Greater than 50% of total time was spent with the patient and / or family counseling and / or coordination of care  A description of the counseling / coordination of care: discussed strategies to improve symptom management with patient and son-in-law Susan Romo ) at bedside  Left message for daughter Toby Nevarez    Efforts are currently rehab and recovery focused

## 2018-10-25 NOTE — UTILIZATION REVIEW
Continued Stay Review    Date: 10/25/2018    Vital Signs: /68 (BP Location: Left arm)   Pulse 64   Temp 98 3 °F (36 8 °C) (Oral)   Resp 18   Wt 54 4 kg (119 lb 14 9 oz)   LMP  (LMP Unknown)   SpO2 94%   BMI 23 42 kg/m²     Medications:   Scheduled Meds:   Current Facility-Administered Medications:  acetaminophen 975 mg Oral Q8H   albuterol 2 5 mg Nebulization Q6H PRN   atorvastatin 20 mg Oral Daily With Dinner   diclofenac sodium 2 g Topical 4x Daily   dicyclomine 10 mg Oral Daily PRN   fluticasone 1 spray Each Nare Daily   furosemide 20 mg Oral Every Other Day   levothyroxine 88 mcg Oral Early Morning   lidocaine 1 patch Topical Daily   metoprolol tartrate 25 mg Oral Q12H KENISHA   ondansetron 4 mg Oral Q8H KENISHA   oxyCODONE 2 5 mg Oral Q4H PRN   pantoprazole 40 mg Oral BID AC   polyethylene glycol 17 g Oral Daily PRN   potassium chloride 20 mEq Oral Daily   senna 1 tablet Oral HS PRN   sucralfate 1,000 mg Oral Q6H [facility]     Continuous Infusions:    PRN Meds: not used    Abnormal Labs/Diagnostic Results:   Wbc 4 84  Bun 25  Creatinine 1 38  Alkaline phosphatase 200  Total protein 5 7  Albumin 2 2  Total bilirubin 1 20  INR 5 73    Age/Sex: 80 y o  female     Assessment/Plan:   Abdominal pain   Assessment & Plan     ? POA, has been on going for the last few weeks  Similar in character to prior abdominal pain that was attributed to hiatal hernia  · Yesterday, patient's daughter at bedside was frustrated that her mother has had "no improvement" since the time she got to the hospital and wants to know if more tests should be ordered to "get to the bottom of this "  Unfortunately during that interaction I was not able to speak much with the patient as her daughter repeatedly interjected and provided history for her  · Today I spoke with the patient in detail alone without her daughter present    She clearly expressed to me that she has had improvement since coming to the hospital   She reported to me that her primary symptom of concern is nausea as this is what contributes to her lack of appetite  She has had back pain with movement but was clear with me that it HAS improved since admission  She also tells me that her abdominal pain is improved when she holds a small pillow in front of her abdomen  ? CT scan without any contrast was negative for acute findings and outpatient  RUQ US negative for evidence of GB stones  GI is doubtful that repeating the imaging with oral barium would be of much utility and I would be hesitant to provide IV contrast in the setting of her low GFR-also do not feel this would provide any additional information at this time  However, if there is any concern for retroperitoneal hematoma she would need a repeat CT scan done with dye (I confirmed this with radiology department)  Although I am concerned about her rising INR, she has not been hypotensive nor has she had any drop in her hemoglobin or any evidence of ecchymosis or superficial hematoma and therefore I would not proceed with more tests  Do not feel an MRI of her abdomen would be of any utility or appropriate  Deferred to GI if they feel EGD would be helpful  Per my conversation with them, since the patient is already receiving Carafate and Protonix anyway, there is likely nothing additional they would find on EGD that would be of benefit  Certainly even if there was some undiagnosed malignancy, at her age she would likely not be a candidate for any intervention  She had EGD 3 years ago which showed a gastric polyp which was removed and hiatal hernia  ? Consider possibility that patient's kyphosis from ongoing compression fractures is contributing to her GI symptoms especially with her underlying moderate hiatal hernia  Consulted ortho for T9 fracture --appreciate their input  See below  ?  Supportive care as follows:  § Carafate Q6  § Protonix 40 mg BID  § Zofran ODT before each meal for nausea  § Senna and Miralax for constipation--changed to p r n  Now that she is having looser bowel movements  § Encourage oral intake and document; protein supplements   ? Noted to have GGT elevation on labs, unclear significance  ? Daughter is adamant that she does not feel that her mom is taking in enough and I explained to her that the only additional option would be to put in an artificial surgical feeding tube and force feed her, which would certainly not be appropriate at her age  ? Given the complexity of patient's situation with her symptoms and lack of perceived improvement in addition to her advanced age and multiple medical comorbidities, I would like to involve our palliative care team for assistance in medication management and establishing goals of care          Closed wedge compression fracture of ninth thoracic vertebra [facility])   Assessment & Plan     · Incidental finding on CT scan  Unclear inciting event, however she did have a recent fall and is chronically kyphotic  ? Add low-dose oxycodone for severe pain in her back in order to encourage more movement especially in light of the fact that she is going home with her daughter (daughter refused rehab) and will need to be much more mobile than she has been  ? In addition continue nonnarcotic management with:  § Aqua K  § Voltaren  § Lidoderm  § Tylenol around the clock  ? PT eval and treat --recommended use of brace--patient seems to be refusing  She has given inconsistent information to various staff members as to whether the brace has been helpful or bothersome      Moderate protein-calorie malnutrition ([facility] [address] )   Assessment & Plan     Malnutrition Findings:   Malnutrition type: Acute illness  Degree of Malnutrition: Malnutrition of moderate degree (inadequate oral intake)     BMI Findings: Body mass index is 23 42 kg/m²     Needs to drink protein supplements      Chronic atrial fibrillation (HCC)   Assessment & Plan     · Rate controlled since admission, INR very supratherapeutic at 5 7, likely due to poor oral intake  Will given vitamin K x 1 (5mg dose)  ? Continue Metoprolol   ? Hold Coumadin and recheck INR in am      CKD (chronic kidney disease), stage III (HCC)   Assessment & Plan     · Creatinine stable  ? Monitor BMP       Essential hypertension   Assessment & Plan     · BP acceptable   ? Continue home medication regimen lasix qod and BB      Acute cystitis without hematuria   Assessment & Plan     · POA, abnormal urinalysis  Did not have any sepsis criteria but did have lactic acidosis  Urine culture showed greater than 100,000 colonies of E coli  Based on patient's penicillin allergy, my colleague discussed with ID pharmacist - Patient tolerated IV Ancef (different side chains making PCN crossreaction unlikely)  However, Keflex would be unsafe for an oral option therefore we converted to Ceftin based on sensitivities to complete 2 more days which would be a 5 day course  · Consider possibility that antibiotics were also making her nauseated  Course is now complete  ? Blood cultures negative      Hypokalemia   Assessment & Plan     ? Continue to replete      Chronic diastolic CHF (congestive heart failure) ([facility] [address] )   Assessment & Plan     · Patient appears euvolemic  ? Ok to continue lasix qod  ? Monitor volume status closely and hold if any concern for dehydration         Discharge Plan: PT recommends post acute IP rehab

## 2018-10-25 NOTE — ASSESSMENT & PLAN NOTE
· Patient appears euvolemic  · Ok to continue lasix qod  · Monitor volume status closely and hold if any concern for dehydration

## 2018-10-25 NOTE — ASSESSMENT & PLAN NOTE
· POA, abnormal urinalysis  Did not have any sepsis criteria but did have lactic acidosis  Urine culture showed greater than 100,000 colonies of E coli  Based on patient's penicillin allergy, my colleague discussed with ID pharmacist - Patient tolerated IV Ancef (different side chains making PCN crossreaction unlikely)  However, Keflex would be unsafe for an oral option therefore we converted to Ceftin based on sensitivities to complete 2 more days which would be a 5 day course  · Consider possibility that antibiotics were also making her nauseated    Course is now complete  · Blood cultures negative

## 2018-10-25 NOTE — ASSESSMENT & PLAN NOTE
Malnutrition Findings:   Malnutrition type: Acute illness  Degree of Malnutrition: Malnutrition of moderate degree (inadequate oral intake)    BMI Findings: Body mass index is 23 42 kg/m²     Needs to drink protein supplements

## 2018-10-25 NOTE — PROGRESS NOTES
Patient resting comfortably, no complaints at this time, vital signs stable   RN will continue to monitor

## 2018-10-25 NOTE — ASSESSMENT & PLAN NOTE
· POA, has been on going for the last few weeks  Similar in character to prior abdominal pain that was attributed to hiatal hernia  · Yesterday, patient's daughter at bedside was frustrated that her mother has had "no improvement" since the time she got to the hospital and wants to know if more tests should be ordered to "get to the bottom of this "  Unfortunately during that interaction I was not able to speak much with the patient as her daughter repeatedly interjected and provided history for her  · Today I spoke with the patient in detail alone without her daughter present  She clearly expressed to me that she has had improvement since coming to the hospital   She reported to me that her primary symptom of concern is nausea as this is what contributes to her lack of appetite  She has had back pain with movement but was clear with me that it HAS improved since admission  She also tells me that her abdominal pain is improved when she holds a small pillow in front of her abdomen  · CT scan without any contrast was negative for acute findings and outpatient  RUQ US negative for evidence of GB stones  GI is doubtful that repeating the imaging with oral barium would be of much utility and I would be hesitant to provide IV contrast in the setting of her low GFR-also do not feel this would provide any additional information at this time  However, if there is any concern for retroperitoneal hematoma she would need a repeat CT scan done with dye (I confirmed this with radiology department)  Although I am concerned about her rising INR, she has not been hypotensive nor has she had any drop in her hemoglobin or any evidence of ecchymosis or superficial hematoma and therefore I would not proceed with more tests  Do not feel an MRI of her abdomen would be of any utility or appropriate  Deferred to GI if they feel EGD would be helpful    Per my conversation with them, since the patient is already receiving Carafate and Protonix anyway, there is likely nothing additional they would find on EGD that would be of benefit  Certainly even if there was some undiagnosed malignancy, at her age she would likely not be a candidate for any intervention  She had EGD 3 years ago which showed a gastric polyp which was removed and hiatal hernia  · Consider possibility that patient's kyphosis from ongoing compression fractures is contributing to her GI symptoms especially with her underlying moderate hiatal hernia  Consulted ortho for T9 fracture --appreciate their input  See below  · Supportive care as follows:  · Carafate Q6  · Protonix 40 mg BID  · Zofran ODT before each meal for nausea  · Senna and Miralax  for constipation--changed to p r n  Now that she is having looser bowel movements  · Encourage oral intake and document; protein supplements   · Noted to have GGT elevation on labs, unclear significance  · Daughter is adamant that she does not feel that her mom is taking in enough and I explained to her that the only additional option would be to put in an artificial surgical feeding tube and force feed her, which would certainly not be appropriate at her age  · Given the complexity of patient's situation with her symptoms and lack of perceived improvement in addition to her advanced age and multiple medical comorbidities, I would like to involve our palliative care team for assistance in medication management and establishing goals of care

## 2018-10-25 NOTE — ASSESSMENT & PLAN NOTE
· Incidental finding on CT scan  Unclear inciting event, however she did have a recent fall and is chronically kyphotic  · Add low-dose oxycodone for severe pain in her back in order to encourage more movement especially in light of the fact that she is going home with her daughter (daughter refused rehab) and will need to be much more mobile than she has been  · In addition continue nonnarcotic management with:  · Aqua K  · Voltaren  · Lidoderm  · Tylenol around the clock  · PT eval and treat --recommended use of brace--patient seems to be refusing    She has given inconsistent information to various staff members as to whether the brace has been helpful or bothersome

## 2018-10-26 NOTE — OCCUPATIONAL THERAPY NOTE
[address]     Patient Name: Noah Riley  RLUCX'K Date: 10/26/2018  Problem List  Patient Active Problem List   Diagnosis    Nondisplaced spiral fracture of shaft of right femur[facility]    CKD (chronic kidney disease), stage III [facility]    Hypothyroidism    Essential hypertension    Chronic atrial fibrillation (HCC)    Anemia of chronic disease    Oral thrush    Hypercholesteremia    Iron deficiency anemia due to chronic blood loss    Gastritis    Hiatal hernia    Chronic anticoagulation    Chronic diastolic [facility]congestive [facility] failure) [facility])    Osteopenia    SSS (sick sinus syndrome) ([facility])    Status post fracture of femur    Abdominal cramping    Abdominal pain    Acute cystitis without hematuria    Closed wedge compression fracture of ninth thoracic vertebra ([facility])    Moderate protein-calorie malnutrition ([facility])    Hypokalemia    Thrombocytopenia ([facility])     Past Medical History  Past Medical History:   Diagnosis Date    Anemia of chronic disease 11/27/2017    Arthritis     Cardiac disease     CHF [facility]congestive heart failure) [facility] [address] )     Femur fracture, right [facility]     Hiatal hernia     Hypercholesteremia     Hypothyroidism     Impaired ambulation      Past Surgical History  Past Surgical History:   Procedure Laterality Date    APPENDECTOMY      CATARACT EXTRACTION Bilateral     CATARACT EXTRACTION      HIP FRACTURE SURGERY Right 12/2015    HYSTERECTOMY      OOPHORECTOMY      REPAIR RECTOCELE      REPLACEMENT TOTAL KNEE Right       10/26/18 0935   Note Type   Note type Eval only   Restrictions/Precautions   Weight Bearing Precautions Per Order No   Braces or Orthoses TLSO;Other (Comment)  (Medi Brace)   Other Precautions Chair Alarm; Bed Alarm; Fall Risk;Pain   Pain Assessment   Pain Assessment 0-10   Pain Score 7   Pain Type Acute pain   Pain Location Abdomen;Back   Pain Orientation Bilateral;Mid;Lower   Effect of Pain on Daily Activities limits sitting /standing tolerance during ADL performance   Patient's Stated Pain Goal No pain   Hospital Pain Intervention(s) Repositioned; Ambulation/increased activity; Emotional support   Response to Interventions tolerated, OOB in chair post eval   Home Living   Type of Home Apartment; Other (Comment)  (3 TEMI)   Home Layout One level   Bathroom Shower/Tub Tub/shower unit  (pt reports sponge bathing)   Bathroom Toilet Standard   Bathroom Equipment (no DME)   Bathroom Accessibility Accessible via walker   [address] [facility]   Additional Comments Pt reports living w/ daughter and son in law in iground floor apt w/ few TEMI   Prior Function   Level of [place] Independent with ADLs and functional mobility   Lives With Daughter; Other (Comment)  (son in law)   Receives Help From Family  (daughter works; son in law in / out currently get chemo)   ADL Assistance Independent   IADLs Needs assistance   Falls in the last 6 months 1 to 4   Vocational Retired   Comments Pt reports I w/ ADL and uses walker for functional mobility   Lifestyle   Autonomy Pt reports I w/ ADL and uses walker for functional mobility  Pt reports daughter / son in law assist w/ IADL and drive her to appts   Reciprocal Relationships Pt reports supportive daughter and son in law, added that her daughter works during the day   Son in law is home most of the time   Service to Others Pt 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[address] Pt reports enjoying crossword puzzles   Psychosocial   Psychosocial (WDL) WDL   ADL   Eating Assistance 6  Modified independent   Eating Deficit Setup   Grooming Assistance 6  Modified Independent  (seated in chair at tray table)   Grooming Deficit [address] Unable to assess   [address] Unable to assess   [address] [facility]  Maximal Assistance   UB Dressing Deficit Fasteners;Pull around back  (to don brace and manage gown in back)   LB Dressing Assistance 2  Maximal Assistance   LB Dressing Deficit Don/doff R sock; Don/doff L sock   Additional Comments max encouragement to participate in ADL OOB   Bed Mobility   Supine to Sit 3  Moderate assistance   Additional items Assist x 1; Increased time required;HOB elevated;Verbal cues   Sit to Supine Unable to assess   Additional Comments Pt seated OOB in chair post eval w/ call bell in reach, chair alarm activated, and needs met   Transfers   Sit to Stand 4  Minimal assistance   Additional items Assist x 1; Armrests; Increased time required;Verbal cues   Stand to Sit 4  Minimal assistance   Additional items Assist x 1; Increased time required;Armrests; Verbal cues   Stand pivot 4  Minimal assistance   Additional items Assist x 1; Increased time required;Verbal cues  (using RW)   Balance   Static Sitting Fair +   Dynamic Sitting Fair -   Static Standing Fair   Ambulatory Poor +   Activity Tolerance   Activity Tolerance Patient limited by pain   Nurse Made Aware spoke to Teresa FONSECA   RUE Assessment   RUE Assessment X  (limited AROM shoulder flex /abd > 90*)   RUE Strength   RUE Overall Strength Deficits; Other (Comment)  (shoulder deficits; grasp, elbow WFL to complete ADL)   LUE Assessment   LUE Assessment X  (limited end range shoulder flex/abd; able to complete ADL)   LUE Strength   LUE Overall Strength Deficits  (shoulder deficits)   Hand Function   Gross Motor Coordination Functional   Fine Motor Coordination Functional   Sensation   Light Touch No apparent deficits  (B UE)   Sharp/Dull Not tested   Additional Comments kyphotic posture limits UE ROM at shoulder   Vision-Basic Assessment   Current Vision Wears glasses all the time   Vision - Complex Assessment   Acuity Able to read newspaper without difficulty; Able to read clock/calendar on wall without difficulty   Cognition   Overall Cognitive Status Impaired   Arousal/Participation Alert; Cooperative   Attention Within functional limits   Orientation Level Oriented to person;Oriented to place; Other (Comment)  (able to report month, year)   Memory Decreased recall of precautions;Decreased recall of recent events   Following Commands Follows one step commands with increased time or repetition   Comments Identified pt by full name and birthdate  Pt able to provide social history, home set- up  Pt demonstrated limited recall details, time frame  Limited insight into deficits, and pt reports she does not like brace  Assessment   Limitation Decreased ADL status; Decreased UE strength;Decreased UE ROM; Decreased cognition;Decreased endurance;Decreased self-care trans;Decreased high-level ADLs   Assessment Pt is a 79yo female [facility] 10/21/[facility]  Pt presents w/ abdominal pain and signficant PMH impacting her occupational performance including diastolic HF, R LE joint replacement, hx femur R femur fx and sx, a-fib  Pt found to have closed compression fx T9 and TLSO brace ordered by PT  Pt reports living w/ her daughter and son in law in apt w/ few TEMI PTA  Pt reports I w/ ADL and uses walker for functional mobility  Upon evaluation, pt required max A to don brace while seated at EOB  Pt completed bed mobility w/ mod A supine to sit at EOB  Pt required assist to don / adjust socks  Pt complete grooming w/ mod I after set- up while seated in chair and feeding w/ mod I after set- up supine HOB elevated  Pt alert and cooperative w/ ecnouragement from therapist for OOB and active participation in ADL's  Pt oriented to person, place, and situation (recalls fall leaving MD office)  Pt able to report month, year  Pt presents w/ decreased activity tolerance, decreased standing tolerance, decreased endurance, decreased UE end ROM and strength, decresaed LE strength, increased pain, limited insight into deficits impacting her I w/ dressing, bathing, functional mobility, functional transfers, activity engagement, clothing mgmt, and community mobility   Pt would benefit from OT while in acute care to address deficits  From an OT perspective, pt can return to PLOF w/ family support and Home OT vs post acute rehab when medically stable for discharge from acute care pending progress and assist available at home  Will continue to follow   Goals   Patient Goals Pt stated that she would like to be more independent in caring for herself and have less pain   Plan   Treatment Interventions ADL retraining;Functional transfer training;UE strengthening/ROM; Patient/family training;Equipment evaluation/education; Compensatory technique education;Continued evaluation; Activityengagement   Goal Expiration Date 11/02/18   OT Frequency 3-5x/wk   Recommendation   OT Discharge Recommendation Other (Comment)  (post acute rehab vs home OT)   OT - OK to Discharge (when medically stable)   Barthel Index   Feeding 10   Bathing 0   Grooming Score 5   Dressing Score 5   Bladder Score 10   Bowels Score 10   Toilet Use Score 5   Transfers (Bed/Chair) Score 10   Mobility (Level Surface) Score 0   Stairs Score 0   Barthel Index Score 55   Modified [place] Scale   Modified [place] Scale 4   Pt goals to be met in 7 days:  1  Pt will complete bed mobility supine <> sit w/ S to max I w/ ADL performance  2  Pt will complete UBD w/ mod I after set- up to don shirt and bra seated unsupported at EOB w/ fair + balance  3  Pt will complete UBD to don TLSO brace w/ min A x1 after set- up to max I and safety to return to PLOF w/ daughter  4  Pt will complete functional transfers using AD w/ S to bed, chair, and toilet to max I to return to PLOF w/ daughter, son in law  5  Pt will complete LBD w/ S after set- up while demonstrating understanding compensatory strategies / tech to max I and safety while maintaining precautions  6   Pt will demonstrate good attention and verbalize understanding of safety precautions during ADL performance    Patricia Gaytan, OTR/L

## 2018-10-26 NOTE — PLAN OF CARE
Problem: OCCUPATIONAL THERAPY ADULT  Goal: Performs self-care activities at highest level of function for planned discharge setting  See evaluation for individualized goals  Treatment Interventions: ADL retraining, Functional transfer training, UE strengthening/ROM, Patient/family training, Equipment evaluation/education, Compensatory technique education, Continued evaluation, Activityengagement          See flowsheet documentation for full assessment, interventions and recommendations  Limitation: Decreased ADL status, Decreased UE strength, Decreased UE ROM, Decreased cognition, Decreased endurance, Decreased self-care trans, Decreased high-level ADLs     Assessment: Pt is a 79yo female [facility] on 10/21/2018  Pt presents w/ abdominal pain and signficant PMH impacting her occupational performance including diastolic HF, R LE joint replacement, hx femur R femur fx and sx, a-fib  Pt found to have closed compression fx T9 and TLSO brace ordered by PT  Pt reports living w/ her daughter and son in law in apt w/ few TEMI PTA  Pt reports I w/ ADL and uses walker for functional mobility  Upon evaluation, pt required max A to don brace while seated at EOB  Pt completed bed mobility w/ mod A supine to sit at EOB  Pt required assist to don / adjust socks  Pt complete grooming w/ mod I after set- up while seated in chair and feeding w/ mod I after set- up supine HOB elevated  Pt alert and cooperative w/ ecnouragement from therapist for OOB and active participation in ADL's  Pt oriented to person, place, and situation (recalls fall leaving MD office)  Pt able to report month, year  Pt presents w/ decreased activity tolerance, decreased standing tolerance, decreased endurance, decreased UE end ROM and strength, decresaed LE strength, increased pain, limited insight into deficits impacting her I w/ dressing, bathing, functional mobility, functional transfers, activity engagement, clothing mgmt, and community mobility  Pt would benefit from OT while in acute care to address deficits  From an OT perspective, pt can return to PLOF w/ family support and Home OT vs post acute rehab when medically stable for discharge from acute care pending progress and assist available at home   Will continue to follow     OT Discharge Recommendation: Other (Comment) (post acute rehab vs home OT)  OT - OK to Discharge:  (when medically stable)

## 2018-10-26 NOTE — SOCIAL WORK
CM spoke with patient's daughter  Daughter does not want patient discharged to [facility]  Daughter is agreeable to [facility] at discharge when medically stable

## 2018-10-26 NOTE — PLAN OF CARE
DISCHARGE PLANNING - CARE MANAGEMENT     Discharge to post-acute care or home with appropriate resources Progressing        GASTROINTESTINAL - ADULT     Maintains adequate nutritional intake Progressing        INFECTION - ADULT     Absence or prevention of progression during hospitalization Progressing        MUSCULOSKELETAL - ADULT     Maintain or return mobility to safest level of function Progressing        Nutrition/Hydration-ADULT     Nutrient/Hydration intake appropriate for improving, restoring or maintaining nutritional needs Progressing        PAIN - ADULT     Verbalizes/displays adequate comfort level or baseline comfort level Progressing        Potential for Falls     Patient will remain free of falls Progressing        Prexisting or High Potential for Compromised Skin Integrity     Skin integrity is maintained or improved Progressing

## 2018-10-26 NOTE — PROGRESS NOTES
Progress note - Palliative and Supportive Care   Wisam Jones 80 y o  female [id number]    Assessment:  Patient Active Problem List   Diagnosis    Nondisplaced spiral fracture of shaft of right femur, initial encounter for closed fracture ([facility] )    CKD (chronic kidney disease), stage III ([facility] )    Hypothyroidism    Essential hypertension    Chronic atrial fibrillation (HCC)    Anemia of chronic disease    Oral thrush    Hypercholesteremia    Iron deficiency anemia due to chronic blood loss    Gastritis    Hiatal hernia    Chronic anticoagulation    Chronic diastolic CH[facility]congestive heart failure) [facility])    Osteopenia    SSS (sick sinus syndrome) ([facility])    Status post fracture of femur    Abdominal cramping    Abdominal pain    Acute cystitis without hematuria    Closed wedge compression fracture of ninth thoracic vertebra ([facility])    Moderate protein-calorie malnutrition ([facility])    Hypokalemia    Thrombocytopenia ([facility] )         Plan:  1  Symptom management -   - PAIN: Continue Tylenol and PRN oxycodone   - NAUSEA & DECREASED APPETITE: continue Decadron and Zyprexa    2  Goals - Daughter not currently available to discuss goals of care  D/W CM and SLIM- she wants to take her home with VNA services when medically stable  -     Code Status: Full - Level 1    Interval history:       Patient states nausea is improved and she wants to try to eat lunch  Still complaining of abdominal pain and is holding a warm pack to her abdomen to help  She states that she is in good spirits       MEDICATIONS / ALLERGIES:     all current active meds have been reviewed and current meds:   Current Facility-Administered Medications   Medication Dose Route Frequency    acetaminophen (TYLENOL) tablet 975 mg  975 mg Oral Q8H    albuterol inhalation solution 2 5 mg  2 5 mg Nebulization Q6H PRN    atorvastatin (LIPITOR) tablet 20 mg  20 mg Oral Daily With Dinner    dexamethasone (DECADRON) tablet 2 mg  2 mg Oral Daily    diclofenac sodium (VOLTAREN) 1 % topical gel 2 g  2 g Topical 4x Daily    fluticasone (FLONASE) 50 mcg/act nasal spray 1 spray  1 spray Each Nare Daily    furosemide (LASIX) tablet 20 mg  20 mg Oral Every Other Day    levothyroxine tablet 88 mcg  88 mcg Oral Early Morning    lidocaine (LIDODERM) 5 % patch 1 patch  1 patch Topical Daily    metoprolol tartrate (LOPRESSOR) tablet 25 mg  25 mg Oral Q12H [facility]    OLANZapine (ZyPREXA) tablet 2 5 mg  2 5 mg Oral HS    ondansetron (ZOFRAN-ODT) dispersible tablet 4 mg  4 mg Oral Q4H PRN    oxyCODONE (ROXICODONE) IR tablet 2 5 mg  2 5 mg Oral Q4H PRN    pantoprazole (PROTONIX) EC tablet 40 mg  40 mg Oral Early Morning    polyethylene glycol (MIRALAX) packet 17 g  17 g Oral Daily PRN    potassium chloride (K-DUR,KLOR-CON) CR tablet 20 mEq  20 mEq Oral Daily    senna (SENOKOT) tablet 8 6 mg  1 tablet Oral HS PRN       Allergies   Allergen Reactions    Ace Inhibitors Angioedema     [facility] [id number]: angioedema to ARB  Other reaction(s): Angioedema  Pt and family state not an allergy    Angiotensin Receptor Blockers Angioedema    Erythromycin Ethylsuccinate GI Intolerance    Losartan      Other reaction(s): Angioedema  Pt and family state this is not an allergy      Aspirin Dermatitis and Edema    Codeine Edema    Erythromycin Base Other (See Comments)    Ibuprofen Other (See Comments)     Patient denies allergy    Penicillins Hives and Edema     However, patient has tolerated dissimilar side chain Cephalosporins (Cefazolin, Cefuroxime)       OBJECTIVE:    Physical Exam  Physical Exam   Constitutional: She is oriented to person, place, and time  No distress  HENT:   Head: Normocephalic and atraumatic  Right Ear: External ear normal    Left Ear: External ear normal    Eyes: Right eye exhibits no discharge  Left eye exhibits no discharge  Cardiovascular: Normal rate and intact distal pulses  Pulmonary/Chest: Effort normal  No respiratory distress  Abdominal: Soft  She exhibits no distension  There is tenderness  There is no guarding  Musculoskeletal: She exhibits no edema  Neurological: She is alert and oriented to person, place, and time  No cranial nerve deficit  Skin: There is pallor  Psychiatric: She has a normal mood and affect  Nursing note and vitals reviewed  Lab Results:   I have personally reviewed pertinent labs  , CBC:   Lab Results   Component Value Date    WBC 4 30 (L) 10/26/2018    HGB 15 7 (H) 10/26/2018    HCT 48 5 (H) 10/26/2018     (H) 10/26/2018     (L) 10/26/2018    MCH 33 0 10/26/2018    MCHC 32 4 10/26/2018    RDW 15 2 (H) 10/26/2018    MPV 10 9 10/26/2018    NRBC 0 10/26/2018   , CMP: No results found for: NA, K, CL, CO2, ANIONGAP, BUN, CREATININE, GLUCOSE, CALCIUM, AST, ALT, ALKPHOS, PROT, ALBUMIN, BILITOT, EGFR  Imaging Studies: reviewed  EKG, Pathology, and Other Studies: reviewed    Counseling / Coordination of Care  Total floor / unit time spent today 25+ minutes  Greater than 50% of total time was spent with the patient and / or family counseling and / or coordination of care  A description of the counseling / coordination of care: symptom assessment, discussion with SLIM, supportive listening

## 2018-10-26 NOTE — PROGRESS NOTES
Progress Note -  Internal Medicine / Hospitalists  Hope Clark 80 y o  female MRN: [id number]  Unit/Bed#: -01 Encounter: [id number]      ASSESSMENT AND PLAN:    Problem List:   Principal Problem:    Abdominal pain  Active Problems:    CKD (chronic kidney disease), stage III ([facility] )    Essential hypertension    Chronic atrial fibrillation (HCC)    Chronic diastolic CHF (congestive heart failure) ([facility] )    Acute cystitis without hematuria    Closed wedge compression fracture of ninth thoracic vertebra (HCC)    Moderate protein-calorie malnutrition (HCC)    Hypokalemia    Thrombocytopenia (HCC)    Principle problem(s):   Abdominal pain:  - Patient still complains of persistent lower abdominal pain that she points to with her finger  Poor p o  intake of solids; fair p o  intake of liquids  - CT of abdmen within normal limits; RUQ u/s without gallstones  - No probable benefit to CT of abdomen with barium or MRI of abdomen  - INR elevated, abdominal pain secondary to retroperitoneal hematoma unlikely; no hypotension/ecchymoses/or superficial hematoma  - History of hiatal hernia  - GI suspects muscular origin; no indication for EGD at this time  - Palliative medicine recommend low-dose Zyprexa for weight gain; stop Carafate, add Bentyl (however patient has GI intolerance); reduced PPI (reduced from 40 mg to 20 mg); dexamethasone for appetite improvement  - Pt's daughter is insistent that mom is not taking any of p o  intake  Discussed how only option would be tube feed which seem inappropriate for pt's age  Palliative Care has been unable to contact daughter   - Encourage p o  intake and protein supplements   - Extensive discussion with patient's daughter via the telephone  She is incredibly uncomfortable with patient going home today  She would like to trial an additional day with recent medication modifications  I did discuss with her that the pain is likely deferred muscular pain    Hopeful discharge home tomorrow  Closed but compression fracture of the 9th thoracic vertebrae:  - Analgesia regimen includes Aqua K, Voltaren gel, Lidoderm patch, scheduled Tylenol, and low-dose p r n  Oxycodone  - Patient compliant with brace    Active Problem(s):    3  Moderate protein calorie malnutrition:  Encourage p o  Intake and protein supplement drinks; BMI 23 42    4  Chronic atrial fibrillation:  Rate controlled on metoprolol  INR subtherapeutic at 5 7 now reduced to 3 3  Continue to hold and repeat INR in a m  Anticipate restarting warfarin tomorrow evening  5  Chronic kidney disease:  Stable creatinine    6  Acute cystitis without hematuria:  Patient has completed a 5 day course of Ceftin    7  Chronic diastolic CHF:  Patient appears euvolemic  Continue metoprolol and Lasix every other day    8  Hypothyroidism:  Continue levothyroxine    Discharge Planning:  Hopeful discharge home tomorrow    Code Status: Level 1 - Full Code  VTE Pharmacologic Prophylaxis: Reason for no pharmacologic prophylaxis Elevated INR  VTE Mechanical Prophylaxis: sequential compression device     ____________________________________________________________    SUBJECTIVE:   Patient seen and examined  Patient with complaint of lower abdominal pain  She points to her abdomen below her umbilicus  Patient states she has back pain that wrapped around her abdomen  No nausea  Drinking Bari Prier  Loose BM yesterday  No difficulty urinating  REVIEW OF SYSTEMS    General:   No Fever or chills; No significant weight loss or gain  EENT:   No ear pain, facial swelling; No sneezing, sore throat  Skin:   No rashes, color changes  Respiratory:     No shortness of breath, cough, wheezing, stridor  Cardiovascular:     No chest pain, palpitations  Gastrointestinal:    No nausea, vomiting, diarrhea; DIFFUSE LOWER ABDOMINAL PAIN   Musculoskeletal:     MID BACK PAIN   Neurologic:   No dizziness, numbness, weakness  No speech difficulties  Psych:   No agitation, suicidal ideations  Otherwise, All other twelve-point review of systems normal      OBJECTIVE:     Vitals:     Temp:  [97 5 °F (36 4 °C)-98 3 °F (36 8 °C)] 97 5 °F (36 4 °C)  HR:  [] 70  Resp:  [18] 18  BP: (116-145)/(78-89) 145/82  SpO2:  [90 %-96 %] 90 %  Temp (24hrs), Av 9 °F (36 6 °C), Min:97 5 °F (36 4 °C), Max:98 3 °F (36 8 °C)  Current: Temperature: 97 5 °F (36 4 °C)    Intake/Output Summary (Last 24 hours) at 10/26/18 1439  Last data filed at 10/26/18 0900   Gross per 24 hour   Intake                0 ml   Output              350 ml   Net             -350 ml       Physical Exam   Constitutional:   Thin; no acute distress   HENT:   Head: Normocephalic  Eyes: Pupils are equal, round, and reactive to light  Conjunctivae are normal    Cardiovascular: Normal rate  An irregularly irregular rhythm present  Pulmonary/Chest: Effort normal and breath sounds normal    Abdominal: Soft  Bowel sounds are normal  She exhibits no distension and no mass  There is tenderness (mild diffuse TTP )  There is no rebound and no guarding  Musculoskeletal: Normal range of motion  Wearing thoracic spine brace   Neurological: She is alert  Skin: Skin is warm and dry  No rash noted  No erythema  No pallor  Psychiatric: She has a normal mood and affect   Her behavior is normal  Thought content normal        Lab, Imaging and other studies:    Results from last 7 days  Lab Units 10/26/18  0509   WBC Thousand/uL 4 30*   HEMOGLOBIN g/dL 15 7*   HEMATOCRIT % 48 5*   PLATELETS Thousands/uL 128*   INR  3 30*       Results from last 7 days  Lab Units 10/25/18  0524   SODIUM mmol/L 140   POTASSIUM mmol/L 3 5   CHLORIDE mmol/L 106   CO2 mmol/L 23   BUN mg/dL 25   CREATININE mg/dL 1 38*   CALCIUM mg/dL 8 9   ALK PHOS U/L 200*   ALT U/L 15   AST U/L 41       Results from last 7 days  Lab Units 10/21/18  1549 10/21/18  1439   TROPONIN I ng/mL 0 04 0 05*     Lab Results   Component Value Date BLOODCX No Growth After 4 Days  10/21/2018    BLOODCX No Growth After 4 Days  10/21/2018    URINECX >100,000 cfu/ml Escherichia coli (A) 10/21/2018       Scheduled Meds:    Current Facility-Administered Medications:  acetaminophen 975 mg Oral Q8H Dariel Kaur PA-C   albuterol 2 5 mg Nebulization Q6H PRN Dariel Otoole, LINDSAY   atorvastatin 20 mg Oral Daily With Viveros's, LINDSAY   dexamethasone 2 mg Oral Daily Magdi Talbot MD   diclofenac sodium 2 g Topical 4x Daily Dariel Kaur PA-C   fluticasone 1 spray Each Nare Daily Dariel Otoole PA-C   furosemide 20 mg Oral Every Other Day Dariel Otoole PA-C   levothyroxine 88 mcg Oral Early Morning Dariel Otoole PA-C   lidocaine 1 patch Topical Daily Dariel Otoole PA-C   metoprolol tartrate 25 mg Oral Q12H [facility] Dariel Kaur PA-C   OLANZapine 2 5 mg Oral HS Magdi Talbot MD   ondansetron 4 mg Oral Q4H PRN Magdi Talbot MD   oxyCODONE 2 5 mg Oral Q4H PRN Jacqueline Lemus PA-C   pantoprazole 40 mg Oral Early Morning Magdi Talbot MD   polyethylene glycol 17 g Oral Daily PRN Jacqueline Lemus PA-C   potassium chloride 20 mEq Oral Daily Dariel Kaur PA-C   senna 1 tablet Oral HS PRN Jacqueline Lemus PA-C     PRN Meds:  albuterol    ondansetron    oxyCODONE    polyethylene glycol    senna    Discussions with Specialists or Other Care Team Provider:  Plan of care discussed with attending physician and palliative medicine  Education and Discussions with Family / Patient:  Message left for daughter James Farooq on:  Patient Centered Rounds: I have performed bedside rounds with nursing staff today  Time Spent for Care: 32 minutes  More than 50% of total time spent on counseling and coordination of care as described above  Current Length of Stay: 5 day(s)  Current Patient Status: Inpatient     Today, Patient Was Seen By: Venkat Roberson PA-C    NOTE: This record was composed with voice recognition software   Occasional wrong word or similar sounding substitutions result do to limitations of voice recognition software  Use context where substitutions of wrong wording have occurred

## 2018-10-27 NOTE — PHYSICAL THERAPY NOTE
PHYSICAL THERAPY NOTE    Patient Name: Harriet Faria  MZLBS'H Date: 10/27/2018        10/27/18 1033   Pain Assessment   Pain Assessment 0-10   Pain Score 9   Pain Type Acute pain   Pain Location Back   Restrictions/Precautions   Weight Bearing Precautions Per Order No   Braces or Orthoses TLSO;Other (Comment)  (medi braxce)   Other Precautions Fall Risk;Pain;Bed Alarm; Chair Alarm;Cognitive   General   Family/Caregiver Present No   Subjective   Subjective Patiient supine in bed and is agreeable to therapy session  Patient identifers obtained from name &   Bed Mobility   Supine to Sit 4  Minimal assistance   Additional items Assist x 1;HOB elevated; Bedrails; Increased time required;Verbal cues;LE management   Sit to Supine Unable to assess   Additional Comments Patient seated OOB in recliner with chair alarm engaged, call bell and belongings in reach   Transfers   Sit to Stand 4  Minimal assistance  (CGA)   Additional items Assist x 1; Armrests; Increased time required;Verbal cues   Stand to Sit 4  Minimal assistance  (CGA)   Additional items Assist x 1; Armrests; Increased time required;Verbal cues   Ambulation/Elevation   Gait pattern Narrow LINN; Forward Flexion;Decreased foot clearance; Short stride; Excessively slow   Gait Assistance 4  Minimal assist  (CGA)   Additional items Assist x 1;Verbal cues   Assistive Device Rolling walker   Distance 60' x2   Balance   Static Sitting Fair +   Dynamic Sitting Fair -   Static Standing Fair   Dynamic Standing Fair   Ambulatory Poor +   Endurance Deficit   Endurance Deficit Yes   Endurance Deficit Description limited ambulation distance   Activity Tolerance   Activity Tolerance Patient limited by fatigue;Patient limited by pain   Nurse Made Aware Spoke to Ayana Thibodeaux RN    Exercises   Hip Abduction Sitting;10 reps;AROM; Bilateral   Hip Adduction Sitting;10 reps;AROM; Bilateral   Knee AROM [employer] Sitting;10 reps;AROM; Bilateral   Ankle Pumps Sitting;15 reps;AROM; Bilateral   Assessment   Prognosis Fair   Problem List Decreased strength;Decreased range of motion;Decreased endurance;Pain;Orthopedic restrictions; Impaired hearing;Decreased cognition;Decreased mobility; Impaired balance  (gait deviations)   Assessment Patient pleasant and motivated to participate in therapy session  Demonstrated ability to transfer supine to sit and multiple sit to stand transfer with consistent less person assist requiring minial instructionf or technique and hand placement  Demonstrated ability to ambulate 61' x2 with roller walker and contact guard assisst however was limited from further distance or additonal trials secondary to reported fatigue  Required occasion instruction for walker management with obstacle negotiation when ambulating around furniture  Patient refused stair trainign trial secondary to fatigue  Continue to focus on OOB mobility taks with roller walker as well as initation of stair as appropriate  Barriers to Discharge Decreased caregiver support; Inaccessible home environment  (TEMI, home alone for periods during the day)   Goals   Patient Goals to go home and do dishes and dust   STG Expiration Date 11/02/18   Treatment Day 3   Plan   Treatment/Interventions Functional transfer training;LE strengthening/ROM; Elevations;Cognitive reorientation; Bed mobility;Gait training;Equipment eval/education;Patient/family training; Endurance training; Therapeutic exercise;Spoke to nursing   Progress Progressing toward goals   PT Frequency Other (Comment)  (3-5x/week)   Recommendation   Recommendation Post acute IP rehab  (vs  home PT and family support)   Equipment Recommended Walker  (short)       Stefany Medley PTA

## 2018-10-27 NOTE — PROGRESS NOTES
Progress Note Stephanie England [birth year], 80 y o  female MRN: [id number]    Unit/Bed#: -01 Encounter: [id number]    Primary Care Provider: Marvel Sandoval DO   Date and time admitted to hospital: 10/21/2018  1:50 PM        * Abdominal pain   Assessment & Plan    · POA, has been on going for the last few weeks  Similar in character to prior abdominal pain that was attributed to hiatal hernia  · During this admission, patient's daughter was at bedside and was frustrated that her mother has had "no improvement" since the time she got to the hospital and wants to know if more tests should be ordered to "get to the bottom of this "    · Today I spoke with the patient in detail alone without her daughter present as in similar interactions this admission  She clearly expressed to me that she has had improvement since coming to the hospital   She reported to me that her primary symptom of concern is nausea as this is what contributes to her lack of appetite and she was able to eat some grilled cheese sandwich today and a spoonful of applesauce  She has had back pain with movement but was clear with me that it HAS improved since admission as well  She also tells me that her abdominal pain is improved when she holds a small pillow in front of her abdomen which she continues to do  She feels well when she is sitting forward as it takes the pressure off of her back and her abdomen  · CT scan without any contrast was negative for acute findings and outpatient  RUQ US negative for evidence of GB stones  GI is doubtful that repeating the imaging with oral barium would be of much utility and I would be hesitant to provide IV contrast in the setting of her low GFR-also do not feel this would provide any additional information at this time  However, if there is any concern for retroperitoneal hematoma she would need a repeat CT scan done with dye (I confirmed this with radiology department)      Do not feel an MRI of her abdomen would be of any utility or appropriate  No EGD at this time  · Consider possibility that patient's kyphosis from ongoing compression fractures is contributing to her GI symptoms especially with her underlying moderate hiatal hernia  Consulted ortho for T9 fracture --appreciate their input  See below  · Supportive care as follows:  · Carafate Q6  · Protonix 40 mg   · Zofran ODT before each meal for nausea  · Senna and Miralax  for constipation--changed to p r n  Now that she is having looser bowel movements  · Encourage oral intake and document; protein supplements   · Noted to have GGT elevation on labs, unclear significance  · Appreciate palliative care involvement        Acute cystitis without hematuria   Assessment & Plan    · POA, abnormal urinalysis  Did not have any sepsis criteria but did have lactic acidosis  Urine culture showed greater than 100,000 colonies of E coli  Based on patient's penicillin allergy, my colleague discussed with ID pharmacist - Patient tolerated IV Ancef (different side chains making PCN crossreaction unlikely)  However, Keflex would be unsafe for an oral option therefore we converted to Ceftin for a 5 day course  · Consider possibility that antibiotics were also making her nauseated  Course is now complete  · Blood cultures negative     Chronic atrial fibrillation (HCC)   Assessment & Plan    · Rate controlled since admission, INR was supratherapeutic at 5 7, likely due to poor oral intake   Received vitamin K x 1 (5mg dose)  · Continue Metoprolol   · Hold Coumadin and recheck INR in am  · May restart within the next 24 hours      Chronic diastolic CHF (congestive heart failure) ([facility] [address] )   Assessment & Plan    · Patient appears euvolemic  · Ok to continue lasix qod  · Monitor volume status closely and hold if any concern for dehydration     CKD (chronic kidney disease), stage III (HCC)   Assessment & Plan    · Creatinine stable  · Monitor BMP      Closed wedge compression fracture of ninth thoracic vertebra [facility])   Assessment & Plan    · Incidental finding on CT scan  Unclear inciting event, however she did have a recent fall and is chronically kyphotic  · Added low-dose oxycodone for severe pain in her back in order to encourage more movement especially in light of the fact that she is going home with her daughter (daughter refused rehab) and will need to be much more mobile than she has been  · In addition continue nonnarcotic management with:  · Aqua K  · Voltaren  · Lidoderm  · Tylenol around the clock  · PT eval and treat --recommended use of brace--patient seems to be refusing  She has given inconsistent information to various staff members as to whether the brace has been helpful or bothersome  · Patient would consider going to rehab but states her daughter doesn't want her to      Moderate protein-calorie malnutrition ([facility] [address] )   Assessment & Plan    Malnutrition Findings:   Malnutrition type: Acute illness  Degree of Malnutrition: Malnutrition of moderate degree (inadequate oral intake)    BMI Findings: Body mass index is 23 42 kg/m²  Needs to drink protein supplements, offered pudding instead and patient does not wish to try          VTE Pharmacologic Prophylaxis:   Pharmacologic: Warfarin (Coumadin) will restart within the next 24 hours   Mechanical VTE Prophylaxis in Place: Yes    Patient Centered Rounds: I have performed bedside rounds with nursing staff today  Discussions with Specialists or Other Care Team Provider: Primary RN    Education and Discussions with Family / Patient: Patient, called patient's daughter at listed #, no answer     Time Spent for Care: 30 minutes  More than 50% of total time spent on counseling and coordination of care as described above      Current Length of Stay: 6 day(s)    Current Patient Status: Inpatient   Certification Statement: The patient will continue to require additional inpatient hospital stay due to abdominal pain and poor po intake     Discharge Plan / Estimated Discharge Date: not quite medically stable       Code Status: Level 1 - Full Code      Subjective:   Feels "OK today " Complains of abdominal pressure low when she does not have her pillow in place  Holds her pressure pillow all day but it feels much better that way  No burning with urination, no frequency with urination  No diarrhea today  Was able to eat a few bites of grilled cheese and applesauce  Would really like to go to rehab as she enjoyed it last time  It's better than being home alone she states  She acknowledges that her daughter does not want her to go to rehab however  She does have back pain at times but it feels much better when she leans forward  Objective:     Vitals:   Temp (24hrs), Av 9 °F (36 6 °C), Min:97 8 °F (36 6 °C), Max:98 °F (36 7 °C)    Temp:  [97 8 °F (36 6 °C)-98 °F (36 7 °C)] 97 8 °F (36 6 °C)  HR:  [] 102  Resp:  [18-20] 20  BP: (134-155)/(73-81) 155/81  SpO2:  [92 %-95 %] 92 %  Body mass index is 23 42 kg/m²  Input and Output Summary (last 24 hours): Intake/Output Summary (Last 24 hours) at 10/27/18 1426  Last data filed at 10/27/18 1331   Gross per 24 hour   Intake                0 ml   Output              850 ml   Net             -850 ml       Physical Exam:     Physical Exam   Constitutional: She is oriented to person, place, and time  She appears well-nourished  No distress  HENT:   Head: Normocephalic  Mouth/Throat: Oropharynx is clear and moist    Eyes: Pupils are equal, round, and reactive to light  Neck: Normal range of motion  Neck supple  Cardiovascular: Normal rate  No murmur heard  Pulmonary/Chest: Effort normal and breath sounds normal    Abdominal: Soft  Bowel sounds are normal  She exhibits no distension  There is no tenderness  Musculoskeletal: Normal range of motion  She exhibits no edema  Neurological: She is alert and oriented to person, place, and time     Skin: Skin is warm and dry    Psychiatric: She has a normal mood and affect  Her behavior is normal  Thought content normal    Nursing note and vitals reviewed  Additional Data:     Labs:      Results from last 7 days  Lab Units 10/27/18  0458 10/26/18  0509   WBC Thousand/uL 4 13* 4 30*   HEMOGLOBIN g/dL 15 6* 15 7*   HEMATOCRIT % 47 1* 48 5*   PLATELETS Thousands/uL 130* 128*   NEUTROS PCT %  --  68   LYMPHS PCT %  --  22   MONOS PCT %  --  5   EOS PCT %  --  4       Results from last 7 days  Lab Units 10/27/18  0458   SODIUM mmol/L 140   POTASSIUM mmol/L 4 4   CHLORIDE mmol/L 103   CO2 mmol/L 25   BUN mg/dL 25   CREATININE mg/dL 1 38*   CALCIUM mg/dL 9 5   ALK PHOS U/L 249*   ALT U/L 15   AST U/L 46*       Results from last 7 days  Lab Units 10/27/18  0458   INR  1 99*       * I Have Reviewed All Lab Data Listed Above  * Additional Pertinent Lab Tests Reviewed: [facility] Admission Reviewed    Imaging:    Imaging Reports Reviewed Today Include: reviewed previous radiological studies       Recent Cultures (last 7 days):       Results from last 7 days  Lab Units 10/21/18  1659 10/21/18  1621   BLOOD CULTURE  No Growth After 5 Days  No Growth After 5 Days    --    URINE CULTURE   --  >100,000 cfu/ml Escherichia coli*       Last 24 Hours Medication List:     Current Facility-Administered Medications:  acetaminophen 975 mg Oral Q8H Dariel Kaur PA-C   albuterol 2 5 mg Nebulization Q6H PRN Dariel Mominzio JuneLINDSAY   atorvastatin 20 mg Oral Daily With Viveros'sLINDSAY   dexamethasone 2 mg Oral Daily Ayaka Flores MD   diclofenac sodium 2 g Topical 4x Daily Dariel Kaur PA-C   fluticasone 1 spray Each Nare Daily Dariel Nunzio JuneLINDSAY   furosemide 20 mg Oral Every Other Day Dariel Mominzio JuneLINDSAY   levothyroxine 88 mcg Oral Early Morning Dariel Nunzio JuneLINDSAY   lidocaine 1 patch Topical Daily Dariel Kaur PA-C   metoprolol tartrate 25 mg Oral Q12H [address] Dariel Kaur PA-C   OLANZapine 2 5 mg Oral HS Eve Sharmaine Gonzalez MD   ondansetron 4 mg Oral Q4H PRN Jolynn Larson MD   oxyCODONE 2 5 mg Oral Q4H PRN Jacqueline Lemus PA-C   pantoprazole 20 mg Oral Early Morning Rina Kraus PA-C   polyethylene glycol 17 g Oral Daily PRN Jacqueline Lemus PA-C   potassium chloride 20 mEq Oral Daily Dariel Isidro PA-C   senna 1 tablet Oral HS PRN Lindsay Conway PA-C        Today, Patient Was Seen By: LIAM Colin    ** Please Note: Dragon 360 Dictation voice to text software may have been used in the creation of this document   **

## 2018-10-27 NOTE — ASSESSMENT & PLAN NOTE
· Rate controlled since admission, INR was supratherapeutic at 5 7, likely due to poor oral intake   Received vitamin K x 1 (5mg dose)  · Continue Metoprolol   · Hold Coumadin and recheck INR in am  · May restart within the next 24 hours

## 2018-10-27 NOTE — PLAN OF CARE
Problem: PHYSICAL THERAPY ADULT  Goal: Performs mobility at highest level of function for planned discharge setting  See evaluation for individualized goals  Treatment/Interventions: Functional transfer training, LE strengthening/ROM, Elevations, Cognitive reorientation, Bed mobility, Gait training, Equipment eval/education, Patient/family training, Endurance training, Therapeutic exercise, Spoke to nursing, Spoke to advanced practitioner  Equipment Recommended: [place] [place]       See flowsheet documentation for full assessment, interventions and recommendations  Outcome: Progressing  Prognosis: Fair  Problem List: Decreased strength, Decreased range of motion, Decreased endurance, Pain, Orthopedic restrictions, Impaired hearing, Decreased cognition, Decreased mobility, Impaired balance (gait deviations)  Assessment: Patient pleasant and motivated to participate in therapy session  Demonstrated ability to transfer supine to sit and multiple sit to stand transfer with consistent less person assist requiring minial instructionf or technique and hand placement  Demonstrated ability to ambulate 61' x2 with roller walker and contact guard assisst however was limited from further distance or additonal trials secondary to reported fatigue  Required occasion instruction for walker management with obstacle negotiation when ambulating around furniture  Patient refused stair trainign trial secondary to fatigue  Continue to focus on OOB mobility taks with roller walker as well as initation of stair as appropriate  Barriers to Discharge: Decreased caregiver support, Inaccessible home environment (TEMI, home alone for periods during the day)     Recommendation: Post acute IP rehab (vs  home PT and family support)          See flowsheet documentation for full assessment

## 2018-10-27 NOTE — ASSESSMENT & PLAN NOTE
Malnutrition Findings:   Malnutrition type: Acute illness  Degree of Malnutrition: Malnutrition of moderate degree (inadequate oral intake)    BMI Findings: Body mass index is 23 42 kg/m²     Needs to drink protein supplements, offered pudding instead and patient does not wish to try

## 2018-10-27 NOTE — ASSESSMENT & PLAN NOTE
· Incidental finding on CT scan  Unclear inciting event, however she did have a recent fall and is chronically kyphotic  · Added low-dose oxycodone for severe pain in her back in order to encourage more movement especially in light of the fact that she is going home with her daughter (daughter refused rehab) and will need to be much more mobile than she has been  · In addition continue nonnarcotic management with:  · Aqua K  · Voltaren  · Lidoderm  · Tylenol around the clock  · PT eval and treat --recommended use of brace--patient seems to be refusing    She has given inconsistent information to various staff members as to whether the brace has been helpful or bothersome  · Patient would consider going to rehab but states her daughter doesn't want her to

## 2018-10-27 NOTE — ASSESSMENT & PLAN NOTE
· POA, abnormal urinalysis  Did not have any sepsis criteria but did have lactic acidosis  Urine culture showed greater than 100,000 colonies of E coli  Based on patient's penicillin allergy, my colleague discussed with ID pharmacist - Patient tolerated IV Ancef (different side chains making PCN crossreaction unlikely)  However, Keflex would be unsafe for an oral option therefore we converted to Ceftin for a 5 day course  · Consider possibility that antibiotics were also making her nauseated    Course is now complete  · Blood cultures negative

## 2018-10-27 NOTE — ASSESSMENT & PLAN NOTE
· POA, has been on going for the last few weeks  Similar in character to prior abdominal pain that was attributed to hiatal hernia  · During this admission, patient's daughter was at bedside and was frustrated that her mother has had "no improvement" since the time she got to the hospital and wants to know if more tests should be ordered to "get to the bottom of this "    · Today I spoke with the patient in detail alone without her daughter present as in similar interactions this admission  She clearly expressed to me that she has had improvement since coming to the hospital   She reported to me that her primary symptom of concern is nausea as this is what contributes to her lack of appetite and she was able to eat some grilled cheese sandwich today and a spoonful of applesauce  She has had back pain with movement but was clear with me that it HAS improved since admission as well  She also tells me that her abdominal pain is improved when she holds a small pillow in front of her abdomen which she continues to do  She feels well when she is sitting forward as it takes the pressure off of her back and her abdomen  · CT scan without any contrast was negative for acute findings and outpatient  RUQ US negative for evidence of GB stones  GI is doubtful that repeating the imaging with oral barium would be of much utility and I would be hesitant to provide IV contrast in the setting of her low GFR-also do not feel this would provide any additional information at this time  However, if there is any concern for retroperitoneal hematoma she would need a repeat CT scan done with dye (I confirmed this with radiology department)  Do not feel an MRI of her abdomen would be of any utility or appropriate  No EGD at this time  · Consider possibility that patient's kyphosis from ongoing compression fractures is contributing to her GI symptoms especially with her underlying moderate hiatal hernia    Consulted ortho for T9 fracture --appreciate their input  See below  · Supportive care as follows:  · Carafate Q6  · Protonix 40 mg   · Zofran ODT before each meal for nausea  · Senna and Miralax  for constipation--changed to p r n   Now that she is having looser bowel movements  · Encourage oral intake and document; protein supplements   · Noted to have GGT elevation on labs, unclear significance  · Appreciate palliative care involvement

## 2018-10-28 NOTE — PLAN OF CARE
Problem: Potential for Falls  Goal: Patient will remain free of falls  INTERVENTIONS:  - Assess patient frequently for physical needs  -  Identify cognitive and physical deficits and behaviors that affect risk of falls  -  Lando fall precautions as indicated by assessment   - Educate patient/family on patient safety including physical limitations  - Instruct patient to call for assistance with activity based on assessment  - Modify environment to reduce risk of injury  - Consider OT/PT consult to assist with strengthening/mobility   Outcome: Progressing      Problem: Prexisting or High Potential for Compromised Skin Integrity  Goal: Skin integrity is maintained or improved  INTERVENTIONS:  - Identify patients at risk for skin breakdown  - Assess and monitor skin integrity  - Assess and monitor nutrition and hydration status  - Monitor labs (i e  albumin)  - Assess for incontinence   - Turn and reposition patient  - Assist with mobility/ambulation  - Relieve pressure over bony prominences  - Avoid friction and shearing  - Provide appropriate hygiene as needed including keeping skin clean and dry  - Evaluate need for skin moisturizer/barrier cream  - Collaborate with interdisciplinary team (i e  Nutrition, Rehabilitation, etc )   - Patient/family teaching   Outcome: Progressing      Problem: Nutrition/Hydration-ADULT  Goal: Nutrient/Hydration intake appropriate for improving, restoring or maintaining nutritional needs  Monitor and assess patient's nutrition/hydration status for malnutrition (ex- brittle hair, bruises, dry skin, pale skin and conjunctiva, muscle wasting, smooth red tongue, and disorientation)  Collaborate with interdisciplinary team and initiate plan and interventions as ordered  Monitor patient's weight and dietary intake as ordered or per policy  Utilize nutrition screening tool and intervene per policy   Determine patient's food preferences and provide high-protein, high-caloric foods as appropriate       INTERVENTIONS:  - Monitor oral intake, urinary output, labs, and treatment plans  - Assess nutrition and hydration status and recommend course of action  - Evaluate amount of meals eaten  - Assist patient with eating if necessary   - Allow adequate time for meals  - Recommend/ encourage appropriate diets, oral nutritional supplements, and vitamin/mineral supplements  - Order, calculate, and assess calorie counts as needed  - Assess need for intravenous fluids  - Provide specific nutrition/hydration education as appropriate  - Include patient/family/caregiver in decisions related to nutrition    Outcome: Progressing      Problem: GASTROINTESTINAL - ADULT  Goal: Maintains adequate nutritional intake  INTERVENTIONS:  - Monitor percentage of each meal consumed  - Identify factors contributing to decreased intake, treat as appropriate  - Assist with meals as needed  - Monitor I&O, WT and lab values  - Obtain nutrition services referral as needed   Outcome: Progressing      Problem: MUSCULOSKELETAL - ADULT  Goal: Maintain or return mobility to safest level of function  INTERVENTIONS:  - Assess patient's ability to carry out ADLs; assess patient's baseline for ADL function and identify physical deficits which impact ability to perform ADLs (bathing, care of mouth/teeth, toileting, grooming, dressing, etc )  - Assess/evaluate cause of self-care deficits   - Assess range of motion  - Assess patient's mobility; develop plan if impaired  - Assess patient's need for assistive devices and provide as appropriate  - Encourage maximum independence but intervene and supervise when necessary  - Involve family in performance of ADLs  - Assess for home care needs following discharge   - Provide patient education as appropriate   Outcome: Progressing      Problem: PAIN - ADULT  Goal: Verbalizes/displays adequate comfort level or baseline comfort level  Interventions:  - Encourage patient to monitor pain and request assistance  - Assess pain using appropriate pain scale  - Administer analgesics based on type and severity of pain and evaluate response  - Implement non-pharmacological measures as appropriate and evaluate response  - Consider cultural and social influences on pain and pain management  - Notify physician/advanced practitioner if interventions unsuccessful or patient reports new pain   Outcome: Progressing      Problem: INFECTION - ADULT  Goal: Absence or prevention of progression during hospitalization  INTERVENTIONS:  - Assess and monitor for signs and symptoms of infection  - Monitor lab/diagnostic results  - Monitor all insertion sites, i e  indwelling lines, tubes, and drains  - [place] appropriate cooling/warming therapies per order  - Administer medications as ordered  - Instruct and encourage patient and family to use good hand hygiene technique   Outcome: Progressing      Problem: DISCHARGE PLANNING - CARE MANAGEMENT  Goal: Discharge to post-acute care or home with appropriate resources  INTERVENTIONS:  - Conduct assessment to determine patient/family and health care team treatment goals, and need for post-acute services based on payer coverage, community resources, and patient preferences, and barriers to discharge  - Address psychosocial, clinical, and financial barriers to discharge as identified in assessment in conjunction with the patient/family and health care team  - Arrange appropriate level of post-acute services according to patients   needs and preference and payer coverage in collaboration with the physician and health care team  - Communicate with and update the patient/family, physician, and health care team regarding progress on the discharge plan  - Arrange appropriate transportation to post-acute venues   Outcome: Progressing

## 2018-10-28 NOTE — ASSESSMENT & PLAN NOTE
· Patient appears euvolemic  · Poor intake last 24 hours    Clinically dry with creatinine bump  · Gentle IV fluid  · Patient is known to Dr Joy Monson with [facility]

## 2018-10-28 NOTE — PROGRESS NOTES
Progress Note Faith Cleveland [birth year], 80 y o  female MRN: [id number]    Unit/Bed#: -01 Encounter: [id number]    Primary Care Provider: Wendie Herring DO   Date and time admitted to hospital: 10/21/2018  1:50 PM        Closed wedge compression fracture of ninth thoracic vertebra [facility])   Assessment & Plan    · Incidental finding on CT scan  Unclear inciting event, however she did have a recent fall and is chronically kyphotic  · Added low-dose oxycodone for severe pain in her back in order to encourage more movement especially in light of the fact that she is going home with her daughter (daughter refused rehab) and will need to be much more mobile than she has been  · In addition continue nonnarcotic management with:  · Aqua K  · Voltaren  · Lidoderm  · Tylenol around the clock  · Brace  · Daughter declined rehab  Would only consider home PT     Acute cystitis without hematuria   Assessment & Plan    · POA, abnormal urinalysis  · Consider possibility that antibiotics were also making her nauseated  Antibiotics Course is now complete  · Blood cultures negative     Chronic diastolic CHF (congestive heart failure) ([facility] [address] )   Assessment & Plan    · Patient appears euvolemic  · Poor intake last 24 hours  Clinically dry with creatinine bump  · Gentle IV fluid  · Patient is known to Dr Theodore Chu with [facility]     Chronic atrial fibrillation [facility])   Assessment & Plan    · Rate controlled since admission, INR was supratherapeutic at 5 7, likely due to poor oral intake  Received vitamin K x 1 (5mg dose)  · Continue Metoprolol   · INR 1 9  Resume Coumadin     Essential hypertension   Assessment & Plan    · BP acceptable for her age  ·      CKD (chronic kidney disease), stage III (HCC)   Assessment & Plan    Creatinine bump to 1 56  Will hold Lasix  Clinically dry  Gentle hydration     * Abdominal pain   Assessment & Plan    · POA, has been on going for the last few weeks   Similar in character to prior abdominal pain that was attributed to hiatal hernia  · During this admission, patient's daughter was at bedside and was frustrated that her mother has had "no improvement" since the time she got to the hospital and wants to know if more tests should be ordered to "get to the bottom of this "    · CT scan without any contrast was negative for acute findings and outpatient  RUQ US negative for evidence of GB stones  GI is doubtful that repeating the imaging with oral barium would be of much utility and I would be hesitant to provide IV contrast in the setting of her low GFR-also do not feel this would provide any additional information at this time  · Consider possibility that patient's kyphosis from ongoing compression fractures is contributing to her GI symptoms especially with her underlying moderate hiatal hernia  Consulted ortho for T9 fracture --appreciate their input  See below  · Supportive care as follows:  · Carafate/Protonix   · Zofran ODT before each meal for nausea  · Senna and Miralax  for constipation--changed to p r n  Now that she is having looser bowel movements  · Encourage oral intake and document; protein supplements   · Noted to have GGT elevation on labs, unclear significance  · Appreciate palliative care involvement   · If improvement next 24 hours, will recommend outpatient follow-up with GI               VTE Pharmacologic Prophylaxis:   Pharmacologic: Warfarin (Coumadin)  Mechanical VTE Prophylaxis in Place: Yes    PEducation and Discussions with Family / Patient:  Spoke with patient's daughter    Time Spent for Care: 30 minutes  More than 50% of total time spent on counseling and coordination of care as described above      Current Length of Stay: 7 day(s)    Current Patient Status: Inpatient   Certification Statement: The patient will continue to require additional inpatient hospital stay due to Above    Discharge Plan / Estimated Discharge Date:  Tentative next 24 to 48 hours if improved    Code Status: Level 1 - Full Code      Subjective:   Patient states that she is feeling worse today with poor appetite, nausea and generalized weakness  Abdominal pain about the same  Patient's daughter is at bedside  Objective:     Vitals:   Temp (24hrs), Av 8 °F (36 6 °C), Min:97 4 °F (36 3 °C), Max:98 3 °F (36 8 °C)    Temp:  [97 4 °F (36 3 °C)-98 3 °F (36 8 °C)] 97 4 °F (36 3 °C)  HR:  [57-74] 74  Resp:  [18-20] 18  BP: (138-165)/(73-98) 165/80  SpO2:  [93 %-97 %] 97 %  Body mass index is 23 42 kg/m²  Input and Output Summary (last 24 hours): Intake/Output Summary (Last 24 hours) at 10/28/18 1420  Last data filed at 10/28/18 1043   Gross per 24 hour   Intake                0 ml   Output              400 ml   Net             -400 ml       Physical Exam:     Physical Exam   Constitutional: She is oriented to person, place, and time  No distress  HENT:   Head: Normocephalic  Eyes: Pupils are equal, round, and reactive to light  Conjunctivae are normal    Neck: Normal range of motion  Cardiovascular: Normal rate  Pulmonary/Chest: Effort normal and breath sounds normal    Abdominal: Soft  Bowel sounds are normal  There is tenderness  Musculoskeletal: She exhibits no edema  Neurological: She is alert and oriented to person, place, and time  Skin: Skin is warm and dry  She is not diaphoretic  Psychiatric: She has a normal mood and affect  Additional Data:     Labs:      Results from last 7 days  Lab Units 10/28/18  0617  10/26/18  0509   WBC Thousand/uL 4 82  < > 4 30*   HEMOGLOBIN g/dL 16 4*  < > 15 7*   HEMATOCRIT % 50 9*  < > 48 5*   PLATELETS Thousands/uL 147*  < > 128*   NEUTROS PCT %  --   --  68   LYMPHS PCT %  --   --  22   MONOS PCT %  --   --  5   EOS PCT %  --   --  4   < > = values in this interval not displayed      Results from last 7 days  Lab Units 10/28/18  0617   SODIUM mmol/L 138   POTASSIUM mmol/L 5 0   CHLORIDE mmol/L 103   CO2 mmol/L 28   BUN mg/dL 32*   CREATININE mg/dL 1 56*   CALCIUM mg/dL 9 6   ALK PHOS U/L 251*   ALT U/L 31   AST U/L 59*       Results from last 7 days  Lab Units 10/28/18  0617   INR  1 90*         Recent Cultures (last 7 days):       Results from last 7 days  Lab Units 10/21/18  1659 10/21/18  1621   BLOOD CULTURE  No Growth After 5 Days  No Growth After 5 Days  --    URINE CULTURE   --  >100,000 cfu/ml Escherichia coli*       Last 24 Hours Medication List:     Current Facility-Administered Medications:  acetaminophen 975 mg Oral Q8H Dariel Kaur PA-C   albuterol 2 5 mg Nebulization Q6H PRN Dariel Varghese PA-C   atorvastatin 20 mg Oral Daily With Viveros'sLINDSAY   dexamethasone 2 mg Oral Daily Jordon Velasquez MD   diclofenac sodium 2 g Topical 4x Daily Dariel Kaur PA-C   fluticasone 1 spray Each Nare Daily Dariel Varghese PA-C   levothyroxine 88 mcg Oral Early Morning Dariel Varghese PA-C   lidocaine 1 patch Topical Daily Dariel Varghese PA-C   melatonin 3 mg Oral HS Tim Eddy MD   metoprolol tartrate 25 mg Oral Q12H [address] Dariel Kaur PA-C   OLANZapine 2 5 mg Oral HS Jordon Velasquez MD   ondansetron 4 mg Oral Q4H PRN Jordon Velasquez MD   oxyCODONE 2 5 mg Oral Q4H PRN Jacqueline Lemus PA-C   pantoprazole 20 mg Oral Early Morning Rina MiskoLINDSAY   polyethylene glycol 17 g Oral Daily PRN Jacqueline Lemus PA-C   senna 1 tablet Oral HS PRN Jacqueline Lemus PA-C   sodium chloride 40 mL/hr Intravenous Once Tim Eddy MD   warfarin 3 mg Oral Daily (warfarin) Tim Eddy MD        Today, Patient Was Seen By: Tim Eddy MD    ** Please Note: This note has been constructed using a voice recognition system   **

## 2018-10-28 NOTE — ASSESSMENT & PLAN NOTE
· Incidental finding on CT scan  Unclear inciting event, however she did have a recent fall and is chronically kyphotic  · Added low-dose oxycodone for severe pain in her back in order to encourage more movement especially in light of the fact that she is going home with her daughter (daughter refused rehab) and will need to be much more mobile than she has been  · In addition continue nonnarcotic management with:  · Aqua K  · Voltaren  · Lidoderm  · Tylenol around the clock  · Brace  · Daughter declined rehab    Would only consider home PT

## 2018-10-28 NOTE — PHYSICAL THERAPY NOTE
PHYSICAL CANCELLATION  THERAPY NOTE          Patient Name: Clover Graves  [id number] Date: 10/28/2018     Attempt made to see patient for physical therapy session however not appropriate at this time and actively vomiting  Nursing aware, will continue to follow as appropriate      Clara Hanson PTA

## 2018-10-28 NOTE — ASSESSMENT & PLAN NOTE
· POA, abnormal urinalysis  · Consider possibility that antibiotics were also making her nauseated    Antibiotics Course is now complete  · Blood cultures negative

## 2018-10-28 NOTE — ASSESSMENT & PLAN NOTE
· Rate controlled since admission, INR was supratherapeutic at 5 7, likely due to poor oral intake  Received vitamin K x 1 (5mg dose)  · Continue Metoprolol   · INR 1 9    Resume Coumadin

## 2018-10-28 NOTE — ASSESSMENT & PLAN NOTE
· POA, has been on going for the last few weeks  Similar in character to prior abdominal pain that was attributed to hiatal hernia  · During this admission, patient's daughter was at bedside and was frustrated that her mother has had "no improvement" since the time she got to the hospital and wants to know if more tests should be ordered to "get to the bottom of this "    · CT scan without any contrast was negative for acute findings and outpatient  RUQ US negative for evidence of GB stones  GI is doubtful that repeating the imaging with oral barium would be of much utility and I would be hesitant to provide IV contrast in the setting of her low GFR-also do not feel this would provide any additional information at this time  · Consider possibility that patient's kyphosis from ongoing compression fractures is contributing to her GI symptoms especially with her underlying moderate hiatal hernia  Consulted ortho for T9 fracture --appreciate their input  See below  · Supportive care as follows:  · Carafate/Protonix   · Zofran ODT before each meal for nausea  · Senna and Miralax  for constipation--changed to p r n   Now that she is having looser bowel movements  · Encourage oral intake and document; protein supplements   · Noted to have GGT elevation on labs, unclear significance  · Appreciate palliative care involvement   · If improvement next 24 hours, will recommend outpatient follow-up with GI

## 2018-10-29 PROBLEM — N17.9 AKI (ACUTE KIDNEY INJURY) (HCC): Status: ACTIVE | Noted: 2018-01-01

## 2018-10-29 NOTE — ASSESSMENT & PLAN NOTE
· POA, has been on going for the last few weeks  Similar in character to prior abdominal pain that was attributed to hiatal hernia  · During this admission, patient's daughter was at bedside and was frustrated that her mother has had "no improvement" since the time she got to the hospital and wants to know if more tests should be ordered to "get to the bottom of this "    · CT scan without any contrast was negative for acute findings and outpatient  RUQ US negative for evidence of GB stones    GI is doubtful that repeating the imaging with oral barium would be of much utility   · Poor intake/Pt states that her pain has not improved  · Will Check mesenteric duplex  · Will speak with GI  · Continue PPI/supportive care

## 2018-10-29 NOTE — ASSESSMENT & PLAN NOTE
· Patient appears euvolemic  · Poor intake last 24 hours  Clinically dry with creatinine bump  · Gentle IV fluid started yesterday  Will increase rate given worsening creatinine  · Spoke with Dr Luis M Petersen with [facility] per family request  Made aware   OK to hold diuretics

## 2018-10-29 NOTE — PROGRESS NOTES
Progress Note Faith Cleveland [birth year], 80 y o  female MRN: [id number]    Unit/Bed#: -01 Encounter: [id number]    Primary Care Provider: Wendie Herring DO   Date and time admitted to hospital: 10/21/2018  1:50 PM        * Abdominal pain   Assessment & Plan    · POA, has been on going for the last few weeks  Similar in character to prior abdominal pain that was attributed to hiatal hernia  · During this admission, patient's daughter was at bedside and was frustrated that her mother has had "no improvement" since the time she got to the hospital and wants to know if more tests should be ordered to "get to the bottom of this "    · CT scan without any contrast was negative for acute findings and outpatient  RUQ US negative for evidence of GB stones  GI is doubtful that repeating the imaging with oral barium would be of much utility   · Poor intake/Pt states that her pain has not improved  · Will Check mesenteric duplex  · Will speak with GI  · Continue PPI/supportive care       SHANEL (acute kidney injury) ([facility] [address] )   Assessment & Plan    Cr worse today   Appears dry  Increase IVF  Lasix has been on hold  Will appreciate renal eval  Check bladder scan       Thrombocytopenia (HCC)   Assessment & Plan    · Appears trending up  · Will monitor closely     Moderate protein-calorie malnutrition (HCC)   Assessment & Plan    Malnutrition Findings:   Malnutrition type: Acute illness  Degree of Malnutrition: Malnutrition of moderate degree (inadequate oral intake)    BMI Findings: Body mass index is 23 42 kg/m²  Needs to drink protein supplements, offered pudding      Closed wedge compression fracture of ninth thoracic vertebra [facility])   Assessment & Plan    · Incidental finding on CT scan  Unclear inciting event, however she did have a recent fall and is chronically kyphotic  · Added low-dose oxycodone /Aqua K/Lidoderm/Tylenol around the clock  · Brace  · Daughter declined rehab    Would only consider home PT     Acute cystitis without hematuria   Assessment & Plan    · POA, abnormal urinalysis  ·  Antibiotics Course is now complete  · Blood cultures negative     Chronic diastolic CHF (congestive heart failure) ([facility] [address] )   Assessment & Plan    · Patient appears euvolemic  · Poor intake last 24 hours  Clinically dry with creatinine bump  · Gentle IV fluid started yesterday  Will increase rate given worsening creatinine  · Patient is known to Dr Lacy Due[facility]as with [facility]     Chronic atrial fibrillation [facility])   Assessment & Plan    · Rate controlled since admission, INR was supratherapeutic at 5 7, likely due to poor oral intake  Received vitamin K x 1 (5mg dose)  · Continue Metoprolol   · INR 1 9  Coumadin resumed              VTE Pharmacologic Prophylaxis:   Pharmacologic: Warfarin (Coumadin)  Mechanical VTE Prophylaxis in Place: Yes    Patient Centered Rounds: I have performed bedside rounds with nursing staff today  Discussions with Specialists or Other Care Team Provider: Dr Darrel Heard    Education and Discussions with Family / Patient: Spoke with Pt's daughter     Time Spent for Care: 30 minutes  More than 50% of total time spent on counseling and coordination of care as described above  Current Length of Stay: 8 day(s)    Current Patient Status: Inpatient   Certification Statement: The patient will continue to require additional inpatient hospital stay due to above    Discharge Plan / Estimated Discharge Date: Home PT/declined rehab    Code Status: Level 1 - Full Code      Subjective:   Patient appears weak and complained of persistent lower abdominal pain  Bowel movement yesterday    Objective:     Vitals:   Temp (24hrs), Av 9 °F (36 6 °C), Min:97 5 °F (36 4 °C), Max:98 4 °F (36 9 °C)    Temp:  [97 5 °F (36 4 °C)-98 4 °F (36 9 °C)] 97 5 °F (36 4 °C)  HR:  [65-76] 72  Resp:  [18] 18  BP: (136-171)/(82-88) 171/88  SpO2:  [93 %-95 %] 95 %  Body mass index is 23 42 kg/m²       Input and Output Summary (last 24 hours): Intake/Output Summary (Last 24 hours) at 10/29/18 1448  Last data filed at 10/28/18 2225   Gross per 24 hour   Intake              295 ml   Output              725 ml   Net             -430 ml       Physical Exam:     Physical Exam   Constitutional: No distress  Eyes: Pupils are equal, round, and reactive to light  Conjunctivae are normal    Neck: Normal range of motion  Neck supple  Cardiovascular: Normal rate and regular rhythm  Pulmonary/Chest: Breath sounds normal  No respiratory distress  Abdominal: Soft  She exhibits no distension  Lower abdominal tenderness   Musculoskeletal: She exhibits no edema  Neurological: She is alert  Skin: Skin is warm and dry  She is not diaphoretic  Psychiatric: She has a normal mood and affect  Additional Data:     Labs:      Results from last 7 days  Lab Units 10/28/18  0617  10/26/18  0509   WBC Thousand/uL 4 82  < > 4 30*   HEMOGLOBIN g/dL 16 4*  < > 15 7*   HEMATOCRIT % 50 9*  < > 48 5*   PLATELETS Thousands/uL 147*  < > 128*   NEUTROS PCT %  --   --  68   LYMPHS PCT %  --   --  22   MONOS PCT %  --   --  5   EOS PCT %  --   --  4   < > = values in this interval not displayed      Results from last 7 days  Lab Units 10/29/18  0522 10/28/18  0617   SODIUM mmol/L 137 138   POTASSIUM mmol/L 5 0 5 0   CHLORIDE mmol/L 102 103   CO2 mmol/L 20* 28   BUN mg/dL 37* 32*   CREATININE mg/dL 1 63* 1 56*   CALCIUM mg/dL 9 8 9 6   ALK PHOS U/L  --  251*   ALT U/L  --  31   AST U/L  --  59*       Results from last 7 days  Lab Units 10/28/18  0617   INR  1 90*         Recent Cultures (last 7 days):           Last 24 Hours Medication List:     Current Facility-Administered Medications:  acetaminophen 975 mg Oral Q8H Dariel Kaur PA-C   atorvastatin 20 mg Oral Daily With LINDSAY Bautista   dexamethasone 2 mg Oral Daily Henri Berrios MD   diclofenac sodium 2 g Topical 4x Daily Dariel Kaur PA-C   fluticasone 1 spray Each Nare Daily Dariel Varghese PA-C   levothyroxine 88 mcg Oral Early Morning Dariel Varghese PA-C   lidocaine 1 patch Topical Daily Dariel Varghese PA-C   melatonin 3 mg Oral HS Tim Eddy MD   metoprolol tartrate 25 mg Oral Q12H [address] Dariel Kaur PA-C   OLANZapine 2 5 mg Oral HS Jordon Velasquez MD   ondansetron 4 mg Oral Q4H PRN Jordon Velasquez MD   oxyCODONE 2 5 mg Oral Q4H PRN Jacqueline Lemus PA-C   pantoprazole 20 mg Oral Early Morning Rina Kraus PA-C   polyethylene glycol 17 g Oral Daily PRN Jacqueline Lemus PA-C   senna 1 tablet Oral HS PRN Jacqueline Lemus PA-C   sodium chloride 60 mL/hr Intravenous Once Tim Eddy MD   warfarin 3 mg Oral Daily (warfarin) Tim Eddy MD        Today, Patient Was Seen By: Tim Eddy MD    ** Please Note: This note has been constructed using a voice recognition system   **

## 2018-10-29 NOTE — PROGRESS NOTES
Progress Note- Ernie Yee 80 y o  female MRN: [id number]    Unit/Bed#: -01 Encounter: [id number]      Assessment and Plan:    Nausea, poor PO intake  Abdominal pain  -suspect multifactorial, exacerbated by back pain, constipation  -follow-up mesenteric doppler  -Would hold off on procedures given age, comorbidities  Do not feel that endoscopic procedures would   She is currently on treatment for PUD or gastritis with PPI and carafate, would increase the PPI to BID  If this is the cause of pain it can take time to improve  She has no evidence of bleeding    -lfts with elevated alkphos/ggt, mild elevation of tbili that has improved  RUQ U/S was unremarkable  No ductal dilation  Continue to monitor  If above negative and she has persistent pain can consider a HIDA scan to rule out acalculous cholecystitis  This is less likely given no fever or white count  -Recommend miralax daily  -palliative care is following and she is on decadron and zyprexa  ______________________________________________________________________    Subjective:     Patient appears to be confused  She is complaining of shoulder pain not abdominal pain but does endorse lack of appetite and nausea  Discussed with her nurse who report she intermittently complains of epigastric discomfort and nausea without vomiting throughout the day and has poor PO intake  She has not received any opioids for pain since 10/27  She had a small bm today      Medication Administration - last 24 hours from 10/28/2018 1559 to 10/29/2018 1559       Date/Time Order Dose Route Action Action by     10/29/2018 1518 acetaminophen (TYLENOL) tablet 975 mg 975 mg Oral Not Given Lauri Youssef RN     10/29/2018 1210 acetaminophen (TYLENOL) tablet 975 mg 325 mg Oral Given Lauri Youssef RN     10/29/2018 2315 acetaminophen (TYLENOL) tablet 975 mg 975 mg Oral Not Given Leah Paige RN     10/28/2018 2207 acetaminophen (TYLENOL) tablet 975 mg 975 mg Oral Given Nadia Conner, RN     10/28/2018 1722 atorvastatin (LIPITOR) tablet 20 mg 20 mg Oral Given [facility] Luz COX, RN     10/29/2018 1004 fluticasone (FLONASE) 50 mcg/act nasal spray 1 spray 1 spray Each Nare Given Barnet Ripple, RN     10/29/2018 4785 levothyroxine tablet 88 mcg 88 mcg Oral Not Given Colon Given, RN     10/29/2018 1007 metoprolol tartrate (LOPRESSOR) tablet 25 mg 25 mg Oral Given Barnet Ripple, RN     10/28/2018 2207 metoprolol tartrate (LOPRESSOR) tablet 25 mg 25 mg Oral Given [facility] COX, RN     10/29/2018 1006 lidocaine (LIDODERM) 5 % patch 1 patch 1 patch Topical Medication Applied Barnet Ripple, RN     10/28/2018 2212 lidocaine (LIDODERM) 5 % patch 1 patch 1 patch Topical Patch Removed [facility] Luz COX, RN     10/29/2018 1225 diclofenac sodium (VOLTAREN) 1 % topical gel 2 g 2 g Topical Not Given [place] Ripple, RN     10/29/2018 1004 diclofenac sodium (VOLTAREN) 1 % topical gel 2 g 2 g Topical Given Barnet Ripple, RN     10/28/2018 2219 diclofenac sodium (VOLTAREN) 1 % topical gel 2 g 2 g Topical Given [facility] Ubaldo, RN     10/28/2018 1722 diclofenac sodium (VOLTAREN) 1 % topical gel 2 g 2 g Topical Given [facility] Luz COX, RN     10/29/2018 1320 ondansetron (ZOFRAN-ODT) dispersible tablet 4 mg 4 mg Oral Given Barnet Ripple, RN     10/28/2018 2207 ondansetron (ZOFRAN-ODT) dispersible tablet 4 mg 4 mg Oral Given [facility] Tommy Henriquez, RN     10/28/2018 1722 ondansetron (ZOFRAN-ODT) dispersible tablet 4 mg 4 mg Oral Given [facility] Tommy Henriquez, RN     10/29/2018 1007 dexamethasone (DECADRON) tablet 2 mg 2 mg Oral Given Barnet Ripple, RN     10/28/2018 2207 OLANZapine (ZyPREXA) tablet 2 5 mg 2 5 mg [facility]     10/29/2018 0649 pantoprazole (PROTONIX) EC tablet 20 mg 20 mg Oral Not Given Colon Given, RN     10/28/2018 1722 warfarin (COUMADIN) tablet 3 mg 3 mg Oral Given Marvel Henriquez, RAYMUNDO     10/28/2018 2207 melatonin tablet 3 mg 3 mg Oral Given Marvel COX RN     10/28/2018 1858 ondansetron (ZOFRAN) injection 2 mg 2 mg Intravenous Given Marvel COX RN     10/29/2018 1520 sodium chloride 0 9 % infusion 60 mL/hr Intravenous New Bag Marialuisa Shaffer RN          Objective:     Vitals: Blood pressure 164/90, pulse 83, temperature 97 6 °F (36 4 °C), temperature source Oral, resp  rate 18, weight 54 4 kg (119 lb 14 9 oz), SpO2 95 %, not currently breastfeeding  ,Body mass index is 23 42 kg/m²  Intake/Output Summary (Last 24 hours) at 10/29/18 1559  Last data filed at 10/28/18 2225   Gross per 24 hour   Intake              295 ml   Output              625 ml   Net             -330 ml       Physical Exam:   General Appearance: Awake and alert, in no acute distress  confused  Abdomen: Soft, non-tender, non-distended; bowel sounds normal    Invasive Devices     Peripheral Intravenous Line            Peripheral IV 10/27/18 Left Arm 1 day                Lab Results:  Admission on 10/21/2018   No results displayed because visit has over 200 results  Imaging Studies: I have personally reviewed pertinent imaging studies

## 2018-10-29 NOTE — ASSESSMENT & PLAN NOTE
· Incidental finding on CT scan  Unclear inciting event, however she did have a recent fall and is chronically kyphotic  · Added low-dose oxycodone /Aqua K/Lidoderm/Tylenol around the clock  · Brace  · Daughter declined rehab    Would only consider home PT

## 2018-10-29 NOTE — TELEPHONE ENCOUNTER
PT  IS STILL IN THE HOSPITAL  CAN YOU LOOK AT HER CHART AND MAYBE OFFER ANY INPUT? DAUGHTER THINKS PT  NEEDS AN UPPER GI OR LOWER GI  DAUGHTER THINKS THEY AREN'T DOING THE RIGHT TESTING  HENRIETTA THINKS PT  IS WORSE THAN WHEN SHE FIRST WENT IN

## 2018-10-29 NOTE — PLAN OF CARE
Problem: Nutrition/Hydration-ADULT  Goal: Nutrient/Hydration intake appropriate for improving, restoring or maintaining nutritional needs  Monitor and assess patient's nutrition/hydration status for malnutrition (ex- brittle hair, bruises, dry skin, pale skin and conjunctiva, muscle wasting, smooth red tongue, and disorientation)  Collaborate with interdisciplinary team and initiate plan and interventions as ordered  Monitor patient's weight and dietary intake as ordered or per policy  Utilize nutrition screening tool and intervene per policy  Determine patient's food preferences and provide high-protein, high-caloric foods as appropriate       INTERVENTIONS:  - Monitor oral intake, urinary output, labs, and treatment plans  - Assess nutrition and hydration status and recommend course of action  - Evaluate amount of meals eaten  - Assist patient with eating if necessary   - Allow adequate time for meals  - Recommend/ encourage appropriate diets, oral nutritional supplements, and vitamin/mineral supplements  - Order, calculate, and assess calorie counts as needed  - Assess need for intravenous fluids  - Provide specific nutrition/hydration education as appropriate  - Include patient/family/caregiver in decisions related to nutrition    Outcome: Not Progressing  Intake 25% or less of meals

## 2018-10-29 NOTE — TELEPHONE ENCOUNTER
Melany Logan needs to talk with hospitalist(that is the main doctor taking care of jovana) about her concerns    I will try calling as well, later today or tmrw

## 2018-10-29 NOTE — ASSESSMENT & PLAN NOTE
Cr worse today   Appears dry  Increase IVF  Lasix has been on hold  Will appreciate renal eval  Check bladder scan

## 2018-10-29 NOTE — ASSESSMENT & PLAN NOTE
Malnutrition Findings:   Malnutrition type: Acute illness  Degree of Malnutrition: Malnutrition of moderate degree (inadequate oral intake)    BMI Findings: Body mass index is 23 42 kg/m²     Needs to drink protein supplements, offered pudding

## 2018-10-29 NOTE — UTILIZATION REVIEW
Continued Stay Review    Date:10/29/2018    Vital Signs: BP (!) 171/88   Pulse 72   Temp 97 5 °F (36 4 °C) (Oral)   Resp 18   Wt 54 4 kg (119 lb 14 9 oz)   LMP  (LMP Unknown)   SpO2 95%   BMI 23 42 kg/m²     Medications:   Scheduled Meds:   Current Facility-Administered Medications:  acetaminophen 975 mg Oral Q8H   atorvastatin 20 mg Oral Daily With Dinner   dexamethasone 2 mg Oral Daily   diclofenac sodium 2 g Topical 4x Daily   fluticasone 1 spray Each Nare Daily   levothyroxine 88 mcg Oral Early Morning   lidocaine 1 patch Topical Daily   melatonin 3 mg Oral HS   metoprolol tartrate 25 mg Oral Q12H KENISHA   OLANZapine 2 5 mg Oral HS   ondansetron 4 mg Oral Q4H PRN   oxyCODONE 2 5 mg Oral Q4H PRN   pantoprazole 20 mg Oral Early Morning   polyethylene glycol 17 g Oral Daily PRN   senna 1 tablet Oral HS PRN   warfarin 3 mg Oral Daily (warfarin)     Continuous Infusions:    PRN Meds: ondansetron  ODT - used x 1  Abnormal Labs/Diagnostic Results:   CO2-20  Anion gap 15  Bun 37  Creatinine 1 63  Glucose 171  INR 1 90    Age/Sex: 80 y o  female     Assessment/Plan: on 10/28-   Closed wedge compression fracture of ninth thoracic vertebra [facility])   Assessment & Plan     · Incidental finding on CT scan  Unclear inciting event, however she did have a recent fall and is chronically kyphotic  ? Added low-dose oxycodone for severe pain in her back in order to encourage more movement especially in light of the fact that she is going home with her daughter (daughter refused rehab) and will need to be much more mobile than she has been  ? In addition continue nonnarcotic management with:  § Aqua K  § Voltaren  § Lidoderm  § Tylenol around the clock  ? Brace  ? Daughter declined rehab  Would only consider home PT      Acute cystitis without hematuria   Assessment & Plan     · POA, abnormal urinalysis  · Consider possibility that antibiotics were also making her nauseated  Antibiotics Course is now complete  ?  Blood cultures negative      Chronic diastolic CHF (congestive heart failure) ([facility] [address] )   Assessment & Plan     · Patient appears euvolemic  ? Poor intake last 24 hours  Clinically dry with creatinine bump  ? Gentle IV fluid  ? Patient is known to Dr Derik Jim with [facility]      Chronic atrial fibrillation [facility])   Assessment & Plan     · Rate controlled since admission, INR was supratherapeutic at 5 7, likely due to poor oral intake  Received vitamin K x 1 (5mg dose)  ? Continue Metoprolol   ? INR 1 9  Resume Coumadin      Essential hypertension   Assessment & Plan     · BP acceptable for her age  ?        CKD (chronic kidney disease), stage III (HCC)   Assessment & Plan     Creatinine bump to 1 56  Will hold Lasix  Clinically dry  Gentle hydration      * Abdominal pain   Assessment & Plan     ? POA, has been on going for the last few weeks  Similar in character to prior abdominal pain that was attributed to hiatal hernia  · During this admission, patient's daughter was at bedside and was frustrated that her mother has had "no improvement" since the time she got to the hospital and wants to know if more tests should be ordered to "get to the bottom of this "    ? CT scan without any contrast was negative for acute findings and outpatient  RUQ US negative for evidence of GB stones  GI is doubtful that repeating the imaging with oral barium would be of much utility and I would be hesitant to provide IV contrast in the setting of her low GFR-also do not feel this would provide any additional information at this time  ? Consider possibility that patient's kyphosis from ongoing compression fractures is contributing to her GI symptoms especially with her underlying moderate hiatal hernia  Consulted ortho for T9 fracture --appreciate their input  See below  ? Supportive care as follows:  § Carafate/Protonix   § Zofran ODT before each meal for nausea  § Senna and Miralax  for constipation--changed to p r n   Now that she is having looser bowel movements  § Encourage oral intake and document; protein supplements   ? Noted to have GGT elevation on labs, unclear significance  ? Appreciate palliative care involvement   ? If improvement next 24 hours, will recommend outpatient follow-up with GI             Discharge Plan: to be determined

## 2018-10-29 NOTE — ASSESSMENT & PLAN NOTE
· Rate controlled since admission, INR was supratherapeutic at 5 7, likely due to poor oral intake  Received vitamin K x 1 (5mg dose)  · Continue Metoprolol   · INR 1 9    Coumadin resumed

## 2018-10-29 NOTE — CONSULTS
Consultation - Nephrology   Yumiko De Los Santos 80 y o  female MRN: [id number]  Unit/Bed#: -01 Encounter: [id number]      Assessment/Plan     Assessment:  1  Acute renal failure/acute kidney injury: This likely could be secondary to volume depletion plus or minus an effect of anti prostaglandins/NSAID use affecting renal perfusion as well  There is also an element of volume depletion related to abdominal discomfort  Patient yesterday had a hemoglobin level of 16 4 likely due to volume depletion  Had been 13 on October 22nd  Agree with IV fluids, will discontinue diclofenac gel  Recheck urinalysis, note CT scan done on admission did not show any hydronephrosis  Check bladder scan PVR; continue with IV fluid 60 cc an hour  Noted on the initial urinalysis patient had mild pyuria of 10-20 white blood cells per high-power field but there is negative protein and 0-1 red blood cell  2  Stage 3 chronic kidney disease:  Baseline creatinine seems to be 1 1-1 3  Plan:  As above        History of Present Illness   Physician Requesting Consult: Chauncey Lui MD  Reason for Consult / Principal Problem:  Acute renal failure  Hx and PE limited by:   HPI: Yumiko De Los Santos is a 80y o  year old female who presented to the hospital on October 21st secondary to abdominal discomfort  I had the opportunity to speak with the patient's daughter at bedside  Patient initially had undergone a workup for abdominal discomfort but it persisted and worsened and the patient come to the emergency room  CT scan did not show any abnormality  Patient had been seen by GI during this hospitalization  The patient had fallen 3 weeks prior was noted the patient had a T9 compression fracture patient has been on multiple medications to try to help with the pain including Voltaren gel and oxycodone which the patient had been taking at home  It was noted the patient's creatinine increased to 1 68 today    We are asked to see the patient for acute renal failure    In speaking with the patient's daughter, the patient yesterday and today has had problems with nausea after eating  There does not seem to be any pattern with the abdominal discomfort  The patient did not seem to have any urinary symptoms  Patient is currently NPO in anticipation of undergoing mesenteric Doppler      Inpatient consult to Nephrology  Consult performed by: Tor Hutchinson ordered by: Leatha Bear          General:  No new rash  Cardiovascular:  No chest pressure or shortness of Breath reported  Respiratory:  No cough no hemoptysis no epistaxis reported  Gastrointestinal:  Nausea after eating, positive abdominal discomfort, no diarrhea mild constipation  Genitourinary:  No urgency frequency hematuria  Outside of the above review of systems, all others are essentially negative    Historical Information   Past Medical History:   Diagnosis Date    Anemia of chronic disease 11/27/2017    Arthritis     Cardiac disease     CHF (congestive heart failure) (HCC)     Femur fracture, right (HCC)     Hiatal hernia     Hypercholesteremia     Hypothyroidism     Impaired ambulation      Past Surgical History:   Procedure Laterality Date    APPENDECTOMY      CATARACT EXTRACTION Bilateral     CATARACT EXTRACTION      HIP FRACTURE SURGERY Right 12/2015    HYSTERECTOMY      OOPHORECTOMY      REPAIR RECTOCELE      REPLACEMENT TOTAL KNEE Right      Social History   History   Alcohol Use No     History   Drug Use No     History   Smoking Status    Never Smoker   Smokeless Tobacco    Never Used     Family History   Problem Relation Age of Onset    Kidney cancer Mother     Alcohol abuse Father     Cirrhosis Father         LIVER    Drug abuse Father     Alcohol abuse Brother     Diabetes Brother     Drug abuse Brother     Stroke Brother     Coronary artery disease Family        Meds/Allergies   all current active meds have been reviewed, current meds: Current Facility-Administered Medications   Medication Dose Route Frequency    acetaminophen (TYLENOL) tablet 975 mg  975 mg Oral Q8H    atorvastatin (LIPITOR) tablet 20 mg  20 mg Oral Daily With Dinner    dexamethasone (DECADRON) tablet 2 mg  2 mg Oral Daily    fluticasone (FLONASE) 50 mcg/act nasal spray 1 spray  1 spray Each Nare Daily    levothyroxine tablet 88 mcg  88 mcg Oral Early Morning    lidocaine (LIDODERM) 5 % patch 1 patch  1 patch Topical Daily    melatonin tablet 3 mg  3 mg Oral HS    metoprolol tartrate (LOPRESSOR) tablet 25 mg  25 mg Oral Q12H [facility]    OLANZapine (ZyPREXA) tablet 2 5 mg  2 5 mg Oral HS    ondansetron (ZOFRAN-ODT) dispersible tablet 4 mg  4 mg Oral Q4H PRN    oxyCODONE (ROXICODONE) IR tablet 2 5 mg  2 5 mg Oral Q4H PRN    [START ON 10/30/2018] pantoprazole (PROTONIX) EC tablet 40 mg  40 mg Oral BID AC    polyethylene glycol (MIRALAX) packet 17 g  17 g Oral Daily PRN    [START ON 10/30/2018] polyethylene glycol (MIRALAX) packet 17 g  17 g Oral Daily    senna (SENOKOT) tablet 8 6 mg  1 tablet Oral HS PRN    warfarin (COUMADIN) tablet 3 mg  3 mg Oral Daily (warfarin)    and PTA meds:  Prescriptions Prior to Admission   Medication    acetaminophen (TYLENOL) 325 mg tablet    albuterol (2 5 mg/3 mL) 0 083 % nebulizer solution    atorvastatin (LIPITOR) 20 mg tablet    bisacodyl (DULCOLAX) 10 mg suppository    Cholecalciferol (VITAMIN D) 2000 UNITS CAPS    cyanocobalamin 1,000 mcg/mL    Cyanocobalamin 1000 MCG/ML KIT    dicyclomine (BENTYL) 10 mg capsule    Docusate Sodium 100 MG capsule    fluticasone (FLONASE) 50 mcg/act nasal spray    furosemide (LASIX) 20 mg tablet    levothyroxine 88 mcg tablet    metoprolol tartrate (LOPRESSOR) 25 mg tablet    omeprazole (PriLOSEC) 40 MG capsule    oxyCODONE (ROXICODONE) 5 mg immediate release tablet    potassium chloride (K-DUR) 10 mEq tablet    potassium chloride (K-DUR,KLOR-CON) 20 mEq tablet    warfarin (COUMADIN) 1 mg tablet       Allergies   Allergen Reactions    Ace Inhibitors Angioedema     Annotation - [id number]: angioedema to ARB  Other reaction(s): Angioedema  Pt and family state not an allergy    Angiotensin Receptor Blockers Angioedema    Erythromycin Ethylsuccinate GI Intolerance    Losartan      Other reaction(s): Angioedema  Pt and family state this is not an allergy      Aspirin Dermatitis and Edema    Codeine Edema    Erythromycin Base Other (See Comments)    Ibuprofen Other (See Comments)     Patient denies allergy    Penicillins Hives and Edema     However, patient has tolerated dissimilar side chain Cephalosporins (Cefazolin, Cefuroxime)       Objective     Intake/Output Summary (Last 24 hours) at 10/29/18 1632  Last data filed at 10/28/18 2225   Gross per 24 hour   Intake              295 ml   Output              400 ml   Net             -105 ml       Invasive Devices:        General: patient in NAD  Skin:  No new rash  Eyes:  No scleral icterus  ENT:  Dry mucous membranes  Neck:  Supple no adenopathy  Chest:  Coarse breath sounds  CVS:  S1-S2  Abdomen:  Soft mild tenderness to moderate palpation and left lower quadrant area no rebound tenderness noted  Extremities:  No edema  Neuro:  Nonfocal  Psych:  As per daughter slightly lethargic although the patient had difficulty putting in her hearing aids  Current Weight: Weight - Scale: 54 4 kg (119 lb 14 9 oz)  First Weight: Weight - Scale: 55 6 kg (122 lb 9 2 oz)    Lab Results:  I have personally reviewed pertinent labs    CBC: Lab Results   Component Value Date    WBC 4 82 10/28/2018    WBC 4 37 12/10/2015    RBC 5 05 10/28/2018    RBC 3 34 (L) 12/10/2015     CMP: Lab Results   Component Value Date     10/29/2018     12/10/2015     10/29/2018     12/10/2015    CO2 20 (L) 10/29/2018    CO2 28 7 12/10/2015    ANIONGAP 7 12/10/2015    BUN 37 (H) 10/29/2018    BUN 26 (H) 12/10/2015    CREATININE 1 63 (H) 10/29/2018    CREATININE 1 16 12/10/2015    GLUCOSE 86 12/10/2015    CALCIUM 9 8 10/29/2018    CALCIUM 8 0 (L) 12/10/2015    AST 59 (H) 10/28/2018    AST 21 12/05/2015    ALT 31 10/28/2018    ALT <10 (L) 12/05/2015    ALKPHOS 251 (H) 10/28/2018    ALKPHOS 77 12/05/2015    PROT 6 1 (L) 12/05/2015    BILITOT 0 73 12/05/2015    EGFR 27 10/29/2018     Phosphorus: No results found for: PHOS  Magnesium:   Lab Results   Component Value Date    MG 1 4 (L) 12/10/2015     Urinalysis: Lab Results   Component Value Date    COLORU Yellow 10/21/2018    COLORU Yellow 12/04/2015    CLARITYU Slightly Cloudy 10/21/2018    CLARITYU Clear 12/04/2015    SPECGRAV 1 010 10/21/2018    SPECGRAV 1 025 12/04/2015    PHUR 5 5 10/21/2018    PHUR 5 5 12/04/2015    LEUKOCYTESUR Moderate (A) 10/21/2018    LEUKOCYTESUR Negative 12/04/2015    NITRITE Positive (A) 10/21/2018    NITRITE Positive (A) 12/04/2015    PROTEINUA Negative 10/21/2018    PROTEINUA Negative 12/04/2015    GLUCOSEU Negative 10/21/2018    GLUCOSEU Negative 12/04/2015    KETONESU Negative 10/21/2018    KETONESU Negative 12/04/2015    BILIRUBINUR Negative 10/21/2018    BILIRUBINUR Negative 12/04/2015    BLOODU Trace-Intact (A) 10/21/2018    BLOODU Negative 12/04/2015     BMP: Lab Results   Component Value Date    GLUCOSE 86 12/10/2015    CO2 20 (L) 10/29/2018    CO2 28 7 12/10/2015    BUN 37 (H) 10/29/2018    BUN 26 (H) 12/10/2015    CREATININE 1 63 (H) 10/29/2018    CREATININE 1 16 12/10/2015    CALCIUM 9 8 10/29/2018    CALCIUM 8 0 (L) 12/10/2015

## 2018-10-29 NOTE — PLAN OF CARE
GASTROINTESTINAL - ADULT     Maintains adequate nutritional intake Not Progressing        Nutrition/Hydration-ADULT     Nutrient/Hydration intake appropriate for improving, restoring or maintaining nutritional needs Not Progressing          DISCHARGE PLANNING - CARE MANAGEMENT     Discharge to post-acute care or home with appropriate resources Progressing        INFECTION - ADULT     Absence or prevention of progression during hospitalization Progressing        MUSCULOSKELETAL - ADULT     Maintain or return mobility to safest level of function Progressing        PAIN - ADULT     Verbalizes/displays adequate comfort level or baseline comfort level Progressing        Potential for Falls     Patient will remain free of falls Progressing        Prexisting or High Potential for Compromised Skin Integrity     Skin integrity is maintained or improved Progressing

## 2018-10-30 NOTE — ASSESSMENT & PLAN NOTE
· Rate controlled since admission, INR was supratherapeutic at 5 7, likely due to poor oral intake  Received vitamin K x 1 (5mg dose)  · Continue Metoprolol   · INR 1 9  Coumadin resumed  · Follow up INR in a m

## 2018-10-30 NOTE — TELEPHONE ENCOUNTER
Called and spoke to Hospitalist at [address] [place] [address] taking care of [employer] like they are treating carolyn's back pain with brace, PT and medication & still working up her abd pain    Once it is time for Carolyn's discharge planning, Mariusz Guillaume should schedule a follow up with GI specialist as Carolyn may need further testing/work up    thx

## 2018-10-30 NOTE — PROGRESS NOTES
Progress Note- Mickael Litten 80 y o  female MRN: [id number]    Unit/Bed#: -01 Encounter: [id number]      Assessment and Plan:    Nausea, poor PO intake  Abdominal pain  -suspect multifactorial, exacerbated by back pain, constipation, possibly gastritis versus PUD  -follow-up mesenteric doppler  -Would hold off on procedures given age, comorbidities  Do not feel that endoscopic procedures would   She is currently on treatment for PUD or gastritis with PPI and carafate, would increase the PPI to BID  If this is the cause of pain it can take time to improve  She has no evidence of bleeding    -Elevated alkphos/ggt, mild elevation of tbili that has improved  RUQ U/S was unremarkable  No ductal dilation  Continue to monitor  If above negative and she has persistent pain can consider a HIDA scan to rule out acalculous cholecystitis  This is less likely given no fever or white count  -Recommend miralax daily  -Encourage PO intake  -palliative care is following and she is on decadron and zyprexa    ______________________________________________________________________    Subjective:     She reports epigastric and RUQ pain, nausea  She has not had a bowel movement today       Medication Administration - last 24 hours from 10/29/2018 1133 to 10/30/2018 1133       Date/Time Order Dose Route Action Action by     10/30/2018 0852 acetaminophen (TYLENOL) tablet 975 mg 650 mg Oral Given Brianne Perez RN     10/30/2018 6825 acetaminophen (TYLENOL) tablet 975 mg 975 mg Oral Not Given Bindu Hughes, RAYMUNDO     10/29/2018 2219 acetaminophen (TYLENOL) tablet 975 mg 975 mg Oral Given Bindu Hughes, RAYMUNDO     10/29/2018 1518 acetaminophen (TYLENOL) tablet 975 mg 975 mg Oral Not Given Brianne Perez, RAYMUNDO     10/29/2018 1210 acetaminophen (TYLENOL) tablet 975 mg 325 mg Oral Given Brianne Perez, RAYMUNDO     10/29/2018 1809 atorvastatin (LIPITOR) tablet 20 mg 20 mg Oral Given Brianne Perez, RN     10/30/2018 0859 fluticasone (FLONASE) 50 mcg/act nasal spray 1 spray 1 spray Each Nare Given Loc Loop, RN     10/30/2018 0985 levothyroxine tablet 88 mcg 88 mcg Oral Given Suha Rings, RN     10/30/2018 3799 metoprolol tartrate (LOPRESSOR) tablet 25 mg 25 mg Oral Given Loc Loop, RN     10/29/2018 2219 metoprolol tartrate (LOPRESSOR) tablet 25 mg 25 mg Oral Given Suha Rings, RN     10/30/2018 0844 lidocaine (LIDODERM) 5 % patch 1 patch 1 patch Topical Medication Applied Loc Loop, RN     10/29/2018 2228 lidocaine (LIDODERM) 5 % patch 1 patch 1 patch Topical Patch Removed Mariajose Pleiss, RN     10/29/2018 1225 diclofenac sodium (VOLTAREN) 1 % topical gel 2 g 2 g Topical Not Given Loc Loop, RN     10/30/2018 1111 oxyCODONE (ROXICODONE) IR tablet 2 5 mg 2 5 mg Oral Given Loc Loop, RN     10/30/2018 0507 oxyCODONE (ROXICODONE) IR tablet 2 5 mg 2 5 mg Oral Given Mariajose Pleiss, RN     10/29/2018 1320 ondansetron (ZOFRAN-ODT) dispersible tablet 4 mg 4 mg Oral Given Loc Loop, RN     10/30/2018 0911 dexamethasone (DECADRON) tablet 2 mg 2 mg Oral Given Loc Loop, RN     10/29/2018 2223 OLANZapine (ZyPREXA) tablet 2 5 mg 2 5 mg Oral Given Suha Rings, RN     10/29/2018 1809 warfarin (COUMADIN) tablet 3 mg 3 mg Oral Given Loc Loop, RN     10/29/2018 2223 melatonin tablet 3 mg 3 mg Oral Given Mariajose Pleiss, RN     10/29/2018 1701 sodium chloride 0 9 % infusion 0 mL/hr Intravenous Stopped Loc Loop, RN     10/29/2018 1520 sodium chloride 0 9 % infusion 60 mL/hr Intravenous [address] Loc Loop, RN     10/30/2018 0854 polyethylene glycol (MIRALAX) packet 17 g 17 g Oral Given Loc Loop, RN     10/30/2018 9754 pantoprazole (PROTONIX) EC tablet 40 mg 40 mg Oral Given Suha Rings, RN     10/29/2018 1703 sodium chloride 0 9 % infusion 60 mL/hr Intravenous [address] Loc Lainer RN     10/30/2018 9624 sucralfate (CARAFATE) oral suspension 1,000 mg 1,000 mg Oral Given Loc Lanier RN          Objective:     Vitals: Blood pressure 138/68, pulse 60, temperature 97 5 °F (36 4 °C), temperature source Oral, resp  rate 20, weight 54 4 kg (119 lb 14 9 oz), SpO2 92 %, not currently breastfeeding  ,Body mass index is 23 42 kg/m²  Intake/Output Summary (Last 24 hours) at 10/30/18 1133  Last data filed at 10/30/18 1001   Gross per 24 hour   Intake              101 ml   Output              828 ml   Net             -727 ml       Physical Exam:   General Appearance: Awake and alert, in no acute distress  Abdomen: Soft, mildly tender, non-distended; bowel sounds normal  Skin: No jaundice    Invasive Devices     Peripheral Intravenous Line            Peripheral IV 10/27/18 Left Arm 2 days                Lab Results:  Admission on 10/21/2018   No results displayed because visit has over 200 results  Imaging Studies: I have personally reviewed pertinent imaging studies

## 2018-10-30 NOTE — ASSESSMENT & PLAN NOTE
· Incidental finding on CT scan  Unclear inciting event, however she did have a recent fall and is chronically kyphotic  · Added low-dose oxycodone /[place]qua K/Lidoderm/Tylenol around the clock  · Continue with TLSO  Brace  · Family declining rehabilitation  but  Are  agreeable to home physical therapy    · Palliative Medicine input appreciated

## 2018-10-30 NOTE — PROGRESS NOTES
Progress Note - Palliative & Supportive Care  Dov Fischer  80 y o   female  /-01   MRN: [id number]  Encounter: [id number]     Assessment  Fall with right rib fracture  Incident T9 compression fracture  Hx of distal femur fracture status post intramedullary nixon fixation  Impaired ambulation - uses a walker at baseline  Hiatal hernia  Weight loss  Nausea  Decreased appetite  Moderate to severe thoracic kyphoscoliosis  Chronic diastolic heart failure  CKD III  Chronic atrial fibrillation  Chronic use of anticoagulation  HTN  Frailty    Plan:  Symptom Management:   Pain - patient is status post a fall a few weeks ago resulting in a known right rib fracture  During this admission an incidental acute T9 compression fracture was also found  Her ambulatory ability was already impaired at baseline with her femur fracture and mod-severe thoracic kyphoscoliosis  Her current analgesic regime includes:   - tylenol 975mg PO TID   - Lidocaine patch   - voltaren gel   - short term use of dexamethasone 2mg daily   - PRN use of oxycodone 2 5mg - asked RN to give one at the end of my visit  Given the nature of her acute injuries, incident pain is expected  Prior to PT sessions or personal care she should be premedicated with oxycodone  Given her overall level of frailty, expectations for her level of function may need to be addressed  She can't take NSAIDS due to her impaired renal function and use of anticoagulation  Nausea/weight loss/decreased appetite - I imagine that these changes are also likely due to pain, however she also has a significant hiatal hernia  - Protonix 40mg daily   - Bowel regime   - Dexamethasone 2mg daily   - Zyprexa 2 5mg qhs   - Carafate and ATC zofran were not effective but zofran remains PRN    Goals of Care:  Level 1 code status  While there is not one defining end stage illness present in this patient, she is overall very frail    Her acute injuries of her rib and compression fractures and her prolonged hospitalization have resulted in a functional decline and the beginning stages of adult failure to thrive  These new injuries may result in permanent changes to her functional status  Family is not accepting of SNF for rehab and will only consider rehab efforts at home or [facility]  Daughter was not present throughout the morning  History  Patient is up and in her recliner with a brace  She is complaining of back pain and lower abdominal pain  She continues to complain of nausea and dry heaves but is not vomiting  She does not have much of an appetite  ROS: Pertinent items are noted in HPI      Meds  all current active meds have been reviewed and current meds:   Current Facility-Administered Medications   Medication Dose Route Frequency    acetaminophen (TYLENOL) tablet 975 mg  975 mg Oral Q8H    atorvastatin (LIPITOR) tablet 20 mg  20 mg Oral Daily With Dinner    dexamethasone (DECADRON) tablet 2 mg  2 mg Oral Daily    fluticasone (FLONASE) 50 mcg/act nasal spray 1 spray  1 spray Each Nare Daily    levothyroxine tablet 88 mcg  88 mcg Oral Early Morning    lidocaine (LIDODERM) 5 % patch 1 patch  1 patch Topical Daily    melatonin tablet 3 mg  3 mg Oral HS    metoprolol tartrate (LOPRESSOR) tablet 25 mg  25 mg Oral Q12H [address]    OLANZapine (ZyPREXA) tablet 2 5 mg  2 5 mg Oral HS    ondansetron (ZOFRAN-ODT) dispersible tablet 4 mg  4 mg Oral Q4H PRN    oxyCODONE (ROXICODONE) IR tablet 2 5 mg  2 5 mg Oral Q4H PRN    pantoprazole (PROTONIX) EC tablet 40 mg  40 mg Oral BID AC    polyethylene glycol (MIRALAX) packet 17 g  17 g Oral Daily PRN    polyethylene glycol (MIRALAX) packet 17 g  17 g Oral Daily    senna (SENOKOT) tablet 8 6 mg  1 tablet Oral HS PRN    sucralfate (CARAFATE) oral suspension 1,000 mg  1,000 mg Oral Q6H KENISHA    warfarin (COUMADIN) tablet 3 mg  3 mg Oral Daily (warfarin)     Allergies   Allergen Reactions    Ace Inhibitors Angioedema Annotation - [id number]: angioedema to ARB  Other reaction(s): Angioedema  Pt and family state not an allergy    Angiotensin Receptor Blockers Angioedema    Erythromycin Ethylsuccinate GI Intolerance    Losartan      Other reaction(s): Angioedema  Pt and family state this is not an allergy      Aspirin Dermatitis and Edema    Codeine Edema    Erythromycin Base Other (See Comments)    Ibuprofen Other (See Comments)     Patient denies allergy    Penicillins Hives and Edema     However, patient has tolerated dissimilar side chain Cephalosporins (Cefazolin, Cefuroxime)     Objective  Physical Exam: /68 (BP Location: Right arm)   Pulse 60   Temp 97 5 °F (36 4 °C) (Oral)   Resp 20   Wt 54 4 kg (119 lb 14 9 oz)   LMP  (LMP Unknown)   SpO2 92%   BMI 23 42 kg/m²   Constitutional: frail elderly frail, reclining in bed  She appears uncomfortable sitting in recliner   Head: Normocephalic and atraumatic  Eyes: EOM are normal  Right eye exhibits no discharge  Left eye exhibits no discharge  No scleral icterus  Pulmonary/Chest: Effort normal  No stridor  No respiratory distress  Abdominal: No distension  Musculoskeletal: No edema  Neurological: Alert, oriented and appropriately conversant  Skin: Skin is dry, not diaphoretic  Psychiatric: Displays a normal mood and affect  Behavior, judgement and thought content appear normal      Lab Results:   I have personally reviewed pertinent labs  , CBC:   Lab Results   Component Value Date    WBC 7 14 10/30/2018    HGB 16 7 (H) 10/30/2018    HCT 51 1 (H) 10/30/2018     (H) 10/30/2018     10/30/2018    MCH 33 0 10/30/2018    MCHC 32 7 10/30/2018    RDW 15 5 (H) 10/30/2018    MPV 10 3 10/30/2018    NRBC 0 10/30/2018   , CMP:   Lab Results   Component Value Date     10/30/2018    K 4 5 10/30/2018     10/30/2018    CO2 25 10/30/2018    BUN 37 (H) 10/30/2018    CREATININE 1 55 (H) 10/30/2018    CALCIUM 9 3 10/30/2018    AST 66 (H) 10/30/2018    ALT 41 10/30/2018    ALKPHOS 242 (H) 10/30/2018    EGFR 28 10/30/2018       Counseling / Coordination of Care  Total floor / unit time spent today 20 minutes       Barrera Jung MD  Palliative & Supportive Care  Office number: [phone]

## 2018-10-30 NOTE — ASSESSMENT & PLAN NOTE
Improved to 1 55 today  Diuretics on hold  Renal evaluation appreciated  Follow up on postvoid residual on bladder scan    Avoid nephrotoxin medications

## 2018-10-30 NOTE — PROGRESS NOTES
Progress Note Fernando Galvez [birth year], 80 y o  female MRN: [id number]    Unit/Bed#: -01 Encounter: [id number]    Primary Care Provider: Cielo Man DO   Date and time admitted to hospital: 10/21/2018  1:50 PM        * Abdominal pain   Assessment & Plan    · POA, has been on going for the last few weeks  Similar in character to prior abdominal pain that was attributed to hiatal hernia  · During this admission, patient's daughter was at bedside and was frustrated that her mother has had "no improvement" since the time she got to the hospital and wants to know if more tests should be ordered to "get to the bottom of this "    · CT scan without any contrast was negative for acute findings and outpatient  RUQ US negative for evidence of GB stones  Poor intake/Pt states that her pain has not improved  · Will Check mesenteric duplex  · Continue with Protonix, Carafate  HIDA scan of continues to complain of ongoing abdominal pain  ·        Closed wedge compression fracture of ninth thoracic vertebra [facility])   Assessment & Plan    · Incidental finding on CT scan  Unclear inciting event, however she did have a recent fall and is chronically kyphotic  · Added low-dose oxycodone /Aqua K/Lidoderm/Tylenol around the clock  · Continue with TLSO  Brace  · Family declining rehabilitation  but  Are  agreeable to home physical therapy  · Palliative Medicine input appreciated     SHANEL (acute kidney injury) ([facility] [address] )   Assessment & Plan    Improved to 1 55 today  Diuretics on hold  Renal evaluation appreciated  Follow up on postvoid residual on bladder scan    Avoid nephrotoxin medications     Thrombocytopenia (HCC)   Assessment & Plan    · Appears trending up  · Will monitor closely     Hypokalemia   Assessment & Plan    · Continue to replete     Moderate protein-calorie malnutrition (HCC)   Assessment & Plan    Malnutrition Findings:   Malnutrition type: Acute illness  Degree of Malnutrition: Malnutrition of moderate degree (inadequate oral intake)    BMI Findings: Body mass index is 23 42 kg/m²  Needs to drink protein supplements, offered pudding      Acute cystitis without hematuria   Assessment & Plan    · POA, abnormal urinalysis  ·  Antibiotics Course is now complete  · Blood cultures negative     Chronic diastolic CHF (congestive heart failure) ([facility] )   Assessment & Plan    · Patient appears euvolemic  · Poor intake last 24 hours  Clinically dry with creatinine bump  · Gentle IV fluid started yesterday  Will increase rate given worsening creatinine  · Spoke with Dr Jason Mejía with [facility] per family request  Made aware  OK to hold diuretics     Chronic atrial fibrillation ([facility] )   Assessment & Plan    · Rate controlled since admission, INR was supratherapeutic at 5 7, likely due to poor oral intake  Received vitamin K x 1 (5mg dose)  · Continue Metoprolol   · INR 1 9  Coumadin resumed  · Follow up INR in a m  Essential hypertension   Assessment & Plan    · BP acceptable for her age  ·          VTE Pharmacologic Prophylaxis:   Pharmacologic: Enoxaparin (Lovenox)  Mechanical VTE Prophylaxis in Place: No    Patient Centered Rounds: I have performed bedside rounds with nursing staff today  Discussions with Specialists or Other Care Team Provider:  Nephrology    Education and Discussions with Family / Patient:  Discussed with patient's daughter    Time Spent for Care: 30 minutes  More than 50% of total time spent on counseling and coordination of care as described above  Current Length of Stay: 9 day(s)    Current Patient Status: Inpatient   Certification Statement: The patient will continue to require additional inpatient hospital stay due to Ongoing pain control    Discharge Plan: To home with home visiting nurses and physical therapy once medically stable for discharge  Code Status: Level 1 - Full Code      Subjective:   Patient continues to complain of shoulder and back pain    Feels fatigue and has poor appetite  Objective:     Vitals:   Temp (24hrs), Av 5 °F (36 4 °C), Min:97 5 °F (36 4 °C), Max:97 6 °F (36 4 °C)    Temp:  [97 5 °F (36 4 °C)-97 6 °F (36 4 °C)] 97 5 °F (36 4 °C)  HR:  [60-83] 60  Resp:  [18-20] 20  BP: (138-169)/(68-90) 138/68  SpO2:  [92 %-95 %] 92 %  Body mass index is 23 42 kg/m²  Input and Output Summary (last 24 hours): Intake/Output Summary (Last 24 hours) at 10/30/18 1501  Last data filed at 10/30/18 1001   Gross per 24 hour   Intake              101 ml   Output              828 ml   Net             -727 ml       Physical Exam:     Physical Exam   Constitutional: She is oriented to person, place, and time  She appears distressed  HENT:   Head: Normocephalic and atraumatic  Mouth/Throat: Oropharynx is clear and moist    Eyes: Pupils are equal, round, and reactive to light  Neck: Normal range of motion  Neck supple  Cardiovascular: Normal rate and regular rhythm  Pulmonary/Chest:   Severe kyphosis   TLSO brace in place   Abdominal: Soft  Bowel sounds are normal  She exhibits no distension  There is no tenderness  There is no rebound and no guarding  Musculoskeletal: She exhibits no edema  Neurological: She is alert and oriented to person, place, and time  Skin: Skin is warm   She is not diaphoretic      )    Additional Data:     Labs:      Results from last 7 days  Lab Units 10/30/18  0516   WBC Thousand/uL 7 14   HEMOGLOBIN g/dL 16 7*   HEMATOCRIT % 51 1*   PLATELETS Thousands/uL 167   NEUTROS PCT % 88*   LYMPHS PCT % 7*   MONOS PCT % 4   EOS PCT % 0       Results from last 7 days  Lab Units 10/30/18  0516   SODIUM mmol/L 138   POTASSIUM mmol/L 4 5   CHLORIDE mmol/L 103   CO2 mmol/L 25   BUN mg/dL 37*   CREATININE mg/dL 1 55*   ANION GAP mmol/L 10   CALCIUM mg/dL 9 3   ALBUMIN g/dL 3 1*   TOTAL BILIRUBIN mg/dL 2 00*   ALK PHOS U/L 242*   ALT U/L 41   AST U/L 66*       Results from last 7 days  Lab Units 10/28/18  0617   INR  1 90* * I Have Reviewed All Lab Data Listed Above  * Additional Pertinent Lab Tests Reviewed: Sam [facility] Admission Reviewed    Imaging:    Imaging Reports Reviewed Today Include: NA      Recent Cultures (last 7 days):           Last 24 Hours Medication List:     Current Facility-Administered Medications:  acetaminophen 975 mg Oral Q8H Dariel Kaur PA-C   atorvastatin 20 mg Oral Daily With Viveros'sLINDSAY   dexamethasone 2 mg Oral Daily Dipika Ramirez MD   fluticasone 1 spray Each Nare Daily Dariel Mason PA-C   levothyroxine 88 mcg Oral Early Morning Dariel Mason PA-C   lidocaine 1 patch Topical Daily Dariel Mason PA-C   melatonin 3 mg Oral HS Monica Lr MD   metoprolol tartrate 25 mg Oral Q12H [address] Dariel Kaur PA-C   OLANZapine 2 5 mg Oral HS Dipika Ramirez MD   ondansetron 4 mg Oral Q4H PRN Dipika Ramirez MD   oxyCODONE 2 5 mg Oral Q4H PRN Jacqueline Lemus PA-C   pantoprazole 40 mg Oral BID AC Raad Palmer PA-C   polyethylene glycol 17 g Oral Daily PRN Jacqueline Lemus PA-C   polyethylene glycol 17 g Oral Daily Raad Palmer PA-C   senna 1 tablet Oral HS PRN Jacqueline Lemus PA-C   sucralfate 1,000 mg Oral Q6H [address] Raad Palemr PA-C   warfarin 3 mg Oral Daily (warfarin) Monica Lr MD        Today, Patient Was Seen By: Phyllis Rojas MD    ** Please Note: Dictation voice to text software may have been used in the creation of this document   **

## 2018-10-30 NOTE — TELEPHONE ENCOUNTER
Pt daughter notified and states she has been talking to them about it but is concerned about the decline she is taking  Did state she is discussing her concerns with them all the time  States "They are looking at it she is 80year old and what would you like us to do" and this is upsetting her

## 2018-10-30 NOTE — PHYSICAL THERAPY NOTE
Physical Therapy Progress Note     10/30/18 7262   Pain Assessment   Pain Assessment 0-10   Pain Score 9   Pain Type Acute pain   Pain Location Abdomen;Back   Pain Orientation Lower; Posterior   Restrictions/Precautions   Weight Bearing Precautions Per Order No   Braces or Orthoses TLSO;Other (Comment)   Other Precautions (medi brace)   General   Chart Reviewed Yes   Response to Previous Treatment Other (Comment)  (Pt noted that brace caused increased pain LV )   Family/Caregiver Present No   Cognition   Overall Cognitive Status Impaired   Arousal/Participation Alert; Cooperative   Attention Within functional limits   Orientation Level Oriented to person;Oriented to place;Oriented to situation   Memory Decreased recall of precautions;Decreased recall of recent events   Following Commands Follows one step commands with increased time or repetition   Comments Pt identified ny name and id bracelt, agreeable to treatment  Bed Mobility   Rolling L 4  Minimal assistance   Additional items Assist x 1;Bedrails;Verbal cues   Supine to Sit 4  Minimal assistance   Additional items Assist x 1; Increased time required;HOB elevated;Verbal cues; Bedrails   Sit to Supine 3  Moderate assistance   Additional items Assist x 1; Increased time required;Verbal cues;LE management   Additional Comments Pt boosted up in bed, was comfortable to end  Transfers   Sit to Stand 4  Minimal assistance   Additional items Assist x 1; Armrests; Increased time required;Verbal cues   Stand to Sit 4  Minimal assistance   Additional items Assist x 1; Increased time required;Verbal cues; Bedrails   Ambulation/Elevation   Gait pattern Not tested  (Pt refused to wear brace )   Balance   Static Sitting Fair +   Dynamic Sitting Fair -   Static Standing Fair   Endurance Deficit   Endurance Deficit Yes   Endurance Deficit Description general deconditioning   Activity Tolerance   Activity Tolerance Patient limited by fatigue;Patient limited by pain   Nurse Made Aware yes, ok to see   Assessment   Prognosis Fair   Problem List Decreased strength;Decreased range of motion;Decreased endurance;Pain;Orthopedic restrictions; Impaired hearing;Decreased cognition;Decreased mobility; Impaired balance   Assessment Pt found supine at beginning of session, very lethargic  She required min Ax1 for bed mobility today having increased back pain with transfers  Educated pt on proper brace fitting and usage throughout session  She refused to use it noting that it causes too much pain and dosn't fit right  With a second attempt of education for therapy to adjust brace for proper fit, pt continued to refuse  She sat at the EOB for 10' and requested to go back to bed  Pt had all needs met and callbell in reach  She would benefit from continued PT in order to promote safe and functional mobility  Barriers to Discharge Decreased caregiver support; Inaccessible home environment  (ETMI, home alone for periods of the day)   Goals   Patient Goals to go home   STG Expiration Date 11/02/18   Short Term Goal #1 As per PT 10/22/18: : Pt will: Perform bed mobility tasks to Supervision to increase Indep in prior living environment  Perform transfers to Supervision to increase Indep in prior living environment  Perform ambulation with rolling walker for >50' to Supervision to decrease risk for falls and increase indep while others at work  Perofrm 3 steps with railing to return to home with TEMI   Treatment Day 4   Plan   Treatment/Interventions Functional transfer training;LE strengthening/ROM; Elevations;Cognitive reorientation; Bed mobility;Gait training;Equipment eval/education;Patient/family training; Endurance training; Therapeutic exercise;Spoke to nursing   Progress Progressing toward goals   PT Frequency Other (Comment)  (3-5x/wk)   Recommendation   Recommendation Post acute IP rehab; Other (Comment)  (vs home and family support)   Equipment Recommended Walker; Other (Comment)  (short)   Additional Comments pt is non compliant with brace use       Velinda Dills, PTA

## 2018-10-30 NOTE — TELEPHONE ENCOUNTER
Pt daughter notified and states she feels if that's needed they should do it while in the hospital and she feels pt is much worse since being there and is not getting any better

## 2018-10-30 NOTE — PLAN OF CARE
Problem: PHYSICAL THERAPY ADULT  Goal: Performs mobility at highest level of function for planned discharge setting  See evaluation for individualized goals  Treatment/Interventions: Functional transfer training, LE strengthening/ROM, Elevations, Cognitive reorientation, Bed mobility, Gait training, Equipment eval/education, Patient/family training, Endurance training, Therapeutic exercise, Spoke to nursing, Spoke to advanced practitioner  Equipment Recommended: Vaishnavi Loaiza       See flowsheet documentation for full assessment, interventions and recommendations  Outcome: Progressing  Prognosis: Fair  Problem List: Decreased strength, Decreased range of motion, Decreased endurance, Pain, Orthopedic restrictions, Impaired hearing, Decreased cognition, Decreased mobility, Impaired balance  Assessment: Pt found supine at beginning of session, very lethargic  She required min Ax1 for bed mobility today having increased back pain with transfers  Educated pt on proper brace fitting and usage throughout session  She refused to use it noting that it causes too much pain and dosn't fit right  With a second attempt of education for therapy to adjust brace for proper fit, pt continued to refuse  She sat at the EOB for 10' and requested to go back to bed  Pt had all needs met and callbell in reach  She would benefit from continued PT in order to promote safe and functional mobility  Barriers to Discharge: Decreased caregiver support, Inaccessible home environment (TEMI, home alone for periods of the day)     Recommendation: Post acute IP rehab, Other (Comment) (vs home and family support)          See flowsheet documentation for full assessment

## 2018-10-30 NOTE — ASSESSMENT & PLAN NOTE
· POA, has been on going for the last few weeks  Similar in character to prior abdominal pain that was attributed to hiatal hernia  · During this admission, patient's daughter was at bedside and was frustrated that her mother has had "no improvement" since the time she got to the hospital and wants to know if more tests should be ordered to "get to the bottom of this "    · CT scan without any contrast was negative for acute findings and outpatient  RUQ US negative for evidence of GB stones  Poor intake/Pt states that her pain has not improved  · Will Check mesenteric duplex  · Continue with Protonix, Carafate  HIDA scan of continues to complain of ongoing abdominal pain    ·

## 2018-10-30 NOTE — ASSESSMENT & PLAN NOTE
· Patient appears euvolemic  · Poor intake last 24 hours  Clinically dry with creatinine bump  · Gentle IV fluid started yesterday  Will increase rate given worsening creatinine  · Spoke with Dr Neda Granado with [facility] per family request  Made aware   OK to hold diuretics

## 2018-10-31 NOTE — ASSESSMENT & PLAN NOTE
Improved to 1 22 today  Diuretics on hold  Renal evaluation appreciated     Avoid nephrotoxin medications

## 2018-10-31 NOTE — PHYSICAL THERAPY NOTE
Physical Therapy Cancellation Note              Pt unavailable at this time for PT intervention as pt off floor for Hida scan  Will follow for Pt intervention at a later time as appropriate for mobility training, strengthening and to assist in d/c plans and recommendations   Any Murrell, PTA

## 2018-10-31 NOTE — PROGRESS NOTES
Progress Note - Palliative & Supportive Care  Elly Mendoza  80 y o   female  /-01   MRN: [id number]  Encounter: [id number]     Assessment  Fall with right rib fracture  Incident T9 compression fracture  Hx of distal femur fracture status post intramedullary nixon fixation  Impaired ambulation - uses a walker at baseline  Hiatal hernia  Weight loss  Nausea  Decreased appetite  Moderate to severe thoracic kyphoscoliosis  Chronic diastolic heart failure  CKD III  Chronic atrial fibrillation  Chronic use of anticoagulation  HTN  Frailty    Plan:  Symptom Management:   Pain - patient is status post a fall a few weeks ago resulting in a known right rib fracture   During this admission an incidental acute T9 compression fracture was also found   Her ambulatory ability was already impaired at baseline with her femur fracture and mod-severe thoracic kyphoscoliosis   Her current analgesic regime includes:   - tylenol 975mg PO TID   - Lidocaine patch   - voltaren gel   - short term use of dexamethasone 2mg daily   - PRN use of oxycodone 2 5mg - asked RN to give one at the end of my visit  Given the nature of her acute injuries, incident pain is expected   Prior to PT sessions or personal care she should be premedicated with oxycodone   Given her overall level of frailty, expectations for her level of function may need to be addressed   She can't take NSAIDS due to her impaired renal function and use of anticoagulation      Nausea/weight loss/decreased appetite - I imagine that these changes are also likely due to pain, however she also has a significant hiatal hernia      - Protonix 40mg daily   - Bowel regime   - Dexamethasone 2mg daily   - Carafate and    - ATC zofran as not effective   - Zyprexa 2 5mg qhs - discontinued due to lack of symptom improvement     Goals of Care:  Level 1 code status  While there is not one defining end stage illness present in this patient, she is overall very frail   Her acute injuries of her rib and compression fractures and her prolonged hospitalization have resulted in a functional decline and the beginning stages of adult failure to thrive   These new injuries may result in permanent changes to her functional status   Family is not accepting of SNF for rehab and will only consider rehab efforts at home or [facility]  Daughter was not present throughout the morning  History  Patient was reclining and resting in bed when I entered  Her eyes opened to voice  She continues to report pain in her back  She states that her nausea is "ok but not great "    ROS: Pertinent items are noted in HPI      Meds  all current active meds have been reviewed and current meds:   Current Facility-Administered Medications   Medication Dose Route Frequency    acetaminophen (TYLENOL) tablet 975 mg  975 mg Oral Q8H    atorvastatin (LIPITOR) tablet 20 mg  20 mg Oral Daily With Dinner    dexamethasone (DECADRON) tablet 2 mg  2 mg Oral Daily    fluticasone (FLONASE) 50 mcg/act nasal spray 1 spray  1 spray Each Nare Daily    levothyroxine tablet 88 mcg  88 mcg Oral Early Morning    lidocaine (LIDODERM) 5 % patch 1 patch  1 patch Topical Daily    melatonin tablet 3 mg  3 mg Oral HS    metoprolol tartrate (LOPRESSOR) tablet 25 mg  25 mg Oral Q12H [address]    OLANZapine (ZyPREXA) tablet 2 5 mg  2 5 mg Oral HS    ondansetron (ZOFRAN-ODT) dispersible tablet 4 mg  4 mg Oral Q4H PRN    oxyCODONE (ROXICODONE) IR tablet 2 5 mg  2 5 mg Oral Q4H PRN    pantoprazole (PROTONIX) EC tablet 40 mg  40 mg Oral BID AC    polyethylene glycol (MIRALAX) packet 17 g  17 g Oral Daily PRN    polyethylene glycol (MIRALAX) packet 17 g  17 g Oral BID    saccharomyces boulardii (FLORASTOR) capsule 250 mg  250 mg Oral BID    senna (SENOKOT) tablet 8 6 mg  1 tablet Oral HS PRN    sucralfate (CARAFATE) oral suspension 1,000 mg  1,000 mg Oral Q6H [address]    warfarin (COUMADIN) tablet 3 mg  3 mg Oral Daily (warfarin)       Allergies Allergen Reactions    Ace Inhibitors Angioedema     Annotation - [id number]: angioedema to ARB  Other reaction(s): Angioedema  Pt and family state not an allergy    Angiotensin Receptor Blockers Angioedema    Erythromycin Ethylsuccinate GI Intolerance    Losartan      Other reaction(s): Angioedema  Pt and family state this is not an allergy      Aspirin Dermatitis and Edema    Codeine Edema    Erythromycin Base Other (See Comments)    Ibuprofen Other (See Comments)     Patient denies allergy    Penicillins Hives and Edema     However, patient has tolerated dissimilar side chain Cephalosporins (Cefazolin, Cefuroxime)     Objective  Physical Exam: /71 (BP Location: Right arm)   Pulse 70   Temp 97 8 °F (36 6 °C) (Oral)   Resp 18   Wt 54 4 kg (119 lb 14 9 oz)   LMP  (LMP Unknown)   SpO2 94%   BMI 23 42 kg/m²   Constitutional: frail elderly frail, reclining in bed  She appears comfortable until she has to move  Head: Normocephalic and atraumatic  Eyes: EOM are normal  Right eye exhibits no discharge  Left eye exhibits no discharge  No scleral icterus  Pulmonary/Chest: Effort normal  No stridor  No respiratory distress  Abdominal: No distension  Musculoskeletal: No edema  Neurological: Alert, oriented and appropriately conversant  Skin: Skin is dry, not diaphoretic  Psychiatric: Displays a normal mood and affect  Behavior, judgement and thought content appear normal      Lab Results:   I have personally reviewed pertinent labs  , CBC: No results found for: WBC, HGB, HCT, MCV, PLT, ADJUSTEDWBC, MCH, MCHC, RDW, MPV, NRBC, CMP:   Lab Results   Component Value Date     10/31/2018    K 4 1 10/31/2018     10/31/2018    CO2 23 10/31/2018    BUN 34 (H) 10/31/2018    CREATININE 1 22 10/31/2018    CALCIUM 9 3 10/31/2018    EGFR 38 10/31/2018       Counseling / Coordination of Care  Total floor / unit time spent today 20 minutes       Brain MD [place]  Palliative & Supportive Care  Office number: [phone]

## 2018-10-31 NOTE — ASSESSMENT & PLAN NOTE
· Rate controlled since admission, INR was supratherapeutic at 5 7, likely due to poor oral intake  Received vitamin K x 1 (5mg dose)  · Continue Metoprolol   · INR 1 9    Coumadin resumed  · Follow up INR closely as patient continues to have poor oral intake which could potentially cause increased INR

## 2018-10-31 NOTE — ASSESSMENT & PLAN NOTE
· Patient appears euvolemic  · Poor intake last 24 hours  Clinically dry with creatinine bump  · Spoke with Dr Lisa Loaiza with [facility] per family request  Made aware   OK to hold diuretics

## 2018-10-31 NOTE — PROGRESS NOTES
Progress Note Juno Bruce [birth year], 80 y o  female MRN: [id number]    Unit/Bed#: -01 Encounter: [id number]    Primary Care Provider: Kim Rodrigues DO   Date and time admitted to hospital: 10/21/2018  1:50 PM        * Abdominal pain   Assessment & Plan    · POA, has been on going for the last few weeks  Similar in character to prior abdominal pain that was attributed to hiatal hernia  · During this admission, patient's daughter was at bedside and was frustrated that her mother has had "no improvement" since the time she got to the hospital and wants to know if more tests should be ordered to "get to the bottom of this "    · CT scan without any contrast was negative for acute findings and outpatient  RUQ US negative for evidence of GB stones  Patient scheduled for HIDA scan today  Mesenteric duplex ultrasound also negative  · Continue with Protonix, Carafate  Closed wedge compression fracture of ninth thoracic vertebra [facility])   Assessment & Plan    · Incidental finding on CT scan  Unclear inciting event, however she did have a recent fall and is chronically kyphotic  · Added low-dose oxycodone /Aqua K/Lidoderm/Tylenol around the clock  · Continue with TLSO  Brace  · Family declining rehabilitation  but  Are  agreeable to home physical therapy  · Palliative Medicine input appreciated     SHANEL (acute kidney injury) ([facility] [address] )   Assessment & Plan    Improved to 1 22 today  Diuretics on hold  Renal evaluation appreciated     Avoid nephrotoxin medications     Thrombocytopenia (HCC)   Assessment & Plan    · Appears trending up  · Will monitor closely     Hypokalemia   Assessment & Plan    · Resolved     Moderate protein-calorie malnutrition (HCC)   Assessment & Plan    Malnutrition Findings:   Malnutrition type: Acute illness  Degree of Malnutrition: Malnutrition of moderate degree (inadequate oral intake)    BMI Findings: Body mass index is 23 42 kg/m²     Needs to drink protein supplements, offered pudding      Acute cystitis without hematuria   Assessment & Plan    · POA, abnormal urinalysis  ·  Antibiotics Course is now complete  · Blood cultures negative     Chronic diastolic CHF (congestive heart failure) ([facility] [address] )   Assessment & Plan    · Patient appears euvolemic  · Poor intake last 24 hours  Clinically dry with creatinine bump  · Spoke with Dr Cheikh Copeland with [facility] per family request  Made aware  OK to hold diuretics     Chronic atrial fibrillation ([facility] [address] )   Assessment & Plan    · Rate controlled since admission, INR was supratherapeutic at 5 7, likely due to poor oral intake  Received vitamin K x 1 (5mg dose)  · Continue Metoprolol   · INR 1 9  Coumadin resumed  · Follow up INR closely as patient continues to have poor oral intake which could potentially cause increased INR     Essential hypertension   Assessment & Plan    · BP acceptable for her age  ·          VTE Pharmacologic Prophylaxis:   Pharmacologic: Warfarin (Coumadin)  Mechanical VTE Prophylaxis in Place: No    Patient Centered Rounds: I have performed bedside rounds with nursing staff today  Discussions with Specialists or Other Care Team Provider:  Discussed with nursing  Education and Discussions with Family / Patient:  Discussed with son in law at bedside  Time Spent for Care: 30 minutes  More than 50% of total time spent on counseling and coordination of care as described above  Current Length of Stay: 10 day(s)    Current Patient Status: Inpatient   Certification Statement: The patient will continue to require additional inpatient hospital stay due to Workup for abdominal pain    Discharge Plan:  Likely next 24 hours    Code Status: Level 1 - Full Code      Subjective:   Continues to complain of abdominal pain  Remains with poor oral intake      Objective:     Vitals:   Temp (24hrs), Av 6 °F (36 4 °C), Min:97 4 °F (36 3 °C), Max:97 8 °F (36 6 °C)    Temp:  [97 4 °F (36 3 °C)-97 8 °F (36 6 °C)] 97 8 °F (36 6 °C)  HR:  [70-82] 70  Resp:  [18-20] 18  BP: (140-172)/(71-94) 150/71  SpO2:  [94 %] 94 %  Body mass index is 23 42 kg/m²  Input and Output Summary (last 24 hours): Intake/Output Summary (Last 24 hours) at 10/31/18 1456  Last data filed at 10/31/18 1100   Gross per 24 hour   Intake                0 ml   Output              300 ml   Net             -300 ml       Physical Exam:     Physical Exam   Constitutional: She is oriented to person, place, and time  Cachectic with diffuse muscle and body fat wasting   HENT:   Head: Normocephalic  Dry mucous membranes   Eyes: Pupils are equal, round, and reactive to light  Neck: Neck supple  Cardiovascular: Normal rate and regular rhythm  Pulmonary/Chest: Effort normal and breath sounds normal    Abdominal: Soft  She exhibits no mass  There is no rebound and no guarding  Right upper and lower quadrant abdominal tenderness without rebound and guarding   Musculoskeletal: She exhibits no edema  Neurological: She is alert and oriented to person, place, and time  Skin: Skin is warm  Additional Data:     Labs:      Results from last 7 days  Lab Units 10/30/18  0516   WBC Thousand/uL 7 14   HEMOGLOBIN g/dL 16 7*   HEMATOCRIT % 51 1*   PLATELETS Thousands/uL 167   NEUTROS PCT % 88*   LYMPHS PCT % 7*   MONOS PCT % 4   EOS PCT % 0       Results from last 7 days  Lab Units 10/31/18  0528 10/30/18  0516   SODIUM mmol/L 139 138   POTASSIUM mmol/L 4 1 4 5   CHLORIDE mmol/L 104 103   CO2 mmol/L 23 25   BUN mg/dL 34* 37*   CREATININE mg/dL 1 22 1 55*   ANION GAP mmol/L 12 10   CALCIUM mg/dL 9 3 9 3   ALBUMIN g/dL  --  3 1*   TOTAL BILIRUBIN mg/dL  --  2 00*   ALK PHOS U/L  --  242*   ALT U/L  --  41   AST U/L  --  66*       Results from last 7 days  Lab Units 10/31/18  0528   INR  3 53*                       * I Have Reviewed All Lab Data Listed Above  * Additional Pertinent Lab Tests Reviewed:  [facility] Admission Reviewed    Imaging:    Imaging Reports Reviewed Today Include: NA      Recent Cultures (last 7 days):           Last 24 Hours Medication List:     Current Facility-Administered Medications:  acetaminophen 975 mg Oral Q8H Dariel Kaur PA-C   atorvastatin 20 mg Oral Daily With Viveros's, LINDSAY   dexamethasone 2 mg Oral Daily Alexander Sumner MD   fluticasone 1 spray Each Nare Daily Dariel Ca PA-C   levothyroxine 88 mcg Oral Early Morning Dariel Ca PA-C   lidocaine 1 patch Topical Daily Dariel Ca PA-C   melatonin 3 mg Oral HS Sudha Anderson MD   metoprolol tartrate 25 mg Oral Q12H [address] Dariel Kaur PA-C   ondansetron 4 mg Oral Q4H PRN Alexander Sumner MD   oxyCODONE 2 5 mg Oral Q4H PRN Jacqueline Lemus, PA-SILVANA   pantoprazole 40 mg Oral BID AC Ulanda Fuel, PA-C   polyethylene glycol 17 g Oral Daily PRN Jacqueline Stoudt, PA-C   polyethylene glycol 17 g Oral BID Ulanda Fuel, PA-C   saccharomyces boulardii 250 mg Oral BID Ulanda Fuel, PA-C   senna 1 tablet Oral HS PRN Jacqueline Stoudt, LINDSAY   sucralfate 1,000 mg Oral Q6H [address] Ulanda Fuel, PA-C   warfarin 3 mg Oral Daily (warfarin) Sudha Anderson MD        Today, Patient Was Seen By: Anuel Yun MD    ** Please Note: Dictation voice to text software may have been used in the creation of this document   **

## 2018-11-01 PROBLEM — N17.9 AKI (ACUTE KIDNEY INJURY) (HCC): Status: RESOLVED | Noted: 2018-01-01 | Resolved: 2018-01-01

## 2018-11-01 PROBLEM — R10.9 ABDOMINAL PAIN: Status: RESOLVED | Noted: 2018-01-01 | Resolved: 2018-01-01

## 2018-11-01 PROBLEM — D69.6 THROMBOCYTOPENIA (HCC): Status: RESOLVED | Noted: 2018-01-01 | Resolved: 2018-01-01

## 2018-11-01 PROBLEM — N30.00 ACUTE CYSTITIS WITHOUT HEMATURIA: Status: RESOLVED | Noted: 2018-01-01 | Resolved: 2018-01-01

## 2018-11-01 PROBLEM — E87.6 HYPOKALEMIA: Status: RESOLVED | Noted: 2018-01-01 | Resolved: 2018-01-01

## 2018-11-01 NOTE — ASSESSMENT & PLAN NOTE
· Patient appears euvolemic  · Poor intake last 24 hours  Clinically dry with creatinine bump  · Spoke with Dr Portillo Client with [facility] per family request  Made aware   OK to hold diuretics

## 2018-11-01 NOTE — DISCHARGE SUMMARY
Discharge- Diana Mark [birth year], 80 y o  female MRN: [id number]    Unit/Bed#: -01 Encounter: [id number]    Primary Care Provider: Wendie Herring DO   Date and time admitted to hospital: 10/21/2018  1:50 PM        * Abdominal pain   Assessment & Plan    · POA, has been on going for the last few weeks  Similar in character to prior abdominal pain that was attributed to hiatal hernia  ·     · CT scan without any contrast was negative for acute findings and outpatient  RUQ US negative for evidence of GB stones  HIDA scan is negative as well   Mesenteric duplex ultrasound also negative  · Continue with Protonix, Carafate  Closed wedge compression fracture of ninth thoracic vertebra [facility])   Assessment & Plan    · Incidental finding on CT scan  Unclear inciting event, however she did have a recent fall and is chronically kyphotic  · Added low-dose oxycodone /Aqua K/Lidoderm/Tylenol around the clock  · Continue with TLSO  Brace  · Family declining rehabilitation  but  Are  agreeable to home physical therapy  · Palliative Medicine input appreciated     Hypokalemia   Assessment & Plan    · Resolved     Moderate protein-calorie malnutrition (HCC)   Assessment & Plan    Malnutrition Findings:   Malnutrition type: Acute illness  Degree of Malnutrition: Malnutrition of moderate degree (inadequate oral intake)    BMI Findings: Body mass index is 23 42 kg/m²  Needs to drink protein supplements, offered pudding   Patient needs to be encouraged to improve oral intake  Acute cystitis without hematuria   Assessment & Plan    · POA, abnormal urinalysis  ·  Antibiotics Course is now complete  · Blood cultures negative     Chronic diastolic CHF (congestive heart failure) ([facility] [address] )   Assessment & Plan    · Patient appears euvolemic  · Poor intake last 24 hours  Clinically dry with creatinine bump  · Spoke with Dr Theodore Chu with [facility] per family request  Made aware   OK to hold diuretics Chronic atrial fibrillation (HCC)   Assessment & Plan    · Rate controlled since admission, INR was supratherapeutic at 5 7, likely due to poor oral intake  Received vitamin K x 1 (5mg dose)  · Continue Metoprolol   · INR 1 9  Coumadin resumed  · Follow up INR closely as patient continues to have poor oral intake which could potentially cause increased INR     Essential hypertension   Assessment & Plan    · BP acceptable for her age  ·            Discharging Physician / Practitioner: Toya Jefferson MD  PCP: Kim Rodrigues DO  Admission Date:   Admission Orders     Ordered        10/21/18 1645  Inpatient Admission (expected length of stay for this patient is greater than two midnights)  Once             Discharge Date: 11/01/18    Resolved Problems  Date Reviewed: 10/27/2018    None          Consultations During Hospital Stay:  · Palliative Medicine  · Gastroenterology    Procedures Performed:     · Right upper quadrant ultrasound negative for acute cholecystitis  · Mesenteric duplex ultrasound negative for ischemia  CT scan of the abdomen and pelvis:Small amount of air in the bladder lumen  Correlate for recent catheterization versus gas from bladder infection  · 2  Moderate compression fracture of the T9 vertebral body of indeterminate age  This is new from the prior exam    Reason for Admission:  Abdominal pain    Hospital Course:     Corrinne Push is a 80 y o  female patient who originally presented to the hospital on 10/21/2018 due to abdominal pain  She had extensive workup done and gastroenterology evaluation including CT scan of the abdomen and pelvis, right upper quadrant ultrasound, HIDA scan  No etiology abdominal pain was identified other than chronic constipation hiatal hernia  She was maintained on proton pump inhibitor therapy Carafate and p r n  Laxative  Patient continued to have poor oral intake    Palliative Medicine evaluated the patient and recommended pain medication for her T9 thoracic compression fracture and poor appetite  She was also started on low-dose Decadron  Based on her frail [de-identified] discussion was done with the family at multiple occasions regarding need for placement  Family did not want any rehabilitation and wanted her going home  She was discharged home with home care services     Ongoing discussion with palliative Medicine was done regarding her goals of care  The patient remained a full code  Her acute injuries of her rib, compression fraction prolonged hospitalization had resulted in a functional decline and adult failure to thrive  This was discussed with patient's primary care physician Dr Leeanne Sacks  and her family at length  Please see above list of diagnoses and related plan for additional information  Condition at Discharge: fair     Discharge Day Visit / Exam:     Subjective:  Remains frail with poor oral intake  Vitals: Blood Pressure: 148/69 (11/01/18 1435)  Pulse: 60 (11/01/18 1435)  Temperature: 97 9 °F (36 6 °C) (11/01/18 1435)  Temp Source: Oral (11/01/18 0740)  Respirations: 16 (11/01/18 1435)  Weight - Scale: 54 4 kg (119 lb 14 9 oz) (10/21/18 1831)  SpO2: 94 % (11/01/18 1435)  Exam:   Physical Exam   Constitutional: No distress  HENT:   Head: Normocephalic  Mouth/Throat: Oropharynx is clear and moist    Eyes: Pupils are equal, round, and reactive to light  Conjunctivae are normal    Neck: Normal range of motion  Neck supple  Cardiovascular: Normal rate and regular rhythm  Pulmonary/Chest: Effort normal and breath sounds normal    Abdominal: Soft  Musculoskeletal: She exhibits no edema  Neurological: She is alert  Skin: Skin is warm  She is not diaphoretic  Discussion with Family:  Message left for daughter and grandson Discharge instructions/Information to patient and family:   See after visit summary for information provided to patient and family        Provisions for Follow-Up Care:  See after visit summary for information related to follow-up care and any pertinent home health orders  Disposition:     Home    For Discharges to [facility] SNF:   · Not Applicable to this Patient - Not Applicable to this Patient    Planned Readmission: No     Discharge Statement:  I spent 40minutes discharging the patient  This time was spent on the day of discharge  I had direct contact with the patient on the day of discharge  Greater than 50% of the total time was spent examining patient, answering all patient questions, arranging and discussing plan of care with patient as well as directly providing post-discharge instructions  Additional time then spent on discharge activities  Discharge Medications:  See after visit summary for reconciled discharge medications provided to patient and family        ** Please Note: This note has been constructed using a voice recognition system **

## 2018-11-01 NOTE — ASSESSMENT & PLAN NOTE
Malnutrition Findings:   Malnutrition type: Acute illness  Degree of Malnutrition: Malnutrition of moderate degree (inadequate oral intake)    BMI Findings: Body mass index is 23 42 kg/m²  Needs to drink protein supplements, offered pudding   Patient needs to be encouraged to improve oral intake

## 2018-11-01 NOTE — PLAN OF CARE
Problem: DISCHARGE PLANNING - CARE MANAGEMENT  Goal: Discharge to post-acute care or home with appropriate resources  INTERVENTIONS:  - Conduct assessment to determine patient/family and health care team treatment goals, and need for post-acute services based on payer coverage, community resources, and patient preferences, and barriers to discharge  - Address psychosocial, clinical, and financial barriers to discharge as identified in assessment in conjunction with the patient/family and health care team  - Arrange appropriate level of post-acute services according to patients   needs and preference and payer coverage in collaboration with the physician and health care team  - Communicate with and update the patient/family, physician, and health care team regarding progress on the discharge plan  - Arrange appropriate transportation to post-acute venues   Outcome: Completed Date Met: 11/01/18  Martina spoke with patient's daughter at bedside  Per Av, patient is medically stable for discharge  CM handed new prescriptions to patient's daughter  Daughter Ashley Leavitt would like to have the prescriptions filled by her pharmacy  CM clarified for daughter that patient will continue to take her lasix and potassium at discharge  [facility] updated [facility] that patient is discharging home today and to please contact Ashley Leavitt at [phone]  All questions/concerns answered at bedside

## 2018-11-01 NOTE — SOCIAL WORK
CM spoke with patient's daughter [facility]  Daughter is on her way to the hospital  Patient's daughter updated that per SLIM, patient is medically stable for discharge  Daughter would like to speak with Av about her diet and appetite  Slim updated regarding daughter's requests

## 2018-11-01 NOTE — ASSESSMENT & PLAN NOTE
· POA, has been on going for the last few weeks  Similar in character to prior abdominal pain that was attributed to hiatal hernia  ·     · CT scan without any contrast was negative for acute findings and outpatient  RUQ US negative for evidence of GB stones  HIDA scan is negative as well   Mesenteric duplex ultrasound also negative  · Continue with Protonix, Carafate

## 2018-11-01 NOTE — PLAN OF CARE
GASTROINTESTINAL - ADULT     Maintains adequate nutritional intake Not Progressing        Nutrition/Hydration-ADULT     Nutrient/Hydration intake appropriate for improving, restoring or maintaining nutritional needs Not Progressing          INFECTION - ADULT     Absence or prevention of progression during hospitalization Progressing        MUSCULOSKELETAL - ADULT     Maintain or return mobility to safest level of function Progressing        PAIN - ADULT     Verbalizes/displays adequate comfort level or baseline comfort level Progressing        Potential for Falls     Patient will remain free of falls Progressing        Prexisting or High Potential for Compromised Skin Integrity     Skin integrity is maintained or improved Progressing

## 2018-11-01 NOTE — SOCIAL WORK
TRINA and Av spoke with patient's daughter at bedside  Per Av, patient is medically stable for discharge  TRINA handed new prescriptions to patient's daughter  Daughter Renuka Beanerika would like to have the prescriptions filled by her pharmacy  TRINA clarified for daughter that patient will continue to take her lasix and potassium at discharge  TRINA updated [facility] that patient is discharging home today and to please contact Renuka Phillips at [phone]  All questions/concerns answered at bedside

## 2018-11-02 NOTE — TELEPHONE ENCOUNTER
Daughter calling that one of her prescriptions is costing over $100   derivative of marijuana  She to get her appetite up

## 2018-11-07 NOTE — TELEPHONE ENCOUNTER
Daughter requesting B-12 injection be sent to [facility] pharmacy - patient has [facility] coming to the home and will be able to give her the injection in 2 weeks when she is due  It is difficult for patient to ambulate due to fracture - daughter will reschedule future B-12 at infusion center for when she recovers

## 2018-11-09 NOTE — TELEPHONE ENCOUNTER
Nose packing does need to be changed periodically but is something that is usually addressed by ENT due to risk of re-bleeding and other complications    Usually ER prescribes an abx after someone has nose packing    To answer her question, I'm not sure  Jeff Goodwin needs to call the ER she took jovana to, to see what they recommend to do

## 2018-11-09 NOTE — TELEPHONE ENCOUNTER
Daughter states it was coumadin level should be between 2 and 3 and hers went up to 8 2 that's why she got the nose bleed  Dr Samir Alfred on [address] [facility] Emergency Department packed the noseThis was yesterday 11/8/18    Level is  down to 2 2 so that it is good , daughter is just concerned how saturated it is , that it is either going to push it self out or that it isn't doing anything for her being that saturated

## 2018-11-09 NOTE — TELEPHONE ENCOUNTER
Patient has packing in her nose in the left nostril and is suppose to leave there for 7 days, daughter said it is saturated and wants to know if she can change it nurse doesn't come until Tuesday  Daughter just wants to know if she can change it ?

## 2018-11-09 NOTE — TELEPHONE ENCOUNTER
Spoke with Prem Michaud who states the packing is saturated and bleeds on and off but it is not continual      Per Dr Jamal Dubois if the bleeding becomes continual then would need to go to ER to have it repacked       Pt daughter notified

## 2018-11-12 NOTE — TELEPHONE ENCOUNTER
I called [facility] and tried to get a hold of Dr Lazarus Basque they told me she was @ [facility] today and transferred me to their ER, they then said she was not there and they had no way of getting a hold of the Doctor  So we will have to try again tomorrow   [phone]

## 2018-11-12 NOTE — TELEPHONE ENCOUNTER
Pts daughter called nose bleed stopped  Pt will be taken off coumadin until packing is out  Nose bleed has been contributed to her coumadin  She will need a order to take out packing on Thursday [facility] are coming in to the house

## 2018-11-12 NOTE — TELEPHONE ENCOUNTER
Called and spoke to patient's daughter(Sherri)    She spoke with cardiologist who also advised Rosa M Laguna needs to see ENT    Plan - referral entered for expedited appt with ENT (Dr Radha Alonzo or Dr Estrella Fearing if Dr Radha Alonzo unavailable)    Will fax ref to Dr Lloyd's office and pt's daughter will contact them tmrw for expedited appt

## 2018-11-12 NOTE — TELEPHONE ENCOUNTER
pls get me in touch with ER dr Ursula Tran saw at [facility] AT [facility]:    Per EPIC she saw Agnes Dumont MD at [facility]

## 2018-11-13 NOTE — TELEPHONE ENCOUNTER
rec'd a request from [facility] for marinol prescription for Ursula Tran    Did she fill the prescription she rec'd from hospital?    If yes, did Ursula Tran do okay with medication? Did it help her appetite?

## 2018-11-14 NOTE — TELEPHONE ENCOUNTER
Misa Ramirez notified  States she did speak with Carolyn's cardiologist  States Cardiology states it is okay to take

## 2018-11-14 NOTE — TELEPHONE ENCOUNTER
rx ordered same as from hospital doctor but at lower(2 5mg) dose for a 10 day trial    Start at 1 capsule per day to see how Haily Smith tolerates rx    Ready for (family requested printed rx)    Should check with her cardiologist if this rx is okay to take   can occ cause fast heart rate    thx

## 2018-11-14 NOTE — TELEPHONE ENCOUNTER
Daughter called back  Did not refill script for Marinol from the hospital because it was too expensive

## 2018-11-27 PROBLEM — N18.30 CHRONIC KIDNEY DISEASE, STAGE 3 (HCC): Chronic | Status: ACTIVE | Noted: 2018-01-01

## 2018-11-27 PROBLEM — R77.8 ELEVATED TROPONIN: Status: ACTIVE | Noted: 2018-01-01

## 2018-11-28 NOTE — PROGRESS NOTES
Progress Note Sofia Nichols [birth year], 80 y o  female MRN: [id number]    Unit/Bed#: ED 23 Encounter: [id number]    Primary Care Provider: Zee Myers DO   Date and time admitted to hospital: 11/27/2018  6:08 PM    * Abdominal cramping   Assessment & Plan    · Persistent from last hospitalization  Notably patient with extensive workup negative for clear pathology; prior hiatal hernia noted  Reports lower sharp abdominal pain starting after breakfast and persisting throughout the day  Made worse when sitting up straight and relieved with laying down  Associated with nausea, poor appetite, and weight loss  · Discussed goals of hospitalization with patient and daughter  At this point they do not wish for any extensive workup but would like to improve her quality of life  · Palliative input appreciated  · Note daughter was not giving patient Carafate at home as she felt it was not helping her and was giving her omeprazole only once daily as she did not think it was helping her  Chronic kidney disease, stage 3 (HCC)   Assessment & Plan    · Creatinine close to baseline  · Monitor     Elevated troponin   Assessment & Plan    · Troponin 0 09 on admission  No EKG changes and patient denying chest pain/pressure or anginal symptoms  · Does have some vascular congestion on CXR  May be related to CHF  · Check repeat troponin     Moderate protein-calorie malnutrition ([facility] )   Assessment & Plan    · Patient with persistent failure to thrive with poor appetite despite addition of Marinol, protein shakes  · Will provide protein shakes, nutrition consult       Closed wedge compression fracture of ninth thoracic vertebra [facility])   Assessment & Plan    · Incidentally noted on patient's last admission with unclear inciting event    No plans for operative intervention  · Pain control with scheduled Tylenol, lidocaine patch, Aqua-K, PRN oxycodone  · PT/OT consultation     Chronic diastolic CHF (congestive heart failure) ([facility] ) Assessment & Plan    · With mild lower extremity edema, however patient with poor p o  Intake, no JVD  Patient has been off diuretics since last admission  · Will continue to hold diuretics given poor po intake  · Received 1 dose albumin in ED  · Will defer fluid restriction at this time, monitor daily weights and I/O, volume status closely         Chronic atrial fibrillation (HCC)   Assessment & Plan    · Currently rate controlled, takes Coumadin for anticoagulation  · Check INR     Essential hypertension   Assessment & Plan    · BP controlled, continue lopressor     Hypothyroidism   Assessment & Plan    · Chronic, continue levothyroxine       VTE Pharmacologic Prophylaxis:   Pharmacologic: Warfarin (Coumadin)  Mechanical VTE Prophylaxis in Place: Yes    Patient Centered Rounds: I have evaluated patient without nursing staff present due to patient in ED    Discussions with Specialists or Other Care Team Provider:     Education and Discussions with Family / Patient: Patient and daughter at bedside    Time Spent for Care: 20 minutes  More than 50% of total time spent on counseling and coordination of care as described above  Current Length of Stay: 1 day(s)    Current Patient Status: Inpatient   Certification Statement: The patient will continue to require additional inpatient hospital stay due to failure to thrive    Discharge Plan: Pending clinical improvement  Code Status: Level 1 - Full Code      Subjective:   Comfortable currently  Just had dose of oxycodone and is feeling a little drowsy  Last BM was yesterday- given suppository this morning but she states she hasn't had a BM yet  Objective:     Vitals:   Temp (24hrs), Av 9 °F (36 1 °C), Min:96 9 °F (36 1 °C), Max:96 9 °F (36 1 °C)    Temp:  [96 9 °F (36 1 °C)] 96 9 °F (36 1 °C)  HR:  [56-80] 56  Resp:  [16] 16  BP: (123-165)/(67-87) 151/67  SpO2:  [97 %-98 %] 98 %  Body mass index is 22 07 kg/m²       Input and Output Summary (last 24 hours): Intake/Output Summary (Last 24 hours) at 11/28/18 1147  Last data filed at 11/27/18 2321   Gross per 24 hour   Intake               50 ml   Output              100 ml   Net              -50 ml       Physical Exam:     Physical Exam   Constitutional:   Frail, elderly female   HENT:   Head: Normocephalic and atraumatic  Eyes: No scleral icterus  Neck: Neck supple  Cardiovascular:   Irregular   Pulmonary/Chest: Effort normal and breath sounds normal  No respiratory distress  She has no wheezes  She has no rales  Abdominal: Soft  Bowel sounds are normal  She exhibits no distension  There is no tenderness  There is no rebound  Musculoskeletal:   +edema   Neurological: She is alert  Skin: Skin is warm and dry  Psychiatric: She has a normal mood and affect  Additional Data:     Labs:      Results from last 7 days  Lab Units 11/28/18  0436   WBC Thousand/uL 4 22*   HEMOGLOBIN g/dL 13 9   HEMATOCRIT % 42 6   PLATELETS Thousands/uL 163   NEUTROS PCT % 74   LYMPHS PCT % 16   MONOS PCT % 7   EOS PCT % 1       Results from last 7 days  Lab Units 11/28/18  0436   POTASSIUM mmol/L 4 0   CHLORIDE mmol/L 99*   CO2 mmol/L 28   BUN mg/dL 28*   CREATININE mg/dL 1 28   CALCIUM mg/dL 9 1   ALK PHOS U/L 172*   ALT U/L 22   AST U/L 27           * I Have Reviewed All Lab Data Listed Above  * Additional Pertinent Lab Tests Reviewed:  All Labs Within Last 24 Hours Reviewed    Imaging:    Imaging Reports Reviewed Today Include: Chest x-ray  Imaging Personally Reviewed by Myself Includes:  None    Recent Cultures (last 7 days):           Last 24 Hours Medication List:     Current Facility-Administered Medications:  acetaminophen 975 mg Oral Q8H Poornima Arcos MD   albuterol 2 5 mg Nebulization Q6H PRN Poornima Arcos MD   atorvastatin 20 mg Oral Daily Poornima Arcos MD   bisacodyl 10 mg Rectal Daily Poornima Arcos MD   cholecalciferol 2,000 Units Oral Daily Poornima Arcos MD   docusate sodium 100 mg Oral Daily Chuck Sheriff MD   dronabinol 2 5 mg Oral BID AC Chuck Sheriff MD   fluticasone 1 spray Nasal BID Chuck Sheriff MD   levothyroxine 88 mcg Oral Early Morning Chuck Sheriff MD   lidocaine 1 patch Topical Daily Chuck Sheriff MD   metoprolol tartrate 25 mg Oral Q12H [address] Chuck Sheriff MD   ondansetron 4 mg Intravenous Q6H PRN Chuck Sheriff MD   oxyCODONE 2 5 mg Oral Q4H PRN Chuck Sheriff MD   oxyCODONE 5 mg Oral Q4H PRN Chuck Sheriff MD   pantoprazole 40 mg Oral BID AC Chuck Sheriff MD   polyethylene glycol 17 g Oral Daily PRN Chuck Sheriff MD   potassium chloride 10 mEq Oral Daily Chuck Sheriff MD   sucralfate 1 g Oral 4x Daily Chuck Sheriff MD   warfarin 1 25 mg Oral Daily (warfarin) Chuck Sheriff MD        Today, Patient Was Seen By: Roda Eisenmenger, PA-C    ** Please Note: Dragon 360 Dictation voice to text software may have been used in the creation of this document   **

## 2018-11-28 NOTE — ASSESSMENT & PLAN NOTE
· Patient with persistent failure to thrive with poor appetite despite addition of Marinol, protein shakes      · Will provide protein shakes, nutrition consult

## 2018-11-28 NOTE — ASSESSMENT & PLAN NOTE
· Incidentally noted on patient's last admission with unclear inciting event    No plans for operative intervention  · Pain control with scheduled Tylenol, lidocaine patch, Aqua-K, PRN oxycodone  · PT/OT consultation

## 2018-11-28 NOTE — ASSESSMENT & PLAN NOTE
· With mild lower extremity edema, however patient with poor p o  Intake, no JVD  Patient has been off diuretics since last admission  · Will continue to hold diuretics given elevated creatinine, poor p o   Intake  · Received 1 dose albumin in ED  · Will defer fluid restriction at this time, monitor daily weights and I/O, volume status closely

## 2018-11-28 NOTE — ASSESSMENT & PLAN NOTE
Incidentally noted on patient's last admission with unclear inciting event    No plans for operative intervention  · Pain control with scheduled Tylenol, lidocaine patch, Aqua-K, PRN oxycodone  · PT/OT consultation

## 2018-11-28 NOTE — ASSESSMENT & PLAN NOTE
· Troponin 0 09 on admission  No EKG changes and patient denying chest pain/pressure or anginal symptoms  · Does have some vascular congestion on CXR   May be related to CHF  · Check repeat troponin

## 2018-11-28 NOTE — ED NOTES
Medications not given - awaiting pharmacy  Meds were requested       Rodolfo Stockton RN  11/27/18 2743

## 2018-11-28 NOTE — ASSESSMENT & PLAN NOTE
Malnutrition Findings:         BMI Findings: Body mass index is 22 07 kg/m²  · Patient with persistent failure to thrive with poor appetite despite addition of Marinol, protein shakes      · Will provide protein shakes, nutrition consult  · Family agreeable to palliative care consultation to clarify goals of care/recommendations for quality of life measures

## 2018-11-28 NOTE — ASSESSMENT & PLAN NOTE
· With mild lower extremity edema, however patient with poor p o  Intake, no JVD  Patient has been off diuretics since last admission    · Will continue to hold diuretics given poor po intake  · Received 1 dose albumin in ED  · Will defer fluid restriction at this time, monitor daily weights and I/O, volume status closely

## 2018-11-28 NOTE — ASSESSMENT & PLAN NOTE
· Persistent from last hospitalization  Notably patient with extensive workup negative for clear pathology; prior hiatal hernia noted  Reports lower sharp abdominal pain starting after breakfast and persisting throughout the day  Made worse when sitting up straight and relieved with laying down  Associated with nausea, poor appetite, and weight loss  · Discussed goals of hospitalization with patient and daughter  At this point they do not wish for any extensive workup but would like to improve her quality of life  · Palliative input appreciated  · Note daughter was not giving patient Carafate at home as she felt it was not helping her and was giving her omeprazole only once daily as she did not think it was helping her

## 2018-11-28 NOTE — ASSESSMENT & PLAN NOTE
Troponin 0 09 on admission    No EKG changes and patient denying chest pain/pressure or anginal symptoms  -Repeat troponin

## 2018-11-28 NOTE — H&P
H&P- Tara Chew [birth year], 80 y o  female MRN: [id number]    Unit/Bed#: ED 23 Encounter: [id number]    Primary Care Provider: Umair Laura DO   Date and time admitted to hospital: 11/27/2018  6:08 PM        Moderate protein-calorie malnutrition ([address] )   Assessment & Plan    Malnutrition Findings:         BMI Findings: Body mass index is 22 07 kg/m²  · Patient with persistent failure to thrive with poor appetite despite addition of Marinol, protein shakes  · Will provide protein shakes, nutrition consult  · Family agreeable to palliative care consultation to clarify goals of care/recommendations for quality of life measures         * Abdominal cramping   Assessment & Plan    · Persistent from last hospitalization  Notably patient with extensive workup negative for clear pathology; prior hiatal hernia noted  No clear palpable exam findings save mild epigastric pain improved with distraction  · Continue PPI, Carafate  · Zofran PRN  · Troponin noted to be mildly elevated, will consider ischemic workup if persists     Chronic kidney disease, stage 3 (HCC)   Assessment & Plan    Creatinine mildly elevated to 1 3 (baseline 1-1 1)  Likely secondary to hypovolemia from poor p o  Intake  · Patient receiving albumin in ED  · Trend creatinine     Elevated troponin   Assessment & Plan    Troponin 0 09 on admission  No EKG changes and patient denying chest pain/pressure or anginal symptoms  -Repeat troponin     Closed wedge compression fracture of ninth thoracic vertebra [facility])   Assessment & Plan    Incidentally noted on patient's last admission with unclear inciting event  No plans for operative intervention  · Pain control with scheduled Tylenol, lidocaine patch, Aqua-K, PRN oxycodone  · PT/OT consultation     Chronic diastolic CHF (congestive heart failure) (HCC)   Assessment & Plan    · With mild lower extremity edema, however patient with poor p o  Intake, no JVD    Patient has been off diuretics since last admission  · Will continue to hold diuretics given elevated creatinine, poor p o  Intake  · Received 1 dose albumin in ED  · Will defer fluid restriction at this time, monitor daily weights and I/O, volume status closely         Chronic atrial fibrillation (HCC)   Assessment & Plan    · Currently rate controlled, takes Coumadin for anticoagulation  · Continue metoprolol  · Will obtain INR, resumption of Coumadin to be based on this result     Essential hypertension   Assessment & Plan    BP controlled, continue lopressor     Hypothyroidism   Assessment & Plan    Chronic, continue levothyroxine           VTE Prophylaxis: Warfarin (Coumadin)  / sequential compression device   Code Status: Level 1 - Full Code as discussed with patient and daughter at bedside  POLST: There is no POLST form on file for this patient (pre-hospital)    Anticipated Length of Stay:  Patient will be admitted on an Inpatient basis with an anticipated length of stay of  Greater than 2 midnights  Justification for Hospital Stay: Please see detailed plans noted above  Chief Complaint:     Shortness of breath  History of Present Illness:  Killian Kaminski is a 80 y o  female with history of hiatal hernia, chronic diastolic congestive heart failure, chronic AFib on Coumadin, hypertension presenting with shortness of breath and persistent abdominal pain  Notably, patient with admission to [facility] 10/21/2018-11/1/2018 formula pain with extensive GI evaluation including CT abdomen pelvis, RUQ US, and HIDA scan without clear etiology determined  The abdominal pain has been persistent this discharge, bending over and putting pressure short apnea leading pain somewhat, along with low dose opioid  Reports nausea but without episodes of vomiting  Patient also noted some shortness of breath related to pain, but reports improvement with body position  Denies fever/chills, cough, chest pain/pressure       Patient's daughter reports persisted decreased appetite and approximately 5-10lb weight loss since discharge due to lack of appetite  She has been trialed on Marinol and protein shakes with no significant improvement  Currently, patient is lying in bed    Review of Systems:    Constitutional:  Denies fever or chills but reports poor appetite  Eyes:  Denies change in visual acuity   HENT:  Denies nasal congestion or sore throat   Respiratory:  Denies cough or shortness of breath   Cardiovascular:  Denies chest pain or edema   GI:  Denies vomiting, bloody stools or diarrhea but reports abdominal pain, nausea, and poor p o  Intake  :  Denies dysuria   Musculoskeletal:  Reports back pain  Integument:  Denies rash   Neurologic:  Denies headache, focal weakness or sensory changes   Endocrine:  Denies polyuria or polydipsia   Lymphatic:  Denies swollen glands   Psychiatric:  Denies depression or anxiety     Past Medical and Surgical History:   Past Medical History:   Diagnosis Date    Anemia of chronic disease 11/27/2017    Arthritis     Cardiac disease     CHF (congestive heart failure) (HCC)     Femur fracture, right (HCC)     Hiatal hernia     Hypercholesteremia     Hypothyroidism     Impaired ambulation      Past Surgical History:   Procedure Laterality Date    APPENDECTOMY      CATARACT EXTRACTION Bilateral     CATARACT EXTRACTION      HIP FRACTURE SURGERY Right 12/2015    HYSTERECTOMY      OOPHORECTOMY      REPAIR RECTOCELE      REPLACEMENT TOTAL KNEE Right        Meds/Allergies:    (Not in a hospital admission)    Allergies: Allergies   Allergen Reactions    Ace Inhibitors Angioedema     Annotation - [id number]: angioedema to ARB  Other reaction(s): Angioedema  Pt and family state not an allergy    Angiotensin Receptor Blockers Angioedema    Erythromycin Ethylsuccinate GI Intolerance    Losartan      Other reaction(s): Angioedema   Pt and family state this is not an allergy      Aspirin Dermatitis and Edema    Codeine Edema    Erythromycin Base Other (See Comments)    Ibuprofen Other (See Comments)     Patient denies allergy    Penicillins Hives and Edema     However, patient has tolerated dissimilar side chain Cephalosporins (Cefazolin, Cefuroxime)     History:  Marital Status:    Occupation: Retired  Patient Pre-hospital Living Situation: Home with daughter  Patient Pre-hospital Level of Mobility: Walker at home, wheelchair for longer distances  Patient Pre-hospital Diet Restrictions: None  Substance Use History:   History   Alcohol Use No     History   Smoking Status    Never Smoker   Smokeless Tobacco    Never Used     History   Drug Use No       Family History:  Family History   Problem Relation Age of Onset    Kidney cancer Mother     Alcohol abuse Father     Cirrhosis Father         LIVER    Drug abuse Father     Alcohol abuse Brother     Diabetes Brother     Drug abuse Brother     Stroke Brother     Coronary artery disease Family        Physical Exam:     Vitals:   Blood Pressure: 123/67 (11/27/18 2228)  Pulse: 69 (11/27/18 2228)  Temperature: (!) 96 9 °F (36 1 °C) (11/27/18 1754)  Temp Source: Tympanic (11/27/18 1754)  Respirations: 16 (11/27/18 2228)  Height: 5' (152 4 cm) (11/27/18 1754)  Weight - Scale: 51 3 kg (113 lb) (11/27/18 1754)  SpO2: 98 % (11/27/18 2228)    Constitutional:  Frail appearing female, no acute distress, non-toxic appearance   Eyes:  PERRL, conjunctiva normal   HENT:  Atraumatic, external ears normal, nose normal, oropharynx mild-moderately dry, no pharyngeal exudates   Neck- normal range of motion, no tenderness, supple   Respiratory:  No respiratory distress, normal breath sounds, no rales, no wheezing   Cardiovascular:  Irregularly irregular rhythm, no murmurs, no gallops, no rubs   GI:  Soft, nondistended, normal bowel sounds, mild epigastric tenderness however improved when distracted, no organomegaly, no mass, no rebound, no guarding   :  No costovertebral angle tenderness   Musculoskeletal:  Trace pitting edema in bilateral lower extremities to mid-shin, no tenderness, no deformities  Back- no tenderness  Integument:  Warm and dry   Lymphatic:  No lymphadenopathy noted   Neurologic:  Alert &awake, communicative, CN 2-12 normal, normal motor function, normal sensory function, no focal deficits noted   Psychiatric:  Speech and behavior appropriate       Lab Results: I have personally reviewed pertinent reports  Results from last 7 days  Lab Units 11/27/18  1849   WBC Thousand/uL 4 71   HEMOGLOBIN g/dL 16 0*   HEMATOCRIT % 48 8*   PLATELETS Thousands/uL 212   NEUTROS PCT % 82*   LYMPHS PCT % 9*   MONOS PCT % 6   EOS PCT % 1       Results from last 7 days  Lab Units 11/27/18  1849   POTASSIUM mmol/L 5 3   CHLORIDE mmol/L 98*   CO2 mmol/L 27   BUN mg/dL 28*   CREATININE mg/dL 1 32*   CALCIUM mg/dL 9 7   ALK PHOS U/L 205*   ALT U/L 22   AST U/L 51*           EKG: Atrial fibrillation, LAD    Imaging: I have personally reviewed pertinent reports  and I have personally reviewed pertinent films in PACS   CXR: await formal read, appears no active pulmonary disease    Xr Hip/pelv 2-3 Vws Right If Performed    Result Date: 10/31/2018  Narrative: RIGHT HIP INDICATION:   Rt hip pain  COMPARISON:  11/23/2017  VIEWS:  XR HIP/PELV 2-3 VWS RIGHT W PELVIS IF PERFORMED FINDINGS: There is no acute fracture or dislocation  Patient is status post remote internal fixation of proximal right femoral fracture  No significant hip degenerative changes  No lytic or blastic osseous lesions  Soft tissues are unremarkable  The visualized lumbar spine is unremarkable  Impression: No acute osseous abnormality   Workstation performed: [id number]     Nm Hepatobiliary    Result Date: 10/31/2018  Narrative: HEPATOBILIARY SCAN INDICATION: abd pain COMPARISON:  CT 10/21/2018 TECHNIQUE:   Following the intravenous administration of 4 5 mCi Tc-99m labeled mebrofenin, dynamic abdominal imaging was obtained in the anterior projection over a 60 minute time period  FINDINGS:  There is prompt, uniform accumulation with normal clearance of the radiopharmaceutical by the liver  There is normal filling of the intrahepatic ducts, common bile duct and gallbladder with normal excretion of the radiopharmaceutical into the duodenum  Impression: There is visualization of the gallbladder, indicating a patent cystic duct  Workstation performed: [id number]     Vas Celiac And/or Mesenteric Duplex; Complete Study    Result Date: 10/30/2018  Narrative:  [facility] REPORT CLINICAL: Indications:  Abdominal Pain unspecified [R10 9]  Risk Factors The patient has history of HTN  FINDINGS:  Unilateral              PSV  EDV  Sup-Maye Ao               53    2  Splenic                  60   20  Prox  SMA               182   66  Px Inf-William Ao            43   12  Celiac                  166   58  Dist  SMA                32    4  Ds Inf-William Ao            41    2  Celiac Artery Standing  100   23   Segment     Rig       Left                    PSV  EDV  PSV  EDV  Prox Renal  124   34   83   11     CONCLUSION:  Impression The aorta is patent and normal in caliber  The celiac, splenic and hepatic arteries are patent  The superior mesenteric artery is normal and patent in a fasting state  SIGNATURE: Electronically Signed by: Dunia Marquez MD on 2018-10-30 03:17:30 PM        ** Please Note: Dragon 360 Dictation voice to text software was used in the creation of this document   **

## 2018-11-28 NOTE — PHYSICIAN ADVISOR
Current patient class: Inpatient  The patient is currently on Hospital Day: 2 at [address] [place] [address]        The patient was admitted to the hospital  on 11/27/18 at 2130 for the following diagnosis:  Adult failure to thrive [R62 7]  Shortness of breath [R06 02]  CHF (congestive heart failure) ([facility]) [I50 9]  Elevated troponin [R74 8]  SHANEL (acute kidney injury) ([facility] ) [N17 9]  Moderate protein-calorie malnutrition ([facility] ) [A18 6]  Chronic diastolic CHF (congestive heart failure) ([facility] ) [I50 32]       There is documentation in the medical record of an expected length of stay of at least 2 midnights  The patient is therefore expected to satisfy the 2 midnight benchmark and given the 2 midnight presumption is appropriate for INPATIENT ADMISSION  Given this expectation of a satisfying stay, CMS instructs us that the patient is most often appropriate for inpatient admission under part A provided medical necessity is documented in the chart  After review of the relevant documentation, labs, vital signs and test results, the patient is appropriate for INPATIENT ADMISSION  Admission to the hospital as an inpatient is a complex decision making process which requires the practitioner to consider the patients presenting complaint, history and physical examination and all relevant testing  With this in mind, in this case, the patient was deemed appropriate for INPATIENT ADMISSION  After review of the documentation and testing available at the time of the admission I concur with this clinical determination of medical necessity  The patient does have an inpatient admission within the previous 30 days  The patient was admitted on 10/21/18 and discharged on 11/1/18 as an inpatient  The patient therefore required readmission review  Rationale is as follows:     The patient is a 80 yrs   Female who presented to the ED at 11/27/2018  6:08 PM with a chief complaint of Shortness of Breath (Pt reports SOB, dehydration, lack of appetite  Pt denies cp)     Patient admitted with a report of continued abdominal pain since last hospital stay  There is also a report of decreased oral intake and an overall failure to thrive  She was recently hospitalized for abdominal pain and placement was recommended; however, the family declined that idea  It does not appear that there is any new problem and it would be appropriate to consider this admission as RELATED to the previous admission      The patients vitals on arrival were ED Triage Vitals   Temperature Pulse Respirations Blood Pressure SpO2   11/27/18 1754 11/27/18 1754 11/27/18 1754 11/27/18 1754 11/27/18 1754   (!) 96 9 °F (36 1 °C) 66 16 156/79 97 %      Temp Source Heart Rate Source Patient Position - Orthostatic VS BP Location FiO2 (%)   11/27/18 1754 11/27/18 1754 11/27/18 1858 11/27/18 1754 --   Tympanic Monitor Lying Right arm       Pain Score       11/27/18 1846       7           Past Medical History:   Diagnosis Date    Anemia of chronic disease 11/27/2017    Arthritis     Cardiac disease     CHF (congestive heart failure) (HCC)     Femur fracture, right (HCC)     Hiatal hernia     Hypercholesteremia     Hypothyroidism     Impaired ambulation      Past Surgical History:   Procedure Laterality Date    APPENDECTOMY      CATARACT EXTRACTION Bilateral     CATARACT EXTRACTION      HIP FRACTURE SURGERY Right 12/2015    HYSTERECTOMY      OOPHORECTOMY      REPAIR RECTOCELE      REPLACEMENT TOTAL KNEE Right            Consults have been placed to:   IP CONSULT TO PALLIATIVE CARE  IP CONSULT TO CASE MANAGEMENT  IP CONSULT TO NUTRITION SERVICES    Vitals:    11/28/18 0114 11/28/18 0210 11/28/18 0600 11/28/18 1300   BP: 165/77 125/67 151/67 96/54   BP Location: Right arm Right arm Right arm Right arm   Pulse: 60 62 56 (!) 54   Resp: 16 16 16 16   Temp:    97 6 °F (36 4 °C)   TempSrc:    Oral   SpO2: 98% 98% 98% 98%   Weight:    50 kg (110 lb 3 2 oz)   Height:    5' (1 524 m)       Most recent labs:    Recent Labs      11/28/18   0436  11/28/18   1244   WBC  4 22*   --    HGB  13 9   --    HCT  42 6   --    PLT  163   --    K  4 0   --    CALCIUM  9 1   --    BUN  28*   --    CREATININE  1 28   --    TROPONINI   --   0 09*   AST  27   --    ALT  22   --    ALKPHOS  172*   --        Scheduled Meds:  Current Facility-Administered Medications:  acetaminophen 975 mg Oral Q8H Kashif Morgan MD   albuterol 2 5 mg Nebulization Q6H PRN Kashif Morgan MD   atorvastatin 20 mg Oral Daily Kashif Morgan MD   bisacodyl 10 mg Rectal Daily Kashif Morgan MD   cholecalciferol 2,000 Units Oral Daily Kashif Morgan MD   docusate sodium 100 mg Oral Daily Kashif Morgan MD   dronabinol 2 5 mg Oral BID AC Kashif Morgan MD   fluticasone 1 spray Nasal BID Kashif Morgan MD   levothyroxine 88 mcg Oral Early Morning Kashif Morgan MD   lidocaine 1 patch Topical Daily Kashif Morgan MD   metoprolol tartrate 25 mg Oral Q12H [address] Kashif Morgan MD   ondansetron 4 mg Intravenous Q6H PRN Kashif Morgan MD   oxyCODONE 2 5 mg Oral Q4H PRN Kashif Morgan MD   oxyCODONE 5 mg Oral Q4H PRN Kashif Morgan MD   pantoprazole 40 mg Oral BID AC Kashif Morgan MD   polyethylene glycol 17 g Oral Daily PRN Kashif Morgan MD   potassium chloride 10 mEq Oral Daily Kashif Morgan MD   sucralfate 1 g Oral 4x Daily Kashif Morgan MD   warfarin 1 25 mg Oral Daily (warfarin) Kashif Morgan MD     Continuous Infusions:   PRN Meds: albuterol    ondansetron    oxyCODONE    oxyCODONE    polyethylene glycol    Surgical procedures (if appropriate):

## 2018-11-28 NOTE — UTILIZATION REVIEW
Initial Clinical Review    Admission: Date/Time/Statement: 11/27/18 @ 2130     Orders Placed This Encounter   Procedures    Inpatient Admission (expected length of stay for this patient is greater than two midnights)     Standing Status:   Standing     Number of Occurrences:   1     Order Specific Question:   Admitting Physician     Answer:   Nathan Talbot [[id number]]     Order Specific Question:   Level of Care     Answer:   Med Surg [16]     Order Specific Question:   Estimated length of stay     Answer:   More than 2 Midnights     Order Specific Question:   Certification     Answer:   I certify that inpatient services are medically necessary for this patient for a duration of greater than two midnights  See H&P and MD Progress Notes for additional information about the patient's course of treatment  ED: Date/Time/Mode of Arrival:   ED Arrival Information     Expected Arrival Acuity Means of Arrival Escorted By Service Admission Type    - 11/27/2018 17:48 Emergent Wheelchair Self Emergency Medicine Emergency    Arrival Complaint    chf            Chief Complaint   Patient presents with    Shortness of Breath     Pt reports SOB, dehydration, lack of appetite  Pt denies cp       History of Illness:       80year old female  With history of hiatal hernia  Presents to ed  With persistent abdominal pain  Since previous admission  10-21-18 to 11-1-18   Extensive work up including  CT abdomen, RUQ US and HIDA scan  With no clear etiology  Now she reports  Nausea and shortness of breath related to the times when she has pain  Daughter reports  Decreased appetite  And 5-10 lb wt loss  Trial of marinol and protein shakes show no improvement          ED Vital Signs:   11/27/18 1754 11/27/18 1754 11/27/18 1754 11/27/18 1754 11/27/18 1754   (!) 96 9 °F (36 1 °C) 66 16 156/79 97 %         Pain Score       7        Wt Readings from Last 1 Encounters:   11/28/18 50 kg (110 lb 3 2 oz)           Abnormal Labs/Diagnostic Test Results:                 ED Treatment:   Medication Administration from 11/27/2018 1748 to 11/28/2018 1251       Date/Time Order Dose Route     11/27/2018 1846 acetaminophen (TYLENOL) tablet 650 mg 650 mg Oral     11/27/2018 2228 albumin human (FLEXBUMIN) 25 % injection 12 5 g 12 5 g Intravenous     11/28/2018 0917 bisacodyl (DULCOLAX) rectal suppository 10 mg 10 mg Rectal     11/28/2018 0916 acetaminophen (TYLENOL) tablet 975 mg 975 mg Oral     11/28/2018 0603 dronabinol (MARINOL) capsule 2 5 mg 2 5 mg Oral     11/28/2018 0914 cholecalciferol (VITAMIN D3) tablet 2,000 Units 2,000 Units Oral     11/28/2018 0916 docusate sodium (COLACE) capsule 100 mg 100 mg Oral     11/28/2018 0601 levothyroxine tablet 88 mcg 88 mcg Oral     11/28/2018 0913 lidocaine (LIDODERM) 5 % patch 1 patch 1 patch Topical     11/28/2018 0915 metoprolol tartrate (LOPRESSOR) tablet 25 mg 25 mg Oral     11/28/2018 0024 metoprolol tartrate (LOPRESSOR) tablet 25 mg 25 mg Oral     11/28/2018 0915 sucralfate (CARAFATE) tablet 1 g 1 g Oral     11/28/2018 0915 potassium chloride (K-DUR,KLOR-CON) CR tablet 10 mEq 10 mEq Oral     11/28/2018 0601 pantoprazole (PROTONIX) EC tablet 40 mg 40 mg Oral     11/28/2018 0913 fluticasone (FLONASE) 50 mcg/act nasal spray 1 spray 1 spray Nasal     11/28/2018 0916 oxyCODONE (ROXICODONE) IR tablet 5 mg 5 mg Oral     11/28/2018 0024 oxyCODONE (ROXICODONE) IR tablet 5 mg 5 mg Oral          Past Medical/Surgical History:     Diagnosis    Nondisplaced spiral fracture of shaft of right femur, initial encounter for closed fracture (HCC)    Hypothyroidism    Essential hypertension    Chronic atrial fibrillation (HCC)    Anemia of chronic disease    Oral thrush    Hypercholesteremia    Iron deficiency anemia due to chronic blood loss    Gastritis    Hiatal hernia    Chronic anticoagulation    Chronic diastolic CHF (congestive heart failure) (HCC)    Osteopenia    SSS (sick sinus syndrome) ([facility] )    Status post fracture of femur    Abdominal cramping    Closed wedge compression fracture of ninth thoracic vertebra ([facility])    Moderate protein-calorie malnutrition (HCC)           Admitting Diagnosis: Adult failure to thrive [R62 7]  Shortness of breath [R06 02]  CHF (congestive heart failure) ([facility]) [I50 9]  Elevated troponin [R74 8]  SHANEL (acute kidney injury) ([place] [address] ) [N17 9]  Moderate protein-calorie malnutrition ([facility]) [Q98 6]  Chronic diastolic CHF (congestive heart failure) ([place] [address] ) [I50 32]    Age/Sex: 80 y o  female   Continues with persistent abdominal pain with unclear etiology  5/10  Improved to 2/10 on oxydodone  5 mg  Palliative consult pending for failure to thrive  Assessment/Plan:    Malnutrition Findings:         BMI Findings: Body mass index is 22 07 kg/m²  · Patient with persistent failure to thrive with poor appetite despite addition of Marinol, protein shakes  ? Will provide protein shakes, nutrition consult  ? Family agreeable to palliative care consultation to clarify goals of care/recommendations for quality of life measures           ·  Persistent from last hospitalization  Notably patient with extensive workup negative for clear pathology; prior hiatal hernia noted  No clear palpable exam findings save mild epigastric pain improved with distraction  ? Continue PPI, Carafate  ?  Zofran PRN  Troponin noted to be mildly elevated, will consider ischemic workup if persists       Admission Orders:  PT and OT eval and trreat   Consult palliative care   Diet  Gi lo fat   Aqua k pad   Sequential compression device   Nutrition supplement             Scheduled Meds:     acetaminophen 975 mg Oral Q8H   albuterol 2 5 mg Nebulization Q6H PRN   atorvastatin 20 mg Oral Daily   bisacodyl 10 mg Rectal Daily   cholecalciferol 2,000 Units Oral Daily   docusate sodium 100 mg Oral Daily   dronabinol 2 5 mg Oral BID AC   fluticasone 1 spray Nasal BID   levothyroxine 88 mcg Oral Early Morning   lidocaine 1 patch Topical Daily   metoprolol tartrate 25 mg Oral Q12H KENISHA   ondansetron 4 mg Intravenous Q6H PRN   oxyCODONE 2 5 mg Oral Q4H PRN   oxyCODONE 5 mg Oral Q4H PRN   pantoprazole 40 mg Oral BID AC   polyethylene glycol 17 g Oral Daily PRN   potassium chloride 10 mEq Oral Daily   sucralfate 1 g Oral 4x Daily   warfarin 1 25 mg Oral Daily (warfarin)

## 2018-11-28 NOTE — ED PROVIDER NOTES
History  Chief Complaint   Patient presents with    Shortness of Breath     Pt reports SOB, dehydration, lack of appetite  Pt denies cp     69-year-old female presenting to the emergency department with multiple medical complaints  He is probably presenting for shortness of breath  This is in the setting of decreased appetite and dehydration for several days  She has recently discharged from a prolonged hospitalization and [facility] for decreased appetite  Tried on dronabinol which is only slightly improved  The patient is daughter brings her in for continuing failure to thrive at home  She also reports several days of worsening dyspnea on exertion and leg swelling  She does have a history of CHF and her Lasix was held after she was found to be dehydrated on a previous admission, this was restarted at a lower dose  She denies any fevers chills urinary symptoms she denies any cough or chest pain  States that she still generally fatigued  Daughter notes that her appetite is still poor despite starting to happen all therapy  Prior to Admission Medications   Prescriptions Last Dose Informant Patient Reported? Taking? Cholecalciferol (VITAMIN D) 2000 UNITS CAPS  Self Yes Yes   Sig: Take 2,000 Units by mouth daily     Docusate Sodium 100 MG capsule  Self Yes Yes   Sig: Take 1 tablet by mouth daily   acetaminophen (TYLENOL) 325 mg tablet  Self No Yes   Sig: Take 3 tablets by mouth every 8 (eight) hours   albuterol (2 5 mg/3 mL) 0 083 % nebulizer solution  Self Yes Yes   Sig: INHALE THE CONTENTS OF 1 VIAL BY NEBULIZATION FOUR TIMES DAILY   atorvastatin (LIPITOR) 20 mg tablet  Self No Yes   Sig: Take 1 tablet (20 mg total) by mouth daily   bisacodyl (DULCOLAX) 10 mg suppository  Self Yes Yes   Sig: Insert into the rectum   cyanocobalamin 1,000 mcg/mL   No Yes   Sig: Inject 1 mL (1,000 mcg total) into a muscle every 30 (thirty) days   dronabinol (MARINOL) 2 5 mg capsule   No Yes   Sig: Take 1 capsule (2 5 mg total) by mouth 2 (two) times a day before meals for 10 days   dronabinol (MARINOL) 5 MG capsule   No Yes   Sig: Take 1 capsule (5 mg total) by mouth 2 (two) times a day before meals for 10 days   fluticasone (FLONASE) 50 mcg/act nasal spray  Self Yes Yes   Sig: into each nostril   furosemide (LASIX) 20 mg tablet   No Yes   Sig: Take 1 tablet (20 mg total) by mouth every other day   levothyroxine 88 mcg tablet  Self No Yes   Sig: Take 1 tablet (88 mcg total) by mouth daily   lidocaine (LIDODERM) 5 %   No Yes   Sig: Apply 1 patch topically daily Remove & Discard patch within 12 hours or as directed by MD   metoprolol tartrate (LOPRESSOR) 25 mg tablet  Self Yes Yes   Sig: Take 25 mg by mouth every 12 (twelve) hours   omeprazole (PriLOSEC) 40 MG capsule   No Yes   Sig: Take 1 capsule (40 mg total) by mouth 2 (two) times a day   polyethylene glycol (MIRALAX) 17 g packet   No Yes   Sig: Take 17 g by mouth daily as needed (Constipation)   potassium chloride (K-DUR) 10 mEq tablet   No Yes   Sig: Take 1 tablet (10 mEq total) by mouth daily   sucralfate (CARAFATE) 1 g tablet   No Yes   Sig: Take 1 tablet (1 g total) by mouth 4 (four) times a day   warfarin (COUMADIN) 1 mg tablet  Self Yes Yes   Sig: Take 2 5 mg by mouth daily Dosing is based on INR        Facility-Administered Medications: None       Past Medical History:   Diagnosis Date    Anemia of chronic disease 11/27/2017    Arthritis     Cardiac disease     CHF (congestive heart failure) (HCC)     Femur fracture, right (HCC)     Hiatal hernia     Hypercholesteremia     Hypothyroidism     Impaired ambulation        Past Surgical History:   Procedure Laterality Date    APPENDECTOMY      CATARACT EXTRACTION Bilateral     CATARACT EXTRACTION      HIP FRACTURE SURGERY Right 12/2015    HYSTERECTOMY      OOPHORECTOMY      REPAIR RECTOCELE      REPLACEMENT TOTAL KNEE Right        Family History   Problem Relation Age of Onset    Kidney cancer Mother     Alcohol abuse Father     Cirrhosis Father         LIVER    Drug abuse Father     Alcohol abuse Brother     Diabetes Brother     Drug abuse Brother     Stroke Brother     Coronary artery disease Family      I have reviewed and agree with the history as documented  Social History   Substance Use Topics    Smoking status: Never Smoker    Smokeless tobacco: Never Used    Alcohol use No        Review of Systems   Constitutional: Positive for activity change, appetite change and fatigue  Negative for chills and fever  HENT: Negative for sneezing and sore throat  Eyes: Negative for visual disturbance  Respiratory: Positive for shortness of breath  Negative for cough, choking, chest tightness and wheezing  Cardiovascular: Negative for chest pain and palpitations  Gastrointestinal: Positive for abdominal pain and nausea  Negative for constipation, diarrhea and vomiting  Genitourinary: Negative for difficulty urinating and dysuria  Neurological: Negative for dizziness, weakness, light-headedness, numbness and headaches  All other systems reviewed and are negative  Physical Exam  ED Triage Vitals   Temperature Pulse Respirations Blood Pressure SpO2   11/27/18 1754 11/27/18 1754 11/27/18 1754 11/27/18 1754 11/27/18 1754   (!) 96 9 °F (36 1 °C) 66 16 156/79 97 %      Temp Source Heart Rate Source Patient Position - Orthostatic VS BP Location FiO2 (%)   11/27/18 1754 11/27/18 1754 11/27/18 1858 11/27/18 1754 --   Tympanic Monitor Lying Right arm       Pain Score       11/27/18 1846       7           Orthostatic Vital Signs  Vitals:    11/30/18 2328 12/01/18 0743 12/01/18 0816 12/01/18 1600   BP: 120/77 117/69 137/72 119/62   Pulse: 77 75  86   Patient Position - Orthostatic VS:  Sitting Sitting Lying       Physical Exam   Constitutional: She is oriented to person, place, and time  She appears well-developed  No distress  The patient is pleasantly and mildly demented    She appears chronically malnourished dehydrated and overall cachectic  HENT:   Head: Normocephalic and atraumatic  Mouth/Throat: Oropharynx is clear and moist    Eyes: Pupils are equal, round, and reactive to light  EOM are normal    Neck: No JVD present  No tracheal deviation present  Cardiovascular: Normal rate, regular rhythm, normal heart sounds and intact distal pulses  Exam reveals no gallop and no friction rub  No murmur heard  Pulmonary/Chest: Effort normal and breath sounds normal  No respiratory distress  She has no wheezes  She has no rales  Abdominal: Soft  Bowel sounds are normal  She exhibits no distension  There is no tenderness  There is no rebound and no guarding  Neurological: She is alert and oriented to person, place, and time  No cranial nerve deficit  She exhibits normal muscle tone  Skin: Skin is warm and dry  She is not diaphoretic  No pallor  Psychiatric: She has a normal mood and affect  Her behavior is normal    Nursing note and vitals reviewed        ED Medications  Medications   atorvastatin (LIPITOR) tablet 20 mg (20 mg Oral Not Given 11/30/18 1625)   acetaminophen (TYLENOL) tablet 975 mg (975 mg Oral Given 12/1/18 0809)   dronabinol (MARINOL) capsule 2 5 mg (2 5 mg Oral Given 12/1/18 0624)   cholecalciferol (VITAMIN D3) tablet 2,000 Units (2,000 Units Oral Given 12/1/18 0813)   docusate sodium (COLACE) capsule 100 mg (100 mg Oral Given 12/1/18 0813)   levothyroxine tablet 88 mcg (88 mcg Oral Given 12/1/18 0624)   lidocaine (LIDODERM) 5 % patch 1 patch (1 patch Topical Medication Applied 12/1/18 0811)   metoprolol tartrate (LOPRESSOR) tablet 25 mg (25 mg Oral Given 12/1/18 0816)   sucralfate (CARAFATE) tablet 1 g (1 g Oral Given 12/1/18 1145)   potassium chloride (K-DUR,KLOR-CON) CR tablet 10 mEq (10 mEq Oral Given 12/1/18 0813)   polyethylene glycol (MIRALAX) packet 17 g (not administered)   pantoprazole (PROTONIX) EC tablet 40 mg (40 mg Oral Given 12/1/18 0624)   fluticasone [facility]) 50 mcg/act nasal spray 1 spray (1 spray Nasal Given 12/1/18 0808)   oxyCODONE (ROXICODONE) IR tablet 2 5 mg (2 5 mg Oral Given 11/29/18 1538)   oxyCODONE (ROXICODONE) IR tablet 5 mg (5 mg Oral Given 11/28/18 0916)   ondansetron (ZOFRAN) injection 4 mg (4 mg Intravenous Given 12/1/18 1657)   bisacodyl (DULCOLAX) rectal suppository 10 mg (not administered)   sodium chloride 0 9 % infusion (100 mL/hr Intravenous New Bag 12/1/18 1633)   sodium chloride 0 9 % bolus 250 mL (250 mL Intravenous Not Given 11/30/18 1128)   phytonadione (MEPHYTON) tablet 2 5 mg (2 5 mg Oral Given 12/1/18 1145)   acetaminophen (TYLENOL) tablet 650 mg (650 mg Oral Given 11/27/18 1846)   albumin human (FLEXBUMIN) 25 % injection 12 5 g (0 g Intravenous Stopped 11/27/18 2321)       Diagnostic Studies  Results Reviewed     Procedure Component Value Units Date/Time    Basic metabolic panel [[id number]]  (Abnormal) Collected:  11/29/18 0458    Lab Status:  Final result Specimen:  Blood from Arm, Left Updated:  11/29/18 0644     Sodium 139 mmol/L      Potassium 4 3 mmol/L      Chloride 101 mmol/L      CO2 25 mmol/L      ANION GAP 13 mmol/L      BUN 35 (H) mg/dL      Creatinine 1 68 (H) mg/dL      Glucose 79 mg/dL      Calcium 8 8 mg/dL      eGFR 26 ml/min/1 73sq m     Narrative:         National Kidney Disease Education Program recommendations are as follows:  GFR calculation is accurate only with a steady state creatinine  Chronic Kidney disease less than 60 ml/min/1 73 sq  meters  Kidney failure less than 15 ml/min/1 73 sq  meters      CBC and differential [[id number]]  (Abnormal) Collected:  11/29/18 0458    Lab Status:  Final result Specimen:  Blood from Arm, Left Updated:  11/29/18 0600     WBC 5 35 Thousand/uL      RBC 4 59 Million/uL      Hemoglobin 15 5 (H) g/dL      Hematocrit 47 9 (H) %       (H) fL      MCH 33 8 pg      MCHC 32 4 g/dL      RDW 18 8 (H) %      MPV 11 2 fL      Platelets 165 Thousands/uL      nRBC 1 /100 WBCs Neutrophils Relative 79 (H) %      Immat GRANS % 1 %      Lymphocytes Relative 11 (L) %      Monocytes Relative 7 %      Eosinophils Relative 1 %      Basophils Relative 1 %      Neutrophils Absolute 4 27 Thousands/µL      Immature Grans Absolute 0 06 Thousand/uL      Lymphocytes Absolute 0 58 (L) Thousands/µL      Monocytes Absolute 0 37 Thousand/µL      Eosinophils Absolute 0 03 Thousand/µL      Basophils Absolute 0 04 Thousands/µL     STAT INR [[id number]]  (Abnormal) Collected:  11/28/18 1423    Lab Status:  Final result Specimen:  Blood from Arm, Right Updated:  11/28/18 1459     Protime 38 0 (H) seconds      INR 3 87 (H)    Troponin I [[id number]]  (Abnormal) Collected:  11/28/18 1244    Lab Status:  Final result Specimen:  Blood from Arm, Left Updated:  11/28/18 1321     Troponin I 0 09 (H) ng/mL     Comprehensive metabolic panel [[id number]]  (Abnormal) Collected:  11/28/18 0436    Lab Status:  Final result Specimen:  Blood from Arm, Left Updated:  11/28/18 0522     Sodium 135 (L) mmol/L      Potassium 4 0 mmol/L      Chloride 99 (L) mmol/L      CO2 28 mmol/L      ANION GAP 8 mmol/L      BUN 28 (H) mg/dL      Creatinine 1 28 mg/dL      Glucose 83 mg/dL      Calcium 9 1 mg/dL      AST 27 U/L      ALT 22 U/L      Alkaline Phosphatase 172 (H) U/L      Total Protein 6 7 g/dL      Albumin 3 7 g/dL      Total Bilirubin 2 21 (H) mg/dL      eGFR 36 ml/min/1 73sq m     Narrative:         National Kidney Disease Education Program recommendations are as follows:  GFR calculation is accurate only with a steady state creatinine  Chronic Kidney disease less than 60 ml/min/1 73 sq  meters  Kidney failure less than 15 ml/min/1 73 sq  meters      Magnesium [[id number]]  (Abnormal) Collected:  11/28/18 0436    Lab Status:  Final result Specimen:  Blood from Arm, Left Updated:  11/28/18 0522     Magnesium 1 4 (L) mg/dL     CBC and differential [[id number]]  (Abnormal) Collected:  11/28/18 0436    Lab Status:  Final result Specimen: Blood from Arm, Left Updated:  11/28/18 0457     WBC 4 22 (L) Thousand/uL      RBC 4 14 Million/uL      Hemoglobin 13 9 g/dL      Hematocrit 42 6 %       (H) fL      MCH 33 6 pg      MCHC 32 6 g/dL      RDW 18 3 (H) %      MPV 10 6 fL      Platelets 201 Thousands/uL      nRBC 0 /100 WBCs      Neutrophils Relative 74 %      Immat GRANS % 1 %      Lymphocytes Relative 16 %      Monocytes Relative 7 %      Eosinophils Relative 1 %      Basophils Relative 1 %      Neutrophils Absolute 3 14 Thousands/µL      Immature Grans Absolute 0 06 Thousand/uL      Lymphocytes Absolute 0 66 Thousands/µL      Monocytes Absolute 0 28 Thousand/µL      Eosinophils Absolute 0 05 Thousand/µL      Basophils Absolute 0 03 Thousands/µL     Troponin I [[id number]] Collected:  11/27/18 2226    Lab Status:  No result Specimen:  Blood from Arm, Left     Lactic acid x2 Q2H [[id number]]  (Abnormal) Collected:  11/27/18 2028    Lab Status:  Final result Specimen:  Blood from Arm, Left Updated:  11/27/18 2131     LACTIC ACID 2 5 (HH) mmol/L     Narrative:         Result may be elevated if tourniquet was used during collection      Urine Microscopic [[id number]]  (Abnormal) Collected:  11/27/18 2034    Lab Status:  Final result Specimen:  Urine from Urine, Clean Catch Updated:  11/27/18 2114     RBC, UA None Seen /hpf      WBC, UA None Seen /hpf      Epithelial Cells None Seen /hpf      Bacteria, UA Occasional /hpf      Hyaline Casts, UA 5-10 (A) /lpf     POCT urinalysis dipstick [[id number]]  (Normal) Resulted:  11/27/18 2034    Lab Status:  Final result Updated:  11/27/18 2034     Color, UA see chart    ED Urine Macroscopic [[id number]]  (Abnormal) Collected:  11/27/18 2034    Lab Status:  Final result Specimen:  Urine Updated:  11/27/18 2033     Color, UA Yellow     Clarity, UA Clear     pH, UA 5 5     Leukocytes, UA Negative     Nitrite, UA Negative     Protein, UA Negative mg/dl      Glucose, UA Negative mg/dl      Ketones, UA Negative mg/dl Urobilinogen, UA 1 0 E U /dl      Bilirubin, UA Interference- unable to analyze (A)     Blood, UA Trace (A)     Specific [place], UA 1 020    Narrative:       CLINITEK RESULT    Lactic acid x2 Q2H [[id number]]  (Abnormal) Collected:  11/27/18 1849    Lab Status:  Final result Specimen:  Blood from Arm, Left Updated:  11/27/18 1959     LACTIC ACID 3 1 (HH) mmol/L     Narrative:         Result may be elevated if tourniquet was used during collection  B-type natriuretic peptide [[id number]]  (Abnormal) Collected:  11/27/18 1849    Lab Status:  Final result Specimen:  Blood from Arm, Left Updated:  11/27/18 1957     NT-proBNP 12,417 (H) pg/mL     Comprehensive metabolic panel [[id number]]  (Abnormal) Collected:  11/27/18 1849    Lab Status:  Final result Specimen:  Blood from Arm, Left Updated:  11/27/18 1957     Sodium 133 (L) mmol/L      Potassium 5 3 mmol/L      Chloride 98 (L) mmol/L      CO2 27 mmol/L      ANION GAP 8 mmol/L      BUN 28 (H) mg/dL      Creatinine 1 32 (H) mg/dL      Glucose 102 mg/dL      Calcium 9 7 mg/dL      AST 51 (H) U/L      ALT 22 U/L      Alkaline Phosphatase 205 (H) U/L      Total Protein 6 8 g/dL      Albumin 2 9 (L) g/dL      Total Bilirubin 2 72 (H) mg/dL      eGFR 34 ml/min/1 73sq m     Narrative:         National Kidney Disease Education Program recommendations are as follows:  GFR calculation is accurate only with a steady state creatinine  Chronic Kidney disease less than 60 ml/min/1 73 sq  meters  Kidney failure less than 15 ml/min/1 73 sq  meters      Troponin I [[id number]]  (Abnormal) Collected:  11/27/18 1849    Lab Status:  Final result Specimen:  Blood from Arm, Left Updated:  11/27/18 1953     Troponin I 0 09 (H) ng/mL     CBC and differential [[id number]]  (Abnormal) Collected:  11/27/18 1849    Lab Status:  Final result Specimen:  Blood from Arm, Left Updated:  11/27/18 1938     WBC 4 71 Thousand/uL      RBC 4 77 Million/uL      Hemoglobin 16 0 (H) g/dL      Hematocrit 48 8 (H) %       (H) fL      MCH 33 5 pg      MCHC 32 8 g/dL      RDW 18 3 (H) %      MPV 11 1 fL      Platelets 012 Thousands/uL      nRBC 0 /100 WBCs      Neutrophils Relative 82 (H) %      Immat GRANS % 1 %      Lymphocytes Relative 9 (L) %      Monocytes Relative 6 %      Eosinophils Relative 1 %      Basophils Relative 1 %      Neutrophils Absolute 3 87 Thousands/µL      Immature Grans Absolute 0 06 Thousand/uL      Lymphocytes Absolute 0 42 (L) Thousands/µL      Monocytes Absolute 0 27 Thousand/µL      Eosinophils Absolute 0 06 Thousand/µL      Basophils Absolute 0 03 Thousands/µL                  X-ray chest 2 views   ED Interpretation by Zack Pulliam MD (11/27 1915)   No active pulmonary disease      Final Result by Bennett Fernandez MD (11/27 2246)      Left basilar opacity could represent atelectasis and/or pneumonia  Workstation performed: [id number]               Procedures  Procedures      Phone Consults  ED Phone Contact    ED Course  ED Course as of Dec 01 1659   Tue Nov 27, 2018 2002 Elevated lactic acid could be hypoperfusion from cardiogenic shock from CHF  No evidence of infection at this point to raise concern for sepsis    2052 Had a lengthy discussion with the daughter regarding her mother's clinical state and advanced age  Expressed my doubt that medically there is any specific treatable or reversible pathology contributing to her mother's overall decline over the past months and that at her advanced age would not likely be able to identify such a condition  The daughter expressed understanding of this  Discussed admission for consideration of palliative care evaluation with the understanding that this does not necessarily mean that she is dying in the acute phase but rather should be evaluated to help improve quality of life as a remains  The patient's daughter agrees and would like for this to happen    They did state that in the meantime the patient is to remain full code as is the patient's wish  Identification of Seniors at Risk      Most Recent Value   (ISAR) Identification of Seniors at Risk   Before the illness or injury that brought you to the Emergency, did you need someone to help you on a regular basis? 1 Filed at: 11/27/2018 1758   In the last 24 hours, have you needed more help than usual?  1 Filed at: 11/27/2018 1758   Have you been hospitalized for one or more nights during the past 6 months? 1 Filed at: 11/27/2018 1758   In general, do you see well?  0 Filed at: 11/27/2018 1758   In general, do you have serious problems with your memory? 0 Filed at: 11/27/2018 1758   Do you take more than three different medications every day? 1 Filed at: 11/27/2018 1758   ISAR Score  4 Filed at: 11/27/2018 1758                          MDM  Number of Diagnoses or Management Options  Adult failure to thrive:   SHANEL (acute kidney injury) [facility]):   Elevated troponin:   Shortness of breath:   Diagnosis management comments: 80year-old female shortness of breath and overall failure to thrive  Had a lengthy discussion with the patient and the patient's daughter about significance of this and her advanced age and that regardless of of any underlying exacerbating etiology, she has been generally declining over the past several months and is likely nearing end of life  Discussed that we would attempt to further investigate her symptoms to discover and possibly treated a any contributing underlying reversible pathologies including CHF exacerbation infections or treated cardiac issues though I again expressed my doubt that such a condition may be able to be identified or treated  Regardless will obtain a cardiac and infectious workup and will likely admit to the hospital for further discussions of plans of care      CritCare Time    Disposition  Final diagnoses:   Shortness of breath   Adult failure to thrive   Elevated troponin   SHANEL (acute kidney injury) ([facility] [address] )     Time reflects when diagnosis was documented in both MDM as applicable and the Disposition within this note     Time User Action Codes Description Comment    11/27/2018  9:29 PM Aries Silva Add [R06 02] Shortness of breath     11/27/2018  9:29 PM Aries Silva Add [R62 7] Adult failure to thrive     11/27/2018  9:29 PM Aries Silva Add [R74 8] Elevated troponin     11/27/2018  9:29 PM Aries Silva Add [N17 9] SHANEL (acute kidney injury) ([facility] )     11/27/2018 10:00 PM Rhett Doug D Add [K49 19] Chronic diastolic CHF (congestive heart failure) ([facility] )     11/27/2018 10:00 PM Rhett Doug D Modify [O89 67] Chronic diastolic CHF (congestive heart failure) ([facility] )     11/27/2018 10:00 PM Rhett Doug D Add [Z79 01] Chronic anticoagulation     11/27/2018 10:00 PM Rhett Doug D Modify [Z79 01] Chronic anticoagulation     11/27/2018 10:00 PM Rhett Doug D Remove [Z79 01] Chronic anticoagulation     11/27/2018 10:07 PM Rhett Doug D Add [E44 0] Moderate protein-calorie malnutrition ([facility] )     11/29/2018  3:14 PM Hettie Mac Add [L60 2] Long toenail       ED Disposition     ED Disposition Condition Comment    Admit  Case was discussed with RHIANNON and the patient's admission status was agreed to be Admission Status: inpatient status to the service of Dr Jose Mckeon           Follow-up Information    None         Current Discharge Medication List      CONTINUE these medications which have NOT CHANGED    Details   acetaminophen (TYLENOL) 325 mg tablet Take 3 tablets by mouth every 8 (eight) hours  Qty: 30 tablet, Refills: 0      albuterol (2 5 mg/3 mL) 0 083 % nebulizer solution INHALE THE CONTENTS OF 1 VIAL BY NEBULIZATION FOUR TIMES DAILY  Refills: 0      atorvastatin (LIPITOR) 20 mg tablet Take 1 tablet (20 mg total) by mouth daily  Qty: 90 tablet, Refills: 1    Associated Diagnoses: Hypercholesteremia      bisacodyl (DULCOLAX) 10 mg suppository Insert into the rectum      Cholecalciferol (VITAMIN D) 2000 UNITS CAPS Take 2,000 Units by mouth daily  cyanocobalamin 1,000 mcg/mL Inject 1 mL (1,000 mcg total) into a muscle every 30 (thirty) days  Qty: 1 mL, Refills: 0    Associated Diagnoses: Anemia due to vitamin B12 deficiency, unspecified B12 deficiency type      Docusate Sodium 100 MG capsule Take 1 tablet by mouth daily      dronabinol (MARINOL) 2 5 mg capsule Take 1 capsule (2 5 mg total) by mouth 2 (two) times a day before meals for 10 days  Qty: 20 capsule, Refills: 0    Associated Diagnoses: Moderate protein-calorie malnutrition (HCC)      dronabinol (MARINOL) 5 MG capsule Take 1 capsule (5 mg total) by mouth 2 (two) times a day before meals for 10 days  Qty: 20 capsule, Refills: 0    Associated Diagnoses: Moderate protein-calorie malnutrition (HCC)      fluticasone (FLONASE) 50 mcg/act nasal spray into each nostril      furosemide (LASIX) 20 mg tablet Take 1 tablet (20 mg total) by mouth every other day  Qty: 30 tablet, Refills: 0    Associated Diagnoses: Chronic diastolic CHF (congestive heart failure) (HCC)      levothyroxine 88 mcg tablet Take 1 tablet (88 mcg total) by mouth daily  Qty: 90 tablet, Refills: 1    Associated Diagnoses: Hypothyroidism, unspecified type      lidocaine (LIDODERM) 5 % Apply 1 patch topically daily Remove & Discard patch within 12 hours or as directed by MD  Qty: 30 patch, Refills: 0    Associated Diagnoses: Closed wedge compression fracture of ninth thoracic vertebra, sequela      metoprolol tartrate (LOPRESSOR) 25 mg tablet Take 25 mg by mouth every 12 (twelve) hours      omeprazole (PriLOSEC) 40 MG capsule Take 1 capsule (40 mg total) by mouth 2 (two) times a day  Qty: 90 capsule, Refills: 0    Associated Diagnoses: Gastritis, presence of bleeding unspecified, unspecified chronicity, unspecified gastritis type;  Hiatal hernia      polyethylene glycol (MIRALAX) 17 g packet Take 17 g by mouth daily as needed (Constipation)  Qty: 14 each, Refills: 0    Associated Diagnoses: Acute cystitis without hematuria      potassium chloride (K-DUR) 10 mEq tablet Take 1 tablet (10 mEq total) by mouth daily  Qty: 30 tablet, Refills: 0    Associated Diagnoses: Acute cystitis without hematuria      sucralfate (CARAFATE) 1 g tablet Take 1 tablet (1 g total) by mouth 4 (four) times a day  Qty: 90 tablet, Refills: 0    Associated Diagnoses: Lower abdominal pain      warfarin (COUMADIN) 1 mg tablet Take 2 5 mg by mouth daily Dosing is based on INR             No discharge procedures on file  ED Provider  Attending physically available and evaluated Mickael Litten I managed the patient along with the ED Attending      Electronically Signed by         Matt Francis MD  12/01/18 5934

## 2018-11-28 NOTE — ASSESSMENT & PLAN NOTE
· Currently rate controlled, takes Coumadin for anticoagulation  · Continue metoprolol  · Will obtain INR, resumption of Coumadin to be based on this result

## 2018-11-28 NOTE — ASSESSMENT & PLAN NOTE
· Persistent from last hospitalization  Notably patient with extensive workup negative for clear pathology; prior hiatal hernia noted  No clear palpable exam findings save mild epigastric pain improved with distraction      · Continue PPI, Carafate  · Zofran PRN  · Troponin noted to be mildly elevated, will consider ischemic workup if persists

## 2018-11-28 NOTE — ASSESSMENT & PLAN NOTE
Creatinine mildly elevated to 1 3 (baseline 1-1 1)  Likely secondary to hypovolemia from poor p o   Intake  · Patient receiving albumin in ED  · Trend creatinine

## 2018-11-29 NOTE — ASSESSMENT & PLAN NOTE
· Troponin 0 09 on admission, repeat troponin stable  No EKG changes and patient denying chest pain/pressure or anginal symptoms  · Does have some vascular congestion on CXR  May be related to CHF  · Doubt type 1 NSTEMI  Defer further workup at this time given advanced age, low suspicion for MI, transitioning toward more comfort-focused care  Already on BB, statin  Defer aspirin as she is on warfarin

## 2018-11-29 NOTE — PLAN OF CARE
Problem: OCCUPATIONAL THERAPY ADULT  Goal: Performs self-care activities at highest level of function for planned discharge setting  See evaluation for individualized goals  Treatment Interventions: ADL retraining, Functional transfer training, UE strengthening/ROM, Endurance training, Cognitive reorientation, Patient/family training, Equipment evaluation/education, Compensatory technique education, Continued evaluation, Energy conservation, Activityengagement          See flowsheet documentation for full assessment, interventions and recommendations  Limitation: Decreased ADL status, Decreased UE strength, Decreased Safe judgement during ADL, Decreased cognition, Decreased endurance, Decreased self-care trans, Decreased high-level ADLs  Prognosis: Fair  Assessment: Pt is a 80year old female seen for OT s/p admission to [facility] with SOB and persistent abdominal pain  Comorbidities include a h/o hiatal hernia, chronic diastolic congestive heart failure, chronic Afib on Coumadin, and HTN  Pt with active OT orders and up with assistance orders  Pt lives with her daughter and son-in-law in an apartment with 3STE  Her daughter works and her son-in-law is in and out of the apartment to receive chemotherapy  Pt required assistance with washing her hair, but was able to complete all other ADLs I'ly PTA  Pt required assistance with IADLs PTA  Pt was able to perform functional mobility with rw I'ly PTA  Pt is currently demonstrating the following occupational deficits: grooming with Maxx, UB bathing with modA, LB bathing with maxA, UB dressing with modA, LB dressing with maxA, toileting with maxA, and bed mobility with modA  Impairments that are contributing to the decline in pt's performance of these occupations include: cognitive deficits, pain, endurance, sitting tolerance, activity tolerance, strength and unsupportive home environment    The following occupational performance areas to address include: grooming, UB dressing, LB dressing, UB bathing, LB bathing, bed mobility, and functional transfers  Overall, pt scored 25/100 on the Barthel Index  Considering the patient's current functional deficits, home environment, and family support (home alone for periods of time), it is not currently safe for the patient to return home upon d/c  Recommend STR upon d/c  Pt is to continue to benefit from skilled occupational therapy while in the hospital to maximize functioning and independence in daily tasks  See below for OT goals       OT Discharge Recommendation: Short Term Rehab  OT - OK to Discharge:  (when medically stable)      Comments: IZAIAH Alfaro, OTR/L

## 2018-11-29 NOTE — CONSULTS
Late entry - Pt seen on DOS; my document was entered late  Consultation - Palliative and 34 [address] y o  female [phone]    Patient Active Problem List   Diagnosis    Nondisplaced spiral fracture of shaft of right femur, initial encounter for closed fracture ([facility] )    Hypothyroidism    Essential hypertension    Chronic atrial fibrillation (HCC)    Anemia of chronic disease    Oral thrush    Hypercholesteremia    Iron deficiency anemia due to chronic blood loss    Gastritis    Hiatal hernia    Chronic anticoagulation    Chronic diastolic CHF (congestive heart failure) (HCC)    Osteopenia    [facility]SS [facility]sick [facility] syndrome) ([facility]    Status post fracture of femur    Abdominal cramping    Closed wedge compression fracture of ninth thoracic vertebra (HCC)    Moderate protein-calorie malnutrition (HCC)    Elevated troponin    Chronic kidney disease, stage 3 ([facility] )      Plan:  1  Symptom management - proportional palliation    - Will begin to whittle anti-acid meds, as pt's symptomatology is not consistent with reflux  2  Goals - full cares, no limits   - Pt has poor competence, defers medical decisions to her daughter    - Daughter does not feel that any clear cause for pt's illness has been found; she is therefore unwilling to consider comfort measurse at this time  Code Status: FULL - Level 1   Decisional apparatus:  Patient is not fully competent on my exam today  If competence is lost, patient's substitute decision maker would default to daughter by [place] 169  Advance Directive / Living Will / POLST:  None on file     I have reviewed the patient's controlled substance dispensing history in the Prescription Drug Monitoring Program in compliance with the [facility] regulations before prescribing any controlled substances  We appreciate the invitation to be involved in this patient's care  We will continue to follow    Please do not hesitate to reach our on call provider through our clinic answering service at  should you have acute symptom control concerns  Slime Holbrook MD  Palliative and Supportive Care  Pager: [phone]       IDENTIFICATION:  Consults  Physician Requesting Consult: Irma Flores MD  Reason for Consult / Principal Problem: goals  Hx and PE limited by: pt's dementia    HISTORY OF PRESENT ILLNESS:       Irene Saavedra is a 80 y o  female who presents with wt loss and abdominal pain  Pt has had debilitating abdominal pain since September, according to her daughter  At that time, PT or another carer attempted to lift or position the patient, and a small crack was heard  Afertwards, the pt began to endorse chest pain, or chest wall pain  This has progressed to migratory discomfort over the chest and abdomen, but typically centered on the epigastric area  She states that leaning forward and applying pressure to abd, or lying down, are both help for her pain  Dauhgter provides a long history of medical complications, including CHF, a-fib on blood thinners, and htn, but denies that she has had troubles with vomtiing  Moreover, the problem is progressive wt loss, early satiety, and general/constant nausea  She has lost 5-10# since last hospital stay a week ago  Tests and studies show:   - Negative HIDA scan 10/20   - No fx on R hip films 10/30   - S shaped thoracolumbar scoliosis and significant DJD    - ?New fx of T9 verterbral body, since previous scan  No other cause for concern in abdomen    - Significant drop in hgb on 11/28, possible lab error    - No recorded endoscopies recently in our system  Blanca Russell concerned for her overall longevity and quality of life, but I cannot articulate direct contributors to her overal status  In theory, all her ills, aside from her dementia -- could be treated with intent to resolve  However, the likeltihood of this happening is quite poor, and ddaughter whished for full cares  Review of Systems   Unable to perform ROS: dementia       Past Medical History:   Diagnosis Date    Anemia of chronic disease 11/27/2017    Arthritis     Cardiac disease     CHF (congestive heart failure) ([address]     Femur fracture, right (HCC)     Hiatal hernia     Hypercholesteremia     Hypothyroidism     Impaired ambulation      Past Surgical History:   Procedure Laterality Date    APPENDECTOMY      CATARACT EXTRACTION Bilateral     CATARACT EXTRACTION      HIP FRACTURE SURGERY Right 12/2015    HYSTERECTOMY      OOPHORECTOMY      REPAIR RECTOCELE      REPLACEMENT TOTAL KNEE Right      Social History     Social History    Marital status:      Spouse name: N/A    Number of children: N/A    Years of education: N/A     Occupational History    [employer]      Social History Main Topics    Smoking status: Never Smoker    Smokeless tobacco: Never Used    Alcohol use No    Drug use: No    Sexual activity: No     Other Topics Concern    Not on file     Social History Narrative    DAILY COFFEE CONSUMPTION 2 CUPS  A DAY    TEA CONSUMPTION 1 CUP A DAY     Family History   Problem Relation Age of Onset    Kidney cancer Mother     Alcohol abuse Father     Cirrhosis Father         LIVER    Drug abuse Father     Alcohol abuse Brother     Diabetes Brother     Drug abuse Brother     Stroke Brother     Coronary artery disease Family        MEDICATIONS / ALLERGIES:    all current active meds have been reviewed    Allergies   Allergen Reactions    Ace Inhibitors Angioedema     [facility] - [id number]: angioedema to ARB  Other reaction(s): Angioedema  Pt and family state not an allergy    Angiotensin Receptor Blockers Angioedema    Erythromycin Ethylsuccinate GI Intolerance    Losartan      Other reaction(s): Angioedema   Pt and family state this is not an allergy      Aspirin Dermatitis and Edema    Codeine Edema    Erythromycin Base Other (See Comments)    Ibuprofen Other (See Comments)     Patient denies allergy    Penicillins Hives and Edema     However, patient has tolerated dissimilar side chain Cephalosporins (Cefazolin, Cefuroxime)       OBJECTIVE:    Physical Exam  Physical Exam   Constitutional: She is oriented to person, place, and time  No distress  frail   HENT:   Head: Normocephalic and atraumatic  Right Ear: External ear normal    Left Ear: External ear normal    Mouth/Throat: Oropharyngeal exudate (mucous membranes tacky) present  Eyes: Pupils are equal, round, and reactive to light  Conjunctivae and EOM are normal  Right eye exhibits no discharge  Left eye exhibits no discharge  Neck: No tracheal deviation present  Cardiovascular:   tachycardic   Pulmonary/Chest: Effort normal  No stridor  No respiratory distress  Abdominal: Soft  She exhibits no distension  Scaphoid   Musculoskeletal: She exhibits no edema  Dramatic muscle wasting thorughout  Muscle of hands appear quite [id number]  Eyes sunken   Neurological: She is alert and oriented to person, place, and time  No cranial nerve deficit  Skin: Skin is warm and dry  No rash noted  She is not diaphoretic  No erythema  Lab Results: I have personally reviewed pertinent labs  Poor renal function; prevents use or morhpine produts  Imaging Studies: reviewd imaging reports, so noted above  EKG, Pathology, and Other Studies: none new    Counseling / Coordination of Care  Total floor / unit time spent today 45+ minutes  Greater than 50% of total time was spent with the patient and / or family counseling and / or coordination of care   A description of the counseling / coordination of care: chart review and symptom pursuit; review and assessment of decisional apparatus and capacity, applicable legal codes and extant documents;

## 2018-11-29 NOTE — ASSESSMENT & PLAN NOTE
· Currently rate controlled, takes Coumadin for anticoagulation  · INR elevated  Coumadin on hold   Recheck INR in AM

## 2018-11-29 NOTE — CONSULTS
Consult - James Cadena 80 y o  female MRN: [id number]  Unit/Bed#: CW2 216-01 Encounter: [id number]    Assessment/Plan     Assessment:  1  Onychomycosis    Plan:  - toenails times 10 trimmed with large nail Nipper  No incidents noted  - continue with heel offloading with Allyvn pad b/l  - thank you for consult, will sign off at this time; patient podiatry for questions or concerns  History of Present Illness     HPI:  Madhavi Jeong is a 80 y o  female who presents with elongated toenails  States that their nails are painful, elongated  They have difficulty applying their socks and shoes  The pressure within their shoe gear is painful and they have been unable to cut their nails adequately  Consults  Review of Systems   Constitutional: Negative  HENT: Negative  Eyes: Negative  Respiratory: Negative  Cardiovascular: Negative  Gastrointestinal: Negative  Musculoskeletal: painful toenails   Skin:elongated toenails   Neurological: Negative  Psych: negative         Historical Information   Past Medical History:   Diagnosis Date    Anemia of chronic disease 11/27/2017    Arthritis     Cardiac disease     CHF (congestive heart failure) (HCC)     Femur fracture, right (HCC)     Hiatal hernia     Hypercholesteremia     Hypothyroidism     Impaired ambulation      Past Surgical History:   Procedure Laterality Date    APPENDECTOMY      CATARACT EXTRACTION Bilateral     CATARACT EXTRACTION      HIP FRACTURE SURGERY Right 12/2015    HYSTERECTOMY      OOPHORECTOMY      REPAIR RECTOCELE      REPLACEMENT TOTAL KNEE Right      Social History   History   Alcohol Use No     History   Drug Use No     History   Smoking Status    Never Smoker   Smokeless Tobacco    Never Used     Family History:   Family History   Problem Relation Age of Onset    Kidney cancer Mother     Alcohol abuse Father     Cirrhosis Father         LIVER    Drug abuse Father     Alcohol abuse Brother     Diabetes Brother     Drug abuse Brother     Stroke Brother     Coronary artery disease Family        Meds/Allergies   Prescriptions Prior to Admission   Medication    acetaminophen (TYLENOL) 325 mg tablet    albuterol (2 5 mg/3 mL) 0 083 % nebulizer solution    atorvastatin (LIPITOR) 20 mg tablet    bisacodyl (DULCOLAX) 10 mg suppository    Cholecalciferol (VITAMIN D) 2000 UNITS CAPS    cyanocobalamin 1,000 mcg/mL    Docusate Sodium 100 MG capsule    dronabinol (MARINOL) 2 5 mg capsule    dronabinol (MARINOL) 5 MG capsule    fluticasone (FLONASE) 50 mcg/act nasal spray    furosemide (LASIX) 20 mg tablet    levothyroxine 88 mcg tablet    lidocaine (LIDODERM) 5 %    metoprolol tartrate (LOPRESSOR) 25 mg tablet    omeprazole (PriLOSEC) 40 MG capsule    polyethylene glycol (MIRALAX) 17 g packet    potassium chloride (K-DUR) 10 mEq tablet    sucralfate (CARAFATE) 1 g tablet    warfarin (COUMADIN) 1 mg tablet     Allergies   Allergen Reactions    Ace Inhibitors Angioedema     [facility] - [id number]: angioedema to ARB  Other reaction(s): Angioedema  Pt and family state not an allergy    Angiotensin Receptor Blockers Angioedema    Erythromycin Ethylsuccinate GI Intolerance    Losartan      Other reaction(s): Angioedema   Pt and family state this is not an allergy      Aspirin Dermatitis and Edema    Codeine Edema    Erythromycin Base Other (See Comments)    Ibuprofen Other (See Comments)     Patient denies allergy    Penicillins Hives and Edema     However, patient has tolerated dissimilar side chain Cephalosporins (Cefazolin, Cefuroxime)       Objective   First Vitals:   Blood Pressure: 156/79 (11/27/18 1754)  Pulse: 66 (11/27/18 1754)  Temperature: (!) 96 9 °F (36 1 °C) (11/27/18 1754)  Temp Source: Tympanic (11/27/18 1754)  Respirations: 16 (11/27/18 1754)  Height: 5' (152 4 cm) (11/27/18 1754)  Weight - Scale: 51 3 kg (113 lb) (11/27/18 1754)  SpO2: 97 % (11/27/18 1754)    Current Vitals: Blood Pressure: 149/83 (11/29/18 1519)  Pulse: 60 (11/29/18 1519)  Temperature: 97 6 °F (36 4 °C) (11/29/18 1519)  Temp Source: Oral (11/29/18 1519)  Respirations: 18 (11/29/18 1519)  Height: 5' (152 4 cm) (11/28/18 1300)  Weight - Scale: 52 5 kg (115 lb 11 9 oz) (11/29/18 0600)  SpO2: 94 % (11/29/18 1519)        /83 (BP Location: Right arm)   Pulse 60   Temp 97 6 °F (36 4 °C) (Oral)   Resp 18   Ht 5' (1 524 m)   Wt 52 5 kg (115 lb 11 9 oz)   LMP  (LMP Unknown)   SpO2 94%   BMI 22 60 kg/m²      General Appearance:    Alert, cooperative, no distress   Head:    Normocephalic, without obvious abnormality, atraumatic   Eyes:    PERRL, conjunctiva/corneas clear, EOM's intact        Nose:   Moist mucous membranes   Neck:   Supple, symmetrical, trachea midline   Back:     Symmetric   Lungs:     Respirations unlabored   Heart:    Regular rate and rhythm, S1 and S2 normal, no murmur, rub   or gallop   Abdomen:     Soft, non-tender   Extremities:   MMT 4/5 B/L  Painful toes due to elongated nails   Pulses:   Palpable DP and PT bilaterally   Skin:   Thin, xerosis noted  Toenails times 10 elongated and thickened with subungual debris  Neurologic:   Gross sensation is intact             Lab Results:   Admission on 11/27/2018   Component Date Value    Sodium 11/27/2018 133*    Potassium 11/27/2018 5 3     Chloride 11/27/2018 98*    CO2 11/27/2018 27     ANION GAP 11/27/2018 8     BUN 11/27/2018 28*    Creatinine 11/27/2018 1 32*    Glucose 11/27/2018 102     Calcium 11/27/2018 9 7     AST 11/27/2018 51*    ALT 11/27/2018 22     Alkaline Phosphatase 11/27/2018 205*    Total Protein 11/27/2018 6 8     Albumin 11/27/2018 2 9*    Total Bilirubin 11/27/2018 2 72*    eGFR 11/27/2018 34     WBC 11/27/2018 4 71     RBC 11/27/2018 4 77     Hemoglobin 11/27/2018 16 0*    Hematocrit 11/27/2018 48 8*    MCV 11/27/2018 102*    MCH 11/27/2018 33 5     MCHC 11/27/2018 32 8     RDW 11/27/2018 18 3*    MPV 11/27/2018 11 1     Platelets 21/00/8715 212     nRBC 11/27/2018 0     Neutrophils Relative 11/27/2018 82*    Immat GRANS % 11/27/2018 1     Lymphocytes Relative 11/27/2018 9*    Monocytes Relative 11/27/2018 6     Eosinophils Relative 11/27/2018 1     Basophils Relative 11/27/2018 1     Neutrophils Absolute 11/27/2018 3 87     Immature Grans Absolute 11/27/2018 0 06     Lymphocytes Absolute 11/27/2018 0 42*    Monocytes Absolute 11/27/2018 0 27     Eosinophils Absolute 11/27/2018 0 06     Basophils Absolute 11/27/2018 0 03     Troponin I 11/27/2018 0 09*    NT-proBNP 11/27/2018 45843*    LACTIC ACID 11/27/2018 3 1*    LACTIC ACID 11/27/2018 2 5*    Color, UA 11/27/2018 see chart     Color, UA 11/27/2018 Yellow     Clarity, UA 11/27/2018 Clear     pH, UA 11/27/2018 5 5     Leukocytes, UA 11/27/2018 Negative     Nitrite, UA 11/27/2018 Negative     Protein, UA 11/27/2018 Negative     Glucose, UA 11/27/2018 Negative     Ketones, UA 11/27/2018 Negative     Urobilinogen, UA 11/27/2018 1 0     Bilirubin, UA 11/27/2018 Interference- unable to analyze*    Blood, UA 11/27/2018 Trace*    Specific Gravity, UA 11/27/2018 1 020     RBC, UA 11/27/2018 None Seen     WBC, UA 11/27/2018 None Seen     Epithelial Cells 11/27/2018 None Seen     Bacteria, UA 11/27/2018 Occasional     Hyaline Casts, UA 11/27/2018 5-10*    Protime 11/28/2018 38 0*    INR 11/28/2018 3 87*    Sodium 11/28/2018 135*    Potassium 11/28/2018 4 0     Chloride 11/28/2018 99*    CO2 11/28/2018 28     ANION GAP 11/28/2018 8     BUN 11/28/2018 28*    Creatinine 11/28/2018 1 28     Glucose 11/28/2018 83     Calcium 11/28/2018 9 1     AST 11/28/2018 27     ALT 11/28/2018 22     Alkaline Phosphatase 11/28/2018 172*    Total Protein 11/28/2018 6 7     Albumin 11/28/2018 3 7     Total Bilirubin 11/28/2018 2 21*    eGFR 11/28/2018 36     Magnesium 11/28/2018 1 4*    WBC 11/28/2018 4 22*    RBC 11/28/2018 4 14  Hemoglobin 11/28/2018 13 9     Hematocrit 11/28/2018 42 6     MCV 11/28/2018 103*    MCH 11/28/2018 33 6     MCHC 11/28/2018 32 6     RDW 11/28/2018 18 3*    MPV 11/28/2018 10 6     Platelets 86/70/8666 163     nRBC 11/28/2018 0     Neutrophils Relative 11/28/2018 74     Immat GRANS % 11/28/2018 1     Lymphocytes Relative 11/28/2018 16     Monocytes Relative 11/28/2018 7     Eosinophils Relative 11/28/2018 1     Basophils Relative 11/28/2018 1     Neutrophils Absolute 11/28/2018 3 14     Immature Grans Absolute 11/28/2018 0 06     Lymphocytes Absolute 11/28/2018 0 66     Monocytes Absolute 11/28/2018 0 28     Eosinophils Absolute 11/28/2018 0 05     Basophils Absolute 11/28/2018 0 03     Ventricular Rate 11/27/2018 67     Atrial Rate 11/27/2018 340     QRSD Interval 11/27/2018 84     QT Interval 11/27/2018 330     QTC Interval 11/27/2018 348     QRS Axis 11/27/2018 -28     T [place] 11/27/2018 201     Troponin I 11/28/2018 0 09*    Sodium 11/29/2018 139     Potassium 11/29/2018 4 3     Chloride 11/29/2018 101     CO2 11/29/2018 25     ANION GAP 11/29/2018 13     BUN 11/29/2018 35*    Creatinine 11/29/2018 1 68*    Glucose 11/29/2018 79     Calcium 11/29/2018 8 8     eGFR 11/29/2018 26     WBC 11/29/2018 5 35     RBC 11/29/2018 4 59     Hemoglobin 11/29/2018 15 5*    Hematocrit 11/29/2018 47 9*    MCV 11/29/2018 104*    MCH 11/29/2018 33 8     MCHC 11/29/2018 32 4     RDW 11/29/2018 18 8*    MPV 11/29/2018 11 2     Platelets 38/70/7903 197     nRBC 11/29/2018 1     Neutrophils Relative 11/29/2018 79*    Immat GRANS % 11/29/2018 1     Lymphocytes Relative 11/29/2018 11*    Monocytes Relative 11/29/2018 7     Eosinophils Relative 11/29/2018 1     Basophils Relative 11/29/2018 1     Neutrophils Absolute 11/29/2018 4 27     Immature Grans Absolute 11/29/2018 0 06     Lymphocytes Absolute 11/29/2018 0 58*    Monocytes Absolute 11/29/2018 0 37     Eosinophils Absolute 11/29/2018 0 03     Basophils Absolute 11/29/2018 0 04                    Invalid input(s): LABAEARO            Imaging: I have personally reviewed pertinent films in PACS  EKG, Pathology, and Other Studies: I have personally reviewed pertinent reports        Code Status: Level 1 - Full Code  Advance Directive and Living Will:      Power of :    POLST:

## 2018-11-29 NOTE — SOCIAL WORK
CM met with patient and explained cm role  Pt alert and oriented  Pts daughter Demetrius Sales at bedside  Pt reports she lives in a 1st floor apartment with her daughter/caregiver Olive Elkins [phone]  Pt reports DME: rw, and VNA w[facility] (pt may be current, pt and daughter not sure), prev rehab w/[facility]  Pt reports needing some assistance with ADL's recently, and uses a rw to ambulate, she does not drive, transports to appointments via her daughters, and will transport home with daughters for d/c  Pts pharmacy is [facility] [address] in [place]  No POA  Pt denies hx/admission for drug/etoh and psych/mental health  CM presented and explained bundle ltr to pt  CM reviewed d/c planning process including the following: identifying help at home, patient preference for d/c planning needs, Discharge Lounge, [facility] Meds to Bed program, availability of treatment team to discuss questions or concerns patient and/or family may have regarding understanding medications and recognizing signs and symptoms once discharged  CM also encouraged patient to follow up with all recommended appointments after discharge  Patient advised of importance for patient and family to participate in managing patients medical well being

## 2018-11-29 NOTE — PHYSICAL THERAPY NOTE
Physical Therapy Evaluation      Patient Active Problem List   Diagnosis    Nondisplaced spiral fracture of shaft of right femur, initial encounter for closed fracture ([facility] [address] )    Hypothyroidism    Essential hypertension    Chronic atrial fibrillation (HCC)    Anemia of chronic disease    Oral thrush    Hypercholesteremia    Iron deficiency anemia due to chronic blood loss    Gastritis    Hiatal hernia    Chronic anticoagulation    Chronic diastolic CHF (congestive heart failure) (HCC)    Osteopenia    [facility]    Status post fracture of femur    Abdominal cramping    Closed wedge compression fracture of ninth thoracic vertebra ([facility])    Moderate protein-calorie malnutrition (HCC)    Elevated troponin    Chronic kidney disease, stage 3 (HCC)       Past Medical History:   Diagnosis Date    Anemia of chronic disease 11/27/2017    Arthritis     Cardiac disease     CHF [facility]congestive heart failure) ([facility]     Femur fracture, right ([facility])     Hiatal hernia     Hypercholesteremia     Hypothyroidism     Impaired ambulation        Past Surgical History:   Procedure Laterality Date    APPENDECTOMY      CATARACT EXTRACTION Bilateral     CATARACT EXTRACTION      HIP FRACTURE SURGERY Right 12/2015    HYSTERECTOMY      OOPHORECTOMY      REPAIR RECTOCELE      REPLACEMENT TOTAL KNEE Right       11/29/18 1422   Note Type   Note type Eval only   Pain Assessment   Pain Assessment FLACC   Pain Rating: FLACC (Rest) - Face 1   Pain Rating: FLACC (Rest) - Legs 0   Pain Rating: FLACC (Rest) - Activity 0   Pain Rating: FLACC (Rest) - Cry 0   Pain Rating: FLACC (Rest) - Consolability 0   Score: FLACC (Rest) 1   Home Living   Type of Home House  (basement of daughter's home)   Home Layout One level;Stairs to enter with rails  (3 stairs to enter with both "bannisters")   [address]; Wheelchair-manual   Prior Function   Level of [place] Needs assistance with IADLs; Needs assistance with ADLs and functional mobility   Lives With Family;Daughter   Receives Help From Family   ADL Assistance Needs assistance   IADLs Needs assistance   Falls in the last 6 months 1 to 4   Comments spoke with daughter, pt uses rw for amb and can amb indep  gets [facility] week for bathing  can dress self and toilet self  pt is usually sedentery  was getting home PT  just got lift chair   Restrictions/Precautions   Weight Bearing Precautions Per Order No   Other Precautions Cognitive;Multiple lines; Fall Risk;Pain;Hard of hearing;Visual impairment   General   Family/Caregiver Present Yes   Cognition   Overall Cognitive Status Impaired   Orientation Level Oriented to person;Oriented to place;Oriented to situation   RUE Assessment   RUE Assessment X  (limited elevation, strength grossly 4/5)   LUE Assessment   LUE Assessment X  (limited elevation, strength grossly 4/5)   RLE Assessment   RLE Assessment X  (strength 3+/5)   LLE Assessment   LLE Assessment X  (strength 3+/4)   Coordination   Movements are Fluid and Coordinated 1   Sensation X  (legs hypersensitive)   Light Touch   RLE Light Touch Impaired   LLE Light Touch Impaired   Bed Mobility   Supine to Sit 3  Moderate assistance   Additional items Assist x 1; Increased time required;Verbal cues;LE management   Transfers   Sit to Stand 3  Moderate assistance   Additional items Assist x 1; Increased time required; Impulsive;Verbal cues   Stand to Sit 3  Moderate assistance   Additional items Assist x 1; Increased time required;Verbal cues; Impulsive   Ambulation/Elevation   Gait pattern Decreased foot clearance; Excessively slow; Step to;Short stride; Inconsistent navneet; Shuffling  (legs weak)   Gait Assistance 3  Moderate assist   Additional items Assist x 1;Verbal cues; Tactile cues   Assistive Device Rolling walker   Distance 4'   Balance   Static Sitting Fair -   Dynamic Sitting Poor   Static Standing Poor   Dynamic Standing Poor   Endurance Deficit   Endurance Deficit Yes   Endurance Deficit Description fatigue, weakness   Activity Tolerance   Activity Tolerance Patient tolerated treatment well;Treatment limited secondary to medical complications (Comment)   Nurse Made Aware yes   Assessment   Prognosis Fair   Problem List Decreased strength;Decreased endurance; Impaired balance;Decreased mobility; Decreased cognition;Decreased safety awareness; Impaired vision; Impaired hearing; Impaired sensation;Pain   Assessment pt admitted with weakness, failure to thrive , chf and elevated troponin  pt refered to PT  pt was using rw PTA, needing assist for bathing, using rw for amb  pt has had a few falls  pt lives in [facility] apt set-up with daughter  pt demonstrated moderate to severe functional limitations due to prior debility and progressive decline  pt needed mod assist for bed mobility transfers and amb only a few feet  pt has deficits in strength, balance, cognition  gait stability and sequencing, self care and activity tolerance  pt will need skilled PT, may need rehab depending on progress while here  daughter present and most concerned about pt eating to regain strength  recommend continuation of PT at home if pt returns home  Barriers to Discharge None   Goals   Patient Goals none offered   STG Expiration Date 12/06/18   Short Term Goal #1 min assist for bed mobility, transfers, amb using rw for 25-50'   improve strength and balance by 1/2 to 1 grade  demonstrate good safety practices  improve activity tolerance to 45 minutes  3 stairs with min assist    Plan   Treatment/Interventions Functional transfer training;LE strengthening/ROM; Elevations; Therapeutic exercise; Endurance training;Cognitive reorientation;Patient/family training;Equipment eval/education; Bed mobility;Gait training;Spoke to nursing   PT Frequency (3-5x/wk)   Recommendation   Recommendation Home with family support;Home PT;24 hour supervision/assist  (may need rehab if progress not adequate)   Modified [place] Scale   Modified [place] Scale 4   Barthel Index   Feeding 10   Bathing 0   Grooming Score 0   Dressing Score 0   Bladder Score 0   Bowels Score 10   Toilet Use Score 0   Transfers (Bed/Chair) Score 5   Mobility (Level Surface) Score 0   Stairs Score 0   Barthel Index Score 25   History: co - morbidities, fall risk, use of assistive device, assist for adl's, cognition, multiple lines  Exam: impairments in locomotion, musculoskeletal, balance,posture, joint integrity, , cardiac,  cognition   Clinical: unstable/unpredictable  Complexity:high        [place], PT

## 2018-11-29 NOTE — ASSESSMENT & PLAN NOTE
· Persistent from last hospitalization  Notably patient with extensive workup negative for clear pathology; prior hiatal hernia noted  Reports lower sharp abdominal pain starting after breakfast and persisting throughout the day  Made worse when sitting up straight and relieved with laying down  Associated with nausea, poor appetite, and weight loss- failure to thrive  · Discussed goals of hospitalization with patient and daughter  At this point they do not wish for any extensive workup but would like to improve her quality of life  · Palliative input appreciated  · Note daughter was not giving patient Carafate at home as she felt it was not helping her and was giving her omeprazole only once daily as she did not think it was helping her

## 2018-11-29 NOTE — PROGRESS NOTES
Progress Note Aaron Olivares [birth year], 80 y o  female MRN: [id number]    Unit/Bed#: CW2 216-01 Encounter: [id number]    Primary Care Provider: Vasu Flor DO   Date and time admitted to hospital: 11/27/2018  6:08 PM    * Abdominal cramping   Assessment & Plan    · Persistent from last hospitalization  Notably patient with extensive workup negative for clear pathology; prior hiatal hernia noted  Reports lower sharp abdominal pain starting after breakfast and persisting throughout the day  Made worse when sitting up straight and relieved with laying down  Associated with nausea, poor appetite, and weight loss- failure to thrive  · Discussed goals of hospitalization with patient and daughter  At this point they do not wish for any extensive workup but would like to improve her quality of life  · Palliative input appreciated  · Note daughter was not giving patient Carafate at home as she felt it was not helping her and was giving her omeprazole only once daily as she did not think it was helping her  Chronic kidney disease, stage 3 (HCC)   Assessment & Plan    · Creatinine trending upward, likely due to poor oral intake  · Gentle fluids, trend creatinine     Elevated troponin   Assessment & Plan    · Troponin 0 09 on admission, repeat troponin stable  No EKG changes and patient denying chest pain/pressure or anginal symptoms  · Does have some vascular congestion on CXR  May be related to CHF  · Doubt type 1 NSTEMI  Defer further workup at this time given advanced age, low suspicion for MI, transitioning toward more comfort-focused care  Already on BB, statin  Defer aspirin as she is on warfarin  Moderate protein-calorie malnutrition ([address] )   Assessment & Plan    · Patient with persistent failure to thrive with poor appetite despite addition of Marinol, protein shakes      · Will provide protein shakes, nutrition consult       Closed wedge compression fracture of ninth thoracic vertebra [facility])   Assessment & Plan    · Incidentally noted on patient's last admission with unclear inciting event  No plans for operative intervention  · Pain control with scheduled Tylenol, lidocaine patch, Aqua-K, PRN oxycodone  · PT/OT consultation     Chronic diastolic CHF (congestive heart failure) ([facility])   Assessment & Plan    · With mild lower extremity edema, however patient with poor p o  Intake, no JVD  Patient has been off diuretics since last admission  · Will continue to hold diuretics given poor po intake  · Received 1 dose albumin in ED  · Will defer fluid restriction at this time, monitor daily weights and I/O, volume status closely         Chronic atrial fibrillation ([facility] [address] )   Assessment & Plan    · Currently rate controlled, takes Coumadin for anticoagulation  · INR elevated  Coumadin on hold  Recheck INR in AM     Essential hypertension   Assessment & Plan    · BP controlled, continue lopressor     Hypothyroidism   Assessment & Plan    · Chronic, continue levothyroxine       VTE Pharmacologic Prophylaxis:   Pharmacologic: Coumadin held for supratherapeutic INR  Mechanical VTE Prophylaxis in Place: Yes    Patient Centered Rounds: I have performed bedside rounds with nursing staff today  Discussions with Specialists or Other Care Team Provider:  None    Education and Discussions with Family / Patient: Patient, daughter at bedside  Time Spent for Care: 30 minutes  More than 50% of total time spent on counseling and coordination of care as described above  Current Length of Stay: 2 day(s)    Current Patient Status: Inpatient   Certification Statement: The patient will continue to require additional inpatient hospital stay due to intractable nausea, abdominal pain, failure to thrive  Discharge Plan: Hopefully in next few days once symptoms improved  Family prefer to take her home upon discharge  Code Status: Level 1 - Full Code      Subjective:   Patient very nauseous and reporting pain across abdomen   Eating very little  Objective:     Vitals:   Temp (24hrs), Av 7 °F (36 5 °C), Min:97 6 °F (36 4 °C), Max:97 9 °F (36 6 °C)    Temp:  [97 6 °F (36 4 °C)-97 9 °F (36 6 °C)] 97 7 °F (36 5 °C)  HR:  [54-77] 66  Resp:  [16-20] 20  BP: ()/(54-79) 122/79  SpO2:  [92 %-98 %] 95 %  Body mass index is 22 6 kg/m²  Input and Output Summary (last 24 hours): Intake/Output Summary (Last 24 hours) at 18 1253  Last data filed at 18 0947   Gross per 24 hour   Intake              120 ml   Output              125 ml   Net               -5 ml       Physical Exam:     Physical Exam   Constitutional:   Frail   HENT:   Head: Normocephalic and atraumatic  Eyes: No scleral icterus  Neck: Neck supple  Cardiovascular:   Irregular   Pulmonary/Chest: Effort normal and breath sounds normal  No respiratory distress  She has no wheezes  She has no rales  Abdominal:   Nauseous, holding basin next to her  Mild generalized tenderness upon palpation   Musculoskeletal: She exhibits no edema  Neurological: She is alert  Skin: Skin is warm and dry  Psychiatric: She has a normal mood and affect  Additional Data:     Labs:      Results from last 7 days  Lab Units 18  0458   WBC Thousand/uL 5 35   HEMOGLOBIN g/dL 15 5*   HEMATOCRIT % 47 9*   PLATELETS Thousands/uL 197   NEUTROS PCT % 79*   LYMPHS PCT % 11*   MONOS PCT % 7   EOS PCT % 1       Results from last 7 days  Lab Units 18  0458 18  0436   POTASSIUM mmol/L 4 3 4 0   CHLORIDE mmol/L 101 99*   CO2 mmol/L 25 28   BUN mg/dL 35* 28*   CREATININE mg/dL 1 68* 1 28   CALCIUM mg/dL 8 8 9 1   ALK PHOS U/L  --  172*   ALT U/L  --  22   AST U/L  --  27       Results from last 7 days  Lab Units 18  1423   INR  3 87*       * I Have Reviewed All Lab Data Listed Above  * Additional Pertinent Lab Tests Reviewed:  All Labs Within Last 24 Hours Reviewed    Imaging:    Imaging Reports Reviewed Today Include: None  Imaging Personally Reviewed by Myself Includes:  None    Recent Cultures (last 7 days):           Last 24 Hours Medication List:     Current Facility-Administered Medications:  acetaminophen 975 mg Oral Q8H Laura Clifford MD   albuterol 2 5 mg Nebulization Q4H PRN Jacqueline Kelly MD   atorvastatin 20 mg Oral Daily Laura Clifford MD   bisacodyl 10 mg Rectal Daily PRN Humphrey Lake PA-C   cholecalciferol 2,000 Units Oral Daily Laura Clifford MD   docusate sodium 100 mg Oral Daily Laura Clifford MD   dronabinol 2 5 mg Oral BID LE Clifford MD   fluticasone 1 spray Nasal BID Laura Clifford MD   levothyroxine 88 mcg Oral Early Morning Laura Clifford MD   lidocaine 1 patch Topical Daily Laura Clifford MD   metoprolol tartrate 25 mg Oral Q12H [facility] Laura Clifford MD   ondansetron 4 mg Intravenous Q6H PRN Laura Clifford MD   oxyCODONE 2 5 mg Oral Q4H PRN Laura Clifford MD   oxyCODONE 5 mg Oral Q4H PRN Laura Clifford MD   pantoprazole 40 mg Oral BID AC Laura Clifford MD   polyethylene glycol 17 g Oral Daily PRN Laura Clifford MD   potassium chloride 10 mEq Oral Daily Laura Clifford MD   sodium chloride 75 mL/hr Intravenous Continuous Humphrey Lake PA-C   sucralfate 1 g Oral 4x Daily Laura Clifford MD        Today, Patient Was Seen By: Humphrey Lake PA-C    ** Please Note: Dragon 360 Dictation voice to text software may have been used in the creation of this document   **

## 2018-11-29 NOTE — OCCUPATIONAL THERAPY NOTE
[address] Evaluation     Patient Name: Wisam Jones  DOLTM'Z Date: 11/29/2018  Problem List  Patient Active Problem List   Diagnosis    Nondisplaced spiral fracture of shaft of right femur, initial encounter for closed fracture ([facility] [address] )    Hypothyroidism    Essential hypertension    Chronic atrial fibrillation (HCC)    Anemia of chronic disease    Oral thrush    Hypercholesteremia    Iron deficiency anemia due to chronic blood loss    Gastritis    Hiatal hernia    Chronic anticoagulation    Chronic diastolic CHF (congestive heart failure) (HCC)    Osteopenia    SSS [facility]sick sinus syndrome) ([facility])    Status post fracture of femur    Abdominal cramping    Closed wedge compression fracture of ninth thoracic vertebra ([facility])    Moderate protein-calorie malnutrition (HCC)    Elevated troponin    Chronic kidney disease, stage 3 ([facility] [address] )     Past Medical History  Past Medical History:   Diagnosis Date    Anemia of chronic disease 11/27/2017    Arthritis     Cardiac disease     CHF (congestive heart failure) ([facility] [address] [facility]     Femur fracture, right (HCC)     Hiatal hernia     Hypercholesteremia     Hypothyroidism     Impaired ambulation      Past Surgical History  Past Surgical History:   Procedure Laterality Date    APPENDECTOMY      CATARACT EXTRACTION Bilateral     CATARACT EXTRACTION      HIP FRACTURE SURGERY Right 12/2015    HYSTERECTOMY      OOPHORECTOMY      REPAIR RECTOCELE      REPLACEMENT TOTAL KNEE Right          11/29/18 1115   Note Type   Note type Eval only   Restrictions/Precautions   Weight Bearing Precautions Per Order No   Other Precautions Cognitive;Telemetry; Fall Risk   Pain Assessment   Pain Assessment 0-10   Pain Score 8   Pain Location Abdomen   Home Living   Type of Home Apartment  (3STE)   Home Layout One level   Bathroom Shower/Tub Tub/shower unit  (typically sponge bathes)   Bathroom Toilet Standard  (commode above std toilet)   [address] Walker   Additional Comments Pt lives with her daughter and son-in-law in an apartment with 3STE  Pt sponge bathes PTA and uses a commode over the toilet and BSC   Prior Function   Level of [place] Needs assistance with IADLs  (Pt required assistance to wash her hair)   Lives With Family;Daughter   Receives Help From Family   ADL Assistance (Only req assist with fair washing)   IADLs Needs assistance   Falls in the last 6 months 5 to 10  (Pt reports 6 falls)   Vocational Retired   Comments Pt was able to complete all ADLs I'ly with the exception of washing her hair, which her daughter assists her with  Pt requires assistance with IADLs  Pt is I with functional mobility using a rw PTA  Lifestyle   Autonomy Pt was able to complete all ADLs I'ly with the exception of washing her hair, which her daughter assists her with  Pt requires assistance with IADLs  Pt is I with functional mobility using a rw PTA  Reciprocal Relationships 2 daughters, niece   Service to Others Pt is a retired employee of pharmaceutical lab   Intrinsic Gratification Pt enjoys crossword puzzles   Psychosocial   Psychosocial (WDL) WDL   Subjective   Subjective Pt pleasantly confused throughout  Pt's daughter (doesn't live with her) and niece provided additional information during evaluation   ADL   Eating Assistance 7  Independent   Grooming Assistance 4  Minimal Assistance   UB Bathing Assistance 3  Moderate Assistance   LB Bathing Assistance 2  Maximal Assistance   [address] 3  Moderate Assistance   LB Dressing Assistance 2  Maximal [address] [place] [address]  2  Maximal Assistance   Bed Mobility   Supine to Sit 3  Moderate assistance   Additional items Assist x 1; Increased time required;LE management;Verbal cues;HOB elevated   Sit to Supine 3  Moderate assistance   Additional items Assist x 1; Increased time required;Verbal cues;LE management;HOB elevated   Transfers   Additional Comments Pt only able to tolerate upright at EOB for ~30 seconds, OOB transfer not safe at this time   Balance   Static Sitting Poor   Dynamic Sitting Poor -   Activity Tolerance   Activity Tolerance Patient limited by fatigue;Patient limited by pain   Medical Staff Made Aware Per RN, pt okay to see for OT   Nurse Made Aware yes   RUE Assessment   RUE Assessment WFL  (R UE< L UE)   LUE Assessment   LUE Assessment WFL   Hand Function   Gross Motor Coordination Functional   Fine Motor Coordination Functional   Sensation   Light Touch No apparent deficits   Sharp/Dull No apparent deficits   Proprioception   Proprioception No apparent deficits   Vision-Basic Assessment   Current Vision Wears glasses all the time   Cognition   Overall Cognitive Status Impaired   Arousal/Participation Arousable; Cooperative   Attention Attends with cues to redirect   Orientation Level Oriented to person;Oriented to place;Oriented to situation;Disoriented to time   Memory Decreased long term memory;Decreased recall of biographical information;Decreased short term memory;Decreased recall of recent events;Decreased recall of precautions   Following Commands Follows one step commands with increased time or repetition   Assessment   Limitation Decreased ADL status; Decreased UE strength;Decreased Safe judgement during ADL;Decreased cognition;Decreased endurance;Decreased self-care trans;Decreased high-level ADLs   Prognosis Fair   Assessment Pt is a 80year old female seen for OT s/p admission to [facility] with SOB and persistent abdominal pain  Comorbidities include a h/o hiatal hernia, chronic diastolic congestive heart failure, chronic Afib on Coumadin, and HTN  Pt with active OT orders and up with assistance orders  Pt lives with her daughter and son-in-law in an apartment with 3STE  Her daughter works and her son-in-law is in and out of the apartment to receive chemotherapy    Pt required assistance with washing her hair, but was able to complete all other ADLs I'ly PTA  Pt required assistance with IADLs PTA  Pt was able to perform functional mobility with rw I'ly PTA  Pt is currently demonstrating the following occupational deficits: grooming with Maxx, UB bathing with modA, LB bathing with maxA, UB dressing with modA, LB dressing with maxA, toileting with maxA, and bed mobility with modA  Impairments that are contributing to the decline in pt's performance of these occupations include: cognitive deficits, pain, endurance, sitting tolerance, activity tolerance, strength and unsupportive home environment  The following occupational performance areas to address include: grooming, UB dressing, LB dressing, UB bathing, LB bathing, bed mobility, and functional transfers  Overall, pt scored 25/100 on the Barthel Index  Considering the patient's current functional deficits, home environment, and family support (home alone for periods of time), it is not currently safe for the patient to return home upon d/c  Recommend STR upon d/c  Pt is to continue to benefit from skilled occupational therapy while in the hospital to maximize functioning and independence in daily tasks  See below for OT goals  Plan   Treatment Interventions ADL retraining;Functional transfer training;UE strengthening/ROM; Endurance training;Cognitive reorientation;Patient/family training;Equipment evaluation/education; Compensatory technique education;Continued evaluation; Energy conservation; Activityengagement   Goal Expiration Date 12/09/18   OT Frequency 3-5x/wk   Recommendation   OT Discharge Recommendation Short Term Rehab   OT - OK to Discharge (when medically stable)   Barthel Index   Feeding 10   Bathing 0   Grooming Score 0   Dressing Score 0   Bladder Score 0   Bowels Score 10   Toilet Use Score 0   Transfers (Bed/Chair) Score 5   Mobility (Level Surface) Score 0   Stairs Score 0   Barthel Index Score 25     Goals:  Assess OOB functional transfers    Pt will complete UB ADLs with supervision  Pt will complete grooming with Sue  Pt will complete LB ADLs with Maxx  Pt will participate in ongoing cognitive assessment with G participation for safe discharge/planning      IZAIAH Carney, OTR/L

## 2018-11-29 NOTE — PLAN OF CARE

## 2018-11-29 NOTE — PLAN OF CARE
Problem: PHYSICAL THERAPY ADULT  Goal: Performs mobility at highest level of function for planned discharge setting  See evaluation for individualized goals  Treatment/Interventions: Functional transfer training, LE strengthening/ROM, Elevations, Therapeutic exercise, Endurance training, Cognitive reorientation, Patient/family training, Equipment eval/education, Bed mobility, Gait training, Spoke to nursing          See flowsheet documentation for full assessment, interventions and recommendations  Prognosis: Fair  Problem List: Decreased strength, Decreased endurance, Impaired balance, Decreased mobility, Decreased cognition, Decreased safety awareness, Impaired vision, Impaired hearing, Impaired sensation, Pain  Assessment: pt admitted with weakness, failure to thrive , chf and elevated troponin  pt refered to PT  pt was using rw PTA, needing assist for bathing, using rw for amb  pt has had a few falls  pt lives in [facility] apt set-up with daughter  pt demonstrated moderate to severe functional limitations due to prior debility and progressive decline  pt needed mod assist for bed mobility transfers and amb only a few feet  pt has deficits in strength, balance, cognition  gait stability and sequencing, self care and activity tolerance  pt will need skilled PT, may need rehab depending on progress while here  daughter present and most concerned about pt eating to regain strength  recommend continuation of PT at home if pt returns home  Barriers to Discharge: None     Recommendation: Home with family support, Home PT, 24 hour supervision/assist (may need rehab if progress not adequate)          See flowsheet documentation for full assessment

## 2018-11-30 PROBLEM — N17.9 AKI (ACUTE KIDNEY INJURY) (HCC): Status: ACTIVE | Noted: 2018-01-01

## 2018-11-30 NOTE — PHYSICAL THERAPY NOTE
Physical Therapy Cancellation Note- noted that pt is desiring comfort care and hospice at home, agreeable to some family  Still waiting for final decision  Will d/c PT if pt going hospice once decision made    Srinivasan Aparicio, PT

## 2018-11-30 NOTE — ASSESSMENT & PLAN NOTE
· Patient with persistent failure to thrive with poor appetite despite addition of Marinol, protein shakes

## 2018-11-30 NOTE — ASSESSMENT & PLAN NOTE
· With mild lower extremity edema, however patient with poor p o  Intake, no JVD  Patient has been off diuretics since last admission    · Will continue to hold diuretics given poor po intake

## 2018-11-30 NOTE — SOCIAL WORK
[facility]:    LINDSAY Verduzco [facility] advised that the patient and her daughter [facility] are now agreeable to Hospice services; they are awaiting the arrival of another daughter, due to be here later this afternoon, for a final choice of hospice services  CM advised RAYMUNDO Baker and MSW TRINA Hernandez

## 2018-11-30 NOTE — PLAN OF CARE
Problem: Potential for Falls  Goal: Patient will remain free of falls  INTERVENTIONS:  - Assess patient frequently for physical needs  -  Identify cognitive and physical deficits and behaviors that affect risk of falls    -  [place] fall precautions as indicated by assessment   - Educate patient/family on patient safety including physical limitations  - Instruct patient to call for assistance with activity based on assessment  - Modify environment to reduce risk of injury  - Consider OT/PT consult to assist with strengthening/mobility   Outcome: Progressing      Problem: PAIN - ADULT  Goal: Verbalizes/displays adequate comfort level or baseline comfort level  Interventions:  - Encourage patient to monitor pain and request assistance  - Assess pain using appropriate pain scale  - Administer analgesics based on type and severity of pain and evaluate response  - Implement non-pharmacological measures as appropriate and evaluate response  - Consider cultural and social influences on pain and pain management  - Notify physician/advanced practitioner if interventions unsuccessful or patient reports new pain   Outcome: Progressing      Problem: INFECTION - ADULT  Goal: Absence or prevention of progression during hospitalization  INTERVENTIONS:  - Assess and monitor for signs and symptoms of infection  - Monitor lab/diagnostic results  - Monitor all insertion sites, i e  indwelling lines, tubes, and drains  - Monitor endotracheal (as able) and nasal secretions for changes in amount and color  - [place] appropriate cooling/warming therapies per order  - Administer medications as ordered  - Instruct and encourage patient and family to use good hand hygiene technique  - Identify and instruct in appropriate isolation precautions for identified infection/condition   Outcome: Progressing      Problem: SAFETY ADULT  Goal: Maintain or return to baseline ADL function  INTERVENTIONS:  -  Assess patient's ability to carry out ADLs; assess patient's baseline for ADL function and identify physical deficits which impact ability to perform ADLs (bathing, care of mouth/teeth, toileting, grooming, dressing, etc )  - Assess/evaluate cause of self-care deficits   - Assess range of motion  - Assess patient's mobility; develop plan if impaired  - Assess patient's need for assistive devices and provide as appropriate  - Encourage maximum independence but intervene and supervise when necessary  ¯ Involve family in performance of ADLs  ¯ Assess for home care needs following discharge   ¯ Request OT consult to assist with ADL evaluation and planning for discharge  ¯ Provide patient education as appropriate   Outcome: Progressing

## 2018-11-30 NOTE — ASSESSMENT & PLAN NOTE
· Currently rate controlled, takes Coumadin for anticoagulation  · INR continues to rise although she hasn't gotten Coumadin in a few days   Continue to hold Coumadin  · No signs of active bleeding

## 2018-11-30 NOTE — ASSESSMENT & PLAN NOTE
· Creatinine increasing despite IVF hydration and patient becoming oliguric    · Likely due to poor oral intake, dehydration  · No further workup given plan to move forward with hospice

## 2018-11-30 NOTE — SOCIAL WORK
Hospice update:     CM spoke with RN Kvng Larkin, pt's second daughter has not yet arrived on the unit for family discussion of hospice service preference  CM advised MSW TRINA Hernandez

## 2018-11-30 NOTE — PROGRESS NOTES
Progress note - Palliative and Supportive Care   Micheline Wallace 80 y o  female [id number]    Assessment:  - abdominal pain of unknown etilogy-suspect vascular BUT vascular arterial study "normal' in resting/fasting state  - chronic systolic heart failure  - SSS on oral anticoagulant  - supratherapeutic INR  - HTN  - NSTEMI x 2  - compression fx T9, indeterminant timing  - moderate protein-calorie malnutrition  - SHANEL on CKD with worsening renal function  -osteopenia  - mild hyperbilirubinemia    Plan:  1  Symptom management    - nothing to add other than she has acetaminophen with oxycodone for prn use   -     2  Goals    - daughter who was present agrees to hospice care in the home  Waiting for daughter Nataly Hill who is stated to be the primary decision maker as to which hospice  Patient able to engage in meaningful conversation regarding her life expectancy and is okay with comfort care  - Medications that would not be useful in this patient at end of life include: lipitor, Vitamin D3, marinol, flonase, pantoprazol  - beta blockade is appropriate as the patient has CAD and NSTEMI; however with worsening renal function, may be better to discontinue metoprolol and place her on carvedilol because of change in renal function  - when Tha Angulo arrives to the floor, I have requested Nora Gomez CNP to describe part of the discussion we had earlier today and write the DNR/DNI at that time    Code Status: Level 1 - Full Code  Advance Directive and Living Will:     none  Decisional apparatus:  Patient is competent on my exam today  If competence is lost, patient's substitute decision maker would default to henrik Hill by [id number] [id number]  Power of :   none  POLST:   none    Reason for visit: follow up abdominal pain    Interval history:  Reviewed nursing notes and MAR  Spoke with patient and her daughter [from [place]    Continues to complain of intermittent pain in the abdomen that is largely accompanied with nausea though not much in the way of vomiting  Minimal intake, U/O had dropped down, labs show azotemia with GFR down to 17; pt getting fluid bolus  INR >7 with no coumadin administered for a few days and no signs of active bleeding  Bilirubin > 2  Pain rated at 2 this am, 1 late last night; higher yesterday  She only had 1 acetaminophen yesterday of 3 scheduled  One given dose of  oxyIR 2 5  Long discussion with [facility]  She appears competent to make decisions  Has insight into her illness- "where do you see yourself in 6 mo" "Dead"  She has no appetite, pain has no pattern related to eating    Though she isn't really eating right now and continues to get pain and nausea  She is not interested in having heart restarted as she is aware she is not getting better, and "everybody dies"  I asked her if she wanted to go home and had hospice come into the home and help her daughter take care of her, and she was okay with that  Also spoke to the daughter that was present [from [place]] and explained what palliative care is and how hospice is an extension of that at the end of life  Explained relatedness of illnesses and medications, and what services and DME are provided by hospice  This daughter not able to make a decision regarding which hospice as she wants to leave that decision to the Toby Nevarez         MEDICATIONS / ALLERGIES:    all current active meds have been reviewed and current meds:   Current Facility-Administered Medications   Medication Dose Route Frequency    acetaminophen (TYLENOL) tablet 975 mg  975 mg Oral Q8H    albuterol inhalation solution 2 5 mg  2 5 mg Nebulization Q4H PRN    atorvastatin (LIPITOR) tablet 20 mg  20 mg Oral Daily    bisacodyl (DULCOLAX) rectal suppository 10 mg  10 mg Rectal Daily PRN    cholecalciferol (VITAMIN D3) tablet 2,000 Units  2,000 Units Oral Daily    docusate sodium (COLACE) capsule 100 mg  100 mg Oral Daily    dronabinol (MARINOL) capsule 2 5 mg  2 5 mg Oral BID AC    fluticasone (FLONASE) 50 mcg/act nasal spray 1 spray  1 spray Nasal BID    levothyroxine tablet 88 mcg  88 mcg Oral Early Morning    lidocaine (LIDODERM) 5 % patch 1 patch  1 patch Topical Daily    metoprolol tartrate (LOPRESSOR) tablet 25 mg  25 mg Oral Q12H [facility]    ondansetron (ZOFRAN) injection 4 mg  4 mg Intravenous Q6H PRN    oxyCODONE (ROXICODONE) IR tablet 2 5 mg  2 5 mg Oral Q4H PRN    oxyCODONE (ROXICODONE) IR tablet 5 mg  5 mg Oral Q4H PRN    pantoprazole (PROTONIX) EC tablet 40 mg  40 mg Oral BID AC    polyethylene glycol (MIRALAX) packet 17 g  17 g Oral Daily PRN    potassium chloride (K-DUR,KLOR-CON) CR tablet 10 mEq  10 mEq Oral Daily    sodium chloride 0 9 % bolus 250 mL  250 mL Intravenous Once    sodium chloride 0 9 % infusion  75 mL/hr Intravenous Continuous    sucralfate (CARAFATE) tablet 1 g  1 g Oral 4x Daily       Allergies   Allergen Reactions    Ace Inhibitors Angioedema     Annotation - [id number]: angioedema to ARB  Other reaction(s): Angioedema  Pt and family state not an allergy    Angiotensin Receptor Blockers Angioedema    Erythromycin Ethylsuccinate GI Intolerance    Losartan      Other reaction(s): Angioedema  Pt and family state this is not an allergy      Aspirin Dermatitis and Edema    Codeine Edema    Erythromycin Base Other (See Comments)    Ibuprofen Other (See Comments)     Patient denies allergy    Penicillins Hives and Edema     However, patient has tolerated dissimilar side chain Cephalosporins (Cefazolin, Cefuroxime)       OBJECTIVE:    Physical Exam    Blood pressure 121/56, pulse 88, temperature (!) 97 4 °F (36 3 °C), temperature source Oral, resp  rate 18, height 5' (1 524 m), weight 52 kg (114 lb 10 2 oz), SpO2 95 %, not currently breastfeeding        Intake/Output Summary (Last 24 hours) at 11/30/18 1256  Last data filed at 11/30/18 0601   Gross per 24 hour   Intake          1276 25 ml   Output              200 ml   Net 1076 25 ml       Physical Exam   Constitutional: She appears lethargic  She appears ill  No distress  HENT:   Head: Normocephalic and atraumatic  Right Ear: External ear normal  Decreased hearing is noted  Left Ear: External ear normal  Decreased hearing is noted  Nose: Nose normal  No rhinorrhea  Mouth/Throat: Mucous membranes are dry and not cyanotic  Abnormal dentition  Eyes: Conjunctivae, EOM and lids are normal  Right eye exhibits no discharge  Left eye exhibits no discharge  No scleral icterus  Neck: No tracheal deviation present  Cardiovascular: Normal rate and intact distal pulses  An irregularly irregular rhythm present  Pulses:       Radial pulses are 2+ on the right side  Pulmonary/Chest: Effort normal  No stridor  No respiratory distress  She has decreased breath sounds  She has no wheezes  Abdominal: Soft  She exhibits distension (softy)  Bowel sounds are decreased  There is no tenderness  There is no guarding  Neurological: She appears lethargic  She displays no tremor  She displays no seizure activity  Drowsy but able to answer questions   Skin: Skin is warm and dry  She is not diaphoretic  No cyanosis  Psychiatric: She has a normal mood and affect  Her speech is normal and behavior is normal  Judgment and thought content normal    Nursing note and vitals reviewed  Lab Results:   I have personally reviewed pertinent labs  , CBC:   Lab Results   Component Value Date    WBC 5 59 11/30/2018    HGB 15 6 (H) 11/30/2018    HCT 48 5 (H) 11/30/2018     (H) 11/30/2018     11/30/2018    MCH 33 8 11/30/2018    MCHC 32 2 11/30/2018    RDW 19 2 (H) 11/30/2018    MPV 10 8 11/30/2018    NRBC 1 11/30/2018   , CMP:   Lab Results   Component Value Date    SODIUM 137 11/30/2018    K 4 2 11/30/2018     11/30/2018    CO2 24 11/30/2018    BUN 44 (H) 11/30/2018    CREATININE 2 37 (H) 11/30/2018    CALCIUM 9 3 11/30/2018    EGFR 17 11/30/2018       Counseling / Coordination of Care  Total floor / unit time spent today 45 minutes  Greater than 50% of total time was spent with the patient and / or family counseling and / or coordination of care  A description of the counseling / coordination of care: see Goals and subjective above  Thank you kindly for allowing us to help provide care for your patient  Chavaalirio Morin MD  [facility]iative and Supportive [facility]  [phone]    ** Please Note: This note may be constructed using a voice recognition dictation system  Although an attempt is made to catch transcription errors, thorough editing is not possible  **

## 2018-11-30 NOTE — ASSESSMENT & PLAN NOTE
· Persistent from last hospitalization  Notably patient with extensive workup negative for clear pathology; prior hiatal hernia noted  Reports lower sharp abdominal pain starting after breakfast and persisting throughout the day  Made worse when sitting up straight and relieved with laying down  Associated with nausea, poor appetite, and weight loss- failure to thrive  · Discussed goals of hospitalization with patient and daughter  At this point they do not wish for any extensive workup but would like to improve her quality of life  · Palliative input appreciated   Present for discussion between daughter and palliative care physician and at this point plan will be for home hospice

## 2018-11-30 NOTE — ASSESSMENT & PLAN NOTE
· Troponin 0 09 on admission, repeat troponin stable    No EKG changes and patient denying chest pain/pressure or anginal symptoms  · No further workup given hospice plans

## 2018-11-30 NOTE — ASSESSMENT & PLAN NOTE
· Incidentally noted on patient's last admission with unclear inciting event    No plans for operative intervention  · Pain control

## 2018-11-30 NOTE — PROGRESS NOTES
Progress Note Agustina Hathaway [birth year], 80 y o  female MRN: [id number]    Unit/Bed#: CW2 216-01 Encounter: [id number]    Primary Care Provider: Justin Cuello DO   Date and time admitted to hospital: 11/27/2018  6:08 PM    * Abdominal cramping   Assessment & Plan    · Persistent from last hospitalization  Notably patient with extensive workup negative for clear pathology; prior hiatal hernia noted  Reports lower sharp abdominal pain starting after breakfast and persisting throughout the day  Made worse when sitting up straight and relieved with laying down  Associated with nausea, poor appetite, and weight loss- failure to thrive  · Discussed goals of hospitalization with patient and daughter  At this point they do not wish for any extensive workup but would like to improve her quality of life  · Palliative input appreciated  Present for discussion between daughter and palliative care physician and at this point plan will be for home hospice     SHANEL (acute kidney injury) [facility])   Assessment & Plan    · Creatinine increasing despite IVF hydration and patient becoming oliguric  · Likely due to poor oral intake, dehydration  · No further workup given plan to move forward with hospice     Elevated troponin   Assessment & Plan    · Troponin 0 09 on admission, repeat troponin stable  No EKG changes and patient denying chest pain/pressure or anginal symptoms  · No further workup given hospice plans     Moderate protein-calorie malnutrition ([address] )   Assessment & Plan    · Patient with persistent failure to thrive with poor appetite despite addition of Marinol, protein shakes  Closed wedge compression fracture of ninth thoracic vertebra [facility])   Assessment & Plan    · Incidentally noted on patient's last admission with unclear inciting event    No plans for operative intervention  · Pain control     Chronic diastolic CHF (congestive heart failure) ([facility])   Assessment & Plan    · With mild lower extremity edema, however patient with poor p o  Intake, no JVD  Patient has been off diuretics since last admission  · Will continue to hold diuretics given poor po intake         Chronic atrial fibrillation (HCC)   Assessment & Plan    · Currently rate controlled, takes Coumadin for anticoagulation  · INR continues to rise although she hasn't gotten Coumadin in a few days  Continue to hold Coumadin  · No signs of active bleeding       VTE Pharmacologic Prophylaxis:   Pharmacologic: Pharmacologic VTE Prophylaxis contraindicated due to elevated INR  Mechanical VTE Prophylaxis in Place: Yes    Patient Centered Rounds: I have performed bedside rounds with nursing staff today  Discussions with Specialists or Other Care Team Provider: Palliative care    Education and Discussions with Family / Patient: Daughter at bedside    Time Spent for Care: 45 minutes  More than 50% of total time spent on counseling and coordination of care as described above  Current Length of Stay: 3 day(s)    Current Patient Status: Inpatient   Certification Statement: The patient will continue to require additional inpatient hospital stay due to discharge planning    Discharge Plan: Home with hospice hopefully next 24 hours    Code Status: Level 1 - Full Code      Subjective:   Patient tired  Very poor appetite  Nauseous  Objective:     Vitals:   Temp (24hrs), Av 5 °F (36 4 °C), Min:97 4 °F (36 3 °C), Max:97 6 °F (36 4 °C)    Temp:  [97 4 °F (36 3 °C)-97 6 °F (36 4 °C)] 97 4 °F (36 3 °C)  HR:  [60-89] 88  Resp:  [18] 18  BP: (121-149)/(56-83) 121/56  SpO2:  [93 %-95 %] 95 %  Body mass index is 22 39 kg/m²  Input and Output Summary (last 24 hours):        Intake/Output Summary (Last 24 hours) at 18 1249  Last data filed at 18 0601   Gross per 24 hour   Intake          1276 25 ml   Output              200 ml   Net          1076 25 ml       Physical Exam:     Physical Exam   Constitutional:   Frail, fatigued-appearing   HENT:   Head: Normocephalic and atraumatic  Eyes: No scleral icterus  Neck: Neck supple  Cardiovascular:   Irregular   Pulmonary/Chest: Effort normal and breath sounds normal  No respiratory distress  She has no wheezes  She has no rales  Abdominal: Soft  There is tenderness  Musculoskeletal: Normal range of motion  Neurological: She is alert  Skin: Skin is warm and dry  Psychiatric: She has a normal mood and affect  Her behavior is normal        Additional Data:     Labs:      Results from last 7 days  Lab Units 11/30/18  0604   WBC Thousand/uL 5 59   HEMOGLOBIN g/dL 15 6*   HEMATOCRIT % 48 5*   PLATELETS Thousands/uL 187   NEUTROS PCT % 79*   LYMPHS PCT % 13*   MONOS PCT % 6   EOS PCT % 0       Results from last 7 days  Lab Units 11/30/18  0604  11/28/18  0436   POTASSIUM mmol/L 4 2  < > 4 0   CHLORIDE mmol/L 102  < > 99*   CO2 mmol/L 24  < > 28   BUN mg/dL 44*  < > 28*   CREATININE mg/dL 2 37*  < > 1 28   CALCIUM mg/dL 9 3  < > 9 1   ALK PHOS U/L  --   --  172*   ALT U/L  --   --  22   AST U/L  --   --  27   < > = values in this interval not displayed  Results from last 7 days  Lab Units 11/30/18  0604   INR  7 07*       * I Have Reviewed All Lab Data Listed Above  * Additional Pertinent Lab Tests Reviewed:  All Labs Within Last 24 Hours Reviewed    Imaging:    Imaging Reports Reviewed Today Include: None  Imaging Personally Reviewed by Myself Includes:  None    Recent Cultures (last 7 days):           Last 24 Hours Medication List:     Current Facility-Administered Medications:  acetaminophen 975 mg Oral Q8H Payal Keating MD    albuterol 2 5 mg Nebulization Q4H PRN Saqib Driscoll MD    atorvastatin 20 mg Oral Daily Payal Keating MD    bisacodyl 10 mg Rectal Daily PRN Marcos Phillips PA-C    cholecalciferol 2,000 Units Oral Daily Payal Keating MD    docusate sodium 100 mg Oral Daily Payal Keating MD    dronabinol 2 5 mg Oral BID AC Payal Keating MD    fluticasone 1 spray Nasal BID Hanh Richards Joaquim Kehr, MD    levothyroxine 88 mcg Oral Early Morning Migdalia Perera MD    lidocaine 1 patch Topical Daily Migdalia Perera MD    metoprolol tartrate 25 mg Oral Q12H [facility] Migdalia Perera MD    ondansetron 4 mg Intravenous Q6H PRN Migdalia Perera MD    oxyCODONE 2 5 mg Oral Q4H PRN Migdalia Perera MD    oxyCODONE 5 mg Oral Q4H PRN Migdalia Perera MD    pantoprazole 40 mg Oral BID AC Migdalia Perera MD    polyethylene glycol 17 g Oral Daily PRN Migdalia Perera MD    potassium chloride 10 mEq Oral Daily Migdalia Perera MD    sodium chloride 250 mL Intravenous Once Ellis Whiteside PA-C    sodium chloride 75 mL/hr Intravenous Continuous Ellis Whiteside PA-C Last Rate: 75 mL/hr (11/30/18 0542)   sucralfate 1 g Oral 4x Daily Migdalia Perera MD         Today, Patient Was Seen By: Ellis Whiteside PA-C    ** Please Note: Dragon 360 Dictation voice to text software may have been used in the creation of this document   **

## 2018-11-30 NOTE — PROGRESS NOTES
Update: Reached out to patient's other daughter, [facility], to discuss code status and hospice options  However, [facility] states that her sister never mentioned hospice  [facility] is not accepting of hospice at this time  I discussed new findings, including renal failure, which we can hopefully fix with fluids  However, once patient goes home and is off fluids, she is likely to go back into renal failure quickly as her po intake cannot support life  I discussed that patient expressed wishes for DNR/DNI and [facility] states that's because she didn't have her hearing aids in and will agree to anything anyone says and that she doesn't believe her mother would ever agree to hospice  At this time, [facility] wants us to hydrate her and keep her full code with intent to take her back home and then go from there  I encouraged [facility] to have a discussion with her sister and mother  At this point, will cancel hospice consult until we can re-discuss with family  Will need to reach out to palliative care tomorrow

## 2018-12-01 NOTE — ASSESSMENT & PLAN NOTE
· Currently rate controlled, takes Coumadin for anticoagulation  · INR continues to rise although she hasn't gotten Coumadin in a few days  · Continue to hold Coumadin  · INR 8 24  · Will administer vitamin K 2 5 mg oral, continue this daily, along with daily INRs  · No signs of active bleeding

## 2018-12-01 NOTE — ASSESSMENT & PLAN NOTE
· Troponin 0 09 on admission, repeat troponin stable    No EKG changes and patient denying chest pain/pressure or anginal symptoms  · No further workup at this time

## 2018-12-01 NOTE — ASSESSMENT & PLAN NOTE
· Patient with persistent failure to thrive with poor appetite despite addition of Marinol  · Family requested protein supplements be discontinued will discontinue at their request  · Encourage oral intake

## 2018-12-01 NOTE — ASSESSMENT & PLAN NOTE
· Persistent from last hospitalization  Notably patient with extensive workup negative for clear pathology; prior hiatal hernia noted  Continues to report this lower abdominal pain worsens with palpation on and off throughout the day  Movement makes it worse, not being disturbed does improve her pain  Symptoms are associated with nausea, poor appetite, and weight loss- failure to thrive  · Goals of care have been discussed with patient's daughters along with patient  Palliative Medicine is following  Daughter Kelly Esparza would like to consider hospice and daughter Barrera Curran would like to continue with full care  Patient has expressed wishes to consider stopping care and pursue comfort care, as noted there appears to be conflict in regards to goals of care  Palliative medicine along with social Work and representative from the internal medicine team along with patient's daughters and patient will have a meeting on Monday at 10:30 a m  To discuss this further  Patient may benefit from a capacity evaluation but 1st will have family/team meeting on Monday

## 2018-12-01 NOTE — ASSESSMENT & PLAN NOTE
· Creatinine improving to 2 09 (down from 2 37), suspect IV hydration helping  · Patient is not eating or drinking on her own  · Will continue to trend at this time and will maintain IV hydration  · Avoid nephrotoxin  · Hold Lasix

## 2018-12-01 NOTE — PROGRESS NOTES
Progress Note Del Deshpande [birth year], 80 y o  female MRN: [id number]    Unit/Bed#: CW2 216-01 Encounter: [id number]    Primary Care Provider: Wilbert Tidwell DO   Date and time admitted to hospital: 11/27/2018  6:08 PM        * Abdominal cramping   Assessment & Plan    · Persistent from last hospitalization  Notably patient with extensive workup negative for clear pathology; prior hiatal hernia noted  Continues to report this lower abdominal pain worsens with palpation on and off throughout the day  Movement makes it worse, not being disturbed does improve her pain  Symptoms are associated with nausea, poor appetite, and weight loss- failure to thrive  · Goals of care have been discussed with patient's daughters along with patient  Palliative Medicine is following  Daughter Senora Dakins would like to consider hospice and daughter [facility] would like to continue with full care  Patient has expressed wishes to consider stopping care and pursue comfort care, as noted there appears to be conflict in regards to goals of care  Palliative medicine along with social Work and representative from the internal medicine team along with patient's daughters and patient will have a meeting on Monday at 10:30 a m  To discuss this further  Patient may benefit from a capacity evaluation but 1st will have family/team meeting on Monday  SHANEL (acute kidney injury) ([facility] [address] )   Assessment & Plan    · Creatinine improving to 2 09 (down from 2 37), suspect IV hydration helping  · Patient is not eating or drinking on her own  · Will continue to trend at this time and will maintain IV hydration  · Avoid nephrotoxin  · Hold Lasix     Elevated troponin   Assessment & Plan    · Troponin 0 09 on admission, repeat troponin stable    No EKG changes and patient denying chest pain/pressure or anginal symptoms  · No further workup at this time     Severe protein calorie malnutrition   Assessment & Plan    · Severe protein calorie malnutrition in the setting of acute illness as evidenced by 12% weight loss in 3 months and less than 25% energy intake for greater than 5 days being treated with liberal diet, oral nutrition supplements and assessment by Nutrition     Closed wedge compression fracture of ninth thoracic vertebra [facility])   Assessment & Plan    · Incidentally noted on patient's last admission with unclear inciting event  No plans for operative intervention  · Pain control     Chronic diastolic CHF (congestive heart failure) (HCC)   Assessment & Plan    · With mild lower extremity edema, however patient with poor p o  Intake, no JVD  Patient has been off diuretics since last admission  · Will continue to hold diuretics given poor po intake         Chronic atrial fibrillation (HCC)   Assessment & Plan    · Currently rate controlled, takes Coumadin for anticoagulation  · INR continues to rise although she hasn't gotten Coumadin in a few days  · Continue to hold Coumadin  · INR 8 24  · Will administer vitamin K 2 5 mg oral, continue this daily, along with daily INRs  · No signs of active bleeding     Essential hypertension   Assessment & Plan    · BP controlled  · continue lopressor     Hypothyroidism   Assessment & Plan    · Chronic  · continue levothyroxine         VTE Pharmacologic Prophylaxis:   Pharmacologic: Pharmacologic VTE Prophylaxis contraindicated due to Supratherapeutic INR  Mechanical VTE Prophylaxis in Place: Yes    Patient Centered Rounds: I have performed bedside rounds with nursing staff today  Discussions with Specialists or Other Care Team Provider:  Palliative medicine    Education and Discussions with Family / Patient:     Time Spent for Care: 30 minutes  More than 50% of total time spent on counseling and coordination of care as described above      Current Length of Stay: 6 day(s)    Current Patient Status: Inpatient   Certification Statement: The patient will continue to require additional inpatient hospital stay due to Under safe discharge plan at this time unclear whether patient will go to hospice versus home were rehab with family    Discharge Plan:  Unclear discharge Plan at this time, plan to have a goals of care discussion on Monday patient may be hospice candidate versus returning home with daughter  Code Status: Level 1 - Full Code      Subjective:   Patient denied any shortness of breath cough chest pain  She reports a generalized abdominal discomfort  She cannot accurately described her discomfort in her abdomen  Per nursing there has been no significant overnight events  Objective:     Vitals:   Temp (24hrs), Av 6 °F (36 4 °C), Min:97 3 °F (36 3 °C), Max:97 8 °F (36 6 °C)    Temp:  [97 3 °F (36 3 °C)-97 8 °F (36 6 °C)] 97 3 °F (36 3 °C)  HR:  [62-71] 71  Resp:  [18] 18  BP: (118-136)/(60-81) 118/81  SpO2:  [91 %-93 %] 91 %  Body mass index is 23 03 kg/m²  Input and Output Summary (last 24 hours): Intake/Output Summary (Last 24 hours) at 18 0540  Last data filed at 18 1800   Gross per 24 hour   Intake          3233 33 ml   Output              250 ml   Net          2983 33 ml       Physical Exam:     Physical Exam   Constitutional: She appears well-developed  She appears cachectic  She is cooperative  No distress  Frail appearing   HENT:   Head: Normocephalic  Lips dry cracked   Cardiovascular: Normal rate  An irregular rhythm present  Pulmonary/Chest: Effort normal and breath sounds normal  No respiratory distress  Abdominal: Soft  Bowel sounds are normal  She exhibits no distension  There is no tenderness  Musculoskeletal: She exhibits no edema  Neurological: She is alert  No cranial nerve deficit  Alert to person place and situation   Skin: Skin is warm and dry  Psychiatric: She has a normal mood and affect  Her behavior is normal    Vitals reviewed        Additional Data:     Labs:      Results from last 7 days  Lab Units 18  0849   WBC Thousand/uL 5 86   HEMOGLOBIN g/dL 14 9   HEMATOCRIT % 46 9*   PLATELETS Thousands/uL 174   NEUTROS PCT % 80*   LYMPHS PCT % 13*   MONOS PCT % 5   EOS PCT % 1       Results from last 7 days  Lab Units 12/02/18  0454   POTASSIUM mmol/L 3 3*   CHLORIDE mmol/L 111*   CO2 mmol/L 21   BUN mg/dL 46*   CREATININE mg/dL 1 75*   CALCIUM mg/dL 8 7   ALK PHOS U/L 156*   ALT U/L 21   AST U/L 22       Results from last 7 days  Lab Units 12/02/18  0454   INR  4 23*       * I Have Reviewed All Lab Data Listed Above  * Additional Pertinent Lab Tests Reviewed:  All Labs Within Last 24 Hours Reviewed    Imaging:    Imaging Reports Reviewed Today Include:  Chest x-ray  Imaging Personally Reviewed by Myself Includes:  None    Recent Cultures (last 7 days):           Last 24 Hours Medication List:     Current Facility-Administered Medications:  acetaminophen 975 mg Oral Q8H Mirella Mitchell MD    atorvastatin 20 mg Oral Daily Mirella Mitchell MD    bisacodyl 10 mg Rectal Daily PRN Amanuel Ovalles PA-C    calcium carbonate 1 tablet Oral Daily With Breakfast Christopher Best MD    cholecalciferol 2,000 Units Oral Daily Mirella Mitchell MD    docusate sodium 100 mg Oral Daily Mirella Mitchell MD    dronabinol 2 5 mg Oral BID AC Mirella Mitchell MD    fluticasone 1 spray Nasal BID Mirella Mitchell MD    levothyroxine 88 mcg Oral Early Morning Mirella Mitchell MD    lidocaine 1 patch Topical Daily Mirella Mitchell MD    metoprolol tartrate 25 mg Oral Q12H [facility] Mirella Mitchell MD    ondansetron 4 mg Intravenous Q6H PRN Mirella Mitchell MD    oxyCODONE 2 5 mg Oral Q4H PRN Mirella Mitchell MD    oxyCODONE 5 mg Oral Q4H PRN Mirella Mitchell MD    pantoprazole 40 mg Oral BID AC Mirella Mitchell MD    phytonadione 2 5 mg Oral Daily LIAM Gregory    polyethylene glycol 17 g Oral Daily PRN Mirella Mitchell MD    potassium chloride 10 mEq Oral Daily Mirella Mitchell MD    sodium chloride 250 mL Intravenous Once Amanuel Ovalles PA-C    sodium chloride 0 9 % with KCl 40 mEq/L 100 mL/hr Intravenous Continuous Clover Moncada MD Last Rate: 100 mL/hr (12/02/18 1045)   sucralfate 1 g Oral 4x Daily Behzad Romero MD         Today, Patient Was Seen By: LIAM Carreon    ** Please Note: Dictation voice to text software may have been used in the creation of this document   **

## 2018-12-01 NOTE — RESPIRATORY THERAPY NOTE
resp care      12/01/18 0954   Respiratory Assessment   Assessment Type Assess only   General Appearance Drowsy   Respiratory Pattern Normal   Chest Assessment Chest expansion symmetrical   Bilateral Breath Sounds Diminished;Clear   Resp Comments Pt in no resp distress  Pt has no pulm hx and uses no meds at home  BS clear  Pt has not needed prn udn since admit  Will d/c prn udn tx

## 2018-12-02 PROBLEM — D68.32 WARFARIN-INDUCED COAGULOPATHY (HCC): Status: ACTIVE | Noted: 2018-01-01

## 2018-12-02 PROBLEM — R62.7 FAILURE TO THRIVE IN ADULT: Status: ACTIVE | Noted: 2018-01-01

## 2018-12-02 PROBLEM — E83.42 HYPOMAGNESEMIA: Status: ACTIVE | Noted: 2018-01-01

## 2018-12-02 PROBLEM — T45.515A WARFARIN-INDUCED COAGULOPATHY (HCC): Status: ACTIVE | Noted: 2018-01-01

## 2018-12-02 NOTE — PROGRESS NOTES
Progress note - Palliative and Supportive Care   Jacinta Bowman 80 y o  female [id number]    Assessment:  - abdominal pain of unknown etilogy-suspect vascular BUT vascular arterial study "normal' in resting/fasting state  - chronic systolic heart failure  - SSS on oral anticoagulant  - supratherapeutic INR-ongoing, now on daily vitamin K  - HTN  - NSTEMI x 2  - compression fx T9, indeterminant timing  - moderate protein-calorie malnutrition  - SHANEL on CKD with worsening renal function  -osteopenia  - mild hyperbilirubinemia  - poor oral intake  - dehydration as evidenced by ongoing elevation of BUN  - somnolence    Plan:  1  Symptom management    - continue acetaminophen for chronic abdominal pain   - suggest discontinuation of marinol as it can contribute to somnolence, cause dizziness, orthostatic hypotension, tachycardia and abdominal pain  It also is not causing the desired effect to increase appetite    2  Goals    - progress toward more comfort care   - Susie-daughter from [place] understands disease progression and accepting of hospice for EOL care   - when asked to patient if she would like to have CPR, to have heart restarted, if it stopped, she responds,  "let me go"  When Susie points out that previously she wanted to live to "see 21 m/o great granddaughter grow up" she responded "whatever"  Chris Fears has not been to either meeting I had with patient, but apparently is very opposed to hospice and feels her mother couldn't hear my questions or wasn't capable of answering-that she lacked capacity and insight    - GOC may be complicated by potential secondary gain for [facility] meeting set up for Monday morning 12/2/18 at 1030 hours with palliative care practitioner, Flora Quinteros, patient Jose R Sheridan, primary team     Code Status: Level 1 - Full Code  Advance Directive and Living Will:    none  Decisional apparatus:  Patient is competent on my exam today    If competence is lost, patient's substitute decision maker would default to both daughters, Christine Meyer and Eben Stinson by [place] Act 169  Power of :   none  POLST:   none    Reason for visit: follow up [address]    Interval history:  Reviewed nursing notes, spoke with primary nurse, provider Na Thakkar CNP, patient's daughter Eben Stinson at bedside and the patient  Also reviewed MAR  Patient continues to complain of abdominal pain [rated 0-8] and had received only acetaminophen  Of note, patient has had work up of abdominal pain in the past [2014] with no resolution or discovery of any cause  After I spoke with daughter Eben Stinson during my visit on 11/30/18, it was clear to both of us that the patient said, "let me go" and it seemed appropriate to refer for hospice services, leaving the choice of agency to the daughter with whom the patient lives  Later, Monserrat GONZALEZ was able to share with Bonitarosie Meyer my suggestions, but Christine Meyer was resistant to any change in code status nor de-escalation in care  Thus referral for hospice was cancelled  Today, Eben Stinson reports many years of friction between her and Christine Meyer for a multitude of issues  In conversation, I asked if there might be a possibility of "secondary gain" on Sherri's part, to keep "mom" from dying  Susie' eyes responded with potential affirmation that will require additional assistance of our social work colleagues to unravel  To that end, family meeting was scheduled for Sherri's day off       MEDICATIONS / ALLERGIES:    all current active meds have been reviewed and current meds:   Current Facility-Administered Medications   Medication Dose Route Frequency    acetaminophen (TYLENOL) tablet 975 mg  975 mg Oral Q8H    atorvastatin (LIPITOR) tablet 20 mg  20 mg Oral Daily    bisacodyl (DULCOLAX) rectal suppository 10 mg  10 mg Rectal Daily PRN    cholecalciferol (VITAMIN D3) tablet 2,000 Units  2,000 Units Oral Daily    docusate sodium (COLACE) capsule 100 mg  100 mg Oral Daily    dronabinol (MARINOL) capsule 2 5 mg  2 5 mg Oral BID AC    fluticasone (FLONASE) 50 mcg/act nasal spray 1 spray  1 spray Nasal BID    levothyroxine tablet 88 mcg  88 mcg Oral Early Morning    lidocaine (LIDODERM) 5 % patch 1 patch  1 patch Topical Daily    metoprolol tartrate (LOPRESSOR) tablet 25 mg  25 mg Oral Q12H [facility]    ondansetron (ZOFRAN) injection 4 mg  4 mg Intravenous Q6H PRN    oxyCODONE (ROXICODONE) IR tablet 2 5 mg  2 5 mg Oral Q4H PRN    oxyCODONE (ROXICODONE) IR tablet 5 mg  5 mg Oral Q4H PRN    pantoprazole (PROTONIX) EC tablet 40 mg  40 mg Oral BID AC    phytonadione (MEPHYTON) tablet 2 5 mg  2 5 mg Oral Daily    polyethylene glycol (MIRALAX) packet 17 g  17 g Oral Daily PRN    potassium chloride (K-DUR,KLOR-CON) CR tablet 10 mEq  10 mEq Oral Daily    sodium chloride 0 9 % bolus 250 mL  250 mL Intravenous Once    sodium chloride 0 9 % infusion  100 mL/hr Intravenous Continuous    sucralfate (CARAFATE) tablet 1 g  1 g Oral 4x Daily       Allergies   Allergen Reactions    Ace Inhibitors Angioedema     [facility] - [id number]: angioedema to ARB  Other reaction(s): Angioedema  Pt and family state not an allergy    Angiotensin Receptor Blockers Angioedema    Erythromycin Ethylsuccinate GI Intolerance    Losartan      Other reaction(s): Angioedema  Pt and family state this is not an allergy      Aspirin Dermatitis and Edema    Codeine Edema    Erythromycin Base Other (See Comments)    Ibuprofen Other (See Comments)     Patient denies allergy    Penicillins Hives and Edema     However, patient has tolerated dissimilar side chain Cephalosporins (Cefazolin, Cefuroxime)       OBJECTIVE:    Physical Exam    Blood pressure 125/62, pulse 82, temperature 97 9 °F (36 6 °C), temperature source Oral, resp  rate 18, height 5' (1 524 m), weight 55 6 kg (122 lb 9 2 oz), SpO2 90 %, not currently breastfeeding        Intake/Output Summary (Last 24 hours) at 12/01/18 0973  Last data filed at 12/01/18 1633   Gross per 24 hour   Intake 1805 ml   Output              125 ml   Net             1680 ml       Physical Exam   Constitutional: She appears cachectic  She is cooperative  She appears ill  No distress  HENT:   Head: Normocephalic and atraumatic  Right Ear: External ear normal  Decreased hearing is noted  Left Ear: External ear normal  Decreased hearing is noted  Nose: Nose normal    Mouth/Throat: Mucous membranes are dry and not cyanotic  Hearing aids in place today   Eyes: Conjunctivae, EOM and lids are normal  Right eye exhibits no discharge  Left eye exhibits no discharge  No scleral icterus  Glasses on   Neck: No tracheal deviation present  Cardiovascular: Normal rate, regular rhythm and normal heart sounds  Pulses:       Radial pulses are 2+ on the right side, and 2+ on the left side  Pulmonary/Chest: Effort normal  No stridor  No tachypnea  She has decreased breath sounds  clear   Abdominal: Normal appearance and bowel sounds are normal    Neurological: She is alert  She displays no tremor  She displays no seizure activity  Able to hear and answer appropriately  If daughter Gurjit Dimas was speaking at length, Russell Maldonado tended to doze off but was easily awakened   Skin: Skin is warm and dry  No cyanosis  No pallor  Nursing note and vitals reviewed  Lab Results:   I have personally reviewed pertinent labs  , CBC:   Lab Results   Component Value Date    WBC 5 86 12/01/2018    HGB 14 9 12/01/2018    HCT 46 9 (H) 12/01/2018     (H) 12/01/2018     12/01/2018    MCH 33 6 12/01/2018    MCHC 31 8 12/01/2018    RDW 19 2 (H) 12/01/2018    MPV 10 7 12/01/2018    NRBC 1 12/01/2018   , CMP:   Lab Results   Component Value Date    SODIUM 140 12/01/2018    K 3 5 12/01/2018     12/01/2018    CO2 21 12/01/2018    BUN 46 (H) 12/01/2018    CREATININE 2 09 (H) 12/01/2018    CALCIUM 8 0 (L) 12/01/2018    AST 20 12/01/2018    ALT 23 12/01/2018    ALKPHOS 158 (H) 12/01/2018    EGFR 20 12/01/2018         Counseling / Coordination of Care  Total floor / unit time spent today 60 minutes  Greater than 50% of total time was spent with the patient and / or family counseling and / or coordination of care  A description of the counseling / coordination of care: see GOC and Subjective above for details  Thank you kindly for allowing us to help provide care for your patient  Karl Denton MD  [facility] Palliative and Support[facility]  [phone]    ** Please Note: This note may be constructed using a voice recognition dictation system  Although an attempt is made to catch transcription errors, thorough editing is not possible  **

## 2018-12-02 NOTE — ASSESSMENT & PLAN NOTE
· persistent from last hospitalization where she underwent an extensive workup including a HIDA scan, mesenteric/celiac duplex study, and CT of abdomen/pelvis all of which were unremarkable for acute etiology  · No further workup  · Plan for comfort care, hospice at home

## 2018-12-02 NOTE — ASSESSMENT & PLAN NOTE
· with associated severe protein calorie malnutrition as evidenced by decreased appetite/oral intake coupled with chronic illness and muscle wasting/atrophy on exam  · planning for home hospice

## 2018-12-02 NOTE — ASSESSMENT & PLAN NOTE
· Likely due to dehydration, component of cardiorenal syndrome  · IVFs stopped due to dsypnea  · No further workup as patient comfort care

## 2018-12-02 NOTE — PROGRESS NOTES
[facility] Service - Internal Medicine Progress Note       PATIENT INFORMATION      Patient: Nataly Scott  female   MRN: [id number]  PCP: Marvel Sandoval DO  Unit/Bed#: CW2 216-01 Encounter: [id number]  Date Of Visit: 12/02/18       ASSESSMENTS & PLAN     Chronic abdominal pain   Assessment & Plan    - persistent from last hospitalization where she underwent an extensive workup including a HIDA scan, mesenteric/celiac duplex study, and CT of abdomen/pelvis all of which were unremarkable for acute etiology  - history of hiatal hernia noted per prior documentation (continue Protonix)  - supportive care otherwise - continue constipation prophylaxis with Colace/MiraLax/Dulcolax regimen      Atrial fibrillation   Assessment & Plan    - rate controlled on Lopressor   - Warfarin held secondary to supratherapeutic INR     Warfarin-induced coagulopathy - Supratherapeutic INR   Assessment & Plan    - INR improved from 8 24 yesterday -> 4 23 today  - likely secondary to vitamin K depletion from malnutrition coupled with chronic Warfarin use - anticoagulation discontinued and initiated daily oral vitamin K supplementation yesterday   - monitor INR for safe/progressive decline/improvement to therapeutic range - monitor for bleeding     Failure to thrive in adult   Assessment & Plan    - with associated protein calorie malnutrition as evidenced by decreased appetite/oral intake coupled with chronic illness and muscle wasting/atrophy on exam  - per family request, nutritional supplementation has been discontinued  - on Marinol chronically for appetite stimulation  - await family meeting tomorrow to discuss pursuing hospice care - both daughters are currently split w/ long-term wishes for their mother as one wants hospice and the other wants continued aggressive treatment/workup      Hypomagnesemia - Hypokalemia   Assessment & Plan    - monitor/replete as necessary  - likely associated with malnutrition and home diuretic use     SHANEL (acute kidney injury)    Assessment & Plan    - creatinine improved from a peak of 2 37 -> 1 75 today with IV fluid hydration  - monitor renal function and limit/avoid nephrotoxins if possible - chronic diuretic held      Chronic diastolic CHF    Assessment & Plan    - continue beta-blockade with Lopressor  - held diuretic (Lasix) due to poor oral intake and acute kidney injury   - monitor/replete potassium and magnesium deficiencies as necessary     Essential hypertension   Assessment & Plan    - continue Lopressor  - PRN pain control      NSTEMI type 2   Assessment & Plan    - troponin plateau of 8 82  - likely secondary to acute kidney injury in the setting of chronic diastolic CHF     Chronic thoracic vertebral compression fracture   Assessment & Plan    - PRN pain control and conservative care  - PT/OT if tolerated - awaiting family decision regarding pursuing hospice  - continue calcium and vitamin-D supplementation       VTE Prophylaxis:  Supratherapeutic INR      SUBJECTIVE     Seen/examined this afternoon with family members at bedside  Awaiting for full family meeting tomorrow including palliative care and   Patient herself complains of weakness/fatigue unchanged from yesterday  She denies any worsening or shortness of breath however  The one sister present during my encounter is still supportive of pursuing hospice as she feels her expectations are more realistic  OBJECTIVE     Vitals:   Temp (24hrs), Av 8 °F (36 6 °C), Min:97 7 °F (36 5 °C), Max:97 8 °F (36 6 °C)    Temp:  [97 7 °F (36 5 °C)-97 8 °F (36 6 °C)] 97 8 °F (36 6 °C)  HR:  [62-82] 62  Resp:  [18] 18  BP: (125-149)/(60-71) 136/71  SpO2:  [91 %-93 %] 91 %  Body mass index is 23 03 kg/m²  Input and Output Summary (last 24 hours):        Intake/Output Summary (Last 24 hours) at 18 1637  Last data filed at 18 0800   Gross per 24 hour   Intake          1703 33 ml   Output 100 ml   Net          1603 33 ml       Physical Exam:     GENERAL:  Frail/cachectic   HEAD:  Normocephalic - atraumatic - temporal wasting noted  EYES: PERRL - EOMI   MOUTH:  Mucosa dry  NECK:  Supple - full range of motion - clavicular wasting evident  CARDIAC:  Irregularly irregular but rate controlled - S1/S2 positive  PULMONARY:  Clear breath sounds bilaterally - nonlabored respirations  ABDOMEN:  Soft - nontender/nondistended - active bowel sounds  MUSCULOSKELETAL:  Motor strength/range of motion markedly deconditioned  NEUROLOGIC:  Alert/awake - weak/fatigued however  SKIN:  Chronic wrinkles/blemishes   PSYCHIATRIC:  Mood/affect flat      ADDITIONAL DATA       Labs & Recent Cultures:       Results from last 7 days  Lab Units 12/01/18  0849   WBC Thousand/uL 5 86   HEMOGLOBIN g/dL 14 9   HEMATOCRIT % 46 9*   PLATELETS Thousands/uL 174   NEUTROS PCT % 80*   LYMPHS PCT % 13*   MONOS PCT % 5   EOS PCT % 1       Results from last 7 days  Lab Units 12/02/18  0454   POTASSIUM mmol/L 3 3*   CHLORIDE mmol/L 111*   CO2 mmol/L 21   BUN mg/dL 46*   CREATININE mg/dL 1 75*   CALCIUM mg/dL 8 7   ALK PHOS U/L 156*   ALT U/L 21   AST U/L 22       Results from last 7 days  Lab Units 12/02/18  0454   INR  4 23*         Last 24 Hours Medication List:     Current Facility-Administered Medications:  acetaminophen 975 mg Oral Q8H Mirella Gant MD    atorvastatin 20 mg Oral Daily Mirella Gant MD    bisacodyl 10 mg Rectal Daily PRN Osmar Stone PA-C    [START ON 12/3/2018] calcium carbonate 1 tablet Oral Daily With Breakfast Angel Wong MD    cholecalciferol 2,000 Units Oral Daily Mirella Gant MD    docusate sodium 100 mg Oral Daily Mirella Gant MD    dronabinol 2 5 mg Oral BID AC Mirella Gant MD    fluticasone 1 spray Nasal BID Mirella Gant MD    levothyroxine 88 mcg Oral Early Morning Mirella Gant MD    lidocaine 1 patch Topical Daily Mirella Gant MD    metoprolol tartrate 25 mg Oral Q12H [address] Jeri Rivera MD    ondansetron 4 mg Intravenous Q6H PRN Jeri Rivera, MD    oxyCODONE 2 5 mg Oral Q4H PRN Jeri Rivera, MD    oxyCODONE 5 mg Oral Q4H PRN Daylene Nicole, MD    pantoprazole 40 mg Oral BID AC Jeri Rivera MD    phytonadione 2 5 mg Oral Daily LIAM Gregory    polyethylene glycol 17 g Oral Daily PRN Jeri Rivera MD    potassium chloride 10 mEq Oral Daily Jeri Rivera MD    sodium chloride 250 mL Intravenous Once Kristie La PA-C    sodium chloride 0 9 % with KCl 40 mEq/L 100 mL/hr Intravenous Continuous Xiomara Joseph MD Last Rate: 100 mL/hr (12/02/18 1045)   sucralfate 1 g Oral 4x Daily Jeri Rivera MD           Time Spent for Care: 33 minutes  More than 50% of total time spent on counseling and coordination of care as described above  Discussed with:  Daughter, Bandar Dickerson, today at bedside  Current Length of Stay: 5 day(s)      Code Status: Level 1 - Full Code         ** Please Note: This note is constructed using a voice recognition dictation system   **

## 2018-12-02 NOTE — ASSESSMENT & PLAN NOTE
- monitor/replete as necessary - morning labs pending  - likely associated with malnutrition and home diuretic use

## 2018-12-02 NOTE — ASSESSMENT & PLAN NOTE
·  INR continued to rise during admission despite discontinuation of Warfarin  ·  likely secondary to vitamin K depletion from malnutrition coupled with chronic Warfarin use - anticoagulation discontinued and patient was started on daily Vit K   No further anticoagulation

## 2018-12-02 NOTE — ASSESSMENT & PLAN NOTE
·  troponin plateau of 8 11  ·  likely secondary to acute kidney injury in the setting of chronic diastolic CHF  · Defer further workup

## 2018-12-03 PROBLEM — E43 SEVERE PROTEIN-CALORIE MALNUTRITION (HCC): Status: ACTIVE | Noted: 2018-01-01

## 2018-12-03 NOTE — PLAN OF CARE
Problem: PAIN - ADULT  Goal: Verbalizes/displays adequate comfort level or baseline comfort level  Interventions:  - Encourage patient to monitor pain and request assistance  - Assess pain using appropriate pain scale  - Administer analgesics based on type and severity of pain and evaluate response  - Implement non-pharmacological measures as appropriate and evaluate response  - Consider cultural and social influences on pain and pain management  - Notify physician/advanced practitioner if interventions unsuccessful or patient reports new pain   Outcome: Progressing      Problem: INFECTION - ADULT  Goal: Absence or prevention of progression during hospitalization  INTERVENTIONS:  - Assess and monitor for signs and symptoms of infection  - Monitor lab/diagnostic results  - Monitor all insertion sites, i e  indwelling lines, tubes, and drains  - Monitor endotracheal (as able) and nasal secretions for changes in amount and color  - [place]leigh appropriate cooling/warming therapies per order  - Administer medications as ordered  - Instruct and encourage patient and family to use good hand hygiene technique  - Identify and instruct in appropriate isolation precautions for identified infection/condition   Outcome: Progressing      Problem: SAFETY ADULT  Goal: Maintain or return to baseline ADL function  INTERVENTIONS:  -  Assess patient's ability to carry out ADLs; assess patient's baseline for ADL function and identify physical deficits which impact ability to perform ADLs (bathing, care of mouth/teeth, toileting, grooming, dressing, etc )  - Assess/evaluate cause of self-care deficits   - Assess range of motion  - Assess patient's mobility; develop plan if impaired  - Assess patient's need for assistive devices and provide as appropriate  - Encourage maximum independence but intervene and supervise when necessary  ¯ Involve family in performance of ADLs  ¯ Assess for home care needs following discharge   ¯ Request OT consult to assist with ADL evaluation and planning for discharge  ¯ Provide patient education as appropriate   Outcome: Progressing      Problem: Knowledge Deficit  Goal: Patient/family/caregiver demonstrates understanding of disease process, treatment plan, medications, and discharge instructions  Complete learning assessment and assess knowledge base    Interventions:  - Provide teaching at level of understanding  - Provide teaching via preferred learning methods   Outcome: Progressing      Problem: Prexisting or High Potential for Compromised Skin Integrity  Goal: Skin integrity is maintained or improved  INTERVENTIONS:  - Identify patients at risk for skin breakdown  - Assess and monitor skin integrity  - Assess and monitor nutrition and hydration status  - Monitor labs (i e  albumin)  - Assess for incontinence   - Turn and reposition patient  - Assist with mobility/ambulation  - Relieve pressure over bony prominences  - Avoid friction and shearing  - Provide appropriate hygiene as needed including keeping skin clean and dry  - Evaluate need for skin moisturizer/barrier cream  - Collaborate with interdisciplinary team (i e  Nutrition, Rehabilitation, etc )   - Patient/family teaching   Outcome: Progressing

## 2018-12-03 NOTE — OCCUPATIONAL THERAPY NOTE
Occupational Therapy         Patient Name: Tara Arreola  MGMAS'U Date: 12/3/2018    Chart reviewed  Pt currently on bedpan, per RN pt unable to transfer to bedside commode at this time, due to PT activity only to bedside sitting today with treatment session and extremely fatigued  Will continue attempts for treatment session

## 2018-12-03 NOTE — HOSPICE NOTE
Met with daughters at bedside  Daughter Alexus Long states she had hospice 20 yrs ago for her father and she is not a fan  Discussed hospice support at home at length and in detail with daughters  Alexus Long states she has no idea how she wants to proceed  Provided contact information to Alexus Long and will follow up tomorrow  Alexus Long also aware she may call liaison with any questions  TRINA escalera

## 2018-12-03 NOTE — SOCIAL WORK
CM met with patient's daughters Nicole Stovall and Marialuisa Shiva for a family meeting regarding in home hospice  Patient's daughters expressed concerns for patient returning home with hospice vs continuing medical treatments  Palliative Care  [employer]) was present to explain and empathize with patient's daughters allowing them to feel more at ease with the concept of hospice  MD also explained that patient is end stage at this time  The question of expectations from the patient's daughters were dicussed and expressed as comfort care for patient  By the end of this meeting, patient's daughters were interested in speaking with a hospice liaison through [facility]  CM sent a referral as well as contacted [facility] regarding this  Patient will need a hospital bed for home whether patient is discharged on hospice or not  CM will follow up with this determination

## 2018-12-03 NOTE — PHYSICAL THERAPY NOTE
Physical Therapy Treatment     12/03/18 8655   Restrictions/Precautions   Weight Bearing Precautions Per Order No   Other Precautions Cognitive;Telemetry; Fall Risk;Multiple lines   General   Chart Reviewed Yes   Family/Caregiver Present Yes  (daughter and son-in-law)   Cognition   Overall Cognitive Status Impaired   Arousal/Participation Cooperative;Arousable   Attention Attends with cues to redirect   Orientation Level Oriented to person   Memory Decreased short term memory;Decreased recall of recent events   Following Commands Follows one step commands with increased time or repetition   Bed Mobility   Rolling R 2  Maximal assistance   Additional items Assist x 1   Rolling L 2  Maximal assistance   Additional items Assist x 1   Supine to Sit 2  Maximal assistance   Additional items Assist x 2;LE management;Verbal cues   Sit to Supine 2  Maximal assistance   Additional items Assist x 2;LE management;Verbal cues   Balance   Static Sitting Poor +   Dynamic Sitting Poor -   Endurance Deficit   Endurance Deficit Yes   Activity Tolerance   Activity Tolerance Patient limited by fatigue;Patient limited by pain   Nurse Made Aware yes - RAYMUNDO Baker   Exercises   Knee AROM [employer]; Bilateral  (3 reps)   Ankle Pumps Sitting;10 reps;AROM; Bilateral  (frequent verbal/tactile cues; frequent rest breaks)   Marching Sitting;10 reps;AROM; Bilateral  (frequent verbal/tactile cues; frequent rest breaks)   Balance training  sat at EOB x15 minutes   Assessment   Prognosis Fair   Problem List Decreased strength;Decreased endurance; Impaired balance;Decreased coordination;Decreased mobility; Decreased cognition; Impaired judgement; Impaired vision; Impaired hearing; Impaired sensation;Pain   Assessment Pt seen for PT treatment session  Pt agreeable to participate and performed bed mobility and therapeutic exercises as outlined above   Pt performed exercises, but required frequent cues for redirection and required several rest breaks throughout due to fatigue and distraction  Pt requested to return to bed after appx 15 minutes  Pt will continue to benefit from skilled PT services to improve bed mobility, transfers and ambulation  PT will continue to follow pt's progress and will work with pt as tolerated, pending hospice decision     Goals   STG Expiration Date 12/06/18   Treatment Day 1   Plan   Treatment/Interventions (continue POC, pending hospice decision)   Progress Slow progress, decreased activity tolerance   PT Frequency (3-5x/wk)   Recommendation   Recommendation (pending hospice decision)   PT - OK to Discharge (pending hospice decision)     Tianna Purcell, SPT

## 2018-12-03 NOTE — PROGRESS NOTES
Progress note - Palliative and Supportive Care   Margarita Mckeon 80 y o  female [id number]    Assessment:  - abdominal pain of unknown etilogy-suspect vascular BUT vascular arterial study "normal' in resting/fasting state  - chronic systolic heart failure  - SSS on oral anticoagulant  - supratherapeutic INR-ongoing, now on daily vitamin K  - HTN  - NSTEMI x 2  - compression fx T9, indeterminant timing  - moderate protein-calorie malnutrition  - SHANEL on CKD with worsening renal function  -osteopenia  - mild hyperbilirubinemia  - poor oral intake  - dehydration as evidenced by ongoing elevation of BUN  - somnolence    Plan:  1  Symptom management    - d/c oxyIR 5 mg dose and maintain 2 5 mg PO PRN   - change tylenol to PRN   - d/c Carafate per family request   - will discuss with family further about cessation of Lipitor and Calcium to decrease pill burden   - Marinol increased per family request to 5 mg PO BID   - Continue other PRN medications as is    2  Goals    - prolonged family meeting held today with Dr Lupe Rojas ([facility] [address], [place] with [facility], myself and patient's 2 daughters- Alex León and Holli Goodwin  - Time spent providing clinical update and addressing their questions/concerns to their satisfaction   - Discussed that she is in an end stage medical condition and that each treatment offered for one organ system may likely harm another (ie  Fluids to help renal function and dehydration but may worsen heart failure)  Discussed her poor nutritional status at length and how this contributes to her over-arching illness and poor prognosis  - They feel that she is taking too much medication but would like to try a higher dose of the Marinol to see if this works for her  - They would like her to be home  They refuse to entertain any use of SNF or rehab  - Re-introduced hospice services, after much discussion they were more receptive to this   Plan to consult hospice liaison to provide further information and address their questions  - Follow up in the next 24 hours  Code Status: Level 1 - Full Code  Advance Directive and Living Will:    none  Decisional apparatus:  Patient is competent on my exam today  If competence is lost, patient's substitute decision maker would default to both daughters, Nayely Woodard and Masha Delarosa by [place] Act 169  Power of :   none  POLST:   none    Reason for visit: follow up [address]    Interval history:  Patient just received pain medications and is currently resting  She appears comfortable  Family meeting held today, please see above for details       MEDICATIONS / ALLERGIES:    all current active meds have been reviewed and current meds:   Current Facility-Administered Medications   Medication Dose Route Frequency    acetaminophen (TYLENOL) tablet 650 mg  650 mg Oral Q6H PRN    atorvastatin (LIPITOR) tablet 20 mg  20 mg Oral Daily    bisacodyl (DULCOLAX) rectal suppository 10 mg  10 mg Rectal Daily PRN    calcium carbonate (OYSTER SHELL,OSCAL) 500 mg tablet 1 tablet  1 tablet Oral Daily With Breakfast    cholecalciferol (VITAMIN D3) tablet 2,000 Units  2,000 Units Oral Daily    docusate sodium (COLACE) capsule 100 mg  100 mg Oral Daily    dronabinol (MARINOL) capsule 5 mg  5 mg Oral BID AC    fluticasone (FLONASE) 50 mcg/act nasal spray 1 spray  1 spray Nasal BID    levothyroxine tablet 88 mcg  88 mcg Oral Early Morning    lidocaine (LIDODERM) 5 % patch 1 patch  1 patch Topical Daily    metoprolol tartrate (LOPRESSOR) tablet 25 mg  25 mg Oral Q12H [facility]    ondansetron (ZOFRAN) injection 4 mg  4 mg Intravenous Q6H PRN    oxyCODONE (ROXICODONE) IR tablet 2 5 mg  2 5 mg Oral Q4H PRN    oxyCODONE (ROXICODONE) IR tablet 5 mg  5 mg Oral Q4H PRN    pantoprazole (PROTONIX) EC tablet 40 mg  40 mg Oral BID AC    phytonadione (MEPHYTON) tablet 2 5 mg  2 5 mg Oral Daily    polyethylene glycol (MIRALAX) packet 17 g  17 g Oral Daily PRN    potassium chloride (K-DUR,KLOR-CON) CR tablet 10 mEq  10 mEq Oral Daily    sodium chloride 0 9 % bolus 250 mL  250 mL Intravenous Once    sodium chloride 0 9 % with KCl 40 mEq/L infusion (premix)  100 mL/hr Intravenous Continuous       Allergies   Allergen Reactions    Ace Inhibitors Angioedema     Annotation - [id number]: angioedema to ARB  Other reaction(s): Angioedema  Pt and family state not an allergy    Angiotensin Receptor Blockers Angioedema    Erythromycin Ethylsuccinate GI Intolerance    Losartan      Other reaction(s): Angioedema  Pt and family state this is not an allergy      Aspirin Dermatitis and Edema    Codeine Edema    Erythromycin Base Other (See Comments)    Ibuprofen Other (See Comments)     Patient denies allergy    Penicillins Hives and Edema     However, patient has tolerated dissimilar side chain Cephalosporins (Cefazolin, Cefuroxime)       OBJECTIVE:    Physical Exam    Blood pressure 143/68, pulse 89, temperature 97 5 °F (36 4 °C), temperature source Oral, resp  rate 20, height 5' (1 524 m), weight 53 kg (116 lb 13 5 oz), SpO2 92 %, not currently breastfeeding  Intake/Output Summary (Last 24 hours) at 12/03/18 1355  Last data filed at 12/02/18 1800   Gross per 24 hour   Intake             1205 ml   Output              150 ml   Net             1055 ml       Physical Exam   Constitutional: She appears well-nourished  No distress  Chronically ill appearing   HENT:   Head: Normocephalic and atraumatic  Right Ear: External ear normal    Left Ear: External ear normal    Nose: Nose normal    Mouth/Throat: Oropharynx is clear and moist    Eyes: EOM are normal  Right eye exhibits no discharge  Left eye exhibits no discharge  No scleral icterus  Neck: Neck supple  No JVD present  No tracheal deviation present  Cardiovascular: Normal rate and intact distal pulses  Irregular    Pulmonary/Chest: Effort normal and breath sounds normal  No respiratory distress  She has no wheezes  She has no rales     Abdominal: Bowel sounds are normal  She exhibits no distension and no mass  There is no tenderness  Musculoskeletal: She exhibits no edema, tenderness or deformity  Neurological:   Recently received pain medication and sleeping    Skin: Skin is warm and dry  She is not diaphoretic  Nursing note and vitals reviewed  Lab Results:   I have personally reviewed pertinent labs  , CBC:   Lab Results   Component Value Date    WBC 4 55 12/03/2018    HGB 12 3 12/03/2018    HCT 39 3 12/03/2018     (H) 12/03/2018     (L) 12/03/2018    MCH 33 9 12/03/2018    MCHC 31 3 (L) 12/03/2018    RDW 19 6 (H) 12/03/2018    MPV 11 0 12/03/2018    NRBC 2 12/03/2018    NRBC 3 (H) 12/03/2018   , CMP:   No results found for: SODIUM, K, CL, CO2, ANIONGAP, BUN, CREATININE, GLUCOSE, CALCIUM, AST, ALT, ALKPHOS, PROT, BILITOT, EGFR      Counseling / Coordination of Care  Total floor / unit time spent today 65+ minutes  Greater than 50% of total time was spent with the patient and / or family counseling and / or coordination of care  A description of the counseling / coordination of care: family meeting, medication review, supportive listening, goals of care    Thank you kindly for allowing us to help provide care for your patient      Neena Myles, DO  Palliative and Supportive Care  [phone]

## 2018-12-03 NOTE — PLAN OF CARE
Problem: PHYSICAL THERAPY ADULT  Goal: Performs mobility at highest level of function for planned discharge setting  See evaluation for individualized goals  Treatment/Interventions: Functional transfer training, LE strengthening/ROM, Elevations, Therapeutic exercise, Endurance training, Cognitive reorientation, Patient/family training, Equipment eval/education, Bed mobility, Gait training, Spoke to nursing          See flowsheet documentation for full assessment, interventions and recommendations  Outcome: Not Progressing  Prognosis: Fair  Problem List: Decreased strength, Decreased endurance, Impaired balance, Decreased coordination, Decreased mobility, Decreased cognition, Impaired judgement, Impaired vision, Impaired hearing, Impaired sensation, Pain  Assessment: Pt seen for PT treatment session  Pt agreeable to participate and performed bed mobility and therapeutic exercises as outlined above  Pt performed exercises, but required frequent cues for redirection and required several rest breaks throughout due to fatigue and distraction  Pt requested to return to bed after appx 15 minutes  Pt will continue to benefit from skilled PT services to improve bed mobility, transfers and ambulation  PT will continue to follow pt's progress and will work with pt as tolerated, pending hospice decision  Barriers to Discharge: None     Recommendation:  (pending hospice decision)     PT - OK to Discharge:  (pending hospice decision)    See flowsheet documentation for full assessment

## 2018-12-03 NOTE — QUICK NOTE
Spoke to daughter and grandson this evening outside the patient's room today  Expressed concern over intermittently labored breathing and we agreed to hold IV fluids for tonight (family fully aware of dehydration status over last few days) and observe  No Lasix for now due to recovering renal injury  If symptoms persist despite discontinuation of IV fluids, can consider a low-dose one time push and check portable CXR  Family still considering hospice care  Also discussed intravenous nutrition (tube feeding versus TPN) and family expresses understanding to risks versus benefits at this point in the patient's medical condition  They also understand why narcotic doses were decreased by palliative care in regards to correlation with ability to increase encephalopathy and respiratory failure especially in the setting of renal impairment

## 2018-12-03 NOTE — PROGRESS NOTES
[facility] - Internal Medicine Progress Note       PATIENT INFORMATION      Patient: Christopher Denis 80 y o  female   MRN: [id number]  PCP: Latricia Vivas DO  Unit/Bed#: CW2 216-01 Encounter: [id number]  Date Of Visit: 12/03/18       ASSESSMENTS & PLAN     Chronic abdominal pain   Assessment & Plan    - persistent from last hospitalization where she underwent an extensive workup including a HIDA scan, mesenteric/celiac duplex study, and CT of abdomen/pelvis all of which were unremarkable for acute etiology  - history of hiatal hernia noted per prior documentation (continue Protonix)  - supportive care otherwise - continue constipation prophylaxis with Colace/MiraLax/Dulcolax regimen      Atrial fibrillation   Assessment & Plan    - rate controlled on Lopressor   - Warfarin previously held secondary to supratherapeutic INR - family considering risks/benefits of continuing chronic anticoagulation at this point     Warfarin-induced coagulopathy - Supratherapeutic INR   Assessment & Plan    - INR improved from 8 24 -> 4 23 -> 1 8 today  - likely secondary to vitamin K depletion from malnutrition coupled with chronic Warfarin use - anticoagulation discontinued and initiated daily oral vitamin K supplementation yesterday   - monitor INR for safe/progressive decline/improvement to therapeutic range - monitor for bleeding     Failure to thrive in adult   Assessment & Plan    - with associated severe protein calorie malnutrition as evidenced by decreased appetite/oral intake coupled with chronic illness and muscle wasting/atrophy on exam  - per family request, nutritional supplementation has been discontinued  - on Marinol chronically for appetite stimulation  - family meeting occurred this morning with daughters considering hospice - await final decision      Hypomagnesemia - Hypokalemia   Assessment & Plan    - monitor/replete as necessary - morning labs pending  - likely associated with malnutrition and home diuretic use     SHANEL (acute kidney injury)    Assessment & Plan    - creatinine improved from a peak of 2 37 -> 1 75 yesterday with IV fluid hydration - morning labs pending  - monitor renal function and limit/avoid nephrotoxins if possible - chronic diuretic held      Chronic diastolic CHF    Assessment & Plan    - continue beta-blockade with Lopressor  - holding diuretic (Lasix) due to poor oral intake and acute kidney injury   - monitor/replete potassium and magnesium deficiencies as necessary     Essential hypertension   Assessment & Plan    - continue Lopressor  - PRN pain control      NSTEMI type 2   Assessment & Plan    - troponin plateau of 0 77  - likely secondary to acute kidney injury in the setting of chronic diastolic CHF     Chronic thoracic vertebral compression fracture   Assessment & Plan    - PRN pain control and conservative care  - PT/OT if tolerated - awaiting family decision regarding pursuing hospice  - continue calcium and vitamin-D supplementation       VTE Prophylaxis:  Supratherapeutic INR      SUBJECTIVE     Seen/examined earlier today after family meeting  Daughters are considering meeting with hospice liaison at this point  The patient herself is quite weak/fatigued today  She is a poor historian in her current state  OBJECTIVE     Vitals:   Temp (24hrs), Av 5 °F (36 4 °C), Min:97 3 °F (36 3 °C), Max:97 8 °F (36 6 °C)    Temp:  [97 3 °F (36 3 °C)-97 8 °F (36 6 °C)] 97 5 °F (36 4 °C)  HR:  [62-89] 89  Resp:  [18-20] 20  BP: (118-168)/(68-81) 143/68  SpO2:  [91 %-92 %] 92 %  Body mass index is 22 82 kg/m²  Input and Output Summary (last 24 hours):        Intake/Output Summary (Last 24 hours) at 18 1234  Last data filed at 18 1800   Gross per 24 hour   Intake             1205 ml   Output              150 ml   Net             1055 ml       Physical Exam:     GENERAL:  Frail/cachectic   HEAD:  Normocephalic - atraumatic - temporal wasting evident  EYES: PERRL - EOMI   MOUTH:   mucosal dryness improving  NECK:  Supple - full range of motion - clavicular wasting present  CARDIAC:  Irregularly irregular but rate controlled - S1/S2 positive  PULMONARY:  Clear breath sounds bilaterally - nonlabored respirations  ABDOMEN:  Soft - nontender/nondistended - active bowel sounds  MUSCULOSKELETAL:  Motor strength/range of motion  significant deconditioned  NEUROLOGIC:  weak/fatigued   SKIN:  Chronic wrinkles/blemishes   PSYCHIATRIC:  Mood/affect flat      ADDITIONAL DATA       Labs & Recent Cultures:       Results from last 7 days  Lab Units 12/03/18  0522 12/01/18  0849   WBC Thousand/uL 4 55 5 86   HEMOGLOBIN g/dL 12 3 14 9   HEMATOCRIT % 39 3 46 9*   PLATELETS Thousands/uL 124* 174   NEUTROS PCT %  --  80*   LYMPHS PCT %  --  13*   LYMPHO PCT % 4*  --    MONOS PCT %  --  5   MONO PCT % 2*  --    EOS PCT % 0 1       Results from last 7 days  Lab Units 12/02/18  0454   POTASSIUM mmol/L 3 3*   CHLORIDE mmol/L 111*   CO2 mmol/L 21   BUN mg/dL 46*   CREATININE mg/dL 1 75*   CALCIUM mg/dL 8 7   ALK PHOS U/L 156*   ALT U/L 21   AST U/L 22       Results from last 7 days  Lab Units 12/03/18  1150   INR  1 80*                 Last 24 Hours Medication List:     Current Facility-Administered Medications:  acetaminophen 975 mg Oral Q8H Clemencia Becker MD    atorvastatin 20 mg Oral Daily Clemencia Becker MD    bisacodyl 10 mg Rectal Daily PRN Jose Johnson PA-C    calcium carbonate 1 tablet Oral Daily With Breakfast Irais Sales MD    cholecalciferol 2,000 Units Oral Daily Clemencia Becker MD    docusate sodium 100 mg Oral Daily Clemencia Becker MD    dronabinol 2 5 mg Oral BID AC Clemencia Becker MD    fluticasone 1 spray Nasal BID Clemencia Becker MD    levothyroxine 88 mcg Oral Early Morning Clemencia Becker MD    lidocaine 1 patch Topical Daily Clemencia Becker MD    metoprolol tartrate 25 mg Oral Q12H [facility] Clemencia Becker MD    ondansetron 4 mg Intravenous Q6H PRN Clemencia Becker MD oxyCODONE 2 5 mg Oral Q4H PRN Annabelle Ruiz MD    oxyCODONE 5 mg Oral Q4H PRN Annabelle Ruiz MD    pantoprazole 40 mg Oral BID AC Annabelle Ruiz MD    phytonadione 2 5 mg Oral Daily LIAM Gregory    polyethylene glycol 17 g Oral Daily PRN Annabelle uRiz MD    potassium chloride 10 mEq Oral Daily Annabelle Ruiz MD    sodium chloride 250 mL Intravenous Once Juice Johnson PA-C    sodium chloride 0 9 % with KCl 40 mEq/L 100 mL/hr Intravenous Continuous Joseph Heard MD Last Rate: 100 mL/hr (12/03/18 0901)   sucralfate 1 g Oral 4x Daily Annabelle Ruiz MD           Time Spent for Care: 32 minutes  More than 50% of total time spent on counseling and coordination of care as described above  Current Length of Stay: 6 day(s)      Code Status: Level 1 - Full Code         ** Please Note: This note is constructed using a voice recognition dictation system   **

## 2018-12-04 NOTE — PROGRESS NOTES
Progress Note - Inpatient Palliative Care   Jocelyne Reyes 80 y o  female MRN: [id number]  Unit/Bed#: CW2 216-01 Encounter: [id number]      Assessment:   Patient Active Problem List   Diagnosis    Nondisplaced spiral fracture of shaft of right femur, initial encounter for closed fracture ([facility] )    Hypothyroidism    Essential hypertension    Atrial fibrillation    Anemia of chronic disease    Oral thrush    Hypercholesteremia    Iron deficiency anemia due to chronic blood loss    Gastritis    Hiatal hernia    Chronic anticoagulation    Chronic diastolic CHF     Osteopenia    [facility]SS [facility]ick [facility] syndrome) ([facility])    Status post fracture of femur    Chronic abdominal pain    Chronic thoracic vertebral compression fracture    Moderate protein-calorie malnutrition ([facility])    NSTEMI type 2    SHANEL (acute kidney injury)     Warfarin-induced coagulopathy - Supratherapeutic INR    Failure to thrive in adult    Hypomagnesemia - Hypokalemia    Severe protein-calorie malnutrition ([facility] )   frail elderly patient with multiple systems failure and adult failure to thrive with severe protein calorie malnutrition  She has pain associated with prior fractures  She is not eating or drinking at a sustainable level and she is now having difficulty with swallowing medication  Extensive meeting with family and treatment team  Family [led by oldest daughter] verbalizes an understanding of the patient's overall poor condition and end stage prognosis  They also all agree that the goals are for her to be at home, to not die in the hospital and to not suffer any more than she has  Patient herself is minimally interactive but acknowledges pain generalized  Plan:   Home hospice  Code level 4  Oxycodone liquid      Pain History  Current pain location(s): abdomen/back  Pain Scale:   FLACC  Severity:  5/10  Quality: chronic  Current Analgesic regimen:  Oxycodone 2 5 mg as needed  24 hour history:     Meds/Allergies   all current active meds have been reviewed and current meds:   Current Facility-Administered Medications   Medication Dose Route Frequency    acetaminophen (TYLENOL) tablet 650 mg  650 mg Oral Q6H PRN    atorvastatin (LIPITOR) tablet 20 mg  20 mg Oral Daily    bisacodyl (DULCOLAX) rectal suppository 10 mg  10 mg Rectal Daily PRN    calcium carbonate (OYSTER SHELL,OSCAL) 500 mg tablet 1 tablet  1 tablet Oral Daily With Breakfast    cholecalciferol (VITAMIN D3) tablet 2,000 Units  2,000 Units Oral Daily    docusate sodium (COLACE) capsule 100 mg  100 mg Oral Daily    dronabinol (MARINOL) capsule 5 mg  5 mg Oral BID AC    fluticasone (FLONASE) 50 mcg/act nasal spray 1 spray  1 spray Nasal BID    levothyroxine tablet 88 mcg  88 mcg Oral Early Morning    lidocaine (LIDODERM) 5 % patch 1 patch  1 patch Topical Daily    metoprolol tartrate (LOPRESSOR) tablet 25 mg  25 mg Oral Q12H [facility]    ondansetron (ZOFRAN) injection 4 mg  4 mg Intravenous Q6H PRN    oxyCODONE (ROXICODONE) oral solution 2 5 mg  2 5 mg Oral Q4H PRN    pantoprazole (PROTONIX) EC tablet 40 mg  40 mg Oral BID AC    phytonadione (MEPHYTON) tablet 2 5 mg  2 5 mg Oral Daily    polyethylene glycol (MIRALAX) packet 17 g  17 g Oral Daily PRN    potassium chloride (K-DUR,KLOR-CON) CR tablet 10 mEq  10 mEq Oral Daily    sodium chloride 0 9 % bolus 250 mL  250 mL Intravenous Once     Pain Management Medications: Allergies   Allergen Reactions    Ace Inhibitors Angioedema     [place] [id number]: angioedema to ARB  Other reaction(s): Angioedema  Pt and family state not an allergy    Angiotensin Receptor Blockers Angioedema    Erythromycin Ethylsuccinate GI Intolerance    Losartan      Other reaction(s): Angioedema   Pt and family state this is not an allergy      Aspirin Dermatitis and Edema    Codeine Edema    Erythromycin Base Other (See Comments)    Ibuprofen Other (See Comments)     Patient denies allergy    Penicillins Hives and Edema However, patient has tolerated dissimilar side chain Cephalosporins (Cefazolin, Cefuroxime)       Objective     Temp:  [97 3 °F (36 3 °C)-97 6 °F (36 4 °C)] 97 3 °F (36 3 °C)  HR:  [] 70  Resp:  [16-20] 18  BP: (137-154)/(72-87) 151/81      Intake/Output Summary (Last 24 hours) at 12/05/18 0912  Last data filed at 12/05/18 0126   Gross per 24 hour   Intake                0 ml   Output                0 ml   Net                0 ml       Physical Exam: /65   Pulse 61   Temp (!) 97 3 °F (36 3 °C) (Tympanic)   Resp 18   Ht 5' (1 524 m)   Wt 53 1 kg (117 lb 1 oz)   LMP  (LMP Unknown)   SpO2 99%   BMI 22 86 kg/m²   General appearance: appears older than stated age, cachectic, fatigued, moderate distress and pale  Lungs: diminished breath sounds  Heart: irregularly irregular rhythm  Abdomen: soft, non-tender; bowel sounds normal; no masses,  no organomegaly  Extremities: extremities normal, warm and well-perfused; no cyanosis, clubbing, or edema  Lab Results:   Imaging Studies: I have personally reviewed pertinent reports  and I have personally reviewed pertinent films in PACS  EKG, Pathology, and Other Studies:     Counseling / Coordination of Care  Total floor / unit time spent today 45 minutes  Greater than 50% of total time was spent with the patient and / or family counseling and / or coordination of care  A description of the counseling / coordination of care: goals of care and end of life care fully discussed

## 2018-12-04 NOTE — PROGRESS NOTES
Progress Note - Venetta Samples [birth year], 80 y o  female MRN: [id number]    Unit/Bed#: CW2 216-01 Encounter: [id number]    Primary Care Provider: Ron Leon DO   Date and time admitted to hospital: 11/27/2018  6:08 PM        * Chronic abdominal pain   Assessment & Plan    · persistent from last hospitalization where she underwent an extensive workup including a HIDA scan, mesenteric/celiac duplex study, and CT of abdomen/pelvis all of which were unremarkable for acute etiology  · No further workup  · Plan for comfort care, hospice at home       Failure to thrive in adult   Assessment & Plan    · with associated severe protein calorie malnutrition as evidenced by decreased appetite/oral intake coupled with chronic illness and muscle wasting/atrophy on exam  · planning for home hospice     Warfarin-induced coagulopathy - Supratherapeutic INR   Assessment & Plan    ·  INR continued to rise during admission despite discontinuation of Warfarin  ·  likely secondary to vitamin K depletion from malnutrition coupled with chronic Warfarin use - anticoagulation discontinued and patient was started on daily Vit K   No further anticoagulation     SHANEL (acute kidney injury)    Assessment & Plan    · Likely due to dehydration, component of cardiorenal syndrome  · IVFs stopped due to dsypnea  · No further workup as patient comfort care       NSTEMI type 2   Assessment & Plan    ·  troponin plateau of 4 66  ·  likely secondary to acute kidney injury in the setting of chronic diastolic CHF  · Defer further workup       Chronic thoracic vertebral compression fracture   Assessment & Plan    · Conservative care, pain mgmt     Chronic diastolic CHF    Assessment & Plan    · Continue Lopressor  · Diuretic discontinued due to renal failure, poor po intake       Atrial fibrillation   Assessment & Plan    · rate controlled on Lopressor   · No further anticoagulation given elevated INR       VTE Pharmacologic Prophylaxis:   Pharmacologic: Pharmacologic VTE Prophylaxis contraindicated due to comfort care  Mechanical VTE Prophylaxis in Place: No    Patient Centered Rounds: I have performed bedside rounds with nursing staff today  Discussions with Specialists or Other Care Team Provider: Palliative care, case mgmt    Education and Discussions with Family / Patient: Spent 45 minutes this morning discussing concerns with daughter, Gurjit Dimas  Later in the afternoon, I participated in family meeting, 60 minutes, with both daughters, grandsons and significant others  After much discussion, it was decided to move forward with home hospice  Time Spent for Care: 105 minutes  More than 50% of total time spent on counseling and coordination of care as described above  Current Length of Stay: 7 day(s)    Current Patient Status: Inpatient   Certification Statement: The patient will continue to require additional inpatient hospital stay due to discharge planning    Discharge Plan: Stable for home hospice when this can be arranged  Code Status: Level 4 - Comfort Care      Subjective:   Seen at bedside  She is fatigued  Voice unintelligible  Per nursing staff, she is refusing all meds besides oxycodone  Groaning in pain when repositioned  She is having trouble swallowing oxycodone  Long family meeting held today and ultimately decision was made to proceed with home hospice  Objective:     Vitals:   Temp (24hrs), Av 5 °F (36 4 °C), Min:97 4 °F (36 3 °C), Max:97 6 °F (36 4 °C)    Temp:  [97 4 °F (36 3 °C)-97 6 °F (36 4 °C)] 97 6 °F (36 4 °C)  HR:  [] 76  Resp:  [16-20] 20  BP: (109-154)/(72-87) 137/72  SpO2:  [90 %-91 %] 90 %  Body mass index is 22 39 kg/m²  Input and Output Summary (last 24 hours):        Intake/Output Summary (Last 24 hours) at 18 1503  Last data filed at 18 2329   Gross per 24 hour   Intake               50 ml   Output               48 ml   Net                2 ml       Physical Exam:     Physical Exam Constitutional:   Frail  Appeared fatigued, chronically ill, malnourished  HENT:   Head: Normocephalic and atraumatic  Eyes: No scleral icterus  Neck: Normal range of motion  Neck supple  Cardiovascular:   Irregular   Pulmonary/Chest: Effort normal and breath sounds normal  No respiratory distress  She has no wheezes  She has no rales  Abdominal: Soft  Bowel sounds are normal  She exhibits no distension  There is tenderness  Musculoskeletal: She exhibits no edema  Neurological: She is alert  Speech difficult to understand  Patient had just gotten oxycodone  Skin: Skin is warm and dry  Psychiatric: She has a normal mood and affect  Additional Data:     Labs:      Results from last 7 days  Lab Units 12/03/18  0522 12/01/18  0849   WBC Thousand/uL 4 55 5 86   HEMOGLOBIN g/dL 12 3 14 9   HEMATOCRIT % 39 3 46 9*   PLATELETS Thousands/uL 124* 174   NEUTROS PCT %  --  80*   LYMPHS PCT %  --  13*   LYMPHO PCT % 4*  --    MONOS PCT %  --  5   MONO PCT % 2*  --    EOS PCT % 0 1       Results from last 7 days  Lab Units 12/02/18  0454   POTASSIUM mmol/L 3 3*   CHLORIDE mmol/L 111*   CO2 mmol/L 21   BUN mg/dL 46*   CREATININE mg/dL 1 75*   CALCIUM mg/dL 8 7   ALK PHOS U/L 156*   ALT U/L 21   AST U/L 22       Results from last 7 days  Lab Units 12/03/18  1150   INR  1 80*       * I Have Reviewed All Lab Data Listed Above  * Additional Pertinent Lab Tests Reviewed:  All Labs Within Last 24 Hours Reviewed    Imaging:    Imaging Reports Reviewed Today Include: None  Imaging Personally Reviewed by Myself Includes:  None    Recent Cultures (last 7 days):           Last 24 Hours Medication List:     Current Facility-Administered Medications:  acetaminophen 650 mg Oral Q6H PRN Alicia Lane DO   atorvastatin 20 mg Oral Daily Neal Martinez MD   bisacodyl 10 mg Rectal Daily PRN Katie Mena PA-C   calcium carbonate 1 tablet Oral Daily With Breakfast Gildardo Foy MD   cholecalciferol 2,000 Units Oral Daily Chuck Sheriff MD   docusate sodium 100 mg Oral Daily Chuck Sheriff MD   dronabinol 5 mg Oral BID AC Alicia Lane DO   fluticasone 1 spray Nasal BID Chuck Sheriff MD   levothyroxine 88 mcg Oral Early Morning Chuck Sheriff MD   lidocaine 1 patch Topical Daily Chuck Sheriff MD   metoprolol tartrate 25 mg Oral Q12H [address] Chuck Sheriff MD   ondansetron 4 mg Intravenous Q6H PRN Chuck Sheriff MD   oxyCODONE 2 5 mg Oral Q4H PRN Roda Eisenmenger, PA-C   pantoprazole 40 mg Oral BID AC Chuck Sheriff MD   phytonadione 2 5 mg Oral Daily LIAM Gregory   polyethylene glycol 17 g Oral Daily PRN Chuck Sheriff MD   potassium chloride 10 mEq Oral Daily Chuck Sheriff MD   sodium chloride 250 mL Intravenous Once Roda Eisenmenger, PA-C        Today, Patient Was Seen By: Roda Eisenmenger, PA-C    ** Please Note: Dragon 360 Dictation voice to text software may have been used in the creation of this document   **

## 2018-12-04 NOTE — SOCIAL WORK
Cm participated in a family meeting with both daughters, grandsons and significant others  After much discussion, it was decided to move forward with home hospice  Cm contacted [address] regarding family's preference to move forward with obtaining a hospital bed and the overall process to make patient comfortable at home  CM to follow up

## 2018-12-04 NOTE — SOCIAL WORK
Cm received notice from Heriberto Prince [facility]) regarding patient's in home hospice care  Patient's hospital bed will be delivered to her home around 12pm tomorrow  Cm arranged transport through [facility] for 1pm at the earliest  Patient will be transported by BLS and on 2L of oxygen  PA Zayra Ratliff) and family have been updated  CM to follow as needed

## 2018-12-04 NOTE — UTILIZATION REVIEW
Continued Stay Review    Date: 12/1/18    Vital Signs:     0808  12/01/18  0743   Vital Signs   Temp  97 5 °F (36 4  °C)   Temp src  Oral   Pulse  75   Heart Rate Source  Monitor   Resp  18   BP  117/69   BP Location  Left arm   BP Method  Automatic   Patient Position - Orthostatic VS  Sitting     Medications:   Scheduled Meds:   Current Facility-Administered Medications:  acetaminophen 650 mg Oral Q6H PRN   atorvastatin 20 mg Oral Daily   bisacodyl 10 mg Rectal Daily PRN   calcium carbonate 1 tablet Oral Daily With Breakfast   cholecalciferol 2,000 Units Oral Daily   docusate sodium 100 mg Oral Daily   dronabinol 5 mg Oral BID AC   fluticasone 1 spray Nasal BID   levothyroxine 88 mcg Oral Early Morning   lidocaine 1 patch Topical Daily   metoprolol tartrate 25 mg Oral Q12H KENISHA   ondansetron 4 mg Intravenous Q6H PRN   oxyCODONE 2 5 mg Oral Q4H PRN   pantoprazole 40 mg Oral BID AC   phytonadione 2 5 mg Oral Daily   polyethylene glycol 17 g Oral Daily PRN   potassium chloride 10 mEq Oral Daily   sodium chloride 250 mL Intravenous Once     Continuous Infusions:   IV 0 9% NSS @ 100 ml/hr     PRN Meds:   acetaminophen    bisacodyl    Ondansetron x1    oxyCODONE    polyethylene glycol    Abnormal Labs/Diagnostic Results:   BUN/Cr 46/2 09    Age/Sex: 80 y o  female     Assessment/Plan:   12/1 Medicine Progress Note  * Abdominal cramping   Assessment & Plan     · Persistent from last hospitalization  Notably patient with extensive workup negative for clear pathology; prior hiatal hernia noted  Continues to report this lower abdominal pain worsens with palpation on and off throughout the day  Movement makes it worse, not being disturbed does improve her pain  Symptoms are associated with nausea, poor appetite, and weight loss- failure to thrive  · Goals of care have been discussed with patient's daughters along with patient  Palliative Medicine is following    Daughter Alex León would like to consider hospice and daughter Hamlet Benitez would like to continue with full care  Patient has expressed wishes to consider stopping care and pursue comfort care, as noted there appears to be conflict in regards to goals of care  Palliative medicine along with social Work and representative from the internal medicine team along with patient's daughters and patient will have a meeting on Monday at 10:30 a m  To discuss this further  Patient may benefit from a capacity evaluation but 1st will have family/team meeting on Monday       SHANEL (acute kidney injury) ([facility] [address] )   Assessment & Plan     · Creatinine improving to 2 09 (down from 2 37), suspect IV hydration helping  · Patient is not eating or drinking on her own  · Will continue to trend at this time and will maintain IV hydration  · Avoid nephrotoxin  · Hold Lasix      Elevated troponin   Assessment & Plan     · Troponin 0 09 on admission, repeat troponin stable  No EKG changes and patient denying chest pain/pressure or anginal symptoms  · No further workup at this time      Severe protein calorie malnutrition   Assessment & Plan     · Severe protein calorie malnutrition in the setting of acute illness as evidenced by 12% weight loss in 3 months and less than 25% energy intake for greater than 5 days being treated with liberal diet, oral nutrition supplements and assessment by Nutrition      Closed wedge compression fracture of ninth thoracic vertebra [facility])   Assessment & Plan     · Incidentally noted on patient's last admission with unclear inciting event  No plans for operative intervention  · Pain control      Chronic diastolic CHF (congestive heart failure) (HCC)   Assessment & Plan     · With mild lower extremity edema, however patient with poor p o  Intake, no JVD  Patient has been off diuretics since last admission    · Will continue to hold diuretics given poor po intake      Chronic atrial fibrillation (HCC)   Assessment & Plan     · Currently rate controlled, takes Coumadin for anticoagulation  · INR continues to rise although she hasn't gotten Coumadin in a few days  ? Continue to hold Coumadin  ? INR 8 24  ?  Will administer vitamin K 2 5 mg oral, continue this daily, along with daily INRs  · No signs of active bleeding      Essential hypertension   Assessment & Plan     · BP controlled  · continue lopressor      Hypothyroidism   Assessment & Plan     · Chronic  · continue levothyroxine      VTE Pharmacologic Prophylaxis:   Pharmacologic: Pharmacologic VTE Prophylaxis contraindicated due to Supratherapeutic INR  Mechanical VTE Prophylaxis in Place: Yes  Current Patient Status: Inpatient   Certification Statement: The patient will continue to require additional inpatient hospital stay due to Under safe discharge plan at this time unclear whether patient will go to hospice versus home were rehab with family  ________________________  12/1 Palliative Care - pt found to be competent today     Discharge Plan: SUJIT

## 2018-12-04 NOTE — SOCIAL WORK
LEANDRA called Holli Goodwin to follow-up from yesterdays family meeting  Holli Goodwin will be visiting her mother at 3:30 today  LEANDRA will meet with her at 3:30

## 2018-12-04 NOTE — HOSPICE NOTE
Met with daughter Cleve Junior at bedside  She has decided to go with hospice  Consents signed and faxed to [facility] office, original DNR and copies of consents are on patients paper chart  Equipment ordered for delivery by Wednesday noon  (hospital bed, half rails, NPPR mattress, over bed table, bed pan, bed pads, 2 bed wedges and oxygen for 2 to [facility]) Palliative care to write scripts for meds to bed for comfort medications, CM to have same delivered to room and set up transport to have pt home by as close to noon as possible tomorrow for same day open

## 2018-12-05 NOTE — ASSESSMENT & PLAN NOTE
·  troponin plateau of 4 88  ·  likely secondary to acute kidney injury in the setting of chronic diastolic CHF  · Defer further workup

## 2018-12-05 NOTE — SOCIAL WORK
CM was informed that patient has passed away  Cm met with family to provide emotional support  TRINA Flores) also cancelled transport  [facility] was updated by RAYMUNDO Goncalves

## 2018-12-05 NOTE — SOCIAL WORK
Patient's family changed their mind regarding hospice care  Patient's family prefers patient to be discharged to the [facility] on [address], [address]  CM updated [facility] regarding this  Transportation time is still confirmed for 1pm as that is the earliest CM is able to obtain  Cm to follow as needed

## 2018-12-05 NOTE — DISCHARGE SUMMARY
PRONOUNCEMENT OF DEATH     DOA: 2018    DOD:  2018    TOD:  5    CODE STATUS AT TOD:  Comfort care only    PATIENT ON HOSPICE?:  Yes    FAMILY NOTIFIED?:  Yes, at bedside    AUTOPSY DESIRED?:  Unknown at this time    SUSPECTED COD: heart failure     HOSPITAL PROBLEM LIST:   Patient Active Problem List   Diagnosis    Nondisplaced spiral fracture of shaft of right femur, initial encounter for closed fracture ([facility] [address] )    Hypothyroidism    Essential hypertension    Atrial fibrillation    Anemia of chronic disease    Oral thrush    Hypercholesteremia    Iron deficiency anemia due to chronic blood loss    Gastritis    Hiatal hernia    Chronic anticoagulation    Chronic diastolic CHF     Osteopenia    SSS (sick sinus syndrome) (HCC)    Status post fracture of femur    Chronic abdominal pain    Chronic thoracic vertebral compression fracture    Moderate protein-calorie malnutrition ([facility])    NSTEMI type 2    SHANEL (acute kidney injury)     Warfarin-induced coagulopathy - Supratherapeutic INR    Failure to thrive in adult    Hypomagnesemia - Hypokalemia    Severe protein-calorie malnutrition ([facility] [address] )       PRONOUNCEMENT OF DEATH    I was contacted by nursing staff to evaluate the patient found without vital signs  Patient was identified visually and identification was confirmed by hospital ID bracelet  Patient was found laying in bed with family at bedside  Personally examined the patient without heart sounds auscultated on exam nor were pulses detected x 2  No breath sounds were appreciated after 2 minutes of auscultation  Pupils were fixed and non-reactive to light  Patient was pronounced dead at this date and time   home is unknown at this time  Please kindly review hospital chart and discharge summary for all details of this hospitalization and circumstances leading to death  Death certificate will be signed my attending physician at a later time

## 2018-12-05 NOTE — PROGRESS NOTES
Progress note - Palliative and Supportive Care   Lorena Dsouza 80 y o  female [id number]    Assessment:  - abdominal pain of unknown etilogy-suspect vascular BUT vascular arterial study "normal' in resting/fasting state  - chronic systolic heart failure  - SSS on oral anticoagulant  - supratherapeutic INR-ongoing, now on daily vitamin K  - HTN  - NSTEMI x 2  - compression fx T9, indeterminant timing  - moderate protein-calorie malnutrition  - SHANEL on CKD with worsening renal function  -osteopenia  - mild hyperbilirubinemia  - poor oral intake  - dehydration as evidenced by ongoing elevation of BUN  - somnolence  - respiratory distress with increased RR and congestion    Plan:  1  Symptom management    - hydromorphone 0 2mg IV q1hour prn   - Lasix IV per [facility]    2  Goals    - home with hospice, considering  house   -     Code Status: Level 4 - Comfort Care    Reason for visit: FU to write discharge comfort meds    Interval history:  Reviewed with nurse, reviewed meds  Patient unable to speak, increased WOB today   No intake,    MEDICATIONS / ALLERGIES:    all current active meds have been reviewed and current meds:   Current Facility-Administered Medications   Medication Dose Route Frequency    acetaminophen (TYLENOL) tablet 650 mg  650 mg Oral Q6H PRN    bisacodyl (DULCOLAX) rectal suppository 10 mg  10 mg Rectal Daily PRN    dronabinol (MARINOL) capsule 5 mg  5 mg Oral BID AC    furosemide (LASIX) injection 20 mg  20 mg Intravenous Once    HYDROmorphone (DILAUDID) injection 0 2 mg  0 2 mg Intravenous Q1H PRN    lidocaine (LIDODERM) 5 % patch 1 patch  1 patch Topical Daily    metoprolol tartrate (LOPRESSOR) tablet 25 mg  25 mg Oral Q12H [facility]    ondansetron (ZOFRAN) injection 4 mg  4 mg Intravenous Q6H PRN    oxyCODONE (ROXICODONE) oral solution 2 5 mg  2 5 mg Oral Q4H PRN    polyethylene glycol (MIRALAX) packet 17 g  17 g Oral Daily PRN       Allergies   Allergen Reactions    Ace Inhibitors Angioedema Annotation - [id number]: angioedema to ARB  Other reaction(s): Angioedema  Pt and family state not an allergy    Angiotensin Receptor Blockers Angioedema    Erythromycin Ethylsuccinate GI Intolerance    Losartan      Other reaction(s): Angioedema  Pt and family state this is not an allergy      Aspirin Dermatitis and Edema    Codeine Edema    Erythromycin Base Other (See Comments)    Ibuprofen Other (See Comments)     Patient denies allergy    Penicillins Hives and Edema     However, patient has tolerated dissimilar side chain Cephalosporins (Cefazolin, Cefuroxime)       OBJECTIVE:    Physical Exam    Blood pressure 148/65, pulse 61, temperature (!) 97 3 °F (36 3 °C), temperature source Tympanic, resp  rate 18, height 5' (1 524 m), weight 53 1 kg (117 lb 1 oz), SpO2 99 %, not currently breastfeeding  Intake/Output Summary (Last 24 hours) at 12/05/18 0954  Last data filed at 12/05/18 0126   Gross per 24 hour   Intake                0 ml   Output                0 ml   Net                0 ml       Physical Exam   Constitutional: She appears listless  She appears ill  Nasal cannula in place  HENT:   Head: Normocephalic and atraumatic  Right Ear: External ear normal  No drainage  Left Ear: External ear normal  No drainage  Nose: Nose normal  No rhinorrhea  Mouth/Throat: Mucous membranes are pale, dry and not cyanotic  Eyes: Conjunctivae, EOM and lids are normal  Right eye exhibits no discharge  Left eye exhibits no discharge  No scleral icterus  Neck: No tracheal deviation present  Cardiovascular: Normal rate  Exam reveals distant heart sounds (masked by rhonchi)  Pulses:       Radial pulses are 1+ on the right side  Pulmonary/Chest: Accessory muscle usage present  No stridor  Tachypnea noted  She has rhonchi  Abdominal: Soft  Normal appearance  There is no tenderness  Musculoskeletal: She exhibits no edema  Neurological: She appears listless  She displays no tremor   She displays no seizure activity  Skin: Skin is warm  There is cyanosis  There is pallor  Soles mottled  Feet cold   Nursing note and vitals reviewed  Breanne Vaughn MD  [facility] Palliative and Supportive Care  [phone]    ** Please Note: This note may be constructed using a voice recognition dictation system  Although an attempt is made to catch transcription errors, thorough editing is not possible  **

## 2018-12-05 NOTE — DISCHARGE SUMMARY
Discharge- Venesameera Samples [birth year], 80 y o  female MRN: [id number]  Unit/Bed#: CW2 216-01 Encounter: [id number] DOS: 12/5/18  Primary Care Provider: Ron Leon DO   Date and time admitted to hospital: 11/27/2018  6:08 PM    ADDENDUM 1159: called to bedside as patient was found without vital signs  Time of death called [phone]  Transfer to hospice house canceled  Family at bedside  Palliative care/hospice team notified  * Chronic abdominal pain   Assessment & Plan    · Persistent from last hospitalization where she underwent an extensive workup including a HIDA scan, mesenteric/celiac duplex study, and CT of abdomen/pelvis all of which were unremarkable for acute etiology  · No further workup  · Plan for comfort care, hospice at home     Failure to thrive in adult   Assessment & Plan    · with associated severe protein calorie malnutrition as evidenced by decreased appetite/oral intake coupled with chronic illness and muscle wasting/atrophy on exam  · planning for home hospice     Warfarin-induced coagulopathy - Supratherapeutic INR   Assessment & Plan    ·  INR continued to rise during admission despite discontinuation of Warfarin  ·  likely secondary to vitamin K depletion from malnutrition coupled with chronic Warfarin use - anticoagulation discontinued and patient was started on daily Vit K   No further anticoagulation     SHANEL (acute kidney injury)    Assessment & Plan    · Likely due to dehydration, component of cardiorenal syndrome  · IVFs stopped due to dsypnea  · No further workup as patient comfort care     NSTEMI type 2   Assessment & Plan    ·  troponin plateau of 1 35  ·  likely secondary to acute kidney injury in the setting of chronic diastolic CHF  · Defer further workup     Chronic thoracic vertebral compression fracture   Assessment & Plan    · Conservative care, pain mgmt     Chronic diastolic CHF    Assessment & Plan    · Continue Lopressor  · Diuretic discontinued due to renal failure, poor po intake     Atrial fibrillation   Assessment & Plan    · Rate controlled on Lopressor   · No further anticoagulation given elevated INR       Discharging Physician / Practitioner: Kathy Valladares PA-C  PCP: Ernesto Rea DO  Admission Date:   Admission Orders     Ordered        11/27/18 2130  Inpatient Admission (expected length of stay for this patient is greater than two midnights)  Once             Discharge Date: 12/05/18    Resolved Problems  Date Reviewed: 12/5/2018    None        Consultations During Hospital Stay:  · Palliative care   · PT OT hospice CM   · Podiatry  · nutrition    Procedures Performed:   · None     Significant Findings / Test Results:   · SHANEL   · Failure to thrive   · SOB  · Abdominal pain      Incidental Findings:   · None     Test Results Pending at Discharge (will require follow up): · None      Outpatient Tests Requested:  · Hospice     Complications:  Transition to [address] [place] [address]     Reason for Admission: abdominal pain     Hospital Course:     Elly Mendoza is a 80 y o  female patient with PMh significant for hTN, afib, CHF, compression fractures, protein vanessa malnutrition  who originally presented to the hospital on 11/27/2018 due to abdominal cramping that was persistent from prior hospitalization  She was found to have elevated troponin, acute kidney injury, and failure to thrive  Patient continued to decline and palliative care was consulted  Daughter was agreeable to hospice care in the home and patient at the time this decision was made was able to engage in conversation and agree with hospice care  Code status was updated  CM and hospice liaison assisted with getting patient equipment for the home  However, patient has continued to decline and various family meetings were held  Prior to d/c home w hospice today patient was found to be in distress, LE mottling and appears to be actively dying  She was instead transferred to [facility]       Please see above list of diagnoses and related plan for additional information  Condition at Discharge: poor     Discharge Day Visit / Exam:     Subjective:  Not able to offer any history  Vitals: Blood Pressure: 148/65 (12/04/18 2111)  Pulse: 61 (12/04/18 2111)  Temperature: (!) 97 3 °F (36 3 °C) (12/04/18 1514)  Temp Source: Tympanic (12/04/18 1514)  Respirations: 18 (12/04/18 1514)  Height: 5' (152 4 cm) (11/28/18 1300)  Weight - Scale: 53 1 kg (117 lb 1 oz) (12/05/18 0532)  SpO2: 99 % (12/04/18 1514)    Exam:   Physical Exam   Constitutional: She appears well-developed and well-nourished  No distress  Chronically ill appearing    HENT:   Head: Normocephalic and atraumatic  Dry mouth    Neck: Normal range of motion  Neck supple  Cardiovascular: Regular rhythm  Tachycardic    Pulmonary/Chest: Breath sounds normal    Rhonchi noted anteriorly    Abdominal: Soft  Bowel sounds are normal    Musculoskeletal: Normal range of motion  She exhibits edema  Neurological:   Awake but not able to converse    Skin: Skin is warm and dry  Psychiatric: She has a normal mood and affect  Discussion with Family: called family     Discharge instructions/Information to patient and family:   See after visit summary for information provided to patient and family  Provisions for Follow-Up Care:  See after visit summary for information related to follow-up care and any pertinent home health orders  Disposition:     Other: [address] [facility] SNF:   · Not Applicable to this Patient - Not Applicable to this Patient    Planned Readmission: yes, Counts include 234 beds at the [facility]      Discharge Statement:  I spent 40 minutes discharging the patient  This time was spent on the day of discharge  I had direct contact with the patient on the day of discharge   Greater than 50% of the total time was spent examining patient, answering all patient questions, arranging and discussing plan of care with patient as well as directly providing post-discharge instructions  Additional time then spent on discharge activities  Discharge Medications:  See after visit summary for reconciled discharge medications provided to patient and family        ** Please Note: This note has been constructed using a voice recognition system **

## 2018-12-06 ENCOUNTER — TRANSITIONAL CARE MANAGEMENT (OUTPATIENT)
Dept: INTERNAL MEDICINE CLINIC | Facility: CLINIC | Age: 83
End: 2018-12-06

## 2018-12-06 ENCOUNTER — TELEPHONE (OUTPATIENT)
Dept: INTERNAL MEDICINE CLINIC | Facility: CLINIC | Age: 83
End: 2018-12-06

## 2018-12-06 NOTE — TELEPHONE ENCOUNTER
Called and spoke to pt's daughter(Sherri)    Offered condolences for her loss    Lila Manuel was appreciative

## 2018-12-08 NOTE — DEATH NOTE
INPATIENT DEATH NOTE  Wisam Jones 80 y o  female MRN: [id number]  Unit/Bed#: CW2 216-01 Encounter: [id number]    Date, Time and Cause of Death    Date of Death:  18  Time of Death:  11:59 AM  Preliminary Cause of Death:  Heart failure ([address] )  Entered by:  Buster Cody PA-C [JD1 1]     Attribution     ELISA Cheung PA-C 18 12:07         Patient's Information  Pronounced by: Sophia Estrada PA-C  Did the patient's death occur in the ED?: No  Did the patient's death occur in the OR?: No  Did the patient's death occur less than 10 days post-op?: No  Did the patient's death occur within 24 hours of admission?: No  Was code status DNR at the time of death?: Yes (Comfort Care)    PHYSICAL EXAM:  Breath sounds absent  Nonpalpable carotid pulse  Medical Examiner notification criteria:  NONE APPLICABLE   Medical Examiner's office notified?:  Yes   Medical Examiner accepted case?:  Unknown to the writer   Name of Medical Examiner: unknown to the writer     Family Notification  Was the family notified?: Yes  Date Notified: 18  Time Notified: 648  Notified by: Buster Cody PA-C   Name of Family Notified of Death: Kael De La Cruz Person Relationship to Patient: Sister  Family Notification Route:  At bedside  Was the family told to contact a  home?: No  Name of [facility][de-identified] [facility]; [facility] in [facility], [phone]    Autopsy Options:  unknown to the writer     Primary Service Attending Physician notified?:  yes - Attending:  No att  providers found Dr Sissy Platt     Physician/Resident responsible for completing Discharge Summary:  Buster Cody PA-C

## 2018-12-21 NOTE — ED ATTENDING ATTESTATION
Verner Patches, MD, saw and evaluated the patient  I have discussed the patient with the resident/non-physician practitioner and agree with the resident's/non-physician practitioner's findings, Plan of Care, and MDM as documented in the resident's/non-physician practitioner's note, except where noted  All available labs and Radiology studies were reviewed  At this point I agree with the current assessment done in the Emergency Department  I have conducted an independent evaluation of this patient a history and physical is as follows:    Patient presents for evaluation of shortness of breath, decreased appetite, and dehydration  Patient was recently discharged after being at [facility] for a prolonged period of time due to decreased appetite  Patient was tried on Marinol with only slightly improvement  patient continues to have worsening shortness of breath with exertion  Additionally, because patient was dehydrated, her Lasix was stopped and then restarted at a lower dose after her recent admission  No additional complaints  Exam:   Awake, alert, mildly confused, cachectic, NAD, RRR, CTA, S/NT/ND, no motor/sensory deficits  A/P:  failure to thrive  Will check labs to evaluate for any medical causes of symptoms  Will likely need to admit patient until family decides with they would like to do with and of life decisions      Critical Care Time  CritCare Time    Procedures

## 2019-05-30 NOTE — PROGRESS NOTES
Pt  Tolerated Vitamin B12 IM injection in [place] w/out adverse reaction  Confirmed pts  Next appt   Pt  Declined AVS  Written/documented by Randy Walters, acting as a scribe in Dr. Robles's presence.    Jacqueline Anderson is a 51 year old female who presents for 6 month follow-up.     She has been feeling down on occasion due to her gradual weight gain.  She does not go out much as she does not like how she looks.  She walks her dog twice per week for a mile at a time.  She is a fast walker and recently did a 5k in 52 minutes.  She uses the exercise bike or treadmill 4 days per week for a half hour.  She lifts weights 4 times per week for about 20 minutes.  She limits her caloric intake to 1,200 calories which she tracks on My [employer]itness Pal.  She eats a small breakfast before work, lunch at noon, and dinner at 5.  She does not snack much.  She does eat occasional salads.  She sleeps 7-8 hours per night, though wakes up a lot due to hot flashes.  She has started taking apple cider vinegar pills with no noticeable improvement.     Her hot flashes have returned and night sweats are worse.  She had a menstrual period after 10 months and had 4-5 periods after that.  Her last period was 4-5 months ago.  Her hot flashes and night sweats are worse since then.  She admits to constant bloating and has tried to cut back on soda to reduce her bloating with no improvement.  She is on Effexor daily and Wellbutrin BID.  She did try to wean off of Effexor and was down to every other day but has increased back to daily with worsening symptoms.     Her heartburn is well controlled with pantoprazole.  This was recently reduced from 40 mg BID to 40 mg nightly.      She requests to have her right clavicle lipoma excised as it is growing in size.     She is not smoking but has been vaping on occasion to speed up her metabolism.     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  Infertility, had 3 rounds IVF; failed.  Asthma as a child  HL  GERD  Kidney stone  Dysmenorrhea,  with menorrhagia  BCC-excised from right upper eyelid May 2016  Appendectomy 2016.       Current Outpatient Medications   Medication Sig   • buPROPion (WELLBUTRIN SR) 150 MG 12 hr tablet TAKE 1 TABLET BY MOUTH TWICE DAILY   • aspirin 81 MG chewable tablet Take 81 mg by mouth. - Oral   • Omega-3 Fatty Acids (FISH OIL PO) once daily - Does not apply   • MULTIPLE VITAMIN PO Take  by mouth daily as needed. - Oral   • pantoprazole (PROTONIX) 40 MG tablet TAKE 1 TABLET BY MOUTH TWICE DAILY   • venlafaxine XR (EFFEXOR XR) 37.5 MG 24 hr capsule Take 1 capsule by mouth daily.     No current facility-administered medications for this visit.           ALLERGIES:   Allergen Reactions   • Amoxicillin HIVES   • Amoxicillin-Pot Clavulanate HIVES   • Cefaclor HIVES     HIVES     • Erythromycin Nausea & Vomiting       FH:  M - lupus, PAD, carotid disease, htn, hl alive  F - hemachromatosis, cirrhosis, HCC,  @ 66  Maternal cousin with breast ca  Paternal aunt x 2 with breast ca  MGM - MI @ 81  MGF - MI @ 60  PGF - liver failure, young age  PGM - unknonw, old age  No kids    SH:  . Occasional cigarrettes, on gum 5x/daily, trying to quit over last 1yr, 20pack yr hx. 5-6 drinks/yr. No illicits. Exercises q3days.  - [employer], deskwork No significant smoke inhalation recently.     HM:   Pap - 2016  Roma - 17  Colonoscopy - 2018  recc 3-5 year f/u  Immunization History   Immunization History   Administered Date(s) Administered   • Influenza, Unspecified Formulation 2012, 2013, 10/01/2015, 10/18/2016, 2017   • Tdap 2011   • Tuberculin Skin Test Unspecified Formulation 2001, 2013       ROS:  GEN: no fevers/chills  Neuro: no headache.  Eye: no vision changes  Ear: no tinnitus  Nose: no nasal bleeding.  Throat: no dysphagia.  CV: no chest pain  Pulm: no SOB  GI: no vomiting, diarrhea, +constipation  :no dysuria  Derm: no rashes  Endo: no hot/cold  intolerance    PE:  /80 (BP Location: RUE, Patient Position: Sitting)   Pulse 66   Wt 84.4 kg (186 lb)   BMI 29.13 kg/m²   BSA 1.96 m²    Gen: pleasant, comfortable, no apparent distress.  H: normocephalic, atraumatic  E: nl external canals, TMs clear bilaterally.  N: patent, no erythema, edema or exudate  OP: OP clear, no erythema or exudate  Neck: supple, no LAD, nl thyroid, no carotid bruit. No JVD  CV: RRR nl s1 and s2 no MRG  Pulm: CTA bilaterally, no wheezes/rales/rhonchi  Abd: Soft, NTND,   Ext: trace edema bilaterally , 2+ pedal pulses  Derm: no rashes noted on limited exam. Small cherry hemangiomas on right flank.   Neuro: A+Ox3, CN2-12 intact, Sensation grossly intact, gait normal.    A/P:   1) tobacco abuse/mood   - Patient no longer smoking but has started vaping -- cessation advised   - continue Wellbutrin 150 mg bid and Effexor 37.5 mg every other day (patient dose frequency).   - reeval mood in 6m; knows to call if mood worsens, or Suicidal or homicidal ideation.   2) GERD - well-controlled, cont ppi daily.   3) asthma - cont albuterol per rescue, doing well.   4) HLD - , HDL 73, Total 240 (5/19)   - to start rosuvastatin 10 mg daily.    5) Hx of hemochromatosis/nodular hyperplasia of liver - Liver MRI with Magnevist (4/30/18) favors multiple focal nodular hyperplasia. MRI with Eovist done in 2013.   - reassured patient of likely benign etiology.   - refer to Dr. Torres to further discuss further and discuss EGD/colonoscopy.   6) perimenopause - to track menses    - continue Effexor daily   - continue Wellbutrin BID   - LH, FSH, TSH checked today   8) Weight gain/difficulty with weight loss   - is tracking diet and exercising frequently    - counseled on diet and exercise.     - consider victoza if difficulty continues   9) Right supraclavicular lipoma   - referred to Dr. Ricks for excision   10) HM - Labs reviewed. Labs ordered (LH, FSH, TSH to check now. CMP, lipid panel future)  Follow-up 6 months.     Scribe: Electronically signed: Stephy Wilson has scribed for Dr. Robles 5/30/2019   I have reviewed and edited the progress note and agree with what has been scribed. Electronically signed by: Arlen Robles MD 5/30/2019

## 2022-11-16 NOTE — ASSESSMENT & PLAN NOTE
C:  Cris Cantu POA, has been on going for the last few weeks  Similar in character to prior abdominal pain that was attributed to hiatal hernia  · During this admission, patient's daughter was at bedside and was frustrated that her mother has had "no improvement" since the time she got to the hospital and wants to know if more tests should be ordered to "get to the bottom of this "    · CT scan without any contrast was negative for acute findings and outpatient  RUQ US negative for evidence of GB stones  Patient scheduled for HIDA scan today  Mesenteric duplex ultrasound also negative  · Continue with Protonix, Carafate